# Patient Record
Sex: MALE | Race: WHITE | NOT HISPANIC OR LATINO | Employment: UNEMPLOYED | URBAN - METROPOLITAN AREA
[De-identification: names, ages, dates, MRNs, and addresses within clinical notes are randomized per-mention and may not be internally consistent; named-entity substitution may affect disease eponyms.]

---

## 2017-01-17 ENCOUNTER — TELEPHONE (OUTPATIENT)
Dept: FAMILY MEDICINE | Facility: CLINIC | Age: 47
End: 2017-01-17

## 2017-01-17 DIAGNOSIS — E13.9 DIABETES MELLITUS TYPE 1.5, MANAGED AS TYPE 1 (H): Primary | ICD-10-CM

## 2017-01-17 NOTE — TELEPHONE ENCOUNTER
Reason for Call:  Other prescription    Detailed comments: pharmacy calling on the lantus. The patient has always been on the solostar. This rx if for the vial. Which is correct.     Phone Number Patient can be reached at: Other phone number:  Number above.     Best Time: any    Can we leave a detailed message on this number? YES    Call taken on 1/17/2017 at 4:08 PM by Bhavana Ang

## 2017-01-18 NOTE — TELEPHONE ENCOUNTER
Spoke with pharmacy, patient has prescription for vials but an order for pen needles.  What does patient want?    Voice mail left for patient to call RN hotline at [phone].    Is patient using vials for insulin or pens?  Does patient need syringes?    Maya Ponce RN

## 2017-01-18 NOTE — TELEPHONE ENCOUNTER
Pt returned call. Uses the Lantus solostar pen.  Pt does not need pen needles at this time.  Rx sent.  Yvette Colindres RN

## 2017-03-08 ENCOUNTER — MYC MEDICAL ADVICE (OUTPATIENT)
Dept: FAMILY MEDICINE | Facility: CLINIC | Age: 47
End: 2017-03-08

## 2017-03-08 DIAGNOSIS — E13.9 DIABETES MELLITUS TYPE 1.5, MANAGED AS TYPE 1 (H): ICD-10-CM

## 2017-03-08 NOTE — TELEPHONE ENCOUNTER
Routing refill request to provider for review/approval because:  Labs not current:  Microalbumin  Please also see [employer] message below. Please advise.    Mae Edwards RN - ELIJAH Landa         Last Written Prescription Date: 1/18/17  Last Fill Quantity: 15 mL, # refills: 0  Last Office Visit with [facility] or  Health prescribing provider:  9/12/16   Next 5 appointments (look out 90 days)     Mar 24, 2017 12:45 PM CDT   Office Visit with Padmini Arthur MD   [facility] ([facility]    [address] [place] [id number]   [phone]                   BP Readings from Last 3 Encounters:   09/12/16 110/78   07/06/16 108/88   06/09/16 118/84     No results found for: MICROL  No results found for: MICROALBUMIN  Creatinine   Date Value Ref Range Status   12/09/2015 0.72 0.66 - 1.25 mg/dL Final   ]  GFR Estimate   Date Value Ref Range Status   12/09/2015 >90  Non  GFR Calc   >60 mL/min/1.7m2 Final   11/26/2015 >60 ml/min/1.73m2 Final   10/08/2014 >90  Non  GFR Calc   >60 mL/min/1.7m2 Final     GFR Estimate If Black   Date Value Ref Range Status   12/09/2015 >90   GFR Calc   >60 mL/min/1.7m2 Final   11/26/2015 >60 ml/min/1.73m2 Final   10/08/2014 >90   GFR Calc   >60 mL/min/1.7m2 Final     Lab Results   Component Value Date    CHOL 196 09/12/2016     Lab Results   Component Value Date    HDL 92 09/12/2016     Lab Results   Component Value Date    LDL 92 09/12/2016     12/09/2015     05/09/2008     Lab Results   Component Value Date    TRIG 60 09/12/2016     Lab Results   Component Value Date    CHOLHDLRATIO 2.7 10/08/2014     Lab Results   Component Value Date     09/12/2016     Lab Results   Component Value Date     09/12/2016     Lab Results   Component Value Date    A1C 7.5 09/12/2016    A1C 7.9 06/09/2016    A1C 8.4 12/09/2015    A1C 7.2 08/10/2015    A1C 7.3 05/11/2015     Potassium    Date Value Ref Range Status   12/09/2015 4.4 3.4 - 5.3 mmol/L Final
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
## 2017-03-24 ENCOUNTER — OFFICE VISIT (OUTPATIENT)
Dept: FAMILY MEDICINE | Facility: CLINIC | Age: 47
End: 2017-03-24
Payer: COMMERCIAL

## 2017-03-24 VITALS
OXYGEN SATURATION: 98 % | BODY MASS INDEX: 25.11 KG/M2 | HEART RATE: 80 BPM | SYSTOLIC BLOOD PRESSURE: 140 MMHG | TEMPERATURE: 97.2 F | WEIGHT: 175 LBS | DIASTOLIC BLOOD PRESSURE: 80 MMHG

## 2017-03-24 DIAGNOSIS — G25.81 RESTLESS LEG SYNDROME: ICD-10-CM

## 2017-03-24 DIAGNOSIS — F41.1 GENERALIZED ANXIETY DISORDER: ICD-10-CM

## 2017-03-24 DIAGNOSIS — R07.89 ATYPICAL CHEST PAIN: Primary | ICD-10-CM

## 2017-03-24 DIAGNOSIS — E13.9 DIABETES MELLITUS TYPE 1.5, MANAGED AS TYPE 1 (H): ICD-10-CM

## 2017-03-24 DIAGNOSIS — Z63.32 FAMILY DISRUPTION DUE TO CHILD IN WELFARE CUSTODY: ICD-10-CM

## 2017-03-24 DIAGNOSIS — I10 HYPERTENSION GOAL BP (BLOOD PRESSURE) < 140/90: ICD-10-CM

## 2017-03-24 DIAGNOSIS — E78.5 HYPERLIPIDEMIA LDL GOAL <100: ICD-10-CM

## 2017-03-24 DIAGNOSIS — Z79.4 ENCOUNTER FOR LONG-TERM (CURRENT) USE OF INSULIN (H): ICD-10-CM

## 2017-03-24 DIAGNOSIS — Z62.21 FAMILY DISRUPTION DUE TO CHILD IN WELFARE CUSTODY: ICD-10-CM

## 2017-03-24 LAB
ANION GAP SERPL CALCULATED.3IONS-SCNC: 15 MMOL/L (ref 3–14)
BUN SERPL-MCNC: 10 MG/DL (ref 7–30)
CALCIUM SERPL-MCNC: 9.5 MG/DL (ref 8.5–10.1)
CHLORIDE SERPL-SCNC: 100 MMOL/L (ref 94–109)
CO2 SERPL-SCNC: 24 MMOL/L (ref 20–32)
CREAT SERPL-MCNC: 0.71 MG/DL (ref 0.66–1.25)
GFR SERPL CREATININE-BSD FRML MDRD: ABNORMAL ML/MIN/1.7M2
GLUCOSE SERPL-MCNC: 313 MG/DL (ref 70–99)
HBA1C MFR BLD: 8.1 % (ref 4.3–6)
LDLC SERPL DIRECT ASSAY-MCNC: 109 MG/DL
POTASSIUM SERPL-SCNC: 4.8 MMOL/L (ref 3.4–5.3)
SODIUM SERPL-SCNC: 139 MMOL/L (ref 133–144)
TROPONIN I SERPL-MCNC: NORMAL UG/L (ref 0–0.04)

## 2017-03-24 PROCEDURE — 83721 ASSAY OF BLOOD LIPOPROTEIN: CPT | Performed by: FAMILY MEDICINE

## 2017-03-24 PROCEDURE — 83036 HEMOGLOBIN GLYCOSYLATED A1C: CPT | Performed by: FAMILY MEDICINE

## 2017-03-24 PROCEDURE — 84484 ASSAY OF TROPONIN QUANT: CPT | Performed by: FAMILY MEDICINE

## 2017-03-24 PROCEDURE — 80048 BASIC METABOLIC PNL TOTAL CA: CPT | Performed by: FAMILY MEDICINE

## 2017-03-24 PROCEDURE — 99214 OFFICE O/P EST MOD 30 MIN: CPT | Performed by: FAMILY MEDICINE

## 2017-03-24 PROCEDURE — 93000 ELECTROCARDIOGRAM COMPLETE: CPT | Performed by: FAMILY MEDICINE

## 2017-03-24 PROCEDURE — 36415 COLL VENOUS BLD VENIPUNCTURE: CPT | Performed by: FAMILY MEDICINE

## 2017-03-24 RX ORDER — ROPINIROLE 0.25 MG/1
0.25-0.5 TABLET, FILM COATED ORAL AT BEDTIME
Qty: 180 TABLET | Refills: 1 | Status: SHIPPED | OUTPATIENT
Start: 2017-03-24 | End: 2017-09-25

## 2017-03-24 RX ORDER — ESCITALOPRAM OXALATE 10 MG/1
10 TABLET ORAL DAILY
Qty: 30 TABLET | Refills: 1 | Status: SHIPPED | OUTPATIENT
Start: 2017-03-24 | End: 2017-05-26

## 2017-03-24 RX ORDER — BUPROPION HYDROCHLORIDE 300 MG/1
300 TABLET ORAL EVERY MORNING
Qty: 90 TABLET | Refills: 1 | Status: SHIPPED | OUTPATIENT
Start: 2017-03-24 | End: 2017-09-11

## 2017-03-24 RX ORDER — ASPIRIN 325 MG
325 TABLET ORAL DAILY
Qty: 1 TABLET | Refills: 0
Start: 2017-03-24 | End: 2017-09-11

## 2017-03-24 RX ORDER — ALPRAZOLAM 0.5 MG
0.5 TABLET ORAL 3 TIMES DAILY PRN
Qty: 45 TABLET | Refills: 1 | Status: SHIPPED | OUTPATIENT
Start: 2017-03-24 | End: 2017-08-21

## 2017-03-24 ASSESSMENT — ANXIETY QUESTIONNAIRES
5. BEING SO RESTLESS THAT IT IS HARD TO SIT STILL: NOT AT ALL
6. BECOMING EASILY ANNOYED OR IRRITABLE: SEVERAL DAYS
2. NOT BEING ABLE TO STOP OR CONTROL WORRYING: NOT AT ALL
3. WORRYING TOO MUCH ABOUT DIFFERENT THINGS: SEVERAL DAYS
1. FEELING NERVOUS, ANXIOUS, OR ON EDGE: SEVERAL DAYS
7. FEELING AFRAID AS IF SOMETHING AWFUL MIGHT HAPPEN: SEVERAL DAYS
IF YOU CHECKED OFF ANY PROBLEMS ON THIS QUESTIONNAIRE, HOW DIFFICULT HAVE THESE PROBLEMS MADE IT FOR YOU TO DO YOUR WORK, TAKE CARE OF THINGS AT HOME, OR GET ALONG WITH OTHER PEOPLE: NOT DIFFICULT AT ALL
GAD7 TOTAL SCORE: 4

## 2017-03-24 ASSESSMENT — PATIENT HEALTH QUESTIONNAIRE - PHQ9: 5. POOR APPETITE OR OVEREATING: NOT AT ALL

## 2017-03-24 ASSESSMENT — PAIN SCALES - GENERAL: PAINLEVEL: NO PAIN (0)

## 2017-03-24 NOTE — NURSING NOTE
"Chief Complaint   Patient presents with     Recheck Medication     Diabetes     Hypertension     Lipids     Health Maintenance     PHQ-9, GAIL, Mircroalbumin, A1C, Lipid       Initial /78  Pulse 91  Temp 97.2  F (36.2  C) (Oral)  Wt 175 lb (79.4 kg)  SpO2 99%  BMI 25.11 kg/m2 Estimated body mass index is 25.11 kg/(m^2) as calculated from the following:    Height as of 7/6/16: 5' 10\" (1.778 m).    Weight as of this encounter: 175 lb (79.4 kg).  Medication Reconciliation: complete      "

## 2017-03-24 NOTE — PATIENT INSTRUCTIONS
Your ekg has changed slightly  I am concerned, because you continue to have pain, that you may be having angina (heart pain).  I do recommend going to the ER now because of your symptoms-- it's the only way that we can be sure that there is no significant blockage in the heart.     We will connect after the ER (Monday) to talk about the next steps for your health.
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
## 2017-03-25 ASSESSMENT — PATIENT HEALTH QUESTIONNAIRE - PHQ9: SUM OF ALL RESPONSES TO PHQ QUESTIONS 1-9: 1

## 2017-03-25 ASSESSMENT — ANXIETY QUESTIONNAIRES: GAD7 TOTAL SCORE: 4

## 2017-04-03 DIAGNOSIS — I10 HYPERTENSION GOAL BP (BLOOD PRESSURE) < 140/90: Primary | ICD-10-CM

## 2017-04-03 NOTE — TELEPHONE ENCOUNTER
lisinopril (PRINIVIL,ZESTRIL) 40 MG tablet      Last Written Prescription Date: 6/9/16  Last Fill Quantity: 90, # refills: 1  Last Office Visit with G, P or [facility] prescribing provider: 3/24/17       Potassium   Date Value Ref Range Status   03/24/2017 4.8 3.4 - 5.3 mmol/L Final     Creatinine   Date Value Ref Range Status   03/24/2017 0.71 0.66 - 1.25 mg/dL Final     BP Readings from Last 3 Encounters:   03/24/17 140/80   09/12/16 110/78   07/06/16 108/88

## 2017-04-04 RX ORDER — LISINOPRIL 40 MG/1
40 TABLET ORAL DAILY
Qty: 90 TABLET | Refills: 3 | Status: SHIPPED | OUTPATIENT
Start: 2017-04-04 | End: 2018-08-21

## 2017-04-04 NOTE — TELEPHONE ENCOUNTER
Prescription approved per [facility] Refill Protocol.  Mae Edwards, RN - BC

## 2017-04-17 ENCOUNTER — MYC MEDICAL ADVICE (OUTPATIENT)
Dept: FAMILY MEDICINE | Facility: CLINIC | Age: 47
End: 2017-04-17

## 2017-04-17 DIAGNOSIS — E10.9 TYPE 1 DIABETES MELLITUS WITHOUT COMPLICATION (H): Primary | ICD-10-CM

## 2017-05-26 DIAGNOSIS — E13.9 DIABETES MELLITUS TYPE 1.5, MANAGED AS TYPE 1 (H): ICD-10-CM

## 2017-05-26 DIAGNOSIS — F41.1 GENERALIZED ANXIETY DISORDER: ICD-10-CM

## 2017-05-26 RX ORDER — ESCITALOPRAM OXALATE 10 MG/1
10 TABLET ORAL DAILY
Qty: 30 TABLET | Refills: 1 | Status: SHIPPED | OUTPATIENT
Start: 2017-05-26 | End: 2017-08-20

## 2017-05-26 NOTE — TELEPHONE ENCOUNTER
blood glucose monitoring Freestyle lite test strip      Last Written Prescription Date: 01/17/17  Last Fill Quantity: 300,  # refills: 1   Last Office Visit with [facility] or  Health prescribing provider: 03/24/17    escitalopram (LEXAPRO) 10 MG tablet       Last Written Prescription Date: 03/24/17  Last Fill Quantity: 30; # refills: 1  Last Office Visit with [facility], [facility] or  Health prescribing provider:  03/24/17        Last PHQ-9 score on record=   PHQ-9 SCORE 3/24/2017   Total Score -   Total Score 1       Lab Results   Component Value Date     09/12/2016     Lab Results   Component Value Date     09/12/2016       Tiara Frost [facility]

## 2017-05-26 NOTE — TELEPHONE ENCOUNTER
Prescription approved per [facility] Refill Protocol.  Mae Edwards, RN - BC

## 2017-07-11 ENCOUNTER — TELEPHONE (OUTPATIENT)
Dept: FAMILY MEDICINE | Facility: CLINIC | Age: 47
End: 2017-07-11

## 2017-07-11 NOTE — TELEPHONE ENCOUNTER
Panel Management Review      Patient has the following on his problem list:     Diabetes    ASA: Passed    Last A1C  Lab Results   Component Value Date    A1C 8.1 03/24/2017    A1C 7.5 09/12/2016    A1C 7.9 06/09/2016    A1C 8.4 12/09/2015    A1C 7.2 08/10/2015     A1C tested: FAILED    Last LDL:    Lab Results   Component Value Date    CHOL 196 09/12/2016     Lab Results   Component Value Date    HDL 92 09/12/2016     Lab Results   Component Value Date     03/24/2017    LDL 92 09/12/2016     Lab Results   Component Value Date    TRIG 60 09/12/2016     Lab Results   Component Value Date    CHOLHDLRATIO 2.7 10/08/2014     Lab Results   Component Value Date    NHDL 104 09/12/2016       Is the patient on a Statin? YES             Is the patient on Aspirin? YES    Medications     HMG CoA Reductase Inhibitors    pravastatin (PRAVACHOL) 40 MG tablet    Salicylates    aspirin 325 MG tablet    aspirin 81 MG EC tablet          Last three blood pressure readings:  BP Readings from Last 3 Encounters:   03/24/17 140/80   09/12/16 110/78   07/06/16 108/88       Date of last diabetes office visit: 03/24/17     Tobacco History:     History   Smoking Status     Former Smoker     Packs/day: 0.40     Types: Cigarettes   Smokeless Tobacco     Never Used     Comment: 2-4  cigarettes daily           Composite cancer screening  Chart review shows that this patient is due/due soon for the following None  Summary:    Patient is due/failing the following:   A1C    Action needed:   Patient needs office visit for Diabetes and anxiety recheck.    Type of outreach:    Phone, left message for patient to call back.     Questions for provider review:    None                                                                                                                                    Leana Martinez MA       Chart routed to Care Team .

## 2017-07-11 NOTE — TELEPHONE ENCOUNTER
Needs to be seen asap for dizziness and neuro symptoms. Can do labs for diabetes at that time.   Will need to see another provider this week for this. Not appropriate for evisit or phone visit.   Book for diabetes/anxiety for 30 min in August-- Ok to use 2 Same day spots  Padmini Arthur MD

## 2017-07-11 NOTE — TELEPHONE ENCOUNTER
Patient returned call transferred to RN due to symptoms of lightheadness/Dizziness. Leana Martinez MA

## 2017-07-11 NOTE — TELEPHONE ENCOUNTER
See triage note below.  Patient needs visit for Diabetic check and anxiety and patient now has dizziness.   Please advise- next 30 min visit is Patricia Ponce RN

## 2017-07-11 NOTE — TELEPHONE ENCOUNTER
Voice mail left for patient to call RN hotline at [phone].  Needs visit this week with any provider for dizziness and for diabetic labs.  Patient also needs Diabetes/anxiety f/u in august with Dr. Jerson murray for 2 same day spots.   Maya Ponce RN

## 2017-07-11 NOTE — TELEPHONE ENCOUNTER
"Patient was called to set up appointment for diabetic and anxiety recheck.  Patient then reported dizziness.   Rudolph Tao is a 47 year old male who calls with dizziness.  Patient is dizzy when lifting things at work like heavy objects and also when turning head around in car to look behind.  This does not happen every time.  Patient denies chest pain, denies SOB, and denies vision changes.  Patient does have ringing in ears for years, this is more noticeable when in a quiet room.   Patient wondering if this is from his lisinopril?  Patient then reported that he has also had numbness in finger tips on left hand.  Denies discoloration and denies changes in skin temperature.  Patient reports this happened to his father and he needed a nerve repair.  Patient wondering if this could be related to the disks in his neck?     NURSING ASSESSMENT:  Description:  dizziness  Onset/duration:  Patient not sure \"a while\"  Precip. factors:  Patient taking lisinopril   Associated symptoms:  Numbness in left finger tips  Improves/worsens symptoms:  nothing  Last exam/Treatment:  3/24/17  Allergies:   Allergies   Allergen Reactions     Avapro [Irbesartan]      Profuse sweating     Crestor [Rosuvastatin Calcium]      cough     Simvastatin      Elevated BS readings     Sulfa Drugs Swelling       MEDICATIONS:   Taking medication(s) as prescribed? Yes  Taking over the counter medication(s?) No  Any medication side effects? No significant side effects    Any barriers to taking medication(s) as prescribed?  No  Medication(s) improving/managing symptoms?  No  Medication reconciliation completed: Yes      NURSING PLAN: Routed to provider Yes    RECOMMENDED DISPOSITION: routing to provider to advise   Will comply with recommendation:  If further questions/concerns or if symptoms do not improve, worsen or new symptoms develop, call your PCP or [facility] as soon as possible.      Guideline used:  Telephone Triage Protocols for " Nurses, Fourth Edition, Lakisha Ponce RN

## 2017-07-13 NOTE — TELEPHONE ENCOUNTER
Patient called the RN hotline and spoke with writer.  RN notified patient of the provider's message as it's written below.  Patient agrees to be seen next Thursday or Friday as he can't come today or tomorrow.  RN helped patient scheduling with Dr. Weathers on 7/20/17 due to patient can't come before 4:30 PM and late provider that is available on Thursday is Dr. Weathers.  Patient states he has to check his August schedule to schedule with Dr. Arthur and declined to schedule now.  Patient states he will call and schedule for the August appointment with Dr. Arthur.       Bola RODRIGUEZ RN, BSN

## 2017-08-18 ENCOUNTER — TELEPHONE (OUTPATIENT)
Dept: FAMILY MEDICINE | Facility: CLINIC | Age: 47
End: 2017-08-18

## 2017-08-18 ENCOUNTER — NURSE TRIAGE (OUTPATIENT)
Dept: NURSING | Facility: CLINIC | Age: 47
End: 2017-08-18

## 2017-08-18 NOTE — TELEPHONE ENCOUNTER
"  Reason for Disposition    [1] MODERATE dizziness (e.g., interferes with normal activities) AND [2] has NOT been evaluated by physician for this  (Exception: dizziness caused by heat exposure, sudden standing, or poor fluid intake)     \"I have noticed recently that I've been getting dizzy and light headed. I had a stress test and a work up that was normal. I also had a recent appt. With my doctor that I needed to cancel. (see epic). I am not having chest pain or palpitations this time. It's just really light headed and usually upon exertion. I have not checked my blood pressure or blood sugar yet today. I also have noticed my left fingers (only) feel numb some times and some \"mild\" nausea.\" denies other sx. Triaged, gave home care advice or when to seek ER. Also advised appt with PCP. Transferred to scheduling for appt.    Additional Information    Negative: Severe difficulty breathing (e.g., struggling for each breath, speaks in single words)    Negative: [1] Difficulty breathing or swallowing AND [2] started suddenly after medicine, an allergic food or bee sting    Negative: Shock suspected (e.g., cold/pale/clammy skin, too weak to stand, low BP, rapid pulse)    Negative: Difficult to awaken or acting confused  (e.g., disoriented, slurred speech)    Negative: [1] Weakness (i.e., paralysis, loss of muscle strength) of the face, arm or leg on one side of the body AND [2] sudden onset AND [3] present now    Negative: [1] Numbness (i.e., loss of sensation) of the face, arm or leg on one side of the body AND [2] sudden onset AND [3] present now    Negative: [1] Loss of speech or garbled speech AND [2] sudden onset AND [3] present now    Negative: Overdose (accidental or intentional) of medications    Negative: [1] Fainted > 15 minutes ago AND [2] still feels too weak or dizzy to stand    Negative: Heart beating < 50 beats per minute OR > 140 beats per minute    Negative: Sounds like a life-threatening emergency to the " "triager    Negative: Chest pain    Negative: Rectal bleeding, bloody stool, or tarry-black stool    Negative: [1] Vomiting AND [2] contains red blood or black (\"coffee ground\") material    Negative: Vomiting is main symptom    Negative: Diarrhea is main symptom    Negative: Headache is main symptom    Negative: Patient states that he/she is having an anxiety/panic attack    Negative: Dizziness from low blood sugar (i.e., < 60 mg/dl or 3.5 mmol/l)    Negative: Dizziness is described as a spinning sensation (i.e., vertigo)    Negative: Heat exhaustion suspected    Negative: Difficulty breathing    Negative: SEVERE dizziness (e.g., unable to stand, requires support to walk, feels like passing out now)    Negative: Extra heart beats OR irregular heart beating  (i.e., \"palpitations\")    Negative: [1] Drinking very little AND [2] dehydration suspected (e.g., no urine > 12 hours, very dry mouth, very lightheaded)    Negative: Patient sounds very sick or weak to the triager    Negative: [1] Dizziness caused by heat exposure, sudden standing, or poor fluid intake AND [2] no improvement after 2 hours of rest and fluids    Negative: [1] Fever > 103 F (39.4 C) AND [2] not able to get the fever down using Fever Care Advice    Negative: [1] Fever > 101 F (38.3 C) AND [2] age > 60    Negative: [1] Fever > 101 F (38.3 C) AND [2] bedridden (e.g., nursing home patient, CVA, chronic illness, recovering from surgery)    Negative: [1] Fever > 100.5 F (38.1 C) AND [2] diabetes mellitus or weak immune system (e.g., HIV positive, cancer chemo, splenectomy, organ transplant, chronic steroids)    Protocols used: DIZZINESS - LIGHTHEADEDNESS-ADULT-AH    "

## 2017-08-18 NOTE — TELEPHONE ENCOUNTER
Reason for call:  Patient reporting a symptom    Symptom or request: Dizziness, numbness in fingers .    Duration (how long have symptoms been present): few days.    Have you been treated for this before? No    Additional comments: Patient would like to schedule appointment . States he is not feeling well , abdominal pain, dizzy and numbness in his fingers.      Phone Number patient can be reached at:  Home number on file [phone] (home)    Best Time:  any    Can we leave a detailed message on this number:  YES    Call taken on 8/18/2017 at 12:14 PM by Anjali Riggs

## 2017-08-20 ENCOUNTER — TELEPHONE (OUTPATIENT)
Dept: FAMILY MEDICINE | Facility: CLINIC | Age: 47
End: 2017-08-20

## 2017-08-20 DIAGNOSIS — F41.1 GENERALIZED ANXIETY DISORDER: ICD-10-CM

## 2017-08-21 ENCOUNTER — OFFICE VISIT (OUTPATIENT)
Dept: FAMILY MEDICINE | Facility: CLINIC | Age: 47
End: 2017-08-21
Payer: COMMERCIAL

## 2017-08-21 VITALS
BODY MASS INDEX: 26.97 KG/M2 | OXYGEN SATURATION: 98 % | HEIGHT: 67 IN | HEART RATE: 90 BPM | SYSTOLIC BLOOD PRESSURE: 130 MMHG | TEMPERATURE: 98.4 F | DIASTOLIC BLOOD PRESSURE: 84 MMHG | WEIGHT: 171.8 LBS

## 2017-08-21 DIAGNOSIS — F41.1 GENERALIZED ANXIETY DISORDER: ICD-10-CM

## 2017-08-21 DIAGNOSIS — H93.13 TINNITUS, BILATERAL: ICD-10-CM

## 2017-08-21 DIAGNOSIS — F17.200 TOBACCO USE DISORDER: ICD-10-CM

## 2017-08-21 DIAGNOSIS — R20.2 PARESTHESIAS: ICD-10-CM

## 2017-08-21 DIAGNOSIS — G56.03 BILATERAL CARPAL TUNNEL SYNDROME: ICD-10-CM

## 2017-08-21 DIAGNOSIS — R42 DIZZINESS: Primary | ICD-10-CM

## 2017-08-21 PROCEDURE — 99214 OFFICE O/P EST MOD 30 MIN: CPT | Performed by: FAMILY MEDICINE

## 2017-08-21 RX ORDER — ALPRAZOLAM 0.5 MG
0.5 TABLET ORAL 3 TIMES DAILY PRN
Qty: 45 TABLET | Refills: 0 | Status: SHIPPED | OUTPATIENT
Start: 2017-08-21 | End: 2021-03-30

## 2017-08-21 NOTE — PATIENT INSTRUCTIONS
Plan:   1. Hold lexapro in meantime, continue Lisinopril and if gets dizzy will consider cutting of Lisinopril.dose  2. Splint for CTS    [facility]    If you have any questions regarding to your visit please contact your care team:       Team Purple:   Clinic Hours Telephone Number   Dr. Margie Arthur     7am-7pm  Monday - Thursday   7am-5pm  Fridays  [phone]  (Appointment scheduling available 24/7)    Questions about your Visit?   Team Line:  [phone]   Urgent Care - [place] - 11am-9pm Monday-Friday Saturday-Sunday- 9am-5pm   [place] - 5pm-9pm Monday-Friday Saturday-Sunday- 9am-5pm  [phone] - [place]   [phone] - [place]       What options do I have for visits at the clinic other than the traditional office visit?  To expand how we care for you, many of our providers are utilizing electronic visits (e-visits) and telephone visits, when medically appropriate, for interactions with their patients rather than a visit in the clinic.   We also offer nurse visits for many medical concerns. Just like any other service, we will bill your insurance company for this type of visit based on time spent on the phone with your provider. Not all insurance companies cover these visits. Please check with your medical insurance if this type of visit is covered. You will be responsible for any charges that are not paid by your insurance.      E-visits via [email or web address]:  generally incur a $35.00 fee.  Telephone visits:  Time spent on the phone: *charged based on time that is spent on the phone in increments of 10 minutes. Estimated cost:   5-10 mins $30.00   11-20 mins. $59.00   21-30 mins. $85.00     Use [email or web address] (secure email communication and access to your chart) to send your primary care provider a message or make an appointment. Ask someone on your Team how to sign up for [email or web address].  For a Price Quote for your services, please  call our Consumer Price Line at [phone].  As always, Thank you for trusting us with your health care needs!    Discharged by Padmini FIELDS CMA ([facility])

## 2017-08-21 NOTE — NURSING NOTE
"Chief Complaint   Patient presents with     Dizziness     light headness  and dizzness     Ear Problem     ringing in ear      Numbness     having numbness in left hand fingertips     Spasms     in bilateral hand but mostly in right hand      Medication Reconciliation     stopped taking Lisinopril x 2-3 days       Initial /80  Pulse 90  Temp 98.4  F (36.9  C) (Oral)  Ht 5' 7.32\" (1.71 m)  Wt 171 lb 12.8 oz (77.9 kg)  SpO2 98%  BMI 26.65 kg/m2 Estimated body mass index is 26.65 kg/(m^2) as calculated from the following:    Height as of this encounter: 5' 7.32\" (1.71 m).    Weight as of this encounter: 171 lb 12.8 oz (77.9 kg).  Medication Reconciliation: complete     An ALEJANDRO Schilling    "

## 2017-08-21 NOTE — PROGRESS NOTES
SUBJECTIVE:   Rudolph Tao is a 47 year old male who presents to clinic today for the following health issues:    Patient presents with:  Dizziness: light headness  and dizzness  Ear Problem: ringing in ear   Numbness: having numbness in left hand fingertips  Spasms: in bilateral hand but mostly in right hand   Medication Reconciliation: stopped taking Lisinopril x 2-3 days    Dizziness x a while  Gets light headed when exerting.  Has not taken his medication for about 3 days and has not felt dizzy.  Also reports that this started when he started taking Lexapro 3 months ago; added to Wellbutrin for his anxiety arising from a protracted divorce [place].  His BP has also been elevated but low while taking Lisinopril.     Tinnitus:  Has ringing in the ears. Exaggerated when quiet.    Numbness tingling/CTS:  Finger tips with numbness and involuntary muscle spasms for fingers for a while especially middle fingers; for a couple of months worse in the morning. He says his neck is a mess has had MRI just some DDD.  Works in [employer] in sales  Has CTS from previous studies in the past.    Hypertension Follow-up  BP still at goal  BP Readings from Last 6 Encounters:   08/21/17 130/84   03/24/17 140/80   09/12/16 110/78   07/06/16 108/88   06/09/16 118/84   12/31/15 (!) 148/93     GAIL:   Needs refill for Xanax   Helps him when nearly goes into panic attack.   Hx of complicated protracted divorce proceedings.      Outpatient blood pressures are not being checked.    Low Salt Diet: not monitoring salt    Problem list and histories reviewed & adjusted, as indicated.  Additional history: as documented    Patient Active Problem List   Diagnosis     GENERALIZED ANXIETY     Hyperlipidemia LDL goal <100     Encounter for long-term (current) use of insulin (H)     Chronic low back pain     Libido, decreased     Restless legs     Tobacco abuse     Cervicalgia     Paresthesias     Persistent disorder of initiating or maintaining  "sleep     Spasms of the hands or feet     Hypertension goal BP (blood pressure) < 140/90     Right-sided low back pain without sciatica     Type 1 diabetes mellitus without complication (H)     Diabetes mellitus type 1.5, managed as type 1 (H)     Past Surgical History:   Procedure Laterality Date     OSTEOTOMY FOOT  1/8/2013    Procedure: OSTEOTOMY FOOT;  Left Foot Distal First Metarsal Osteotomy, Second Toe Hammer Toe Correction, Second Metatarsal Weil Osteotomy;  Surgeon: Robert Augustine DPM;  Location: [facility]     SURGICAL HISTORY OF -   1992    [facility]       Social History   Substance Use Topics     Smoking status: Former Smoker     Packs/day: 0.40     Types: Cigarettes     Smokeless tobacco: Never Used      Comment: 2-4  cigarettes daily     Alcohol use Yes      Comment: 2 drinks daily     Family History   Problem Relation Age of Onset     Unknown/Adopted Mother      Unknown/Adopted Father      Unknown/Adopted Maternal Grandmother      Unknown/Adopted Maternal Grandfather      Unknown/Adopted Paternal Grandmother      Unknown/Adopted Paternal Grandfather          Reviewed and updated as needed this visit by clinical staffTobacco  Allergies  Meds  Med Hx  Surg Hx  Fam Hx  Soc Hx        ROS:  Constitutional, HEENT, cardiovascular, pulmonary, gi and gu systems are negative, except as otherwise noted.      OBJECTIVE:   /80  Pulse 90  Temp 98.4  F (36.9  C) (Oral)  Ht 5' 7.32\" (1.71 m)  Wt 171 lb 12.8 oz (77.9 kg)  SpO2 98%  BMI 26.65 kg/m2  Body mass index is 26.65 kg/(m^2).  GENERAL: healthy, alert and no distress  HANDS: No muscle wasting.  ENT: Ear canals and TMs clear.  NECK: no adenopathy and thyroid normal to palpation  RESP: lungs clear to auscultation - no rales, rhonchi or wheezes  CV: regular rate and rhythm, no murmur, click or rub, no peripheral edema and peripheral pulses strong  ABDOMEN: soft, nontender,  no masses and bowel sounds normal  MS: no gross musculoskeletal defects noted, " no edema    Diagnostic Test Results:  none     ASSESSMENT/PLAN:   (R42) Dizziness  (primary encounter diagnosis)  Comment: Likely hypotension or medication related. He has not been on BP medication and dizziness is better but also reports that started while taking lexapro and stopped both medication. When taking BP medication BP was low. Discussed restarting Lexapro and see if dizziness occurs, if does not then will cut down dose of BP medication.  Plan: Recheck in 1 week: BP and dizziness status.    (G56.03) Bilateral carpal tunnel syndrome  Comment: Has history of CTS  Plan: Try wrist brace and recheck in 1 week    (R20.2) Paresthesias  Comment: Related to CTS  Plan: Wrist Brace    (H93.13) Tinnitus, bilateral  Comment: On going, possibly meniere's disease  Plan: Consider ENT evaluation    (F41.1) GENERALIZED ANXIETY  Comment: refill xanax  Plan: ALPRAZolam (XANAX) 0.5 MG tablet    Follow up in 1 week or sooner with concerns    Jacky Maradiaga MD  [facility]

## 2017-08-21 NOTE — MR AVS SNAPSHOT
After Visit Summary   8/21/2017    Rudolph Tao    MRN: [id number]           Patient Information     Date Of Birth          1970        Visit Information        Provider Department      8/21/2017 4:40 PM Jacky Maradiaga MD [facility]        Today's Diagnoses     Dizziness    -  1    Bilateral carpal tunnel syndrome        Tobacco use disorder        Paresthesias        GENERALIZED ANXIETY          Care Instructions    Plan:   1. Hold lexapro in meantime, continue Lisinopril and if gets dizzy will consider cutting of Lisinopril.dose  2. Splint for CTS    [facility]    If you have any questions regarding to your visit please contact your care team:       Team Purple:   Clinic Hours Telephone Number   Dr. Margie Arthur     7am-7pm  Monday - Thursday   7am-5pm  Fridays  [phone]  (Appointment scheduling available 24/7)    Questions about your Visit?   Team Line:  [phone]   Urgent Care - [place] and [place] - 11am-9pm Monday-Friday Saturday-Sunday- 9am-5pm   [place] - 5pm-9pm Monday-Friday Saturday-Sunday- 9am-5pm  [phone] - [place]   [phone] - [place]       What options do I have for visits at the clinic other than the traditional office visit?  To expand how we care for you, many of our providers are utilizing electronic visits (e-visits) and telephone visits, when medically appropriate, for interactions with their patients rather than a visit in the clinic.   We also offer nurse visits for many medical concerns. Just like any other service, we will bill your insurance company for this type of visit based on time spent on the phone with your provider. Not all insurance companies cover these visits. Please check with your medical insurance if this type of visit is covered. You will be responsible for any charges that are not paid by your insurance.      E-visits via [employer]:   generally incur a $35.00 fee.  Telephone visits:  Time spent on the phone: *charged based on time that is spent on the phone in increments of 10 minutes. Estimated cost:   5-10 mins $30.00   11-20 mins. $59.00   21-30 mins. $85.00     Use Qubulushart (secure email communication and access to your chart) to send your primary care provider a message or make an appointment. Ask someone on your Team how to sign up for [email or web address].  For a Price Quote for your services, please call our [employer] Price Line at [phone].  As always, Thank you for trusting us with your health care needs!    Discharged by Padmini FIELDS CMA ([facility])            Follow-ups after your visit        Follow-up notes from your care team     Return in about 1 week (around 8/28/2017).      Who to contact     If you have questions or need follow up information about today's clinic visit or your schedule please contact [facility] directly at [phone].  Normal or non-critical lab and imaging results will be communicated to you by MyChart, letter or phone within 4 business days after the clinic has received the results. If you do not hear from us within 7 days, please contact the clinic through Qubulushart or phone. If you have a critical or abnormal lab result, we will notify you by phone as soon as possible.  Submit refill requests through [employer] or call your pharmacy and they will forward the refill request to us. Please allow 3 business days for your refill to be completed.          Additional Information About Your Visit        Qubulushart Information     [email or web address] gives you secure access to your electronic health record. If you see a primary care provider, you can also send messages to your care team and make appointments. If you have questions, please call your primary care clinic.  If you do not have a primary care provider, please call [phone] and they will assist you.        Care EveryWhere ID     This is your Care EveryWhere ID. This could  "be used by other organizations to access your [place] medical records  [id number]        Your Vitals Were     Pulse Temperature Height Pulse Oximetry BMI (Body Mass Index)       90 98.4  F (36.9  C) (Oral) 5' 7.32\" (1.71 m) 98% 26.65 kg/m2        Blood Pressure from Last 3 Encounters:   08/21/17 140/80   03/24/17 140/80   09/12/16 110/78    Weight from Last 3 Encounters:   08/21/17 171 lb 12.8 oz (77.9 kg)   03/24/17 175 lb (79.4 kg)   09/12/16 170 lb (77.1 kg)              Today, you had the following     No orders found for display         Where to get your medicines      Some of these will need a paper prescription and others can be bought over the counter.  Ask your nurse if you have questions.     Bring a paper prescription for each of these medications     ALPRAZolam 0.5 MG tablet          Primary Care Provider Office Phone # Fax #    Padmini Arthur [phone]       [facility] [address]        Equal Access to Services     [facility] AH: Hadii meme ku hadasho Sostephanie, waaxda luqadaha, qaybta kaalmada adeobie, grisel george . So [facility] [phone].    ATENCIÓN: Si habla español, tiene a may disposición servicios gratuitos de asistencia lingüística. LlAC[facility] [phone].    We comply with applicable federal civil rights laws and [place] laws. We do not discriminate on the basis of race, color, national origin, age, disability sex, sexual orientation or gender identity.            Thank you!     Thank you for choosing [facility]  for your care. Our goal is always to provide you with excellent care. Hearing back from our patients is one way we can continue to improve our services. Please take a few minutes to complete the written survey that you may receive in the mail after your visit with us. Thank you!             Your Updated Medication List - Protect others around you: Learn how to safely use, store and throw away your " medicines at www.disposemymeds.org.          This list is accurate as of: 8/21/17  5:23 PM.  Always use your most recent med list.                   Brand Name Dispense Instructions for use Diagnosis    ALPRAZolam 0.5 MG tablet    XANAX    45 tablet    Take 1 tablet (0.5 mg) by mouth 3 times daily as needed for anxiety 1 month supply    Generalized anxiety disorder       * aspirin 81 MG EC tablet     90 tablet    Take 1 tablet (81 mg) by mouth daily        * aspirin 325 MG tablet     1 tablet    Take 1 tablet (325 mg) by mouth daily    Atypical chest pain       blood glucose monitoring lancets     3 Box    Use as directed to test blood sugar 4-6 times daily, may test up to 10 times daily if needed. Needs Visit For Further Refills    Type I (juvenile type) diabetes mellitus without mention of complication, not stated as uncontrolled       blood glucose monitoring test strip    no brand specified    300 each    Test as directedtest 4-6 times daily, up to 10 times daily    Diabetes mellitus type 1.5, managed as type 1 (H)       buPROPion 300 MG 24 hr tablet    WELLBUTRIN XL    90 tablet    Take 1 tablet (300 mg) by mouth every morning    Generalized anxiety disorder       cetirizine 10 MG tablet    zyrTEC     Take 10 mg by mouth daily        cyclobenzaprine 5 MG tablet    FLEXERIL    90 tablet    TAKE 1 TABLET (5 MG) BY MOUTH NIGHTLY AS NEEDED FOR MUSCLE SPASMS    Chronic low back pain       escitalopram 10 MG tablet    LEXAPRO    30 tablet    Take 1 tablet (10 mg) by mouth daily    Generalized anxiety disorder       fluticasone 50 MCG/ACT spray    FLONASE    16 g    Spray 1-2 sprays into both nostrils daily    Hoarseness       GLUCAGON EMERGENCY KIT 1 MG Kit     1    use as directed    Type I (juvenile type) diabetes mellitus without mention of complication, not stated as uncontrolled       GLUCOSE CONTROL Liqd     one bottle    as directed    Type I (juvenile type) diabetes mellitus without mention of complication,  not stated as uncontrolled       insulin aspart 100 UNITS/ML injection    NovoLOG    3 Month    Use with sliding scale, approximately 2-5 units daily    Diabetes mellitus type 1.5, managed as type 1 (H)       insulin glargine 100 UNIT/ML injection    LANTUS SOLOSTAR    15 mL    Inject 32 Units Subcutaneous daily    Diabetes mellitus type 1.5, managed as type 1 (H)       insulin pen needle 31G X 5 MM    B-D U/F    300 each    by Device route 6 times daily    Type 1 diabetes mellitus without complication (H)       lisinopril 40 MG tablet    PRINIVIL/ZESTRIL    90 tablet    Take 1 tablet (40 mg) by mouth daily No refills. Needs visit    Hypertension goal BP (blood pressure) < 140/90       metoclopramide 10 MG tablet    REGLAN    30 tablet    Take 1 tablet (10 mg) by mouth 4 times daily as needed    Non-intractable vomiting with nausea, vomiting of unspecified type       metroNIDAZOLE 0.75 % cream    METROCREAM    45 g    Apply topically 2 times daily    Folliculitis       naproxen 500 MG tablet    NAPROSYN    30 tablet    Take 1 tablet (500 mg) by mouth 2 times daily as needed for moderate pain    Bilateral low back pain without sciatica       ondansetron 4 MG ODT tab    ZOFRAN ODT    30 tablet    Take 1-2 tablets (4-8 mg) by mouth every 8 hours as needed for nausea    Non-intractable vomiting with nausea, vomiting of unspecified type       order for DME     1 each    Glucometer, brand as covered by insurance.    Diabetes, type 1.5, uncontrolled, managed as type 1 (H), Encounter for long-term (current) use of insulin (H)       * order for DME     1 each    Glucometer, brand as covered by insurance.    Diabetes, type 1.5, uncontrolled, managed as type 1 (H)       * order for DME     400 each    Lancets.  Four times daily and prn.    Diabetes, type 1.5, uncontrolled, managed as type 1 (H)       * order for DME     400 each    Test strips for pt's glucometer, brand as covered by insurance. Test four times daily and prn.     Diabetes, type 1.5, uncontrolled, managed as type 1 (H)       * order for DME     400 each    Test strips for pt's glucometer, brand as covered by insurance. Test 6 times daily and prn.    Diabetes, type 1.5, uncontrolled, managed as type 1 (H)       * order for DME     400 each    Lancets.  6 times daily and prn    Diabetes, type 1.5, uncontrolled, managed as type 1 (H)       * order for DME     1 each    Glucometer, brand as covered by insurance.    Diabetes, type 1.5, uncontrolled, managed as type 1 (H)       pravastatin 40 MG tablet    PRAVACHOL    90 tablet    TAKE 1 TABLET (40 MG) BY MOUTH DAILY    Hyperlipidemia LDL goal <100       rOPINIRole 0.25 MG tablet    REQUIP    180 tablet    Take 1-2 tablets (0.25-0.5 mg) by mouth At Bedtime    Restless leg syndrome       tadalafil 10 MG tablet    CIALIS    12 tablet    Take 0.5-1 tablets (5-10 mg) by mouth daily as needed for erectile dysfunction Never use with nitroglycerin, terazosin or doxazosin.    ED (erectile dysfunction)       triamcinolone 0.1 % ointment    KENALOG    45 g    Apply sparingly to affected area three times daily sparingly    Rash       * Notice:  This list has 8 medication(s) that are the same as other medications prescribed for you. Read the directions carefully, and ask your doctor or other care provider to review them with you.

## 2017-08-22 NOTE — TELEPHONE ENCOUNTER
escitalopram (LEXAPRO) 10 MG tablet     Last Written Prescription Date: 5/26/17  Last Fill Quantity: 30, # refills: 1  Last Office Visit with G primary care provider:  8/21/17       Last PHQ-9 score on record=   PHQ-9 SCORE 3/24/2017   Total Score -   Total Score 1

## 2017-08-25 RX ORDER — ESCITALOPRAM OXALATE 10 MG/1
TABLET ORAL
Qty: 30 TABLET | Refills: 3 | Status: SHIPPED | OUTPATIENT
Start: 2017-08-25 | End: 2017-09-21

## 2017-09-01 DIAGNOSIS — E13.9 DIABETES MELLITUS TYPE 1.5, MANAGED AS TYPE 1 (H): ICD-10-CM

## 2017-09-05 ENCOUNTER — NURSE TRIAGE (OUTPATIENT)
Dept: NURSING | Facility: CLINIC | Age: 47
End: 2017-09-05

## 2017-09-05 RX ORDER — INSULIN GLARGINE 100 [IU]/ML
INJECTION, SOLUTION SUBCUTANEOUS
Qty: 4 ML | Refills: 1 | Status: SHIPPED | OUTPATIENT
Start: 2017-09-05 | End: 2018-03-16

## 2017-09-05 NOTE — TELEPHONE ENCOUNTER
Routing refill request to provider for review/approval because:  Labs not current:  Microalbumin    Mabel Juares RN

## 2017-09-05 NOTE — TELEPHONE ENCOUNTER
insulin glargine (LANTUS SOLOSTAR) 100 UNIT/ML injection      Last Written Prescription Date: 03/24/2017  Last Fill Quantity: 15mL, # refills: 0  Last Office Visit with G, P or [facility] prescribing provider:  08/21/2017        BP Readings from Last 3 Encounters:   08/21/17 130/84   03/24/17 140/80   09/12/16 110/78     No results found for: MICROL  No results found for: UMALCR  Creatinine   Date Value Ref Range Status   03/24/2017 0.71 0.66 - 1.25 mg/dL Final   ]  GFR Estimate   Date Value Ref Range Status   03/24/2017 >90  Non  GFR Calc   >60 mL/min/1.7m2 Final   12/09/2015 >90  Non  GFR Calc   >60 mL/min/1.7m2 Final   11/26/2015 >60 ml/min/1.73m2 Final     GFR Estimate If Black   Date Value Ref Range Status   03/24/2017 >90   GFR Calc   >60 mL/min/1.7m2 Final   12/09/2015 >90   GFR Calc   >60 mL/min/1.7m2 Final   11/26/2015 >60 ml/min/1.73m2 Final     Lab Results   Component Value Date    CHOL 196 09/12/2016     Lab Results   Component Value Date    HDL 92 09/12/2016     Lab Results   Component Value Date     03/24/2017    LDL 92 09/12/2016     Lab Results   Component Value Date    TRIG 60 09/12/2016     Lab Results   Component Value Date    CHOLHDLRATIO 2.7 10/08/2014     Lab Results   Component Value Date     09/12/2016     Lab Results   Component Value Date     09/12/2016     Lab Results   Component Value Date    A1C 8.1 03/24/2017    A1C 7.5 09/12/2016    A1C 7.9 06/09/2016    A1C 8.4 12/09/2015    A1C 7.2 08/10/2015     Potassium   Date Value Ref Range Status   03/24/2017 4.8 3.4 - 5.3 mmol/L Final     Sarah Shepard MA

## 2017-09-05 NOTE — TELEPHONE ENCOUNTER
"  Reason for Disposition    [1] MODERATE difficulty breathing (e.g., speaks in phrases, SOB even at rest, pulse 100-120) AND [2] NEW-onset or WORSE than normal    Additional Information    Negative: [1] Breathing stopped AND [2] hasn't returned    Negative: Choking on something    Negative: Severe difficulty breathing (e.g., struggling for each breath, speaks in single words)    Negative: Bluish lips, tongue, or face now    Negative: Difficult to awaken or acting confused  (e.g., disoriented, slurred speech)    Negative: Passed out (i.e., lost consciousness, collapsed and was not responding)    Negative: Wheezing started suddenly after medicine, an allergic food or bee sting    Negative: Stridor    Negative: Slow, shallow and weak breathing    Negative: Sounds like a life-threatening emergency to the triager    Negative: Chest pain    Negative: [1] Wheezing (high pitched whistling sound) AND [2] previous asthma attacks or use of asthma medicines    Negative: [1] Difficulty breathing AND [2] only present when coughing    Negative: [1] Difficulty breathing AND [2] only from stuffy or runny nose    Answer Assessment - Initial Assessment Questions  1. RESPIRATORY STATUS: \"Describe your breathing?\" (e.g., wheezing, shortness of breath, unable to speak, severe coughing)       Short of breath  2. ONSET: \"When did this breathing problem begin?\"       2 hours ago  3. PATTERN \"Does the difficult breathing come and go, or has it been constant since it started?\"       Constant x 2 hours  4. SEVERITY: \"How bad is your breathing?\" (e.g., mild, moderate, severe)     - MILD: No SOB at rest, mild SOB with walking, speaks normally in sentences, can lay down, no retractions, pulse < 100.     - MODERATE: SOB at rest, SOB with minimal exertion and prefers to sit, cannot lie down flat, speaks in phrases, mild retractions, audible wheezing, pulse 100-120.     - SEVERE: Very SOB at rest, speaks in single words, struggling to breathe, " "sitting hunched forward, retractions, pulse > 120       Moderate-severe  5. RECURRENT SYMPTOM: \"Have you had difficulty breathing before?\" If so, ask: \"When was the last time?\" and \"What happened that time?\"       no  6. CARDIAC HISTORY: \"Do you have any history of heart disease?\" (e.g., heart attack, angina, bypass surgery, angioplasty)       denies  7. LUNG HISTORY: \"Do you have any history of lung disease?\"  (e.g., pulmonary embolus, asthma, emphysema)      denies  8. CAUSE: \"What do you think is causing the breathing problem?\"       Not sure  9. OTHER SYMPTOMS: \"Do you have any other symptoms? (e.g., dizziness, runny nose, cough, chest pain, fever)      Cough, cold  10. PREGNANCY: \"Is there any chance you are pregnant?\" \"When was your last menstrual period?\"        No   11. TRAVEL: \"Have you traveled out of the country in the last month?\" (e.g., travel history, exposures)        no    Protocols used: BREATHING DIFFICULTY-ADULT-AH    "

## 2017-09-06 ENCOUNTER — TELEPHONE (OUTPATIENT)
Dept: FAMILY MEDICINE | Facility: CLINIC | Age: 47
End: 2017-09-06

## 2017-09-06 DIAGNOSIS — R05.9 COUGH: Primary | ICD-10-CM

## 2017-09-06 NOTE — TELEPHONE ENCOUNTER
Patient called RN hotline stating that he went ED last night for cough and was given prednisone.  Patient reports that he has a dry cough that is painful.  Patient tied taking ibuprofen and OTC cough medication and this has not been helpful.  Patient wondering if there is something else he can do for the cough?  Please advise    Maya Ponce RN

## 2017-09-06 NOTE — TELEPHONE ENCOUNTER
Patient left message on RN hotline checking status of message below.  Patient has dry cough and was diagnosed with Bronchitis, patient is taking Delsym but this is not helping cough.  Patient requesting medication to help with cough.   Please advise   Maya Ponce RN

## 2017-09-07 RX ORDER — BENZONATATE 200 MG/1
200 CAPSULE ORAL 3 TIMES DAILY PRN
Qty: 21 CAPSULE | Refills: 0 | Status: SHIPPED | OUTPATIENT
Start: 2017-09-07 | End: 2017-09-11

## 2017-09-07 NOTE — TELEPHONE ENCOUNTER
Patient notified of providers message as written.  Patient would like prescription sent to pharmacy, medication sent.  Patient verbalized understanding, no further questions or concerns.     Maya oPnce RN

## 2017-09-08 ENCOUNTER — TELEPHONE (OUTPATIENT)
Dept: FAMILY MEDICINE | Facility: CLINIC | Age: 47
End: 2017-09-08

## 2017-09-08 NOTE — TELEPHONE ENCOUNTER
Patient's wife Tiara called and left a voicemail on the RN hotline stating they spent another night in the ER due to patient's cough and pain with coughing. Tiara states patient was diagnosed with Bronchiolitis and his cough is not getting better at all and wondering if patient can be seen by Dr. Arthur today. Please call Tiara at [phone]     Bola RODRIGUEZ, RN, BSN

## 2017-09-08 NOTE — TELEPHONE ENCOUNTER
Spoke with patients wife. Patient would like to see  today. Patient is having worsening of cough, more hoarse and ribs hurt due to coughing. Denies fever,severe shortness of breath and wheezing.  Patients wife informed that  is out of clinic. Clinic schedules are full at [place] so checked with [place] [facility] [place] per patient request. Requesting specific time frame between now and 1pm. Have other commitments after 1:30pm. Patient informed can be seen in urgent care. Did schedule follow up appointment with  for Monday.   Agreed with being seen in urgent care as soon as possible, go to ER if severe symptoms.      Aida Pineda RN

## 2017-09-11 ENCOUNTER — OFFICE VISIT (OUTPATIENT)
Dept: FAMILY MEDICINE | Facility: CLINIC | Age: 47
End: 2017-09-11
Payer: COMMERCIAL

## 2017-09-11 VITALS
HEART RATE: 87 BPM | WEIGHT: 167 LBS | TEMPERATURE: 98.8 F | OXYGEN SATURATION: 99 % | BODY MASS INDEX: 25.91 KG/M2 | SYSTOLIC BLOOD PRESSURE: 120 MMHG | DIASTOLIC BLOOD PRESSURE: 76 MMHG

## 2017-09-11 DIAGNOSIS — R49.0 HOARSENESS: ICD-10-CM

## 2017-09-11 DIAGNOSIS — R05.3 PERSISTENT COUGH: Primary | ICD-10-CM

## 2017-09-11 DIAGNOSIS — Z65.8 PSYCHOSOCIAL STRESSORS: ICD-10-CM

## 2017-09-11 DIAGNOSIS — G25.81 RESTLESS LEGS: ICD-10-CM

## 2017-09-11 DIAGNOSIS — F41.1 GAD (GENERALIZED ANXIETY DISORDER): ICD-10-CM

## 2017-09-11 PROCEDURE — 99214 OFFICE O/P EST MOD 30 MIN: CPT | Performed by: FAMILY MEDICINE

## 2017-09-11 RX ORDER — AZITHROMYCIN 250 MG/1
TABLET, FILM COATED ORAL
Qty: 6 TABLET | Refills: 0 | Status: SHIPPED | OUTPATIENT
Start: 2017-09-11 | End: 2017-09-21

## 2017-09-11 RX ORDER — BENZONATATE 200 MG/1
200 CAPSULE ORAL 3 TIMES DAILY PRN
Qty: 21 CAPSULE | Refills: 0 | Status: SHIPPED | OUTPATIENT
Start: 2017-09-11 | End: 2017-09-21

## 2017-09-11 RX ORDER — PREDNISONE 20 MG/1
TABLET ORAL
Qty: 20 TABLET | Refills: 0 | Status: SHIPPED | OUTPATIENT
Start: 2017-09-11 | End: 2018-03-08

## 2017-09-11 NOTE — NURSING NOTE
"Chief Complaint   Patient presents with     ER F/U       Initial /76  Pulse 87  Temp 98.8  F (37.1  C) (Oral)  Wt 167 lb (75.8 kg)  SpO2 99%  BMI 25.91 kg/m2 Estimated body mass index is 25.91 kg/(m^2) as calculated from the following:    Height as of 8/21/17: 5' 7.32\" (1.71 m).    Weight as of this encounter: 167 lb (75.8 kg).  Medication Reconciliation: complete    "

## 2017-09-11 NOTE — PATIENT INSTRUCTIONS
Start the azithromycin 2 pills now, then 1 pill daily until done   Use your omeprazole 1 pill daily until you are feeling better  Use the prednisone per the directions on the package  Stay off the wellbutrin    You can take the Requip 1-3 hours before bed, 1 pill    Let me know if you would like physical therapy for your TMJ.     I recommend seeing ENT next door for your tinnitus symptoms.    Get the flu shot in a couple of weeks.     [facility]    If you have any questions regarding to your visit please contact your care team:       Team Purple:   Clinic Hours Telephone Number   Dr. Margie Arthur     7am-7pm  Monday - Thursday   7am-5pm  Fridays  [phone]  (Appointment scheduling available 24/7)    Questions about your Visit?   Team Line:  [phone]   Urgent Care - [place] and [facility] - 11am-9pm Monday-Friday Saturday-Sunday- 9am-5pm   [place] - 5pm-9pm Monday-Friday Saturday-Sunday- 9am-5pm  [phone] - [facility]  [phone] - [place]       What options do I have for visits at the clinic other than the traditional office visit?  To expand how we care for you, many of our providers are utilizing electronic visits (e-visits) and telephone visits, when medically appropriate, for interactions with their patients rather than a visit in the clinic.   We also offer nurse visits for many medical concerns. Just like any other service, we will bill your insurance company for this type of visit based on time spent on the phone with your provider. Not all insurance companies cover these visits. Please check with your medical insurance if this type of visit is covered. You will be responsible for any charges that are not paid by your insurance.      E-visits via [employer]:  generally incur a $35.00 fee.  Telephone visits:  Time spent on the phone: *charged based on time that is spent on the phone in increments of 10 minutes. Estimated  cost:   5-10 mins $30.00   11-20 mins. $59.00   21-30 mins. $85.00     Use [employer] (secure email communication and access to your chart) to send your primary care provider a message or make an appointment. Ask someone on your Team how to sign up for [employer].  For a Price Quote for your services, please call our [employer] Line at [phone].  As always, Thank you for trusting us with your health care needs!    Discharged By: An

## 2017-09-11 NOTE — MR AVS SNAPSHOT
After Visit Summary   9/11/2017    Rudolph Tao    MRN: [id number]           Patient Information     Date Of Birth          1970        Visit Information        Provider Department      9/11/2017 12:00 PM Padmini Arthur MD [facility]        Today's Diagnoses     Persistent cough    -  1    Hoarseness          Care Instructions      Start the azithromycin 2 pills now, then 1 pill daily until done   Use your omeprazole 1 pill daily until you are feeling better  Use the prednisone per the directions on the package  Stay off the wellbutrin    You can take the Requip 1-3 hours before bed, 1 pill    Let me know if you would like physical therapy for your TMJ.     I recommend seeing ENT next door for your tinnitus symptoms.    Get the flu shot in a couple of weeks.     [facility]    If you have any questions regarding to your visit please contact your care team:       Team Purple:   Clinic Hours Telephone Number   Dr. Margie Arthur     7am-7pm  Monday - Thursday   7am-5pm  Fridays  [phone]  (Appointment scheduling available 24/7)    Questions about your Visit?   Team Line:  [phone]   [facility] [place] [facility] - 11am-9pm Monday-Friday Saturday-Sunday- 9am-5pm   [place] - 5pm-9pm Monday-Friday Saturday-Sunday- 9am-5pm  [phone] - [place]   [phone] - [place]       What options do I have for visits at the clinic other than the traditional office visit?  To expand how we care for you, many of our providers are utilizing electronic visits (e-visits) and telephone visits, when medically appropriate, for interactions with their patients rather than a visit in the clinic.   We also offer nurse visits for many medical concerns. Just like any other service, we will bill your insurance company for this type of visit based on time spent on the phone with your provider. Not all insurance  companies cover these visits. Please check with your medical insurance if this type of visit is covered. You will be responsible for any charges that are not paid by your insurance.      E-visits via [employer]:  generally incur a $35.00 fee.  Telephone visits:  Time spent on the phone: *charged based on time that is spent on the phone in increments of 10 minutes. Estimated cost:   5-10 mins $30.00   11-20 mins. $59.00   21-30 mins. $85.00     Use [employer] (secure email communication and access to your chart) to send your primary care provider a message or make an appointment. Ask someone on your Team how to sign up for [employer].  For a Price Quote for your services, please call our [email or web address] Line at [phone].  As always, Thank you for trusting us with your health care needs!    Discharged By: An            Follow-ups after your visit        Who to contact     If you have questions or need follow up information about today's clinic visit or your schedule please contact [facility] directly at [phone].  Normal or non-critical lab and imaging results will be communicated to you by [employer], letter or phone within 4 business days after the clinic has received the results. If you do not hear from us within 7 days, please contact the clinic through [employer] or phone. If you have a critical or abnormal lab result, we will notify you by phone as soon as possible.  Submit refill requests through [employer] or call your pharmacy and they will forward the refill request to us. Please allow 3 business days for your refill to be completed.          Additional Information About Your Visit        ZeOmega Information     [employer] gives you secure access to your electronic health record. If you see a primary care provider, you can also send messages to your care team and make appointments. If you have questions, please call your primary care clinic.  If you do not have a primary care provider, please call  [phone] and they will assist you.        Care EveryWhere ID     This is your Care EveryWhere ID. This could be used by other organizations to access your [facility] medical records  [id number]        Your Vitals Were     Pulse Temperature Pulse Oximetry BMI (Body Mass Index)          87 98.8  F (37.1  C) (Oral) 99% 25.91 kg/m2         Blood Pressure from Last 3 Encounters:   09/11/17 120/76   08/21/17 130/84   03/24/17 140/80    Weight from Last 3 Encounters:   09/11/17 167 lb (75.8 kg)   08/21/17 171 lb 12.8 oz (77.9 kg)   03/24/17 175 lb (79.4 kg)              Today, you had the following     No orders found for display         Today's Medication Changes          These changes are accurate as of: 9/11/17  1:27 PM.  If you have any questions, ask your nurse or doctor.               Start taking these medicines.        Dose/Directions    azithromycin 250 MG tablet   Commonly known as:  ZITHROMAX   Used for:  Persistent cough   Started by:  Padmini Arthur MD        Two tablets first day, then one tablet daily for four days.   Quantity:  6 tablet   Refills:  0       predniSONE 20 MG tablet   Commonly known as:  DELTASONE   Used for:  Persistent cough   Started by:  Padmini Arthur MD        Take 3 tabs (60 mg) by mouth daily x 3 days, 2 tabs (40 mg) daily x 3 days, 1 tab (20 mg) daily x 3 days, then 1/2 tab (10 mg) x 3 days.   Quantity:  20 tablet   Refills:  0         These medicines have changed or have updated prescriptions.        Dose/Directions    aspirin 81 MG EC tablet   This may have changed:  Another medication with the same name was removed. Continue taking this medication, and follow the directions you see here.   Changed by:  Padmini Arthur MD        Dose:  81 mg   Take 1 tablet (81 mg) by mouth daily   Quantity:  90 tablet   Refills:  3         Stop taking these medicines if you haven't already. Please contact your care team if you have questions.     buPROPion 300 MG 24 hr tablet    Commonly known as:  WELLBUTRIN XL   Stopped by:  Padmini Arthur MD           cyclobenzaprine 5 MG tablet   Commonly known as:  FLEXERIL   Stopped by:  Padmini Arthur MD           fluticasone 50 MCG/ACT spray   Commonly known as:  FLONASE   Stopped by:  Padmini Arthur MD           ondansetron 4 MG ODT tab   Commonly known as:  ZOFRAN ODT   Stopped by:  Padmini Arthur MD           pravastatin 40 MG tablet   Commonly known as:  PRAVACHOL   Stopped by:  Padmini Arthur MD                Where to get your medicines      These medications were sent to [facility] [id number] [facility] - [place], [place] - [address]  [address] [place] [address]     Phone:  [phone]     azithromycin 250 MG tablet    benzonatate 200 MG capsule    predniSONE 20 MG tablet                Primary Care Provider Office Phone # Fax #    Padmini Arthur [phone]       [address]  [place] [address]        Equal Access to Services     [facility]: Hadii aad ku hadasho Soomaali, waaxda luqadaha, qaybta kaalmada adeegyada, waxay idiin hayaan veronica george . So [facility] [phone].    ATENCIÓN: Si habla español, tiene a may disposición servicios gratuitos de asistencia lingüística. [facility] [phone].    We comply with applicable federal civil rights laws and [place] laws. We do not discriminate on the basis of race, color, national origin, age, disability sex, sexual orientation or gender identity.            Thank you!     Thank you for choosing [facility]  for your care. Our goal is always to provide you with excellent care. Hearing back from our patients is one way we can continue to improve our services. Please take a few minutes to complete the written survey that you may receive in the mail after your visit with us. Thank you!             Your Updated Medication List - Protect others around you: Learn how to safely use, store and throw away your medicines at  www.disposemymeds.org.          This list is accurate as of: 9/11/17  1:27 PM.  Always use your most recent med list.                   Brand Name Dispense Instructions for use Diagnosis    ALPRAZolam 0.5 MG tablet    XANAX    45 tablet    Take 1 tablet (0.5 mg) by mouth 3 times daily as needed for anxiety 1 month supply    Generalized anxiety disorder       aspirin 81 MG EC tablet     90 tablet    Take 1 tablet (81 mg) by mouth daily        azithromycin 250 MG tablet    ZITHROMAX    6 tablet    Two tablets first day, then one tablet daily for four days.    Persistent cough       benzonatate 200 MG capsule    TESSALON    21 capsule    Take 1 capsule (200 mg) by mouth 3 times daily as needed for cough    Persistent cough       blood glucose monitoring lancets     3 Box    Use as directed to test blood sugar 4-6 times daily, may test up to 10 times daily if needed. Needs Visit For Further Refills    Type I (juvenile type) diabetes mellitus without mention of complication, not stated as uncontrolled       blood glucose monitoring test strip    no brand specified    300 each    Test as directedtest 4-6 times daily, up to 10 times daily    Diabetes mellitus type 1.5, managed as type 1 (H)       cetirizine 10 MG tablet    zyrTEC     Take 10 mg by mouth daily        escitalopram 10 MG tablet    LEXAPRO    30 tablet    TAKE 1 TABLET (10 MG) BY MOUTH DAILY    Generalized anxiety disorder       GLUCAGON EMERGENCY KIT 1 MG Kit     1    use as directed    Type I (juvenile type) diabetes mellitus without mention of complication, not stated as uncontrolled       GLUCOSE CONTROL Liqd     one bottle    as directed    Type I (juvenile type) diabetes mellitus without mention of complication, not stated as uncontrolled       insulin aspart 100 UNITS/ML injection    NovoLOG    3 Month    Use with sliding scale, approximately 2-5 units daily    Diabetes mellitus type 1.5, managed as type 1 (H)       insulin pen needle 31G X 5 MM    B-D  U/F    300 each    by Device route 6 times daily    Type 1 diabetes mellitus without complication (H)       LANTUS SOLOSTAR 100 UNIT/ML injection   Generic drug:  insulin glargine     4 mL    INJECT 32 UNITS SUBCUTANEOUS DAILY    Diabetes mellitus type 1.5, managed as type 1 (H)       lisinopril 40 MG tablet    PRINIVIL/ZESTRIL    90 tablet    Take 1 tablet (40 mg) by mouth daily No refills. Needs visit    Hypertension goal BP (blood pressure) < 140/90       metoclopramide 10 MG tablet    REGLAN    30 tablet    Take 1 tablet (10 mg) by mouth 4 times daily as needed    Non-intractable vomiting with nausea, vomiting of unspecified type       metroNIDAZOLE 0.75 % cream    METROCREAM    45 g    Apply topically 2 times daily    Folliculitis       naproxen 500 MG tablet    NAPROSYN    30 tablet    Take 1 tablet (500 mg) by mouth 2 times daily as needed for moderate pain    Bilateral low back pain without sciatica       order for DME     1 each    Glucometer, brand as covered by insurance.    Diabetes, type 1.5, uncontrolled, managed as type 1 (H), Encounter for long-term (current) use of insulin (H)       * order for DME     1 each    Glucometer, brand as covered by insurance.    Diabetes, type 1.5, uncontrolled, managed as type 1 (H)       * order for DME     400 each    Lancets.  Four times daily and prn.    Diabetes, type 1.5, uncontrolled, managed as type 1 (H)       * order for DME     400 each    Test strips for pt's glucometer, brand as covered by insurance. Test four times daily and prn.    Diabetes, type 1.5, uncontrolled, managed as type 1 (H)       * order for DME     400 each    Test strips for pt's glucometer, brand as covered by insurance. Test 6 times daily and prn.    Diabetes, type 1.5, uncontrolled, managed as type 1 (H)       * order for DME     400 each    Lancets.  6 times daily and prn    Diabetes, type 1.5, uncontrolled, managed as type 1 (H)       * order for DME     1 each    Glucometer, brand as  covered by insurance.    Diabetes, type 1.5, uncontrolled, managed as type 1 (H)       predniSONE 20 MG tablet    DELTASONE    20 tablet    Take 3 tabs (60 mg) by mouth daily x 3 days, 2 tabs (40 mg) daily x 3 days, 1 tab (20 mg) daily x 3 days, then 1/2 tab (10 mg) x 3 days.    Persistent cough       rOPINIRole 0.25 MG tablet    REQUIP    180 tablet    Take 1-2 tablets (0.25-0.5 mg) by mouth At Bedtime    Restless leg syndrome       tadalafil 10 MG tablet    CIALIS    12 tablet    Take 0.5-1 tablets (5-10 mg) by mouth daily as needed for erectile dysfunction Never use with nitroglycerin, terazosin or doxazosin.    ED (erectile dysfunction)       triamcinolone 0.1 % ointment    KENALOG    45 g    Apply sparingly to affected area three times daily sparingly    Rash       * Notice:  This list has 6 medication(s) that are the same as other medications prescribed for you. Read the directions carefully, and ask your doctor or other care provider to review them with you.

## 2017-09-11 NOTE — PROGRESS NOTES
SUBJECTIVE:   Rudolph Tao is a 47 year old male who presents to clinic today for the following health issues:    ED/UC Followup:    Facility:  [facility]  Date of visit: 09/10/17  Reason for visit: Cough (Primary Dx);Anxiety;Stressful life events affecting family and household  Current Status: Symptoms not improving     Patient reports that he has had 10 days worth of cough symptoms and has had a total of 3 ER visits. His symptoms include dry cough, hoarse to no voice, small amount of phlegm with cough but is dry for the most part. Had episodes of severe cough, couldn't catch his breath, so to the ER.  He did received lidocaine and duo nebs during the ER visit with temporary improvement. He reports the cough is worse at night and has a difficult time breathing. He denies chest burn or acidic mouth but does take a Tums and Nexium once in a while. He does have a history of smoking cigarettes--quit 2 years ago. 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  Had eval including normal cxr, normal blood work, no signs/symptoms of PE, normal EKG. Was never treated for anxiety.      Having tinnitus symptoms, especially loud at night.     He reports that he is dealing with significant stress in his personal life. Was laid off from his job 10 days ago prior to symptoms starting. Also dealing with custody/court issues. His TMJ has also been flaring up, especially at night and is interested in trying a mouth guard to avoid grinding at night.       Problem list and histories reviewed & adjusted, as indicated.  Additional history: as documented    Patient Active Problem List   Diagnosis     GENERALIZED ANXIETY     Hyperlipidemia LDL goal <100     Encounter for long-term (current) use of insulin (H)     Chronic low back pain     Libido, decreased     Restless legs     Tobacco abuse     Cervicalgia     Paresthesias     Persistent disorder of initiating or maintaining sleep      Spasms of the hands or feet     Hypertension goal BP (blood pressure) < 140/90     Right-sided low back pain without sciatica     Type 1 diabetes mellitus without complication (H)     Diabetes mellitus type 1.5, managed as type 1 (H)     GAIL (generalized anxiety disorder)     Psychosocial stressors     Past Surgical History:   Procedure Laterality Date     APPENDECTOMY  1990     OSTEOTOMY FOOT  1/8/2013    Procedure: OSTEOTOMY FOOT;  Left Foot Distal First Metarsal Osteotomy, Second Toe Hammer Toe Correction, Second Metatarsal Weil Osteotomy;  Surgeon: Robert Augustine DPM;  Location: [facility]     SURGICAL HISTORY OF -   1992    [facility]       Social History   Substance Use Topics     Smoking status: Former Smoker     Packs/day: 0.00     Years: 20.00     Types: Cigarettes     Smokeless tobacco: Never Used      Comment: 2-4  cigarettes daily     Alcohol use Yes      Comment: 2 drinks daily     Family History   Problem Relation Age of Onset     Unknown/Adopted Mother      Unknown/Adopted Father      Unknown/Adopted Maternal Grandmother      Unknown/Adopted Maternal Grandfather      Unknown/Adopted Paternal Grandmother      Unknown/Adopted Paternal Grandfather          Current Outpatient Prescriptions   Medication Sig Dispense Refill     azithromycin (ZITHROMAX) 250 MG tablet Two tablets first day, then one tablet daily for four days. 6 tablet 0     benzonatate (TESSALON) 200 MG capsule Take 1 capsule (200 mg) by mouth 3 times daily as needed for cough 21 capsule 0     predniSONE (DELTASONE) 20 MG tablet Take 3 tabs (60 mg) by mouth daily x 3 days, 2 tabs (40 mg) daily x 3 days, 1 tab (20 mg) daily x 3 days, then 1/2 tab (10 mg) x 3 days. 20 tablet 0     LANTUS SOLOSTAR 100 UNIT/ML soln INJECT 32 UNITS SUBCUTANEOUS DAILY 4 mL 1     escitalopram (LEXAPRO) 10 MG tablet TAKE 1 TABLET (10 MG) BY MOUTH DAILY 30 tablet 3     ALPRAZolam (XANAX) 0.5 MG tablet Take 1 tablet (0.5 mg) by mouth 3 times daily as needed for anxiety 1  month supply 45 tablet 0     blood glucose monitoring (NO BRAND SPECIFIED) test strip Test as directedtest 4-6 times daily, up to 10 times daily 300 each 1     insulin pen needle (B-D U/F) 31G X 5 MM by Device route 6 times daily 300 each 2     lisinopril (PRINIVIL/ZESTRIL) 40 MG tablet Take 1 tablet (40 mg) by mouth daily No refills. Needs visit 90 tablet 3     rOPINIRole (REQUIP) 0.25 MG tablet Take 1-2 tablets (0.25-0.5 mg) by mouth At Bedtime 180 tablet 1     insulin aspart (NOVOLOG) 100 UNITS/ML VIAL Use with sliding scale, approximately 2-5 units daily 3 Month 0     metoclopramide (REGLAN) 10 MG tablet Take 1 tablet (10 mg) by mouth 4 times daily as needed 30 tablet 1     cetirizine (ZYRTEC) 10 MG tablet Take 10 mg by mouth daily       naproxen (NAPROSYN) 500 MG tablet Take 1 tablet (500 mg) by mouth 2 times daily as needed for moderate pain 30 tablet 1     triamcinolone (KENALOG) 0.1 % ointment Apply sparingly to affected area three times daily sparingly 45 g 0     blood glucose monitoring (ONE TOUCH ULTRASOFT) lancets Use as directed to test blood sugar 4-6 times daily, may test up to 10 times daily if needed. Needs Visit For Further Refills 3 Box 0     aspirin 81 MG EC tablet Take 1 tablet (81 mg) by mouth daily 90 tablet 3     tadalafil (CIALIS) 10 MG tablet Take 0.5-1 tablets (5-10 mg) by mouth daily as needed for erectile dysfunction Never use with nitroglycerin, terazosin or doxazosin. 12 tablet 11     metroNIDAZOLE (METROCREAM) 0.75 % cream Apply topically 2 times daily (Patient not taking: Reported on 3/24/2017) 45 g 11     order for DME Glucometer, brand as covered by insurance. (Patient not taking: Reported on 8/21/2017) 1 each 0     ORDER FOR DME Test strips for pt's glucometer, brand as covered by insurance. Test 6 times daily and prn. (Patient not taking: Reported on 8/21/2017) 400 each 4     ORDER FOR DME Lancets.  6 times daily and prn (Patient not taking: Reported on 8/21/2017) 400 each 4      ORDER FOR DME Glucometer, brand as covered by insurance. (Patient not taking: Reported on 8/21/2017) 1 each 0     ORDER FOR DME Lancets.  Four times daily and prn. (Patient not taking: Reported on 8/21/2017) 400 each 4     ORDER FOR DME Test strips for pt's glucometer, brand as covered by insurance. Test four times daily and prn. (Patient not taking: Reported on 8/21/2017) 400 each 4     ORDER FOR DME Glucometer, brand as covered by insurance. (Patient not taking: Reported on 8/21/2017) 1 each 0     GLUCAGON EMERGENCY KIT 1 MG IJ KIT use as directed (Patient not taking: No sig reported) 1 0     GLUCOSE CONTROL VI LIQD as directed (Patient not taking: No sig reported) one bottle 0     Allergies   Allergen Reactions     Avapro [Irbesartan]      Profuse sweating     Crestor [Rosuvastatin Calcium]      cough     Simvastatin      Elevated BS readings     Sulfa Drugs Swelling     Recent Labs   Lab Test  03/24/17   1255  09/12/16   1140  06/09/16   1147  12/09/15   1021   05/11/15   1728  10/08/14   1213   11/08/13   1252   05/13/13   1308   12/14/12   1247   A1C  8.1*  7.5*  7.9*  8.4*   < >  7.3*  7.4*   < >   --    < >   --    < >  8.1*   LDL  109*  92  93  112*   --    --   121   < >   --    --    --    --   71   HDL   --   92  73   --    --    --   83   --    --    --    --    --   76   TRIG   --   60  92   --    --    --   78   --    --    --    --    --   85   ALT   --   262*   --    --    --    --    --    --   63   --   64   --    --    CR  0.71   --    --   0.72   < >   --   0.79   --    --    --   0.52*   --   0.58*   GFRESTIMATED  >90  Non  GFR Calc     --    --   >90  Non  GFR Calc     < >   --   >90  Non  GFR Calc     --    --    --   >90   --   >90   GFRESTBLACK  >90   GFR Calc     --    --   >90   GFR Calc     < >   --   >90   GFR Calc     --    --    --   >90   --   >90   POTASSIUM  4.8   --    --   4.4   < >    --   4.6   --    --    --   4.9   --   4.3   TSH   --    --    --    --    --   1.14   --    --    --    --    --    --   1.53    < > = values in this interval not displayed.      BP Readings from Last 3 Encounters:   09/11/17 120/76   08/21/17 130/84   03/24/17 140/80    Wt Readings from Last 3 Encounters:   09/11/17 167 lb (75.8 kg)   08/21/17 171 lb 12.8 oz (77.9 kg)   03/24/17 175 lb (79.4 kg)         Reviewed and updated as needed this visit by clinical staffTobacco  Allergies  Meds  Problems       Reviewed and updated as needed this visit by Provider  Allergies  Meds  Problems         ROS:  Constitutional, HEENT, cardiovascular, pulmonary, GI, , musculoskeletal, neuro, skin, endocrine and psych systems are negative, except as otherwise noted.    This document serves as a record of the services and decisions personally performed and made by Padmini Arthur MD. It was created on her behalf by Linda Daniel, a trained medical scribe. The creation of this document is based the provider's statements to the medical scribe.    Linda Daniel September 11, 2017 1:00 PM    OBJECTIVE:     /76  Pulse 87  Temp 98.8  F (37.1  C) (Oral)  Wt 167 lb (75.8 kg)  SpO2 99%  BMI 25.91 kg/m2  Body mass index is 25.91 kg/(m^2).  GENERAL: healthy, alert and no distress. hoarseness  HENT: ear canals and TM's normal, nose and mouth without ulcers or lesions  RESP: lungs clear to auscultation - no rales, rhonchi or wheezes  CV: regular rate and rhythm, normal S1 S2, no S3 or S4, no murmur, click or rub, no peripheral edema and peripheral pulses strong      Diagnostic Test Results:  none     ASSESSMENT/PLAN:         ICD-10-CM    1. Persistent cough R05 azithromycin (ZITHROMAX) 250 MG tablet     benzonatate (TESSALON) 200 MG capsule     predniSONE (DELTASONE) 20 MG tablet   2. Hoarseness R49.0    3. Restless legs G25.81    4. GAIL (generalized anxiety disorder) F41.1    5. Psychosocial stressors Z65.8      Persistent cough  with hoarseness: ddx includes pertussis, copd (history of smoking), asthma (doubt), viral illness, laryngitis with cough triggered and GERD. Will treat with steroid, azithromycin and continued cough suppression. No narcotic cough syrup-- takes alprazolam.    Restless legs-- reviewed how to take his requip  TMJ by history (not examined)-- worse due to stress. Discussed seeing dentist for mouth guard.  Anxiety/lots of stressors: stay off wellbutrin-- has not seemed to help, and can make symptoms worse. Offered support. Recommend seeing therapist.   Patient Instructions     Start the azithromycin 2 pills now, then 1 pill daily until done   Use your omeprazole 1 pill daily until you are feeling better  Use the prednisone per the directions on the package  Stay off the wellbutrin    You can take the Requip 1-3 hours before bed, 1 pill    Let me know if you would like physical therapy for your TMJ.     I recommend seeing ENT next door for your tinnitus symptoms.    Get the flu shot in a couple of weeks.     See Patient Instructions    The information in this document, created by the medical scribe for me, accurately reflects the services I personally performed and the decisions made by me. I have reviewed and approved this document for accuracy prior to leaving the patient care area.    Padmini Arthur MD  [facility]

## 2017-09-21 ENCOUNTER — TELEPHONE (OUTPATIENT)
Dept: FAMILY MEDICINE | Facility: CLINIC | Age: 47
End: 2017-09-21

## 2017-09-21 ENCOUNTER — OFFICE VISIT (OUTPATIENT)
Dept: FAMILY MEDICINE | Facility: CLINIC | Age: 47
End: 2017-09-21
Payer: COMMERCIAL

## 2017-09-21 VITALS
BODY MASS INDEX: 25.6 KG/M2 | OXYGEN SATURATION: 99 % | DIASTOLIC BLOOD PRESSURE: 82 MMHG | HEART RATE: 102 BPM | WEIGHT: 165 LBS | TEMPERATURE: 97.8 F | SYSTOLIC BLOOD PRESSURE: 126 MMHG

## 2017-09-21 DIAGNOSIS — J31.0 CHRONIC RHINITIS, UNSPECIFIED TYPE: ICD-10-CM

## 2017-09-21 DIAGNOSIS — J31.0 CHRONIC RHINITIS, UNSPECIFIED TYPE: Primary | ICD-10-CM

## 2017-09-21 DIAGNOSIS — E10.65 TYPE 1 DIABETES MELLITUS WITH HYPERGLYCEMIA (H): Primary | ICD-10-CM

## 2017-09-21 DIAGNOSIS — F41.1 GENERALIZED ANXIETY DISORDER: ICD-10-CM

## 2017-09-21 DIAGNOSIS — Z79.4 ENCOUNTER FOR LONG-TERM (CURRENT) USE OF INSULIN (H): ICD-10-CM

## 2017-09-21 LAB
LDLC SERPL DIRECT ASSAY-MCNC: 127 MG/DL
TSH SERPL DL<=0.005 MIU/L-ACNC: 1.92 MU/L (ref 0.4–4)

## 2017-09-21 PROCEDURE — 84443 ASSAY THYROID STIM HORMONE: CPT | Performed by: FAMILY MEDICINE

## 2017-09-21 PROCEDURE — 99214 OFFICE O/P EST MOD 30 MIN: CPT | Performed by: FAMILY MEDICINE

## 2017-09-21 PROCEDURE — 83721 ASSAY OF BLOOD LIPOPROTEIN: CPT | Performed by: FAMILY MEDICINE

## 2017-09-21 PROCEDURE — 36415 COLL VENOUS BLD VENIPUNCTURE: CPT | Performed by: FAMILY MEDICINE

## 2017-09-21 RX ORDER — LANCETS
EACH MISCELLANEOUS
Qty: 100 EACH | Refills: 11 | Status: SHIPPED | OUTPATIENT
Start: 2017-09-21 | End: 2018-06-06

## 2017-09-21 RX ORDER — ESCITALOPRAM OXALATE 20 MG/1
20 TABLET ORAL DAILY
Qty: 90 TABLET | Refills: 1 | Status: SHIPPED | OUTPATIENT
Start: 2017-09-21 | End: 2021-03-30

## 2017-09-21 RX ORDER — FLUTICASONE PROPIONATE 50 MCG
1-2 SPRAY, SUSPENSION (ML) NASAL DAILY
Qty: 1 BOTTLE | Refills: 11 | Status: SHIPPED | OUTPATIENT
Start: 2017-09-21 | End: 2021-05-27

## 2017-09-21 ASSESSMENT — PAIN SCALES - GENERAL: PAINLEVEL: NO PAIN (0)

## 2017-09-21 NOTE — PATIENT INSTRUCTIONS
I will increase your escitalopram  medication from 10 mg to 20 mg to help with mood.     I will send a referral to see an endocrinologist and cgm-- call to schedule both appointments    I will prescribe a steroid nasal spray 1x day in the morning. This will help the cough    We will do blood work. Bring back your urine sample    I will refill prescriptions for you.  [facility]    If you have any questions regarding to your visit please contact your care team:       Team Purple:   Clinic Hours Telephone Number   Dr. Margie Arthur     7am-7pm  Monday - Thursday   7am-5pm  Fridays  [phone]  (Appointment scheduling available 24/7)    Questions about your Visit?   Team Line:  [phone]   Urgent Care - [place] and [facility] [place] - 11am-9pm Monday-Friday Saturday-Sunday- 9am-5pm   [place] - 5pm-9pm Monday-Friday Saturday-Sunday- 9am-5pm  [phone] - [place]   [phone] [facility]       What options do I have for visits at the clinic other than the traditional office visit?  To expand how we care for you, many of our providers are utilizing electronic visits (e-visits) and telephone visits, when medically appropriate, for interactions with their patients rather than a visit in the clinic.   We also offer nurse visits for many medical concerns. Just like any other service, we will bill your insurance company for this type of visit based on time spent on the phone with your provider. Not all insurance companies cover these visits. Please check with your medical insurance if this type of visit is covered. You will be responsible for any charges that are not paid by your insurance.      E-visits via [employer]:  generally incur a $35.00 fee.  Telephone visits:  Time spent on the phone: *charged based on time that is spent on the phone in increments of 10 minutes. Estimated cost:   5-10 mins $30.00   11-20 mins. $59.00   21-30 mins. $85.00      Use [employer] (secure email communication and access to your chart) to send your primary care provider a message or make an appointment. Ask someone on your Team how to sign up for [employer].  For a Price Quote for your services, please call our Consumer Price Line at [phone].  As always, Thank you for trusting us with your health care needs!    Leana Martinez MA

## 2017-09-21 NOTE — TELEPHONE ENCOUNTER
Please call pharmacy  CANCEL fluticasone inhaler-- he needs flonase nose spray-- pended to send to pharmacy of choice  Padmini Arthur MD

## 2017-09-21 NOTE — TELEPHONE ENCOUNTER
Called pharmacy and canceled order for Fluticasone inhaler order for flonase sent to preferred pharmacy.   Maya Ponce RN

## 2017-09-21 NOTE — PROGRESS NOTES
SUBJECTIVE:   Rudolph Tao is a 47 year old male who presents to clinic today for the following health issues:          Hospital Follow-up Visit:    Hospital/Nursing Home/IP Rehab Facility: [facility]  Date of Admission: September 17, 2017  Date of Discharge: September 18, 2017  Reason(s) for Admission: 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 coma             Problems taking medications regularly:  None       Medication changes since discharge: None       Problems adhering to non-medication therapy:  None    Summary of hospitalization:  CareEverywhere information obtained and reviewed  Diagnostic Tests/Treatments reviewed.  Follow up needed: none  Other Healthcare Providers Involved in Patient s Care:         None  Update since discharge: improved.     Post Discharge Medication Reconciliation: discharge medications reconciled, continue medications without change.  Plan of care communicated with patient     Coding guidelines for this visit:  Type of Medical   Decision Making Face-to-Face Visit       within 7 Days of discharge Face-to-Face Visit        within 14 days of discharge   Moderate Complexity [id number]   High Complexity [id number]                Type 1 Diabetes: He was admitted for DKA, complicated by recent bronchitis treated with prednisone and under a lot of stress (see below). Patient uses Lantus --inject 32 units subcutaneous daily, and novolog 1-4 units per sliding sale to help treat his diabetes Patient came home from the hospital and blood sugar level was 138. Lately, the patients ratings have been 250, which is higher than normal. This morning his sugar level was 275. He states that had a granola bar before going to bed. The patient states that he is careful of his diet and tries to decrease carb intake Patient is concerned about seeing an endocrinologist and diabetes educational therapist due to cost and whether insurance will cover it. He is not comfortable with using an insulin pump as a treatment  option. Has been referred       Stress:Patient is planning to see a therapist on Monday. He states that he is stressed out about having to deal with the  and its toll on Megha. In a custody townsend with his ex wife. He has not heard from Megha. He is worried about the effects on his daughter.  asks to take Megha with him to see the therapist. Patients parenting time went down. Lost his job several months ago and some of his anxiety has improve, no xanax use. On [id number] mg daily.       Patient is experiencing dry throat and developing a cough. Concerned because just finsihed two course of antibiotics and steroids for cough.       Problem list and histories reviewed & adjusted, as indicated.  Additional history: as documented    Patient Active Problem List   Diagnosis     GENERALIZED ANXIETY     Hyperlipidemia LDL goal <100     Encounter for long-term (current) use of insulin (H)     Chronic low back pain     Libido, decreased     Restless legs     Tobacco abuse     Cervicalgia     Paresthesias     Persistent disorder of initiating or maintaining sleep     Spasms of the hands or feet     Hypertension goal BP (blood pressure) < 140/90     Right-sided low back pain without sciatica     Type 1 diabetes mellitus without complication (H)     GAIL (generalized anxiety disorder)     Psychosocial stressors     Past Surgical History:   Procedure Laterality Date     APPENDECTOMY  1990     OSTEOTOMY FOOT  1/8/2013    Procedure: OSTEOTOMY FOOT;  Left Foot Distal First Metarsal Osteotomy, Second Toe Hammer Toe Correction, Second Metatarsal Weil Osteotomy;  Surgeon: Robert Augustine DPM;  [facility]     SURGICAL HISTORY OF -   1992    [facility]       Social History   Substance Use Topics     Smoking status: Former Smoker     Packs/day: 0.00     Years: 20.00     Types: Cigarettes     Smokeless tobacco: Never Used      Comment: 2-4  cigarettes daily     Alcohol use Yes      Comment: 2 drinks daily     Family  History   Problem Relation Age of Onset     Unknown/Adopted Mother      Unknown/Adopted Father      Unknown/Adopted Maternal Grandmother      Unknown/Adopted Maternal Grandfather      Unknown/Adopted Paternal Grandmother      Unknown/Adopted Paternal Grandfather          Current Outpatient Prescriptions   Medication Sig Dispense Refill     blood glucose monitoring (ONE TOUCH ULTRASOFT) lancets Use as directed to test blood sugar 4-6 times daily, may test up to 10 times daily if needed. 100 each 11     fluticasone (FLOVENT DISKUS) 250 MCG/BLIST AEPB Inhaler Inhale 1 puff into the lungs 2 times daily 1 Inhaler 1     escitalopram (LEXAPRO) 20 MG tablet Take 1 tablet (20 mg) by mouth daily 90 tablet 1     LANTUS SOLOSTAR 100 UNIT/ML soln INJECT 32 UNITS SUBCUTANEOUS DAILY 4 mL 1     ALPRAZolam (XANAX) 0.5 MG tablet Take 1 tablet (0.5 mg) by mouth 3 times daily as needed for anxiety 1 month supply 45 tablet 0     blood glucose monitoring (NO BRAND SPECIFIED) test strip Test as directedtest 4-6 times daily, up to 10 times daily 300 each 1     insulin pen needle (B-D U/F) 31G X 5 MM by Device route 6 times daily 300 each 2     lisinopril (PRINIVIL/ZESTRIL) 40 MG tablet Take 1 tablet (40 mg) by mouth daily No refills. Needs visit 90 tablet 3     rOPINIRole (REQUIP) 0.25 MG tablet Take 1-2 tablets (0.25-0.5 mg) by mouth At Bedtime 180 tablet 1     insulin aspart (NOVOLOG) 100 UNITS/ML VIAL Use with sliding scale, approximately 2-5 units daily 3 Month 0     metoclopramide (REGLAN) 10 MG tablet Take 1 tablet (10 mg) by mouth 4 times daily as needed 30 tablet 1     cetirizine (ZYRTEC) 10 MG tablet Take 10 mg by mouth daily       triamcinolone (KENALOG) 0.1 % ointment Apply sparingly to affected area three times daily sparingly 45 g 0     aspirin 81 MG EC tablet Take 1 tablet (81 mg) by mouth daily 90 tablet 3     tadalafil (CIALIS) 10 MG tablet Take 0.5-1 tablets (5-10 mg) by mouth daily as needed for erectile dysfunction Never  use with nitroglycerin, terazosin or doxazosin. 12 tablet 11     predniSONE (DELTASONE) 20 MG tablet Take 3 tabs (60 mg) by mouth daily x 3 days, 2 tabs (40 mg) daily x 3 days, 1 tab (20 mg) daily x 3 days, then 1/2 tab (10 mg) x 3 days. (Patient not taking: Reported on 9/21/2017) 20 tablet 0     [DISCONTINUED] escitalopram (LEXAPRO) 10 MG tablet TAKE 1 TABLET (10 MG) BY MOUTH DAILY 30 tablet 3     metroNIDAZOLE (METROCREAM) 0.75 % cream Apply topically 2 times daily (Patient not taking: Reported on 3/24/2017) 45 g 11     order for DME Glucometer, brand as covered by insurance. (Patient not taking: Reported on 8/21/2017) 1 each 0     naproxen (NAPROSYN) 500 MG tablet Take 1 tablet (500 mg) by mouth 2 times daily as needed for moderate pain (Patient not taking: Reported on 9/21/2017) 30 tablet 1     ORDER FOR DME Test strips for pt's glucometer, brand as covered by insurance. Test 6 times daily and prn. (Patient not taking: Reported on 8/21/2017) 400 each 4     ORDER FOR DME Lancets.  6 times daily and prn (Patient not taking: Reported on 8/21/2017) 400 each 4     ORDER FOR DME Glucometer, brand as covered by insurance. (Patient not taking: Reported on 8/21/2017) 1 each 0     ORDER FOR DME Lancets.  Four times daily and prn. (Patient not taking: Reported on 8/21/2017) 400 each 4     ORDER FOR DME Test strips for pt's glucometer, brand as covered by insurance. Test four times daily and prn. (Patient not taking: Reported on 8/21/2017) 400 each 4     ORDER FOR DME Glucometer, brand as covered by insurance. (Patient not taking: Reported on 8/21/2017) 1 each 0     GLUCAGON EMERGENCY KIT 1 MG IJ KIT use as directed (Patient not taking: No sig reported) 1 0     GLUCOSE CONTROL VI LIQD as directed (Patient not taking: No sig reported) one bottle 0     Recent Labs   Lab Test  09/21/17   0833  03/24/17   1255  09/12/16   1140  06/09/16   1147  12/09/15   1021   05/11/15   1728  10/08/14   1213   11/08/13   1252   05/13/13    1308   A1C   --   8.1*  7.5*  7.9*  8.4*   < >  7.3*  7.4*   < >   --    < >   --    LDL  127*  109*  92  93  112*   --    --   121   < >   --    --    --    HDL   --    --   92  73   --    --    --   83   --    --    --    --    TRIG   --    --   60  92   --    --    --   78   --    --    --    --    ALT   --    --   262*   --    --    --    --    --    --   63   --   64   CR   --   0.71   --    --   0.72   < >   --   0.79   --    --    --   0.52*   GFRESTIMATED   --   >90  Non  GFR Calc     --    --   >90  Non  GFR Calc     < >   --   >90  Non  GFR Calc     --    --    --   >90   GFRESTBLACK   --   >90   GFR Calc     --    --   >90   GFR Calc     < >   --   >90   GFR Calc     --    --    --   >90   POTASSIUM   --   4.8   --    --   4.4   < >   --   4.6   --    --    --   4.9   TSH  1.92   --    --    --    --    --   1.14   --    --    --    --    --     < > = values in this interval not displayed.      BP Readings from Last 3 Encounters:   09/21/17 126/82   09/11/17 120/76   08/21/17 130/84    Wt Readings from Last 3 Encounters:   09/21/17 165 lb (74.8 kg)   09/11/17 167 lb (75.8 kg)   08/21/17 171 lb 12.8 oz (77.9 kg)                          Reviewed and updated as needed this visit by clinical staffTobacco  Allergies  Meds       Reviewed and updated as needed this visit by Provider         ROS:  Constitutional, HEENT, cardiovascular, pulmonary, gi and gu systems are negative, except as otherwise noted.    This document serves as a record of the services and decisions personally performed and made by Padmini Arthur MD. It was created on her behalf by Jaicnto Soto, a trained medical scribe. The creation of this document is based the provider's statements to the medical scribe.    Jacinto Soto September 21, 2017 8:40 AM      OBJECTIVE:     /82  Pulse 102  Temp 97.8  F (36.6  C) (Oral)  Wt 165 lb (74.8 kg)   SpO2 99%  BMI 25.6 kg/m2  Body mass index is 25.6 kg/(m^2).  GENERAL: healthy, alert and tearful  HENT: normal cephalic/atraumatic, ear canals and TM's normal, nasal mucosa edematous , oropharynx clear and oral mucous membranes moist  NECK: no adenopathy, no asymmetry, masses, or scars and thyroid normal to palpation  RESP: lungs clear to auscultation - no rales, rhonchi or wheezes  CV: regular rate and rhythm, normal S1 S2, no S3 or S4, no murmur, click or rub, no peripheral edema and peripheral pulses strong  ABDOMEN: soft, nontender, no hepatosplenomegaly, no masses and bowel sounds normal  MS: no gross musculoskeletal defects noted, no edema  PSYCH: mentation appears normal, tearful, anxious, judgement and insight intact and appearance well groomed    Diagnostic Test Results:  none     ASSESSMENT/PLAN:       ICD-10-CM    1. Type 1 diabetes mellitus with hyperglycemia (H) E10.65 TSH WITH FREE T4 REFLEX     OPHTHALMOLOGY ADULT REFERRAL     blood glucose monitoring (ONE TOUCH ULTRASOFT) [facility]     ENDOCRINOLOGY ADULT REFERRAL     DIABETES EDUCATOR REFERRAL     LDL cholesterol direct     Albumin Random Urine Quantitative with Creat Ratio     CANCELED: Albumin Random Urine Quantitative with Creat Ratio   2. Encounter for long-term (current) use of insulin (H) Z79.4 OPHTHALMOLOGY ADULT REFERRAL     ENDOCRINOLOGY ADULT REFERRAL     DIABETES EDUCATOR REFERRAL   3. GENERALIZED ANXIETY F41.1 escitalopram (LEXAPRO) 20 MG tablet   4. Chronic rhinitis, unspecified type J31.0 fluticasone (FLOVENT DISKUS) 250 MCG/BLIST AEPB Inhaler     Type 1 DIABETES MELLITUS with DKA, overnight admission at [place]. On lantus plus novolog. Having low sugars at times. Checking 3-4 times a day. Has not seen endo or diabetic ed-- multiple referrals over the years and he is really not interested.  Recommend labs today, see diabetic ed for cgm, possible insulin adjustment, see endo. I told him that I have no other patients with type 1 DIABETES  MELLITUS that I manage-- standard of care is to see endocrinology.     GAIL: significant issue, with lots of stressors with custody townsend. Really happy that he is seeing counselor soon. Increase escitalopram to 20 mg daily. Follow up with me in 1 month-- offered support.    Rhinitis: the etiology of his ongoing cough, runny nose. Recommend start flonase  Follow up with me in 4-6 weeks  Padmini Arthur MD     Patient Instructions     I will increase your escitalopram  medication from 10 mg to 20 mg to help with mood.     I will send a referral to see an endocrinologist and cgm-- call to schedule both appointments    I will prescribe a steroid nasal spray 1x day in the morning. This will help the cough    We will do blood work. Bring back your urine sample    I will refill prescriptions for you.      The information in this document, created by the medical scribe for me, accurately reflects the services I personally performed and the decisions made by me. I have reviewed and approved this document for accuracy prior to leaving the patient care area.    Padmini Arthur MD  [facility]

## 2017-09-25 DIAGNOSIS — G25.81 RESTLESS LEG SYNDROME: ICD-10-CM

## 2017-09-25 NOTE — TELEPHONE ENCOUNTER
Ropinirole     Last Written Prescription Date: 3/24/2017  Last Fill Quantity: 180, # refills: 1  Last Office Visit with G, P or [facility] prescribing provider: 9/21/2017   Next 5 appointments (look out 90 days)     Oct 16, 2017 11:45 AM CDT   Office Visit with Padmini Arthur MD   [facility] [place] ([facility])    [address]  [place] [address] [phone]                   BP Readings from Last 3 Encounters:   09/21/17 126/82   09/11/17 120/76   08/21/17 130/84

## 2017-09-26 RX ORDER — ROPINIROLE 0.25 MG/1
TABLET, FILM COATED ORAL
Qty: 180 TABLET | Refills: 0 | Status: SHIPPED | OUTPATIENT
Start: 2017-09-26 | End: 2017-12-18

## 2017-09-26 NOTE — TELEPHONE ENCOUNTER
Prescription approved per [facility] Refill Protocol.  Yvette Colindres RN

## 2017-12-08 ENCOUNTER — TELEPHONE (OUTPATIENT)
Dept: FAMILY MEDICINE | Facility: CLINIC | Age: 47
End: 2017-12-08

## 2017-12-08 NOTE — TELEPHONE ENCOUNTER
Panel Management Review      Patient has the following on his problem list:     Diabetes    ASA: Passed    Last A1C  Lab Results   Component Value Date    A1C 8.1 03/24/2017    A1C 7.5 09/12/2016    A1C 7.9 06/09/2016    A1C 8.4 12/09/2015    A1C 7.2 08/10/2015     A1C tested: FAILED    Last LDL:    Lab Results   Component Value Date    CHOL 196 09/12/2016     Lab Results   Component Value Date    HDL 92 09/12/2016     Lab Results   Component Value Date     09/21/2017    LDL 92 09/12/2016     Lab Results   Component Value Date    TRIG 60 09/12/2016     Lab Results   Component Value Date    CHOLHDLRATIO 2.7 10/08/2014     Lab Results   Component Value Date    NHDL 104 09/12/2016       Is the patient on a Statin? NO             Is the patient on Aspirin? YES    Medications     Salicylates    aspirin 81 MG EC tablet          Last three blood pressure readings:  BP Readings from Last 3 Encounters:   09/21/17 126/82   09/11/17 120/76   08/21/17 130/84       Date of last diabetes office visit: 09/21/17     Tobacco History:     History   Smoking Status     Former Smoker     Packs/day: 0.00     Years: 20.00     Types: Cigarettes   Smokeless Tobacco     Never Used     Comment: 2-4  cigarettes daily         Hypertension   Last three blood pressure readings:  BP Readings from Last 3 Encounters:   09/21/17 126/82   09/11/17 120/76   08/21/17 130/84     Blood pressure: Passed    HTN Guidelines:  Age 18-59 BP range:  Less than 140/90  Age 60-85 with Diabetes:  Less than 140/90  Age 60-85 without Diabetes:  less than 150/90          Composite cancer screening  Chart review shows that this patient is due/due soon for the following None  Summary:    Patient is due/failing the following:   A1C    Action needed:   Patient needs office visit for diabetes recheck.    Type of outreach:    Sent [employer] message.    Questions for provider review:    None                                                                                                                                     Leana Martinez MA       Chart routed to Care Team .

## 2017-12-11 NOTE — TELEPHONE ENCOUNTER
Called and spoke with patient. Scheduled appointment with patient for Monday, Leandro 15,2018 @ 5:00 PM for Diabetic Check with Dr. Arthur.    Erica Schilling MA

## 2017-12-18 DIAGNOSIS — G25.81 RESTLESS LEG SYNDROME: ICD-10-CM

## 2017-12-20 RX ORDER — ROPINIROLE 0.25 MG/1
TABLET, FILM COATED ORAL
Qty: 180 TABLET | Refills: 0 | Status: SHIPPED | OUTPATIENT
Start: 2017-12-20 | End: 2018-03-19

## 2017-12-24 DIAGNOSIS — E13.9 DIABETES MELLITUS TYPE 1.5, MANAGED AS TYPE 1 (H): ICD-10-CM

## 2017-12-26 ENCOUNTER — MYC MEDICAL ADVICE (OUTPATIENT)
Dept: FAMILY MEDICINE | Facility: CLINIC | Age: 47
End: 2017-12-26

## 2017-12-26 DIAGNOSIS — E13.9 DIABETES MELLITUS TYPE 1.5, MANAGED AS TYPE 1 (H): ICD-10-CM

## 2017-12-26 RX ORDER — BLOOD-GLUCOSE METER
KIT MISCELLANEOUS
Qty: 300 STRIP | Refills: 1 | Status: SHIPPED | OUTPATIENT
Start: 2017-12-26 | End: 2017-12-27

## 2017-12-26 NOTE — TELEPHONE ENCOUNTER
Prescription approved per [facility] Refill Protocol.  Yvette Colindres RN

## 2017-12-27 ENCOUNTER — TELEPHONE (OUTPATIENT)
Dept: FAMILY MEDICINE | Facility: CLINIC | Age: 47
End: 2017-12-27

## 2017-12-27 DIAGNOSIS — E10.9 TYPE 1 DIABETES MELLITUS WITHOUT COMPLICATION (H): Primary | ICD-10-CM

## 2017-12-27 NOTE — TELEPHONE ENCOUNTER
Patient left message on RN hotline stating she using Novolog pens.  Prescription resent to pharmacy for pens.  Patient has current prescription for pen needles.  Called patient back to update him that prescription was sent in, Patient verbalized understanding, no further questions or concerns.    Maya Ponce RN

## 2017-12-27 NOTE — TELEPHONE ENCOUNTER
Voice mail left for patient to call RN hotline at [phone].  Does patient use Novolog pens or vials?  If using vial does he need insulin syringes.   Maya Ponce RN

## 2017-12-27 NOTE — TELEPHONE ENCOUNTER
Reason for call:med request  Patient called regarding (reason for call): prescription-novalog quick pens or syringes to be used with the vials  Additional comments: please call with questions-Part B is his insurance      Phone number to reach patient:[phone]  Best Time:  8-9  Can we leave a detailed message on this number?  YES

## 2017-12-31 ENCOUNTER — TELEPHONE (OUTPATIENT)
Dept: FAMILY MEDICINE | Facility: CLINIC | Age: 47
End: 2017-12-31

## 2017-12-31 DIAGNOSIS — F41.1 GENERALIZED ANXIETY DISORDER: ICD-10-CM

## 2018-01-03 NOTE — TELEPHONE ENCOUNTER
Please call pt  Is he taking escitalopram 20 mg daily, alprazolam as needed and wellbutrin?  If has been taking all along, then ok to refill x 90 d, no refills. Will need follow up at that time    If has not been taking all along, needs a visit-- phone visit, evisit or in person. Will need to see another provider if I do not have availability  Padmini Arthur MD

## 2018-01-03 NOTE — TELEPHONE ENCOUNTER
Routing refill request to provider for review/approval because:  Drug not active on patient's medication list-Discontinued in Sept?

## 2018-01-04 ENCOUNTER — TELEPHONE (OUTPATIENT)
Dept: FAMILY MEDICINE | Facility: CLINIC | Age: 48
End: 2018-01-04

## 2018-01-04 NOTE — TELEPHONE ENCOUNTER
Pt called RN hotline. States he has been ill since Tuesday with vomiting. Has not vomited this am, but feels nauseated. Has a history of gastoparesis. Has chest pain but thinks it is related to acid reflux. Is not able to keep down food or fluids. Mouth is not dry, so doesn't feel completely dehydrated. Overnight was urinating every 2 hours. BG this am was 242. He tested his ketones in his urine and they were high at . Advised pt to go to ER. Pt agrees with plan.    Padmini Hutchison RN  [facility]

## 2018-01-09 RX ORDER — BUPROPION HYDROCHLORIDE 300 MG/1
TABLET ORAL
Qty: 90 TABLET | Refills: 0 | Status: SHIPPED | OUTPATIENT
Start: 2018-01-09 | End: 2018-08-21

## 2018-01-09 NOTE — TELEPHONE ENCOUNTER
Called and spoke to patient.  Patient has been taking the escitalopram daily and alprazolam as needed and Wellbutrin.  Refilled for 90 days. Follow up scheduled next week.  Marleny Sands RN

## 2018-03-08 ENCOUNTER — OFFICE VISIT (OUTPATIENT)
Dept: FAMILY MEDICINE | Facility: CLINIC | Age: 48
End: 2018-03-08
Payer: MEDICAID

## 2018-03-08 VITALS
SYSTOLIC BLOOD PRESSURE: 135 MMHG | OXYGEN SATURATION: 96 % | HEART RATE: 115 BPM | DIASTOLIC BLOOD PRESSURE: 81 MMHG | WEIGHT: 176 LBS | TEMPERATURE: 96 F | BODY MASS INDEX: 27.3 KG/M2

## 2018-03-08 DIAGNOSIS — G89.29 CHRONIC LOW BACK PAIN WITHOUT SCIATICA, UNSPECIFIED BACK PAIN LATERALITY: ICD-10-CM

## 2018-03-08 DIAGNOSIS — M51.369 DDD (DEGENERATIVE DISC DISEASE), LUMBAR: ICD-10-CM

## 2018-03-08 DIAGNOSIS — M54.50 CHRONIC LOW BACK PAIN WITHOUT SCIATICA, UNSPECIFIED BACK PAIN LATERALITY: ICD-10-CM

## 2018-03-08 DIAGNOSIS — S33.5XXA LUMBAR SPRAIN, INITIAL ENCOUNTER: Primary | ICD-10-CM

## 2018-03-08 PROCEDURE — 99213 OFFICE O/P EST LOW 20 MIN: CPT | Performed by: PHYSICIAN ASSISTANT

## 2018-03-08 RX ORDER — CYCLOBENZAPRINE HCL 10 MG
5-10 TABLET ORAL 3 TIMES DAILY PRN
Qty: 30 TABLET | Refills: 1 | Status: CANCELLED | OUTPATIENT
Start: 2018-03-08

## 2018-03-08 RX ORDER — DICLOFENAC SODIUM 75 MG/1
75 TABLET, DELAYED RELEASE ORAL 2 TIMES DAILY
Qty: 14 TABLET | Refills: 0 | Status: SHIPPED | OUTPATIENT
Start: 2018-03-08 | End: 2022-09-06

## 2018-03-08 RX ORDER — METHOCARBAMOL 500 MG/1
500-1000 TABLET, FILM COATED ORAL 3 TIMES DAILY PRN
Qty: 60 TABLET | Refills: 3 | Status: SHIPPED | OUTPATIENT
Start: 2018-03-08 | End: 2018-08-21

## 2018-03-08 NOTE — PROGRESS NOTES
SUBJECTIVE:   Rudolph Tao is a 48 year old male who presents to clinic today for the following health issues:      Back Pain       Duration: x4days        Specific cause: unknown was snow plowing     Description:   Location of pain: low back right  Character of pain: sharp  Pain radiation:none  New numbness or weakness in legs, not attributed to pain:  no     Intensity: Currently 8/10 with movement     History:   Pain interferes with job: no  History of back problems: recurrent self limited episodes of low back pain in the past and over 20 yrs  Any previous MRI or X-rays: Yes--at [place].  Date 5 years ago  Sees a specialist for back pain:  No  Therapies tried without relief: Physical Therapy    Alleviating factors:   Improved by: acetaminophen (Tylenol) and cold      Precipitating factors:  Worsened by: Bending and Walking    Functional and Psychosocial Screen ([place] STarT Back):      Not performed today          Accompanying Signs & Symptoms:  Risk of Fracture:  None  Risk of Cauda Equina:  None  Risk of Infection:  None  Risk of Cancer:  None  Risk of Ankylosing Spondylitis:  Onset at age <35, male, AND morning back stiffness. no           Problem list and histories reviewed & adjusted, as indicated.  Additional history: as documented    Patient Active Problem List   Diagnosis     GENERALIZED ANXIETY     Hyperlipidemia LDL goal <100     Encounter for long-term (current) use of insulin (H)     Chronic low back pain     Libido, decreased     Restless legs     Tobacco abuse     Cervicalgia     Paresthesias     Persistent disorder of initiating or maintaining sleep     Spasms of the hands or feet     Hypertension goal BP (blood pressure) < 140/90     Right-sided low back pain without sciatica     Type 1 diabetes mellitus without complication (H)     GAIL (generalized anxiety disorder)     Psychosocial stressors     DDD (degenerative disc disease), lumbar     Past Surgical History:   Procedure Laterality Date      APPENDECTOMY  1990     OSTEOTOMY FOOT  1/8/2013    Procedure: OSTEOTOMY FOOT;  Left Foot Distal First Metarsal Osteotomy, Second Toe Hammer Toe Correction, Second Metatarsal Weil Osteotomy;  Surgeon: Robert Augustine DPM;  [facility]     SURGICAL HISTORY OF -   1992    [facility]       Social History   Substance Use Topics     Smoking status: Former Smoker     Packs/day: 0.00     Years: 20.00     Types: Cigarettes     Smokeless tobacco: Never Used      Comment: 2-4  cigarettes daily     Alcohol use Yes      Comment: 2 drinks daily     Family History   Problem Relation Age of Onset     Unknown/Adopted Mother      Unknown/Adopted Father      Unknown/Adopted Maternal Grandmother      Unknown/Adopted Maternal Grandfather      Unknown/Adopted Paternal Grandmother      Unknown/Adopted Paternal Grandfather            Reviewed and updated as needed this visit by clinical staff       Reviewed and updated as needed this visit by Provider         ROS:  Constitutional, neuro, musculoskeletal systems are negative, except as otherwise noted.    OBJECTIVE:                                                    /81  Pulse 115  Temp 96  F (35.6  C) (Tympanic)  Wt 176 lb (79.8 kg)  SpO2 96%  BMI 27.3 kg/m2  Body mass index is 27.3 kg/(m^2).  GENERAL APPEARANCE: healthy, alert and no distress  MS: extremities normal- no gross deformities noted  ORTHO: Lumber/Thoracic Spine Exam: Tender:  right para lumbar muscles, right SI joint  Range of Motion:  Limited due to pain  Strength:  gastrocsoleus   5/5, hamstrings  5/5, quadriceps  5/5, tibialis anterior  5/5    NEURO: Normal strength and tone  PSYCH: mentation appears normal and affect normal/bright       ASSESSMENT:                                                      1. Lumbar sprain, initial encounter    2. DDD (degenerative disc disease), lumbar    3. Chronic low back pain without sciatica, unspecified back pain laterality         PLAN:                                                     Robaxin PRN and diclofenac x1 week. Continue warm and cold compresses. Supportive therapy also discussed. Follow up if symptoms fail to improve or worsen.      The patient was in agreement with the plan today and had no questions or concerns prior to leaving the clinic.     Akilah Hansen PA-C  [facility]

## 2018-03-08 NOTE — MR AVS SNAPSHOT
After Visit Summary   3/8/2018    Rudolph Tao    MRN: [id number]           Patient Information     Date Of Birth          1970        Visit Information        Provider Department      3/8/2018 11:40 AM Akilah Hansen PA-C [facility]        Today's Diagnoses     Lumbar sprain, initial encounter    -  1    DDD (degenerative disc disease), lumbar        Chronic low back pain without sciatica, unspecified back pain laterality           Follow-ups after your visit        Who to contact     Normal or non-critical lab and imaging results will be communicated to you by nGamehart, letter or phone within 4 business days after the clinic has received the results. If you do not hear from us within 7 days, please contact the clinic through nGamehart or phone. If you have a critical or abnormal lab result, we will notify you by phone as soon as possible.  Submit refill requests through Cuponomia or call your pharmacy and they will forward the refill request to us. Please allow 3 business days for your refill to be completed.          If you need to speak with a  for additional information , please call: [phone]             Additional Information About Your Visit        MyChart Information     Cuponomia gives you secure access to your electronic health record. If you see a primary care provider, you can also send messages to your care team and make appointments. If you have questions, please call your primary care clinic.  If you do not have a primary care provider, please call [phone] and they will assist you.        Care EveryWhere ID     This is your Care EveryWhere ID. This could be used by other organizations to access your [place] medical records  [id number]        Your Vitals Were     Pulse Temperature Pulse Oximetry BMI (Body Mass Index)          115 96  F (35.6  C) (Tympanic) 96% 27.3 kg/m2         Blood Pressure from Last 3 Encounters:   03/08/18 135/81    09/21/17 126/82   09/11/17 120/76    Weight from Last 3 Encounters:   03/08/18 176 lb (79.8 kg)   09/21/17 165 lb (74.8 kg)   09/11/17 167 lb (75.8 kg)              Today, you had the following     No orders found for display         Today's Medication Changes          These changes are accurate as of 3/8/18 12:18 PM.  If you have any questions, ask your nurse or doctor.               Start taking these medicines.        Dose/Directions    diclofenac 75 MG EC tablet   Commonly known as:  VOLTAREN   Used for:  Lumbar sprain, initial encounter   Started by:  Akilah Hansen PA-C        Dose:  75 mg   Take 1 tablet (75 mg) by mouth 2 times daily for 7 days   Quantity:  14 tablet   Refills:  0       methocarbamol 500 MG tablet   Commonly known as:  ROBAXIN   Used for:  Lumbar sprain, initial encounter   Started by:  Akilah Hansen PA-C        Dose:  500-1000 mg   Take 1-2 tablets (500-1,000 mg) by mouth 3 times daily as needed for muscle spasms   Quantity:  60 tablet   Refills:  3         Stop taking these medicines if you haven't already. Please contact your care team if you have questions.     metroNIDAZOLE 0.75 % cream   Commonly known as:  METROCREAM   Stopped by:  Akilah Hansen PA-C           naproxen 500 MG tablet   Commonly known as:  NAPROSYN   Stopped by:  Akilah Hansen PA-C           order for DME   Stopped by:  Akilah Hansen PA-C           order for DME   Stopped by:  Akilah Hansen PA-C           predniSONE 20 MG tablet   Commonly known as:  DELTASONE   Stopped by:  Akilah Hansen PA-C                Where to get your medicines      These medications were sent to [id number] [place] - [address]  [address] [place] [address]     Phone:  [phone]     diclofenac 75 MG EC tablet    methocarbamol 500 MG tablet                Primary Care Provider Office Phone # Fax #    Ariela Migue [facility] [phone] [address] [facility] [address]        Equal Access to Services     VASYL KUHN : Hadii meme ku hadcarinnataliya Sostephanie, waaxda luqadaha, qaybta kaalmada veronicataniyadeborah, grisel pearceethanotis peñaloza. So [facility] [phone].    ATENCIÓN: Si habla español, tiene a may disposición servicios gratuitos de asistencia lingüística. Ll[facility] [phone].    We comply with applicable federal civil rights laws and [place] laws. We do not discriminate on the basis of race, color, national origin, age, disability, sex, sexual orientation, or gender identity.            Thank you!     Thank you for choosing [facility]  for your care. Our goal is always to provide you with excellent care. Hearing back from our patients is one way we can continue to improve our services. Please take a few minutes to complete the written survey that you may receive in the mail after your visit with us. Thank you!             Your Updated Medication List - Protect others around you: Learn how to safely use, store and throw away your medicines at www.disposemymeds.org.          This list is accurate as of 3/8/18 12:18 PM.  Always use your most recent med list.                   Brand Name Dispense Instructions for use Diagnosis    ALPRAZolam 0.5 MG tablet    XANAX    45 tablet    Take 1 tablet (0.5 mg) by mouth 3 times daily as needed for anxiety 1 month supply    Generalized anxiety disorder       aspirin 81 MG EC tablet     90 tablet    Take 1 tablet (81 mg) by mouth daily        blood glucose monitoring lancets     100 each    Use as directed to test blood sugar 4-6 times daily, may test up to 10 times daily if needed.    Type 1 diabetes mellitus with hyperglycemia (H)       blood glucose monitoring test strip    FREESTYLE LITE    300 strip    TEST AS DIRECTED TEST 4-6 TIMES DAILY, UP TO 10 TIMES DAILY    Diabetes mellitus type 1.5, managed as type 1 (H)       buPROPion 300 MG 24 hr tablet    WELLBUTRIN XL    90 tablet    TAKE 1  TABLET (300 MG) BY MOUTH EVERY MORNING    Generalized anxiety disorder       cetirizine 10 MG tablet    zyrTEC     Take 10 mg by mouth daily        diclofenac 75 MG EC tablet    VOLTAREN    14 tablet    Take 1 tablet (75 mg) by mouth 2 times daily for 7 days    Lumbar sprain, initial encounter       escitalopram 20 MG tablet    LEXAPRO    90 tablet    Take 1 tablet (20 mg) by mouth daily    Generalized anxiety disorder       fluticasone 50 MCG/ACT spray    FLONASE    1 Bottle    Spray 1-2 sprays into both nostrils daily    Chronic rhinitis, unspecified type       GLUCAGON EMERGENCY KIT 1 MG Kit     1    use as directed    Type I (juvenile type) diabetes mellitus without mention of complication, not stated as uncontrolled       GLUCOSE CONTROL Liqd     one bottle    as directed    Type I (juvenile type) diabetes mellitus without mention of complication, not stated as uncontrolled       insulin aspart 100 UNIT/ML injection    NovoLOG PEN    6 mL    Use with sliding scale, approximately 2-5 units daily    Type 1 diabetes mellitus without complication (H)       insulin pen needle 31G X 5 MM    B-D U/F    300 each    by Device route 6 times daily    Type 1 diabetes mellitus without complication (H)       LANTUS SOLOSTAR 100 UNIT/ML injection   Generic drug:  insulin glargine     4 mL    INJECT 32 UNITS SUBCUTANEOUS DAILY    Diabetes mellitus type 1.5, managed as type 1 (H)       lisinopril 40 MG tablet    PRINIVIL/ZESTRIL    90 tablet    Take 1 tablet (40 mg) by mouth daily No refills. Needs visit    Hypertension goal BP (blood pressure) < 140/90       methocarbamol 500 MG tablet    ROBAXIN    60 tablet    Take 1-2 tablets (500-1,000 mg) by mouth 3 times daily as needed for muscle spasms    Lumbar sprain, initial encounter       metoclopramide 10 MG tablet    REGLAN    30 tablet    Take 1 tablet (10 mg) by mouth 4 times daily as needed    Non-intractable vomiting with nausea, vomiting of unspecified type       rOPINIRole  0.25 MG tablet    REQUIP    180 tablet    TAKE 1-2 TABLETS (0.25-0.5 MG) BY MOUTH AT BEDTIME    Restless leg syndrome       tadalafil 10 MG tablet    CIALIS    12 tablet    Take 0.5-1 tablets (5-10 mg) by mouth daily as needed for erectile dysfunction Never use with nitroglycerin, terazosin or doxazosin.    ED (erectile dysfunction)       triamcinolone 0.1 % ointment    KENALOG    45 g    Apply sparingly to affected area three times daily sparingly    Rash

## 2018-03-09 ENCOUNTER — TELEPHONE (OUTPATIENT)
Dept: FAMILY MEDICINE | Facility: CLINIC | Age: 48
End: 2018-03-09

## 2018-03-09 NOTE — TELEPHONE ENCOUNTER
Panel Management Review      Patient has the following on his problem list:     Diabetes    ASA: Passed    Last A1C  Lab Results   Component Value Date    A1C 8.1 03/24/2017    A1C 7.5 09/12/2016    A1C 7.9 06/09/2016    A1C 8.4 12/09/2015    A1C 7.2 08/10/2015     A1C tested: FAILED    Last LDL:    Lab Results   Component Value Date    CHOL 196 09/12/2016     Lab Results   Component Value Date    HDL 92 09/12/2016     Lab Results   Component Value Date     09/21/2017    LDL 92 09/12/2016     Lab Results   Component Value Date    TRIG 60 09/12/2016     Lab Results   Component Value Date    CHOLHDLRATIO 2.7 10/08/2014     Lab Results   Component Value Date    NHDL 104 09/12/2016       Is the patient on a Statin? NO             Is the patient on Aspirin? YES    Medications     Salicylates    aspirin 81 MG EC tablet          Last three blood pressure readings:  BP Readings from Last 3 Encounters:   03/08/18 135/81   09/21/17 126/82   09/11/17 120/76       Date of last diabetes office visit: 09/21/17     Tobacco History:     History   Smoking Status     Former Smoker     Packs/day: 0.00     Years: 20.00     Types: Cigarettes   Smokeless Tobacco     Never Used     Comment: 2-4  cigarettes daily         Hypertension   Last three blood pressure readings:  BP Readings from Last 3 Encounters:   03/08/18 135/81   09/21/17 126/82   09/11/17 120/76     Blood pressure: Passed    HTN Guidelines:  Age 18-59 BP range:  Less than 140/90  Age 60-85 with Diabetes:  Less than 140/90  Age 60-85 without Diabetes:  less than 150/90      Composite cancer screening  Chart review shows that this patient is due/due soon for the following Colonoscopy  Summary:    Patient is due/failing the following:   A1C and PHYSICAL    Action needed:   Patient needs office visit for Physical A1C.    Type of outreach:    Sent [employer] message.    Questions for provider review:    None                                                                                                                                     Leana Martinez MA       Chart routed to Care Team .

## 2018-03-13 NOTE — TELEPHONE ENCOUNTER
Patient returned call, Patient will be out of insulin in 9 days has no follow up appointment. Message sent for possible work in for Endo. Leana Martinez MA

## 2018-03-13 NOTE — TELEPHONE ENCOUNTER
Patient called MA line and writer scheduled work in appointment with Endo tomorrow 03/14/2018 at 1:00. No further questions at this time. Sarah Shepard MA

## 2018-03-13 NOTE — TELEPHONE ENCOUNTER
Alicia can do work in tomorrow at 1pm. Called patient and declines appointment. Phone number from referral for [facility] [place] [facility] given patient will call to scheduled. Leana Martinez MA

## 2018-03-14 ENCOUNTER — OFFICE VISIT (OUTPATIENT)
Dept: ENDOCRINOLOGY | Facility: CLINIC | Age: 48
End: 2018-03-14
Payer: MEDICAID

## 2018-03-14 VITALS
HEIGHT: 68 IN | SYSTOLIC BLOOD PRESSURE: 149 MMHG | DIASTOLIC BLOOD PRESSURE: 100 MMHG | BODY MASS INDEX: 26.52 KG/M2 | WEIGHT: 175 LBS | HEART RATE: 98 BPM

## 2018-03-14 DIAGNOSIS — E10.9 TYPE 1 DIABETES MELLITUS WITHOUT COMPLICATION (H): Primary | ICD-10-CM

## 2018-03-14 PROBLEM — E11.10: Status: ACTIVE | Noted: 2017-09-18

## 2018-03-14 PROBLEM — F41.9 ANXIETY: Status: ACTIVE | Noted: 2017-09-18

## 2018-03-14 PROCEDURE — 99214 OFFICE O/P EST MOD 30 MIN: CPT | Performed by: INTERNAL MEDICINE

## 2018-03-14 NOTE — NURSING NOTE
"Chief Complaint   Patient presents with     Diabetes       Initial BP (!) 149/100 (BP Location: Right arm, Cuff Size: Adult Regular)  Pulse 98  Ht 5' 8\" (1.727 m)  Wt 175 lb (79.4 kg)  BMI 26.61 kg/m2 Estimated body mass index is 26.61 kg/(m^2) as calculated from the following:    Height as of this encounter: 5' 8\" (1.727 m).    Weight as of this encounter: 175 lb (79.4 kg).  Medication Reconciliation: complete         Nicky Yeager      "

## 2018-03-14 NOTE — PATIENT INSTRUCTIONS
Health history suggests Type 1 diabetes mellitus  Will update Lantus Solostar Rx today  Plan f/u evaluation with Dr. PANG on a Wednesday at  [place] in next 3 weeks  Will plan to do lab testing, additional exam, discuss management issues  Arrange dilated eye exam soon... optomitrist or ophthalmologist  Contact Dr PANG if questions

## 2018-03-14 NOTE — MR AVS SNAPSHOT
After Visit Summary   3/14/2018    Rudolph Tao    MRN: [id number]           Patient Information     Date Of Birth          1970        Visit Information        Provider Department      3/14/2018 1:00 PM Jalil Tipton MD [facility]        Today's Diagnoses     Type 1 diabetes mellitus without complication (H)    -  1      Care Instructions    Health history suggests Type 1 diabetes mellitus  Will update Lantus Solostar Rx today  Plan f/u evaluation with Dr. PANG on a Wednesday at [facility] in next 3 weeks  Will plan to do lab testing, additional exam, discuss management issues  Arrange dilated eye exam soon... optomitrist or ophthalmologist  Contact Dr PANG if questions          Follow-ups after your visit        Who to contact     If you have questions or need follow up information about today's clinic visit or your schedule please contact [facility] directly at [phone].  Normal or non-critical lab and imaging results will be communicated to you by MyChart, letter or phone within 4 business days after the clinic has received the results. If you do not hear from us within 7 days, please contact the clinic through Youtopiahart or phone. If you have a critical or abnormal lab result, we will notify you by phone as soon as possible.  Submit refill requests through Evince or call your pharmacy and they will forward the refill request to us. Please allow 3 business days for your refill to be completed.          Additional Information About Your Visit        MyChart Information     Evince gives you secure access to your electronic health record. If you see a primary care provider, you can also send messages to your care team and make appointments. If you have questions, please call your primary care clinic.  If you do not have a primary care provider, please call [phone] and they will assist you.        Care EveryWhere ID     This is your Care EveryWhere ID. This  "could be used by other organizations to access your [place] medical records  [id number]        Your Vitals Were     Pulse Height BMI (Body Mass Index)             98 5' 8\" (1.727 m) 26.61 kg/m2          Blood Pressure from Last 3 Encounters:   03/14/18 (!) 149/100   03/08/18 135/81   09/21/17 126/82    Weight from Last 3 Encounters:   03/14/18 175 lb (79.4 kg)   03/08/18 176 lb (79.8 kg)   09/21/17 165 lb (74.8 kg)              Today, you had the following     No orders found for display       Primary Care Provider Office Phone # Fax #    [facility] [phone]       [address] [facility] [address]        Equal Access to Services     [facility] : Hadii meme hall hadasho Soomaali, waaxda luqadaha, qaybta kaalmada adeegyada, grisel george . So [facility] [phone].    ATENCIÓN: Si habla español, tiene a may disposición servicios gratuitos de asistencia lingüística. Javed al [phone].    We comply with applicable federal civil rights laws and [place] laws. We do not discriminate on the basis of race, color, national origin, age, disability, sex, sexual orientation, or gender identity.            Thank you!     Thank you for choosing [facility]  for your care. Our goal is always to provide you with excellent care. Hearing back from our patients is one way we can continue to improve our services. Please take a few minutes to complete the written survey that you may receive in the mail after your visit with us. Thank you!             Your Updated Medication List - Protect others around you: Learn how to safely use, store and throw away your medicines at www.disposemymeds.org.          This list is accurate as of 3/14/18  1:33 PM.  Always use your most recent med list.                   Brand Name Dispense Instructions for use Diagnosis    ALPRAZolam 0.5 MG tablet    XANAX    45 tablet    Take 1 tablet (0.5 mg) by mouth 3 times daily as needed " for anxiety 1 month supply    Generalized anxiety disorder       aspirin 81 MG EC tablet     90 tablet    Take 1 tablet (81 mg) by mouth daily        blood glucose monitoring lancets     100 each    Use as directed to test blood sugar 4-6 times daily, may test up to 10 times daily if needed.    Type 1 diabetes mellitus with hyperglycemia (H)       blood glucose monitoring test strip    FREESTYLE LITE    300 strip    TEST AS DIRECTED TEST 4-6 TIMES DAILY, UP TO 10 TIMES DAILY    Diabetes mellitus type 1.5, managed as type 1 (H)       buPROPion 300 MG 24 hr tablet    WELLBUTRIN XL    90 tablet    TAKE 1 TABLET (300 MG) BY MOUTH EVERY MORNING    Generalized anxiety disorder       cetirizine 10 MG tablet    zyrTEC     Take 10 mg by mouth daily        diclofenac 75 MG EC tablet    VOLTAREN    14 tablet    Take 1 tablet (75 mg) by mouth 2 times daily for 7 days    Lumbar sprain, initial encounter       escitalopram 20 MG tablet    LEXAPRO    90 tablet    Take 1 tablet (20 mg) by mouth daily    Generalized anxiety disorder       fluticasone 50 MCG/ACT spray    FLONASE    1 Bottle    Spray 1-2 sprays into both nostrils daily    Chronic rhinitis, unspecified type       GLUCAGON EMERGENCY KIT 1 MG Kit     1    use as directed    Type I (juvenile type) diabetes mellitus without mention of complication, not stated as uncontrolled       GLUCOSE CONTROL Liqd     one bottle    as directed    Type I (juvenile type) diabetes mellitus without mention of complication, not stated as uncontrolled       insulin aspart 100 UNIT/ML injection    NovoLOG PEN    6 mL    Use with sliding scale, approximately 2-5 units daily    Type 1 diabetes mellitus without complication (H)       insulin pen needle 31G X 5 MM    B-D U/F    300 each    by Device route 6 times daily    Type 1 diabetes mellitus without complication (H)       LANTUS SOLOSTAR 100 UNIT/ML injection   Generic drug:  insulin glargine     4 mL    INJECT 32 UNITS SUBCUTANEOUS DAILY     Diabetes mellitus type 1.5, managed as type 1 (H)       lisinopril 40 MG tablet    PRINIVIL/ZESTRIL    90 tablet    Take 1 tablet (40 mg) by mouth daily No refills. Needs visit    Hypertension goal BP (blood pressure) < 140/90       methocarbamol 500 MG tablet    ROBAXIN    60 tablet    Take 1-2 tablets (500-1,000 mg) by mouth 3 times daily as needed for muscle spasms    Lumbar sprain, initial encounter       metoclopramide 10 MG tablet    REGLAN    30 tablet    Take 1 tablet (10 mg) by mouth 4 times daily as needed    Non-intractable vomiting with nausea, vomiting of unspecified type       rOPINIRole 0.25 MG tablet    REQUIP    180 tablet    TAKE 1-2 TABLETS (0.25-0.5 MG) BY MOUTH AT BEDTIME    Restless leg syndrome       tadalafil 10 MG tablet    CIALIS    12 tablet    Take 0.5-1 tablets (5-10 mg) by mouth daily as needed for erectile dysfunction Never use with nitroglycerin, terazosin or doxazosin.    ED (erectile dysfunction)       triamcinolone 0.1 % ointment    KENALOG    45 g    Apply sparingly to affected area three times daily sparingly    Rash

## 2018-03-14 NOTE — PROGRESS NOTES
Name: Rudolph Tao is a 48 year old man, self-referred for evaluation of diabetes mellitus.    HPI:  Recent issues:  Here for work-in diabetes evaluation  Running out of his Lantus solostar pen medication        Diagnosis of diabetes mellitus age 34, living in [facility]  Had seen Dr. RAJ Foster for evaluation  Initial treatment treatment with oral meds.  Hospitalized with high BG levels, then med plan switched to insulin medications  Recalls taking Humalog and Lantus  insulin  2017. Had DKA illness, hospitalized at [facility]  Current DM meds:   Lantus 32U sq QAM   Novolog sscale 1U per 50>150    total daily dose usually 3-4U  Using [employer] BG meter, tests 3x/day   No meter data available today, recalls FBG 53 this morning  Has not worn CGMS in the past  Previous [facility] labs include:  Lab Results   Component Value Date    A1C 8.1 (H) 03/24/2017     03/24/2017    POTASSIUM 4.8 03/24/2017    CHLORIDE 100 03/24/2017    CO2 24 03/24/2017    ANIONGAP 15 (H) 03/24/2017     (H) 03/24/2017    BUN 10 03/24/2017    CR 0.71 03/24/2017    GFRESTIMATED >90  Non  GFR Calc   03/24/2017    GFRESTBLACK >90   GFR Calc   03/24/2017    DANA 9.5 03/24/2017    CPEPT <0.1 (L) 12/09/2015    CHOL 196 09/12/2016    TRIG 60 09/12/2016    HDL 92 09/12/2016     (H) 09/21/2017    NHDL 104 09/12/2016     DM Complications:   Retinopathy    Last eye exam approx 2 yrs ago, recalls some eye changes (retinopathy)    Wears contact lenses   Neuropathy?    Reports h/o gastroparesis    Has seen Dr. Padmini Arthur general medicine evaluations.    PMH/PSH:  Past Medical History:   Diagnosis Date     Adopted      Anxiety      Anxiety      Chronic low back pain      DDD (degenerative disc disease), lumbar 3/8/2018     Diabetes mellitus (H)     TYPE 1     Hallux abducto valgus      Hyperlipaemia      Hyperlipidemia      Hypertension      Lip lesion 8/2/2010     Pain in toe of left foot     2  ND TOE     RLS (restless legs syndrome)      Snoring     MODERATE SEVERITY     Swelling of foot joint 7/13/2012     Past Surgical History:   Procedure Laterality Date     APPENDECTOMY  1990     OSTEOTOMY FOOT  1/8/2013    Procedure: OSTEOTOMY FOOT;  Left Foot Distal First Metarsal Osteotomy, Second Toe Hammer Toe Correction, Second Metatarsal Weil Osteotomy;  Surgeon: Robert Augustine DPM;  [facility]     SURGICAL HISTORY OF -   1992    Appy       Family Hx:  Family History   Problem Relation Age of Onset     Unknown/Adopted Mother      Unknown/Adopted Father      Unknown/Adopted Maternal Grandmother      Unknown/Adopted Maternal Grandfather      Unknown/Adopted Paternal Grandmother      Unknown/Adopted Paternal Grandfather          Social Hx:  Social History     Social History     Marital status:      Spouse name: N/A     Number of children: N/A     Years of education: N/A     Occupational History     Not on file.     Social History Main Topics     Smoking status: Former Smoker     Packs/day: 0.00     Years: 20.00     Types: Cigarettes     Smokeless tobacco: Never Used      Comment: 2-4  cigarettes daily     Alcohol use Yes      Comment: 2 drinks daily     Drug use: No     Sexual activity: Yes     Partners: Female     Birth control/ protection: None     Other Topics Concern     Parent/Sibling W/ Cabg, Mi Or Angioplasty Before 65f 55m? No     Social History Narrative    Works [employer]    Erratic eating pattern          MEDICATIONS:  has a current medication list which includes the following prescription(s): diclofenac, methocarbamol, bupropion, blood glucose monitoring, insulin aspart, ropinirole, blood glucose monitoring, escitalopram, fluticasone, lantus solostar, alprazolam, insulin pen needle, lisinopril, metoclopramide, cetirizine, triamcinolone, aspirin, tadalafil, glucagon emergency kit, and glucose control.    ROS:     ROS: 10 point ROS neg other than the symptoms noted above in the  "HPI.    GENERAL: no weight changes, fatigue, fevers, chills, night sweats.   HEENT: no dysphagia, odonophagia, diplopia, neck pain  THYROID:  no apparent hyper or hypothyroid symptoms  CV: no chest pain, pressure, palpitations  LUNGS: no SOB, RIVERA, cough, wheezing   ABDOMEN: no diarrhea, constipation, abdominal pain  EXTREMITIES: no rashes, ulcers, edema  NEUROLOGY: no headaches, denies changes in vision, tingling, extremitiy numbness   MSK: left ankle stiffness; no muscle aches, weakness  SKIN: no rashes or lesions  : some decreased erection function  PSYCH:  stable mood, no significant anxiety or depression  ENDOCRINE: no heat or cold intolerance    Physical Exam   VS: BP (!) 149/100 (BP Location: Right arm, Cuff Size: Adult Regular)  Pulse 98  Ht 5' 8\" (1.727 m)  Wt 175 lb (79.4 kg)  BMI 26.61 kg/m2  GENERAL: AXOX3, NAD, well dressed, answering questions appropriately, appears stated age.  THYROID:  normal gland, no apparent nodules or goiter  HEENT: neck non-tender, no exopthalmous, no proptosis, EOMI  CV: RRR, no rubs, gallops, no murmurs  LUNGS: CTAB, no wheezes, rales, or ronchi  ABDOMEN: soft, nontender, nondistended, no organomegaly  EXTREMITIES: no edema, +pedal pulses, no lesions  NEUROLOGY: CN grossly intact, + DTR lower extremity, no tremors  MSK: grossly intact  SKIN: no rashes, no lesions    LABS:    All pertinent notes, labs, and images personally reviewed by me.     A/P:  Encounter Diagnosis   Name Primary?     Type 1 diabetes mellitus without complication (H) Yes     Comments:  Reviewed overall diabetes history and treatment.      Plan:  Discussed general issues with the diabetes diagnosis and management  We discussed the hgbA1c test which reflects previous overall glucose levels or control  Discussed the importance of blood glucose (BG) testing to assess glucose trends  Provided general overview of the multiple daily injection (MDI) plan using rapid acting mealtime and longacting insulin " medications    Recommend:  Continue current low dose Nv and daily Lantus insulin plan at this time, though needs further evaluation/discussion of treatment options  Lower the Lantus as 30U QD, consider future shift to bedtime or change to longer-acting basal insulin  Consider using a 1/2-unit pen such as Novolog (Echo pen) or Humalog (Luxura pen)  Test BG levels QID, goal target  mg/dl  Would benefit from trial vs regular use of CGMS device    Patient Instructions   Health history suggests Type 1 diabetes mellitus  Will update Lantus Solostar Rx today  Plan f/u evaluation with Dr. PANG on a Wednesday at  [place] in next 3 weeks  Will plan to do lab testing, additional exam, discuss management issues  Arrange dilated eye exam soon... optomitrist or ophthalmologist  Contact Dr PANG if questions    See back in 1-2 weeks for f/u evaluation  Will need lab testing assessment  We will also review his symptoms and suspicion for gastroparesis  Monitor BP levels, goal SBP/DBP <130/<80  Advise having fasting lipid panel testing and dilated eye examination, at least annually  Discussed importance of having a primary care practitioner (family practice or internal medicine) for general medicine appointments and also acute care visits.    Addressed patient questions today    Labs ordered today: No orders of the defined types were placed in this encounter.    Radiology/Consults ordered today: None    More than 50% of the time spent with Mr. Tao on counseling / coordinating his care.  Total appointment time was 25 minutes.    Follow-up:  1 week    Jalil Tipton MD  Endocrinology  [facility]

## 2018-03-14 NOTE — LETTER
3/14/2018         RE: Rudolph Tao  [id number] [address] [id number]        Dear Colleague,    Thank you for referring your patient, Rudolph Tao, to the [facility]. Please see a copy of my visit note below.    Name: Rudolph Tao is a 48 year old man, self-referred for evaluation of diabetes mellitus.    HPI:  Recent issues:  Here for work-in diabetes evaluation  Running out of his Lantus solostar pen medication        Diagnosis of diabetes mellitus age 34, living in [place]  Had seen Dr. RAJ Foster for evaluation  Initial treatment treatment with oral meds.  Hospitalized with high BG levels, then med plan switched to insulin medications  Recalls taking Humalog and Lantus  insulin  2017. Had DKA illness, hospitalized at [facility]  Current DM meds:   Lantus 32U sq QAM   Novolog sscale 1U per 50>150    total daily dose usually 3-4U  Using [employer] BG meter, tests 3x/day   No meter data available today, recalls FBG 53 this morning  Has not worn CGMS in the past  Previous [facility] labs include:  Lab Results   Component Value Date    A1C 8.1 (H) 03/24/2017     03/24/2017    POTASSIUM 4.8 03/24/2017    CHLORIDE 100 03/24/2017    CO2 24 03/24/2017    ANIONGAP 15 (H) 03/24/2017     (H) 03/24/2017    BUN 10 03/24/2017    CR 0.71 03/24/2017    GFRESTIMATED >90  Non  GFR Calc   03/24/2017    GFRESTBLACK >90   GFR Calc   03/24/2017    DANA 9.5 03/24/2017    CPEPT <0.1 (L) 12/09/2015    CHOL 196 09/12/2016    TRIG 60 09/12/2016    HDL 92 09/12/2016     (H) 09/21/2017    NHDL 104 09/12/2016     DM Complications:   Retinopathy    Last eye exam approx 2 yrs ago, recalls some eye changes (retinopathy)    Wears contact lenses   Neuropathy?    Reports h/o gastroparesis    Has seen Dr. Padmini Arthur general medicine evaluations.    PMH/PSH:  Past Medical History:   Diagnosis Date     Adopted      Anxiety      Anxiety      Chronic  low back pain      DDD (degenerative disc disease), lumbar 3/8/2018     Diabetes mellitus (H)     TYPE 1     Hallux abducto valgus      Hyperlipaemia      Hyperlipidemia      Hypertension      Lip lesion 8/2/2010     Pain in toe of left foot     2 ND TOE     RLS (restless legs syndrome)      Snoring     MODERATE SEVERITY     Swelling of foot joint 7/13/2012     Past Surgical History:   Procedure Laterality Date     APPENDECTOMY  1990     OSTEOTOMY FOOT  1/8/2013    Procedure: OSTEOTOMY FOOT;  Left Foot Distal First Metarsal Osteotomy, Second Toe Hammer Toe Correction, Second Metatarsal Weil Osteotomy;  Surgeon: Robert Augustine DPM;  [facility]     SURGICAL HISTORY OF -   1992    Appy       Family Hx:  Family History   Problem Relation Age of Onset     Unknown/Adopted Mother      Unknown/Adopted Father      Unknown/Adopted Maternal Grandmother      Unknown/Adopted Maternal Grandfather      Unknown/Adopted Paternal Grandmother      Unknown/Adopted Paternal Grandfather          Social Hx:  Social History     Social History     Marital status:      Spouse name: N/A     Number of children: N/A     Years of education: N/A     Occupational History     Not on file.     Social History Main Topics     Smoking status: Former Smoker     Packs/day: 0.00     Years: 20.00     Types: Cigarettes     Smokeless tobacco: Never Used      Comment: 2-4  cigarettes daily     Alcohol use Yes      Comment: 2 drinks daily     Drug use: No     Sexual activity: Yes     Partners: Female     Birth control/ protection: None     Other Topics Concern     Parent/Sibling W/ Cabg, Mi Or Angioplasty Before 65f 55m? No     Social History Narrative    Works [employer]    Erratic eating pattern          MEDICATIONS:  has a current medication list which includes the following prescription(s): diclofenac, methocarbamol, bupropion, blood glucose monitoring, insulin aspart, ropinirole, blood glucose monitoring, escitalopram, fluticasone, lantus  "solostar, alprazolam, insulin pen needle, lisinopril, metoclopramide, cetirizine, triamcinolone, aspirin, tadalafil, glucagon emergency kit, and glucose control.    ROS:     ROS: 10 point ROS neg other than the symptoms noted above in the HPI.    GENERAL: no weight changes, fatigue, fevers, chills, night sweats.   HEENT: no dysphagia, odonophagia, diplopia, neck pain  THYROID:  no apparent hyper or hypothyroid symptoms  CV: no chest pain, pressure, palpitations  LUNGS: no SOB, RIVERA, cough, wheezing   ABDOMEN: no diarrhea, constipation, abdominal pain  EXTREMITIES: no rashes, ulcers, edema  NEUROLOGY: no headaches, denies changes in vision, tingling, extremitiy numbness   MSK: left ankle stiffness; no muscle aches, weakness  SKIN: no rashes or lesions  : some decreased erection function  PSYCH:  stable mood, no significant anxiety or depression  ENDOCRINE: no heat or cold intolerance    Physical Exam   VS: BP (!) 149/100 (BP Location: Right arm, Cuff Size: Adult Regular)  Pulse 98  Ht 5' 8\" (1.727 m)  Wt 175 lb (79.4 kg)  BMI 26.61 kg/m2  GENERAL: AXOX3, NAD, well dressed, answering questions appropriately, appears stated age.  THYROID:  normal gland, no apparent nodules or goiter  HEENT: neck non-tender, no exopthalmous, no proptosis, EOMI  CV: RRR, no rubs, gallops, no murmurs  LUNGS: CTAB, no wheezes, rales, or ronchi  ABDOMEN: soft, nontender, nondistended, no organomegaly  EXTREMITIES: no edema, +pedal pulses, no lesions  NEUROLOGY: CN grossly intact, + DTR lower extremity, no tremors  MSK: grossly intact  SKIN: no rashes, no lesions    LABS:    All pertinent notes, labs, and images personally reviewed by me.     A/P:  Encounter Diagnosis   Name Primary?     Type 1 diabetes mellitus without complication (H) Yes     Comments:  Reviewed overall diabetes history and treatment.      Plan:  Discussed general issues with the diabetes diagnosis and management  We discussed the hgbA1c test which reflects previous " overall glucose levels or control  Discussed the importance of blood glucose (BG) testing to assess glucose trends  Provided general overview of the multiple daily injection (MDI) plan using rapid acting mealtime and longacting insulin medications    Recommend:  Continue current low dose Nv and daily Lantus insulin plan at this time, though needs further evaluation/discussion of treatment options  Lower the Lantus as 30U QD, consider future shift to bedtime or change to longer-acting basal insulin  Consider using a 1/2-unit pen such as Novolog (Echo pen) or Humalog ([employer]ura pen)  Test BG levels QID, goal target  mg/dl  Would benefit from trial vs regular use of CGMS device    Patient Instructions   Health history suggests Type 1 diabetes mellitus  Will update Lantus Solostar Rx today  Plan f/u evaluation with Dr. PANG on a Wednesday at  [place] in next 3 weeks  Will plan to do lab testing, additional exam, discuss management issues  Arrange dilated eye exam soon... optomitrist or ophthalmologist  Contact Dr PANG if questions    See back in 1-2 weeks for f/u evaluation  Will need lab testing assessment  We will also review his symptoms and suspicion for gastroparesis  Monitor BP levels, goal SBP/DBP <130/<80  Advise having fasting lipid panel testing and dilated eye examination, at least annually  Discussed importance of having a primary care practitioner (family practice or internal medicine) for general medicine appointments and also acute care visits.    Addressed patient questions today    Labs ordered today: No orders of the defined types were placed in this encounter.    Radiology/Consults ordered today: None    More than 50% of the time spent with Mr. Tao on counseling / coordinating his care.  Total appointment time was 25 minutes.    Follow-up:  1 week    Jalil Tipton MD  Endocrinology  [facility]        Again, thank you for allowing me to participate in the care of your patient.         Sincerely,        Jalil Tipton MD

## 2018-03-16 ENCOUNTER — TELEPHONE (OUTPATIENT)
Dept: ENDOCRINOLOGY | Facility: CLINIC | Age: 48
End: 2018-03-16

## 2018-03-16 DIAGNOSIS — E13.9 DIABETES MELLITUS TYPE 1.5, MANAGED AS TYPE 1 (H): Primary | ICD-10-CM

## 2018-03-16 NOTE — TELEPHONE ENCOUNTER
"Routing to Dr Tipton for review and completion.    Last RX in epic system: 9-5-17.  4ml x 1 refill    OV notes from 3-14-18 relating to RX Lantus:   \"Lower the Lantus as 30U QD, consider future shift to bedtime or change to longer-acting basal insulin\"  \"Will update Lantus Solostar Rx today\"    I have pended RX Lantus with new dose.   Please review and complete.     Thank you,  Nikki PHILIPPE RN,BSN    "

## 2018-03-19 DIAGNOSIS — G25.81 RESTLESS LEG SYNDROME: ICD-10-CM

## 2018-03-20 RX ORDER — ROPINIROLE 0.25 MG/1
TABLET, FILM COATED ORAL
Qty: 60 TABLET | Refills: 0 | Status: SHIPPED | OUTPATIENT
Start: 2018-03-20 | End: 2018-04-16

## 2018-03-20 NOTE — TELEPHONE ENCOUNTER
Signed Prescriptions:                        Disp   Refills    rOPINIRole (REQUIP) 0.25 MG tablet         60 tab*0        Sig: TAKE 1-2 TABLETS BY MOUTH AT BEDTIME  Authorizing Provider: ROBERTO BURKS    No further refills until follow-up appointment

## 2018-03-20 NOTE — TELEPHONE ENCOUNTER
Pending Prescriptions:                       Disp   Refills    rOPINIRole (REQUIP) 0.25 MG tablet [Pharm*180 ta*0            Sig: TAKE 1-2 TABLETS BY MOUTH AT BEDTIME    Routing refill request to provider for review/approval because:  Recent elevated BP  Max refills per protocol 6 mths since LOV, which was 9/21/2017    Radha Patel RN on 3/20/2018 at 10:37 AM

## 2018-03-21 ENCOUNTER — OFFICE VISIT (OUTPATIENT)
Dept: ENDOCRINOLOGY | Facility: CLINIC | Age: 48
End: 2018-03-21
Payer: MEDICAID

## 2018-03-21 VITALS
HEIGHT: 68 IN | DIASTOLIC BLOOD PRESSURE: 88 MMHG | SYSTOLIC BLOOD PRESSURE: 139 MMHG | HEART RATE: 118 BPM | BODY MASS INDEX: 26.22 KG/M2 | WEIGHT: 173 LBS

## 2018-03-21 DIAGNOSIS — E10.9 TYPE 1 DIABETES MELLITUS WITHOUT COMPLICATION (H): Primary | ICD-10-CM

## 2018-03-21 LAB
ANION GAP SERPL CALCULATED.3IONS-SCNC: 15 MMOL/L (ref 3–14)
BUN SERPL-MCNC: 12 MG/DL (ref 7–30)
CALCIUM SERPL-MCNC: 10.4 MG/DL (ref 8.5–10.1)
CHLORIDE SERPL-SCNC: 97 MMOL/L (ref 94–109)
CO2 SERPL-SCNC: 23 MMOL/L (ref 20–32)
CREAT SERPL-MCNC: 0.72 MG/DL (ref 0.66–1.25)
GFR SERPL CREATININE-BSD FRML MDRD: >90 ML/MIN/1.7M2
GLUCOSE SERPL-MCNC: 160 MG/DL (ref 70–99)
HBA1C MFR BLD: 7.6 % (ref 4.3–6)
POTASSIUM SERPL-SCNC: 4.9 MMOL/L (ref 3.4–5.3)
SODIUM SERPL-SCNC: 135 MMOL/L (ref 133–144)
TSH SERPL DL<=0.005 MIU/L-ACNC: 2.73 MU/L (ref 0.4–4)

## 2018-03-21 PROCEDURE — 99215 OFFICE O/P EST HI 40 MIN: CPT | Performed by: INTERNAL MEDICINE

## 2018-03-21 PROCEDURE — 83516 IMMUNOASSAY NONANTIBODY: CPT | Mod: 90 | Performed by: INTERNAL MEDICINE

## 2018-03-21 PROCEDURE — 80048 BASIC METABOLIC PNL TOTAL CA: CPT | Performed by: INTERNAL MEDICINE

## 2018-03-21 PROCEDURE — 99000 SPECIMEN HANDLING OFFICE-LAB: CPT | Performed by: INTERNAL MEDICINE

## 2018-03-21 PROCEDURE — 83036 HEMOGLOBIN GLYCOSYLATED A1C: CPT | Performed by: INTERNAL MEDICINE

## 2018-03-21 PROCEDURE — 84443 ASSAY THYROID STIM HORMONE: CPT | Performed by: INTERNAL MEDICINE

## 2018-03-21 PROCEDURE — 36415 COLL VENOUS BLD VENIPUNCTURE: CPT | Performed by: INTERNAL MEDICINE

## 2018-03-21 PROCEDURE — 84681 ASSAY OF C-PEPTIDE: CPT | Performed by: INTERNAL MEDICINE

## 2018-03-21 NOTE — PROGRESS NOTES
Name: Rudolph Tao    HPI:  Recent issues:  Here for f/u diabetes evaluation, with his wife Tiara        Diagnosis of diabetes age 34, living in [place]  Had seen Dr. RAJ Foster for evaluation  Initial treatment treatment with oral meds.  Hospitalized with high BG levels, then med plan switched to insulin medications  Recalls taking Humalog and Lantus  insulin  1/2018. Had DKA illness, hospitalized at [facility]  Current DM meds:   Lantus 30U sq QAM (approx 10am)   Novolog sscale 1U per 50>150     total daily dose usually 3-4U  Using [employer] BG meter, tests 3x/day   No meter data available today  Has not worn CGMS in the past  Previous [facility] labs include:  Lab Results   Component Value Date    A1C 7.6 (H) 03/21/2018     03/24/2017    POTASSIUM 4.8 03/24/2017    CHLORIDE 100 03/24/2017    CO2 24 03/24/2017    ANIONGAP 15 (H) 03/24/2017     (H) 03/24/2017    BUN 10 03/24/2017    CR 0.71 03/24/2017    GFRESTIMATED >90  Non  GFR Calc   03/24/2017    GFRESTBLACK >90   GFR Calc   03/24/2017    DANA 9.5 03/24/2017    CPEPT <0.1 (L) 12/09/2015    CHOL 196 09/12/2016    TRIG 60 09/12/2016    HDL 92 09/12/2016     (H) 09/21/2017    NHDL 104 09/12/2016     DM Complications:   Retinopathy    Last eye exam approx 2 yrs ago, recalls some eye changes (retinopathy)    Wears contact lenses   Neuropathy?    Reports h/o gastroparesis    Has seen Dr. Padmini Arthur general medicine evaluations.    PMH/PSH:  Past Medical History:   Diagnosis Date     Adopted      Anxiety      Anxiety      Chronic low back pain      DDD (degenerative disc disease), lumbar 3/8/2018     Diabetes mellitus (H)     TYPE 1     Hallux abducto valgus      Hyperlipaemia      Hyperlipidemia      Hypertension      Lip lesion 8/2/2010     Pain in toe of left foot     2 ND TOE     RLS (restless legs syndrome)      Snoring     MODERATE SEVERITY     Swelling of foot joint 7/13/2012     Past Surgical  History:   Procedure Laterality Date     APPENDECTOMY  1990     OSTEOTOMY FOOT  1/8/2013    Procedure: OSTEOTOMY FOOT;  Left Foot Distal First Metarsal Osteotomy, Second Toe Hammer Toe Correction, Second Metatarsal Weil Osteotomy;  Surgeon: Robert Augustine DPM;  [facility]     SURGICAL HISTORY OF -   1992    Appy       Family Hx:  Family History   Problem Relation Age of Onset     Unknown/Adopted Mother      Unknown/Adopted Father      Unknown/Adopted Maternal Grandmother      Unknown/Adopted Maternal Grandfather      Unknown/Adopted Paternal Grandmother      Unknown/Adopted Paternal Grandfather          Social Hx:  Social History     Social History     Marital status:      Spouse name: N/A     Number of children: N/A     Years of education: N/A     Occupational History     Not on file.     Social History Main Topics     Smoking status: Former Smoker     Packs/day: 0.00     Years: 20.00     Types: Cigarettes     Smokeless tobacco: Never Used      Comment: 2-4  cigarettes daily     Alcohol use Yes      Comment: 2 drinks daily     Drug use: No     Sexual activity: Yes     Partners: Female     Birth control/ protection: None     Other Topics Concern     Parent/Sibling W/ Cabg, Mi Or Angioplasty Before 65f 55m? No     Social History Narrative    Works [employer]    Erratic eating pattern          MEDICATIONS:  has a current medication list which includes the following prescription(s): ropinirole, insulin glargine, insulin aspart, methocarbamol, bupropion, blood glucose monitoring, blood glucose monitoring, escitalopram, fluticasone, alprazolam, insulin pen needle, lisinopril, metoclopramide, cetirizine, triamcinolone, aspirin, tadalafil, glucagon emergency kit, glucose control, and diclofenac.    ROS:     ROS: 10 point ROS neg other than the symptoms noted above in the HPI.    GENERAL: nightsweats; no weight changes, fatigue, fevers, chills  HEENT: no dysphagia, odonophagia, diplopia, neck pain  THYROID:  no  "apparent hyper or hypothyroid symptoms  CV: no chest pain, pressure, palpitations  LUNGS: no SOB, RIVERA, cough, wheezing   ABDOMEN: no diarrhea, constipation, abdominal pain  EXTREMITIES: no rashes, ulcers, edema  NEUROLOGY: no headaches, denies changes in vision, tingling, extremitiy numbness   MSK: left ankle stiffness; no muscle aches, weakness  SKIN: no rashes or lesions  : some decreased erection function  PSYCH:  Stressors with his mother's health, ex-wife and child custody  ENDOCRINE: no heat or cold intolerance    Physical Exam   VS: /88 (BP Location: Right arm, Cuff Size: Adult Regular)  Pulse 118  Ht 5' 8\" (1.727 m)  Wt 173 lb (78.5 kg)  BMI 26.3 kg/m2  GENERAL: AXOX3, NAD, well dressed, answering questions appropriately, appears stated age.  SKIN: scalp hair thinning; no rashes, no lesions    LABS:    All pertinent notes, labs, and images personally reviewed by me.     A/P:  Encounter Diagnosis   Name Primary?     Type 1 diabetes mellitus without complication (H) Yes     Comments:  Reviewed overall diabetes history and management.  I suspect he has T1DM.     Plan:  Discussed general issues with the diabetes diagnosis and management  We discussed the hgbA1c test which reflects previous overall glucose levels or control  Discussed the importance of blood glucose (BG) testing to assess glucose trends  Provided general overview of the multiple daily injection (MDI) plan using rapid acting mealtime and longacting insulin medications    Recommend:  Continue current Basaglar 30U QD, but shift from morning to bedtime  Consider using a 1/2-unit pen such as Novolog (Echo pen) or Humalog ([employer] pen)   I showed him the demo [employer] pen today   Will check on formulary coverage for a 1/2U pen  Will check on [facility] formulary coverage for the basal and rapid acting insulins  Needs f/u Diabetes Education Evaluation   Review insulin dose adjustment   Sick day management including urine ketone " testing  Encouraged use of a CGMS device, provided general overview of CGMS options  He agreed to try a sensor.   I placed a [employer] Pro CGMS sensor today (lot [id number], SN [id number]), placed to left anterolat abdomen without difficulty   Chart the time/date of meals and any Nv insulin doses  Test BG levels QID, goal target  mg/dl  Monitor BP levels, goal SBP/DBP <130/<80  Will need recheck of fasting lipid panel  Advise having fasting lipid panel testing and dilated eye examination, at least annually    Discussed importance of having a primary care practitioner (family practice or internal medicine) for general medicine appointments and also acute care visits.  Would benefit from GI consultation to review digestive symptoms.  Doubt gastroparesis.  Addressed patient questions today    Labs ordered today:   Orders Placed This Encounter   Procedures     Basic metabolic panel     C-peptide     Glutamic acid decarboxylase antibody     TSH     Hemoglobin A1c     Radiology/Consults ordered today: None    More than 50% of the time spent with Mr. Tao on counseling / coordinating his care.  Total appointment time was 40 minutes.    Follow-up:  1 week    Jalil Tipton MD  Endocrinology  [facility]

## 2018-03-21 NOTE — NURSING NOTE
"Chief Complaint   Patient presents with     Diabetes       Initial /88 (BP Location: Right arm, Cuff Size: Adult Regular)  Pulse 118  Ht 5' 8\" (1.727 m)  Wt 173 lb (78.5 kg)  BMI 26.3 kg/m2 Estimated body mass index is 26.3 kg/(m^2) as calculated from the following:    Height as of this encounter: 5' 8\" (1.727 m).    Weight as of this encounter: 173 lb (78.5 kg).  Medication Reconciliation: complete       Nicky Yeager      "

## 2018-03-22 LAB — C PEPTIDE SERPL-MCNC: <0.1 NG/ML (ref 0.9–6.9)

## 2018-03-24 LAB — GAD65 AB SER IA-ACNC: >250 IU/ML (ref 0–5)

## 2018-03-27 ENCOUNTER — TELEPHONE (OUTPATIENT)
Dept: ENDOCRINOLOGY | Facility: CLINIC | Age: 48
End: 2018-03-27

## 2018-03-27 DIAGNOSIS — E13.9 DIABETES MELLITUS TYPE 1.5, MANAGED AS TYPE 1 (H): ICD-10-CM

## 2018-03-27 NOTE — TELEPHONE ENCOUNTER
Pt is calling back regarding his rx. Pt is stressing that he is out of rx and wants to know when can get it ? Please advise

## 2018-03-27 NOTE — TELEPHONE ENCOUNTER
Messages noted.  I had signed the Lantus pen Rx earlier this afternoon... To his local pharmacy.  I also called his cell phone number but no answer... Left message that the Lantus Rx sent, sorry for the delay.    JAN Tipton MD  [facility]/[place]

## 2018-03-27 NOTE — TELEPHONE ENCOUNTER
Patient calling. He is out of lantus today. At last visit, it was going to be changed. Please call and advise patient what is ordered.

## 2018-03-28 ENCOUNTER — MEDICAL CORRESPONDENCE (OUTPATIENT)
Dept: HEALTH INFORMATION MANAGEMENT | Facility: CLINIC | Age: 48
End: 2018-03-28

## 2018-03-28 ENCOUNTER — OFFICE VISIT (OUTPATIENT)
Dept: ENDOCRINOLOGY | Facility: CLINIC | Age: 48
End: 2018-03-28
Payer: MEDICAID

## 2018-03-28 VITALS
BODY MASS INDEX: 26.22 KG/M2 | SYSTOLIC BLOOD PRESSURE: 132 MMHG | HEIGHT: 68 IN | DIASTOLIC BLOOD PRESSURE: 86 MMHG | WEIGHT: 173 LBS | HEART RATE: 84 BPM

## 2018-03-28 DIAGNOSIS — E10.9 TYPE 1 DIABETES MELLITUS WITHOUT COMPLICATION (H): Primary | ICD-10-CM

## 2018-03-28 PROCEDURE — 95251 CONT GLUC MNTR ANALYSIS I&R: CPT | Performed by: INTERNAL MEDICINE

## 2018-03-28 PROCEDURE — 99214 OFFICE O/P EST MOD 30 MIN: CPT | Performed by: INTERNAL MEDICINE

## 2018-03-28 NOTE — PROGRESS NOTES
Name: Rudolph Tao    HPI:  Recent issues:  Here for f/u diabetes evaluation, with his wife Tiara  Wore Freestyle Jenni sensor past week        Diagnosis of diabetes age 34, living in [facility]  Had seen Dr. RAJ Foster for evaluation  Initial treatment treatment with oral meds.  Hospitalized with high BG levels, then med plan switched to insulin medications  Recalls taking Humalog and Lantus  insulin  1/2018. Had DKA illness, hospitalized at [facility]  Current DM meds:   Lantus 30U sq QPM (approx 9am)   Novolog sscale 1U per 50>150     total daily dose usually 3-4U  Using Ostrovok BG meter, tests 3x/day   No meter data available today  Has not worn CGMS in the past  3/21/18 labs included:  hgbA1c 7.6%, glu 160, C-P <0.1, ADM25Nt >250, TSH 2.73, Cr 0.72, Ca 10.4  DM Complications:   Retinopathy    Last eye exam approx 2 yrs ago, recalls some eye changes (retinopathy)    Wears contact lenses   Neuropathy?    Reports h/o gastroparesis?    , employed with [employer]  Has seen Dr. Padmini [facility] general medicine evaluations.    PMH/PSH:  Past Medical History:   Diagnosis Date     Adopted      Anxiety      Anxiety      Chronic low back pain      DDD (degenerative disc disease), lumbar 3/8/2018     Diabetes mellitus (H)     TYPE 1     Hallux abducto valgus      Hyperlipaemia      Hyperlipidemia      Hypertension      Lip lesion 8/2/2010     Pain in toe of left foot     2 ND TOE     RLS (restless legs syndrome)      Snoring     MODERATE SEVERITY     Swelling of foot joint 7/13/2012     Past Surgical History:   Procedure Laterality Date     APPENDECTOMY  1990     OSTEOTOMY FOOT  1/8/2013    Procedure: OSTEOTOMY FOOT;  Left Foot Distal First Metarsal Osteotomy, Second Toe Hammer Toe Correction, Second Metatarsal Weil Osteotomy;  Surgeon: Robert Augustine DPM;  [facility]     SURGICAL HISTORY OF -   1992    Appy       Family Hx:  Family History   Problem Relation Age of Onset      Unknown/Adopted Mother      Unknown/Adopted Father      Unknown/Adopted Maternal Grandmother      Unknown/Adopted Maternal Grandfather      Unknown/Adopted Paternal Grandmother      Unknown/Adopted Paternal Grandfather          Social Hx:  Social History     Social History     Marital status:      Spouse name: N/A     Number of children: N/A     Years of education: N/A     Occupational History     Not on file.     Social History Main Topics     Smoking status: Former Smoker     Packs/day: 0.00     Years: 20.00     Types: Cigarettes     Smokeless tobacco: Never Used      Comment: 2-4  cigarettes daily     Alcohol use Yes      Comment: 2 drinks daily     Drug use: No     Sexual activity: Yes     Partners: Female     Birth control/ protection: None     Other Topics Concern     Parent/Sibling W/ Cabg, Mi Or Angioplasty Before 65f 55m? No     Social History Narrative    Works [employer]    Erratic eating pattern          MEDICATIONS:  has a current medication list which includes the following prescription(s): insulin glargine, ropinirole, insulin aspart, methocarbamol, bupropion, blood glucose monitoring, blood glucose monitoring, escitalopram, fluticasone, alprazolam, insulin pen needle, lisinopril, metoclopramide, cetirizine, triamcinolone, aspirin, tadalafil, glucagon emergency kit, glucose control, and diclofenac.    ROS:     ROS: 10 point ROS neg other than the symptoms noted above in the HPI.    GENERAL: nightsweats; no weight changes, fatigue, fevers, chills  HEENT: no dysphagia, odonophagia, diplopia, neck pain  THYROID:  no apparent hyper or hypothyroid symptoms  CV: no chest pain, pressure, palpitations  LUNGS: no SOB, RIVERA, cough, wheezing   ABDOMEN: no diarrhea, constipation, abdominal pain  EXTREMITIES: no rashes, ulcers, edema  NEUROLOGY: no headaches, denies changes in vision, tingling, extremitiy numbness   MSK: left ankle stiffness; no muscle aches, weakness  SKIN: no rashes or lesions  : some  "decreased erection function  PSYCH:  Stressors with his mother's health, ex-wife and child custody  ENDOCRINE: no heat or cold intolerance    Physical Exam   VS: /86 (BP Location: Right arm, Cuff Size: Adult Regular)  Pulse 84  Ht 5' 8\" (1.727 m)  Wt 173 lb (78.5 kg)  BMI 26.3 kg/m2  GENERAL: AXOX3, NAD, well dressed, answering questions appropriately, appears stated age.  SKIN: scalp hair thinning; no rashes, no lesions    LABS:    All pertinent notes, labs, and images personally reviewed by me.     A/P:  Encounter Diagnosis   Name Primary?     Type 1 diabetes mellitus without complication (H) Yes     Comments:  Reviewed overall diabetes history and management.   CGMS report indicates postprandial hyperglycemia and nocturnal glucose drop patterns.     Plan:  Discussed general issues with the T1DM diagnosis and management  We discussed the hgbA1c test which reflects previous overall glucose levels or control  Discussed the importance of blood glucose (BG) testing to assess glucose trends  Provided general overview of the multiple daily injection (MDI) plan using rapid acting mealtime and longacting insulin medications  Reviewed recent [employer] Personal CGMS glucose trend reports with patient, in detail.  Report sent for scanning.    Recommend:  Continue MDI plan with Novolog and Lantus insulin  Needs more prandial Nv insulin, less basal Lantus insulin, see dosing strategy listed below  Will arrange Diabetes Education Evaluation   Begin instruction in \"carb counting\" method, establish I:C ratio and begin using it   Review insulin dose adjustment   Sick day management including urine ketone testing    Test BG levels QID, goal target  mg/dl  Monitor BP levels, goal SBP/DBP <130/<80  Advise having fasting lipid panel testing and dilated eye examination, at least annually    Discussed importance of having a primary care practitioner (family practice or internal medicine) for general medicine appointments and " also acute care visits.  Would benefit from GI consultation to review digestive symptoms.  Doubt gastroparesis.  Addressed patient questions today    Patient Instructions   Use the Novolog (Nv) Flex pen or the new Novolog Echo (red) pen.    Start base-dose Novolog premeal as    Nv2U regular meal and Nv4U larger meal   Nv 0.5U per 50 mg/dl over 150   Lantus 26U QPM  Goal target BG  mg/dl  Will update the Glucagon, Novolog cartridge Rx's  Needs f/u Diabetes Education Evaluation   Review insulin dose adjustment   Sick day management including urine ketone testing    Consider getting the Abbott Freestyle Jenni Personal CGMS kit, asked him to contact me if interested in Jenni Personal Rx    Labs ordered today:   Orders Placed This Encounter   Procedures     DIABETES EDUCATOR REFERRAL     Radiology/Consults ordered today: DIABETES EDUCATOR REFERRAL    More than 50% of the time spent with Mr. Tao on counseling / coordinating his care.  Total appointment time was 30 minutes.    Follow-up:  6 weeks    Jalil Tipton MD  Endocrinology  [facility]/[place]

## 2018-03-28 NOTE — MR AVS SNAPSHOT
After Visit Summary   3/28/2018    Rudolph Tao    MRN: [id number]           Patient Information     Date Of Birth          1970        Visit Information        Provider Department      3/28/2018 1:00 PM Jalil Tipton MD [facility] [place]        Today's Diagnoses     Type 1 diabetes mellitus without complication (H)    -  1      Care Instructions    Use the Novolog (Nv) Flex pen or the new Novolog Echo (red) pen.    Start base-dose Novolog premeal as    Nv2U regular meal and Nv4U larger meal   Nv 0.5U per 50 mg/dl over 150   Lantus 26U QPM  Goal target BG  mg/dl  Will update the Glucagon, Novolog cartridge Rx's  Needs f/u Diabetes Education Evaluation   Review insulin dose adjustment   Sick day management including urine ketone testing    Consider getting the Abbott Freestyle Jenni Personal CGMS kit          Follow-ups after your visit        Additional Services     DIABETES EDUCATOR REFERRAL       DIABETES SELF MANAGEMENT TRAINING (DSMT)      Your provider has referred you to Diabetes Education: FMG: Diabetes Education - All [facility] [phone]   [email or web address]     If an urgent visit is needed or A1C is above 12, Care Team to call the Diabetes  Education Team at [phone] or send an In Basket message to the Diabetes Education Pool (P DIAB ED-PATIENT CARE).    A  will call you to make your appointment. If it has been more than 3 business days since your referral was placed, please call the above phone number to schedule.    Type of training and number of hours: Previous Diagnosis: Follow-up DSMT - 2 hours.    Medicare covers: 10 hours of initial DSMT in 12 month period from the time of first visit, plus 2 hours of follow-up DSMT annually, and additional hours as requested for insulin training.    Diabetes Type: Type 1             Diabetes Co-Morbidities: none               A1C Goal:  <7.0       A1C is:  Lab Results       Component                Value               Date                       A1C                      7.6                 03/21/2018              Diabetes Education Topics: Other: insulin to carb ratio with carb counting instruction, also idea of Jenni Personal CGMS use    Special Educational Needs Requiring Individual DSMT: None       MEDICAL NUTRITION THERAPY (MNT) for Diabetes    Medical Nutrition Therapy with a Registered Dietitian can be provided in coordination with Diabetes Self-Management Training to assist in achieving optimal diabetes management.    MNT Type and Hours: Previous diagnosis: Annual follow-up MNT - 2 hours                       Medicare will cover: 3 hours initial MNT in 12 month period after first visit, plus 2 hours of follow-up MNT annually    Please be aware that coverage of these services is subject to the terms and limitations of your health insurance plan.  Call member services at your health plan to determine Diabetes Self-Management Training (Codes  &amp; ) and Medical Nutrition Therapy (Codes 65649 & 13446) benefits and ask which blood glucose monitor brands are covered by your plan.  Please bring the following with you to your appointment:    (1)  List of current medications   (2)  List of Blood Glucose Monitor brands that are covered by your insurance plan  (3)  Blood Glucose Monitor and log book  (4)   Food records for the 3 days prior to your visit    The Certified Diabetes Educator may make diabetes medication adjustments per the CDE Protocol and Collaborative Practice Agreement.                  Who to contact     If you have questions or need follow up information about today's clinic visit or your schedule please contact [facility] directly at [phone].  Normal or non-critical lab and imaging results will be communicated to you by MyChart, letter or phone within 4 business days after the clinic has received the results. If you do not  "hear from us within 7 days, please contact the clinic through [employer] or phone. If you have a critical or abnormal lab result, we will notify you by phone as soon as possible.  Submit refill requests through [employer] or call your pharmacy and they will forward the refill request to us. Please allow 3 business days for your refill to be completed.          Additional Information About Your Visit        [employer] Information     [employer] gives you secure access to your electronic health record. If you see a primary care provider, you can also send messages to your care team and make appointments. If you have questions, please call your primary care clinic.  If you do not have a primary care provider, please call [phone] and they will assist you.        Care EveryWhere ID     This is your Care EveryWhere ID. This could be used by other organizations to access your [place] medical records  [id number]        Your Vitals Were     Pulse Height BMI (Body Mass Index)             84 5' 8\" (1.727 m) 26.3 kg/m2          Blood Pressure from Last 3 Encounters:   03/28/18 132/86   03/21/18 139/88   03/14/18 (!) 149/100    Weight from Last 3 Encounters:   03/28/18 173 lb (78.5 kg)   03/21/18 173 lb (78.5 kg)   03/14/18 175 lb (79.4 kg)              We Performed the Following     DIABETES EDUCATOR REFERRAL        Primary Care Provider Office Phone # Fax #    Jacky Maradiaga [phone] [facility] [address]        Equal Access to Services     [facility] AH: Hadii aad ku hadasho Soomaali, waaxda luqadaha, qaybta kaalmada adeegyada, waxay steven george . So [facility] [phone].    ATENCIÓN: Si habla español, tiene a may disposición servicios gratuitos de asistencia lingüística. Llame al [phone].    We comply with applicable federal civil rights laws and [place] laws. We do not discriminate on the basis of race, color, national origin, age, disability, sex, " sexual orientation, or gender identity.            Thank you!     Thank you for choosing [facility]  for your care. Our goal is always to provide you with excellent care. Hearing back from our patients is one way we can continue to improve our services. Please take a few minutes to complete the written survey that you may receive in the mail after your visit with us. Thank you!             Your Updated Medication List - Protect others around you: Learn how to safely use, store and throw away your medicines at www.disposemymeds.org.          This list is accurate as of 3/28/18  1:35 PM.  Always use your most recent med list.                   Brand Name Dispense Instructions for use Diagnosis    ALPRAZolam 0.5 MG tablet    XANAX    45 tablet    Take 1 tablet (0.5 mg) by mouth 3 times daily as needed for anxiety 1 month supply    Generalized anxiety disorder       aspirin 81 MG EC tablet     90 tablet    Take 1 tablet (81 mg) by mouth daily        blood glucose monitoring lancets     100 each    Use as directed to test blood sugar 4-6 times daily, may test up to 10 times daily if needed.    Type 1 diabetes mellitus with hyperglycemia (H)       blood glucose monitoring test strip    FREESTYLE LITE    300 strip    TEST AS DIRECTED TEST 4-6 TIMES DAILY, UP TO 10 TIMES DAILY    Diabetes mellitus type 1.5, managed as type 1 (H)       buPROPion 300 MG 24 hr tablet    WELLBUTRIN XL    90 tablet    TAKE 1 TABLET (300 MG) BY MOUTH EVERY MORNING    Generalized anxiety disorder       cetirizine 10 MG tablet    zyrTEC     Take 10 mg by mouth daily        diclofenac 75 MG EC tablet    VOLTAREN    14 tablet    Take 1 tablet (75 mg) by mouth 2 times daily for 7 days    Lumbar sprain, initial encounter       escitalopram 20 MG tablet    LEXAPRO    90 tablet    Take 1 tablet (20 mg) by mouth daily    Generalized anxiety disorder       fluticasone 50 MCG/ACT spray    FLONASE    1 Bottle    Spray 1-2 sprays into both  nostrils daily    Chronic rhinitis, unspecified type       GLUCAGON EMERGENCY KIT 1 MG Kit     1    use as directed    Type I (juvenile type) diabetes mellitus without mention of complication, not stated as uncontrolled       GLUCOSE CONTROL Liqd     one bottle    as directed    Type I (juvenile type) diabetes mellitus without mention of complication, not stated as uncontrolled       insulin aspart 100 UNIT/ML injection    NovoLOG PEN    15 mL    Inject 30 Units Subcutaneous daily Use with sliding scale, approximately 2-5 units daily    Type 1 diabetes mellitus without complication (H)       insulin glargine 100 UNIT/ML injection    LANTUS SOLOSTAR    15 mL    Inject 30 Units Subcutaneous daily    Diabetes mellitus type 1.5, managed as type 1 (H)       insulin pen needle 31G X 5 MM    B-D U/F    300 each    by Device route 6 times daily    Type 1 diabetes mellitus without complication (H)       lisinopril 40 MG tablet    PRINIVIL/ZESTRIL    90 tablet    Take 1 tablet (40 mg) by mouth daily No refills. Needs visit    Hypertension goal BP (blood pressure) < 140/90       methocarbamol 500 MG tablet    ROBAXIN    60 tablet    Take 1-2 tablets (500-1,000 mg) by mouth 3 times daily as needed for muscle spasms    Lumbar sprain, initial encounter       metoclopramide 10 MG tablet    REGLAN    30 tablet    Take 1 tablet (10 mg) by mouth 4 times daily as needed    Non-intractable vomiting with nausea, vomiting of unspecified type       rOPINIRole 0.25 MG tablet    REQUIP    60 tablet    TAKE 1-2 TABLETS BY MOUTH AT BEDTIME    Restless leg syndrome       tadalafil 10 MG tablet    CIALIS    12 tablet    Take 0.5-1 tablets (5-10 mg) by mouth daily as needed for erectile dysfunction Never use with nitroglycerin, terazosin or doxazosin.    ED (erectile dysfunction)       triamcinolone 0.1 % ointment    KENALOG    45 g    Apply sparingly to affected area three times daily sparingly    Rash

## 2018-03-28 NOTE — NURSING NOTE
"Chief Complaint   Patient presents with     Diabetes       Initial /86 (BP Location: Right arm, Cuff Size: Adult Regular)  Pulse 84  Ht 5' 8\" (1.727 m)  Wt 173 lb (78.5 kg)  BMI 26.3 kg/m2 Estimated body mass index is 26.3 kg/(m^2) as calculated from the following:    Height as of this encounter: 5' 8\" (1.727 m).    Weight as of this encounter: 173 lb (78.5 kg).  Medication Reconciliation: complete         Nicky Yeager      "

## 2018-03-28 NOTE — PATIENT INSTRUCTIONS
Use the Novolog (Nv) Flex pen or the new Novolog Echo (red) pen.    Start base-dose Novolog premeal as    Nv2U regular meal and Nv4U larger meal   Nv 0.5U per 50 mg/dl over 150   Lantus 26U QPM  Goal target BG  mg/dl  Will update the Glucagon, Novolog cartridge Rx's  Needs f/u Diabetes Education Evaluation   Review insulin dose adjustment   Sick day management including urine ketone testing    Consider getting the Abbott Freestyle Jenni Personal CGMS kit, asked him to contact me if interested in Jenni Personal Rx

## 2018-04-09 ENCOUNTER — OFFICE VISIT (OUTPATIENT)
Dept: FAMILY MEDICINE | Facility: CLINIC | Age: 48
End: 2018-04-09
Payer: MEDICAID

## 2018-04-09 VITALS
BODY MASS INDEX: 26 KG/M2 | HEART RATE: 83 BPM | WEIGHT: 171 LBS | TEMPERATURE: 97.8 F | SYSTOLIC BLOOD PRESSURE: 162 MMHG | OXYGEN SATURATION: 99 % | DIASTOLIC BLOOD PRESSURE: 88 MMHG | RESPIRATION RATE: 16 BRPM

## 2018-04-09 DIAGNOSIS — L21.9 SEBORRHEIC DERMATITIS: ICD-10-CM

## 2018-04-09 DIAGNOSIS — L73.9 FOLLICULITIS: Primary | ICD-10-CM

## 2018-04-09 PROCEDURE — 99213 OFFICE O/P EST LOW 20 MIN: CPT | Performed by: INTERNAL MEDICINE

## 2018-04-09 RX ORDER — TRIAMCINOLONE ACETONIDE 1 MG/ML
LOTION TOPICAL
Qty: 60 ML | Refills: 0 | Status: SHIPPED | OUTPATIENT
Start: 2018-04-09 | End: 2018-08-21

## 2018-04-09 RX ORDER — CEPHALEXIN 500 MG/1
500 CAPSULE ORAL 3 TIMES DAILY
Qty: 30 CAPSULE | Refills: 0 | Status: SHIPPED | OUTPATIENT
Start: 2018-04-09 | End: 2018-08-21

## 2018-04-09 NOTE — PROGRESS NOTES
SUBJECTIVE:   Rudolph Tao is a 48 year old male who presents to clinic today for the following health issues:      Cold sores - Patient complains of having 5 cold sores for the past 4 days. He has never had them before but his wife has. He thinks it may be from using an unclean razor from a week ago. His cold sores looked like white blisters that were sore, pus-filled, and bleeding. There was a hair in one of the cold sores on his lower lip that came out. Notes he had a non-resistant staph infection in his ear, nose, lip, and chin about 6 years ago that took two courses of antibiotics to rid. Denies sore throat. Also has sores on his scalp that come and go. They can get sore, pus-filled, and itchy. Patient uses bar soap for his head. This has been going on for 8 months off and on. He has used triamcinolone for winter itch before. Denies any sores in axilla, pubic area, or abdomen.       Problem list and histories reviewed & adjusted, as indicated.  Additional history: as documented    Patient Active Problem List   Diagnosis     GENERALIZED ANXIETY     Hyperlipidemia LDL goal <100     Encounter for long-term (current) use of insulin (H)     Chronic low back pain     Libido, decreased     Restless legs     Tobacco abuse     Cervicalgia     Paresthesias     Persistent disorder of initiating or maintaining sleep     Spasms of the hands or feet     Hypertension goal BP (blood pressure) < 140/90     Right-sided low back pain without sciatica     Type 1 diabetes mellitus without complication (H)     GAIL (generalized anxiety disorder)     Psychosocial stressors     DDD (degenerative disc disease), lumbar     Anxiety     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)     Hypoglycemia     Syncopal episodes     Past Surgical History:   Procedure Laterality Date     APPENDECTOMY  1990     OSTEOTOMY FOOT  1/8/2013    Procedure: OSTEOTOMY FOOT;  Left Foot Distal First Metarsal Osteotomy, Second Toe Hammer Toe Correction, Second  Metatarsal Weil Osteotomy;  Surgeon: Robert Augustine DPM;  Location: [facility]     SURGICAL HISTORY OF -   1992    [facility]       Social History   Substance Use Topics     Smoking status: Former Smoker     Packs/day: 0.00     Years: 20.00     Types: Cigarettes     Smokeless tobacco: Never Used      Comment: 2-4  cigarettes daily     Alcohol use Yes      Comment: 2 drinks daily     Family History   Problem Relation Age of Onset     Unknown/Adopted Mother      Unknown/Adopted Father      Unknown/Adopted Maternal Grandmother      Unknown/Adopted Maternal Grandfather      Unknown/Adopted Paternal Grandmother      Unknown/Adopted Paternal Grandfather          Current Outpatient Prescriptions   Medication Sig Dispense Refill     cephALEXin (KEFLEX) 500 MG capsule Take 1 capsule (500 mg) by mouth 3 times daily 30 capsule 0     triamcinolone (KENALOG) 0.1 % lotion Apply sparingly to affected area three times daily as needed. 60 mL 0     acetone, Urine, test STRP Use for urine testing if severe hyperglycemia or vomiting, as directed 25 each 3     glucagon (GLUCAGON EMERGENCY) 1 MG kit Use SQ for treatment of severe hypoglycemia reaction, as directed 1 mg 1     insulin glargine (LANTUS SOLOSTAR) 100 UNIT/ML injection Inject 30 Units Subcutaneous daily 15 mL 11     rOPINIRole (REQUIP) 0.25 MG tablet TAKE 1-2 TABLETS BY MOUTH AT BEDTIME 60 tablet 0     insulin aspart (NOVOLOG PEN) 100 UNIT/ML injection Inject 30 Units Subcutaneous daily Use with sliding scale, approximately 2-5 units daily 15 mL 3     methocarbamol (ROBAXIN) 500 MG tablet Take 1-2 tablets (500-1,000 mg) by mouth 3 times daily as needed for muscle spasms 60 tablet 3     buPROPion (WELLBUTRIN XL) 300 MG 24 hr tablet TAKE 1 TABLET (300 MG) BY MOUTH EVERY MORNING 90 tablet 0     blood glucose monitoring (FREESTYLE LITE) test strip TEST AS DIRECTED TEST 4-6 TIMES DAILY, UP TO 10 TIMES DAILY 300 strip 1     blood glucose monitoring (ONE TOUCH ULTRASOFT) lancets Use as  directed to test blood sugar 4-6 times daily, may test up to 10 times daily if needed. 100 each 11     escitalopram (LEXAPRO) 20 MG tablet Take 1 tablet (20 mg) by mouth daily 90 tablet 1     fluticasone (FLONASE) 50 MCG/ACT spray Spray 1-2 sprays into both nostrils daily 1 Bottle 11     ALPRAZolam (XANAX) 0.5 MG tablet Take 1 tablet (0.5 mg) by mouth 3 times daily as needed for anxiety 1 month supply 45 tablet 0     insulin pen needle (B-D U/F) 31G X 5 MM by Device route 6 times daily 300 each 2     lisinopril (PRINIVIL/ZESTRIL) 40 MG tablet Take 1 tablet (40 mg) by mouth daily No refills. Needs visit 90 tablet 3     metoclopramide (REGLAN) 10 MG tablet Take 1 tablet (10 mg) by mouth 4 times daily as needed 30 tablet 1     cetirizine (ZYRTEC) 10 MG tablet Take 10 mg by mouth daily       triamcinolone (KENALOG) 0.1 % ointment Apply sparingly to affected area three times daily sparingly 45 g 0     aspirin 81 MG EC tablet Take 1 tablet (81 mg) by mouth daily 90 tablet 3     tadalafil (CIALIS) 10 MG tablet Take 0.5-1 tablets (5-10 mg) by mouth daily as needed for erectile dysfunction Never use with nitroglycerin, terazosin or doxazosin. 12 tablet 11     GLUCAGON EMERGENCY KIT 1 MG IJ KIT use as directed 1 0     GLUCOSE CONTROL VI LIQD as directed one bottle 0     diclofenac (VOLTAREN) 75 MG EC tablet Take 1 tablet (75 mg) by mouth 2 times daily for 7 days 14 tablet 0     Labs reviewed in EPIC    Reviewed and updated as needed this visit by clinical staff  Tobacco  Allergies  Meds  Med Hx  Surg Hx  Fam Hx  Soc Hx      Reviewed and updated as needed this visit by Provider         ROS:  Constitutional, HEENT, cardiovascular, pulmonary, GI, , musculoskeletal, neuro, skin, endocrine and psych systems are negative, except as otherwise noted.    This document serves as a record of the services and decisions personally performed and made by Haim Bustos MD. It was created on his/her behalf by Jalil Hutchison, trained  medical scribe. The creation of this document is based the provider's statements to the medical scribes.    Scribchar Jimenes CHARTorie Hutchison 2:21 PM, April 9, 2018    OBJECTIVE:     /88  Pulse 83  Temp 97.8  F (36.6  C) (Oral)  Resp 16  Wt 171 lb (77.6 kg)  SpO2 99%  BMI 26 kg/m2  Body mass index is 26 kg/(m^2).  GENERAL: healthy, alert and no distress  SKIN: Mainly dry lips bilaterally, residual folliculitis with one that is yellow crusted on the upper lip left side  NEURO: Normal strength and tone, mentation intact and speech normal  PSYCH: mentation appears normal, affect normal/bright    Diagnostic Test Results:  No orders of the defined types were placed in this encounter.        ASSESSMENT/PLAN:   1. Folliculitis  His diagnosis is not cold sores of mouth and lips, but is instead pseudofolliculitis barbae versus actual folliculitis . Treatment with cephALEXin (KEFLEX) and Lever 2000 antibacterial soap   - cephALEXin (KEFLEX) 500 MG capsule; Take 1 capsule (500 mg) by mouth 3 times daily  Dispense: 30 capsule; Refill: 0    2. Seborrheic dermatitis  An unrelated matter   - triamcinolone (KENALOG) 0.1 % lotion; Apply sparingly to affected area three times daily as needed.  Dispense: 60 mL; Refill: 0    Patient Instructions     Lever 2000 antibacterial soap try using at the very least 2-4 times days per week         [facility]    If you have any questions regarding to your visit please contact your care team:     Team Pink:   Clinic Hours Telephone Number   Internal Medicine:  Dr. Annika Bustos, NP       7am-7pm  Monday - Thursday   7am-5pm  Fridays  [phone]  (Appointment scheduling available 24/7)    Questions about your visit?  Team Line  [phone]   Urgent Care - [place] - 11am-9pm Monday-Friday Saturday-Sunday- 9am-5pm   [place] - 5pm-9pm Monday-Friday Saturday-Sunday- 9am-5pm  [phone] Jama Villagomez    [phone] - [place]       What options do I have for visits at the clinic other than the traditional office visit?  To expand how we care for you, many of our providers are utilizing electronic visits (e-visits) and telephone visits, when medically appropriate, for interactions with their patients rather than a visit in the clinic.   We also offer nurse visits for many medical concerns. Just like any other service, we will bill your insurance company for this type of visit based on time spent on the phone with your provider. Not all insurance companies cover these visits. Please check with your medical insurance if this type of visit is covered. You will be responsible for any charges that are not paid by your insurance.      E-visits via Cav[employer] microcapshart:  generally incur a $35.00 fee.  Telephone visits:  Time spent on the phone: *charged based on time that is spent on the phone in increments of 10 minutes. Estimated cost:   5-10 mins $30.00   11-20 mins. $59.00   21-30 mins. $85.00   Use Personera (secure email communication and access to your chart) to send your primary care provider a message or make an appointment. Ask someone on your Team how to sign up for Personera.    For a Price Quote for your services, please call our Consumer Price Line at [phone].    As always, Thank you for trusting us with your health care needs!    Ana JOHNSON CMA ([facility])      The information in this document, created by the medical scribe, Jalil Hutchison, for me, accurately reflects the services I personally performed and the decisions made by me. I have reviewed and approved this document for accuracy prior to leaving the patient care area.    Haim Bustos MD  [facility]

## 2018-04-09 NOTE — PATIENT INSTRUCTIONS
Meaghan 2000 antibacterial soap try using at the very least 2-4 times days per week         [facility]    If you have any questions regarding to your visit please contact your care team:     Team Pink:   Clinic Hours Telephone Number   Internal Medicine:  Dr. Annika Bustos NP       7am-7pm  Monday - Thursday   7am-5pm  Fridays  [phone]  (Appointment scheduling available 24/7)    Questions about your visit?  Team Line  [phone]   Urgent Care - [place] and [place] - 11am-9pm Monday-Friday Saturday-Sunday- 9am-5pm   [place] - 5pm-9pm Monday-Friday Saturday-Sunday- 9am-5pm  [phone] - [place]   [phone] - [place]       What options do I have for visits at the clinic other than the traditional office visit?  To expand how we care for you, many of our providers are utilizing electronic visits (e-visits) and telephone visits, when medically appropriate, for interactions with their patients rather than a visit in the clinic.   We also offer nurse visits for many medical concerns. Just like any other service, we will bill your insurance company for this type of visit based on time spent on the phone with your provider. Not all insurance companies cover these visits. Please check with your medical insurance if this type of visit is covered. You will be responsible for any charges that are not paid by your insurance.      E-visits via [employer]:  generally incur a $35.00 fee.  Telephone visits:  Time spent on the phone: *charged based on time that is spent on the phone in increments of 10 minutes. Estimated cost:   5-10 mins $30.00   11-20 mins. $59.00   21-30 mins. $85.00   Use Naonextt (secure email communication and access to your chart) to send your primary care provider a message or make an appointment. Ask someone on your Team how to sign up for Brand[employer]ters.    For a Price Quote for your services, please call our Consumer Price Line at  [phone].    As always, Thank you for trusting us with your health care needs!    Ana JOHNSON CMA ([facility])

## 2018-04-09 NOTE — MR AVS SNAPSHOT
After Visit Summary   4/9/2018    Rudolph Tao    MRN: [id number]           Patient Information     Date Of Birth          1970        Visit Information        Provider Department      4/9/2018 1:50 PM Haim Bustos MD [facility]        Today's Diagnoses     Folliculitis    -  1    Seborrheic dermatitis          Care Instructions    Lever 2000 antibacterial soap try using at the very least 2-4 times days per week         [facility]    If you have any questions regarding to your visit please contact your care team:     Team Pink:   Clinic Hours Telephone Number   Internal Medicine:  Dr. Annika Bustos, NP       7am-7pm  Monday - Thursday   7am-5pm  Fridays  [phone]  (Appointment scheduling available 24/7)    Questions about your visit?  Team Line  [phone]   Urgent Care - [place] [facility] - 11am-9pm Monday-Friday Saturday-Sunday- 9am-5pm   [place] - 5pm-9pm Monday-Friday Saturday-Sunday- 9am-5pm  [phone] - [place]   [phone] - [place]       What options do I have for visits at the clinic other than the traditional office visit?  To expand how we care for you, many of our providers are utilizing electronic visits (e-visits) and telephone visits, when medically appropriate, for interactions with their patients rather than a visit in the clinic.   We also offer nurse visits for many medical concerns. Just like any other service, we will bill your insurance company for this type of visit based on time spent on the phone with your provider. Not all insurance companies cover these visits. Please check with your medical insurance if this type of visit is covered. You will be responsible for any charges that are not paid by your insurance.      E-visits via AmberPoint:  generally incur a $35.00 fee.  Telephone visits:  Time spent on the phone: *charged based on time that is spent on the phone in  increments of 10 minutes. Estimated cost:   5-10 mins $30.00   11-20 mins. $59.00   21-30 mins. $85.00   Use Nanda Technologieshart (secure email communication and access to your chart) to send your primary care provider a message or make an appointment. Ask someone on your Team how to sign up for NetProspex.    For a Price Quote for your services, please call our Consumer Price Line at [phone].    As always, Thank you for trusting us with your health care needs!    Ana JOHNSON CMA ([facility])            Follow-ups after your visit        Your next 10 appointments already scheduled     Apr 12, 2018 10:00 AM CDT   Diabetic Education with BE DIABETIC ED RESOURCE   [facility] [place] ([facility])    [address]  [place] [address]   [phone]            Apr 19, 2018  3:00 PM CDT   Diabetic Education with BE DIABETIC ED RESOURCE   [facility] [place] ([facility])    [address]  [place] [address]   [phone]            May 16, 2018  2:30 PM CDT   Return Visit with Jalil Tipton MD   [facility] [place] ([facility])    [address]  [place] [address] [phone]              Who to contact     If you have questions or need follow up information about today's clinic visit or your schedule please contact [facility] directly at [phone].  Normal or non-critical lab and imaging results will be communicated to you by Nanda Technologieshart, letter or phone within 4 business days after the clinic has received the results. If you do not hear from us within 7 days, please contact the clinic through Nanda [employer]hart or phone. If you have a critical or abnormal lab result, we will notify you by phone as soon as possible.  Submit refill requests through NetProspex or call your pharmacy and they will forward the refill request to us. Please allow 3 business days for your refill to be completed.          Additional Information  About Your Visit        [id number] Information     Inveni gives you secure access to your electronic health record. If you see a primary care provider, you can also send messages to your care team and make appointments. If you have questions, please call your primary care clinic.  If you do not have a primary care provider, please call [phone] and they will assist you.        Care EveryWhere ID     This is your Care EveryWhere ID. This could be used by other organizations to access your [facility] medical records  [id number]        Your Vitals Were     Pulse Temperature Respirations Pulse Oximetry BMI (Body Mass Index)       83 97.8  F (36.6  C) (Oral) 16 99% 26 kg/m2        Blood Pressure from Last 3 Encounters:   04/09/18 162/88   03/28/18 132/86   03/21/18 139/88    Weight from Last 3 Encounters:   04/09/18 171 lb (77.6 kg)   03/28/18 173 lb (78.5 kg)   03/21/18 173 lb (78.5 kg)              Today, you had the following     No orders found for display         Today's Medication Changes          These changes are accurate as of 4/9/18  2:28 PM.  If you have any questions, ask your nurse or doctor.               Start taking these medicines.        Dose/Directions    cephALEXin 500 MG capsule   Commonly known as:  KEFLEX   Used for:  Folliculitis   Started by:  Haim Bustos MD        Dose:  500 mg   Take 1 capsule (500 mg) by mouth 3 times daily   Quantity:  30 capsule   Refills:  0         These medicines have changed or have updated prescriptions.        Dose/Directions    * triamcinolone 0.1 % ointment   Commonly known as:  KENALOG   This may have changed:  Another medication with the same name was added. Make sure you understand how and when to take each.   Used for:  Rash   Changed by:  Haim Bustos MD        Apply sparingly to affected area three times daily sparingly   Quantity:  45 g   Refills:  0       * triamcinolone 0.1 % lotion   Commonly known as:  KENALOG   This may have changed:  You were  already taking a medication with the same name, and this prescription was added. Make sure you understand how and when to take each.   Used for:  Seborrheic dermatitis   Changed by:  Haim Bustos MD        Apply sparingly to affected area three times daily as needed.   Quantity:  60 mL   Refills:  0       * Notice:  This list has 2 medication(s) that are the same as other medications prescribed for you. Read the directions carefully, and ask your doctor or other care provider to review them with you.         Where to get your medicines      These medications were sent to [place] [address] [place] [facility] [place] - [address]  [address] [place] [address]     Phone:  [phone]     cephALEXin 500 MG capsule    triamcinolone 0.1 % lotion                Primary Care Provider Office Phone # Fax #    Jacky Maradiaga [phone] [address]  [facility] [address]        Equal Access to Services     [facility] AH: Hadii aad ku hadasho Soomaali, waaxda luqadaha, qaybta kaalmada adeegyada, waxay idiin haydarlenen veronica george . So [facility] [phone].    ATENCIÓN: Si ade irizarry, tiene a may disposición servicios gratuitos de asistencia lingüística. Llame al [phone].    We comply with applicable federal civil rights laws and [place] laws. We do not discriminate on the basis of race, color, national origin, age, disability, sex, sexual orientation, or gender identity.            Thank you!     Thank you for choosing [facility]  for your care. Our goal is always to provide you with excellent care. Hearing back from our patients is one way we can continue to improve our services. Please take a few minutes to complete the written survey that you may receive in the mail after your visit with us. Thank you!             Your Updated Medication List - Protect others around you: Learn how to safely use, store and throw away your medicines at www.disposemymeds.org.           This list is accurate as of 4/9/18  2:28 PM.  Always use your most recent med list.                   Brand Name Dispense Instructions for use Diagnosis    acetone (Urine) test Strp     25 each    Use for urine testing if severe hyperglycemia or vomiting, as directed    Type 1 diabetes mellitus without complication (H)       ALPRAZolam 0.5 MG tablet    XANAX    45 tablet    Take 1 tablet (0.5 mg) by mouth 3 times daily as needed for anxiety 1 month supply    Generalized anxiety disorder       aspirin 81 MG EC tablet     90 tablet    Take 1 tablet (81 mg) by mouth daily        blood glucose monitoring lancets     100 each    Use as directed to test blood sugar 4-6 times daily, may test up to 10 times daily if needed.    Type 1 diabetes mellitus with hyperglycemia (H)       blood glucose monitoring test strip    FREESTYLE LITE    300 strip    TEST AS DIRECTED TEST 4-6 TIMES DAILY, UP TO 10 TIMES DAILY    Diabetes mellitus type 1.5, managed as type 1 (H)       buPROPion 300 MG 24 hr tablet    WELLBUTRIN XL    90 tablet    TAKE 1 TABLET (300 MG) BY MOUTH EVERY MORNING    Generalized anxiety disorder       cephALEXin 500 MG capsule    KEFLEX    30 capsule    Take 1 capsule (500 mg) by mouth 3 times daily    Folliculitis       cetirizine 10 MG tablet    zyrTEC     Take 10 mg by mouth daily        diclofenac 75 MG EC tablet    VOLTAREN    14 tablet    Take 1 tablet (75 mg) by mouth 2 times daily for 7 days    Lumbar sprain, initial encounter       escitalopram 20 MG tablet    LEXAPRO    90 tablet    Take 1 tablet (20 mg) by mouth daily    Generalized anxiety disorder       fluticasone 50 MCG/ACT spray    FLONASE    1 Bottle    Spray 1-2 sprays into both nostrils daily    Chronic rhinitis, unspecified type       * GLUCAGON EMERGENCY KIT 1 MG Kit     1    use as directed    Type I (juvenile type) diabetes mellitus without mention of complication, not stated as uncontrolled       * glucagon 1 MG kit    GLUCAGON  EMERGENCY    1 mg    Use SQ for treatment of severe hypoglycemia reaction, as directed    Type 1 diabetes mellitus without complication (H)       GLUCOSE CONTROL Liqd     one bottle    as directed    Type I (juvenile type) diabetes mellitus without mention of complication, not stated as uncontrolled       insulin aspart 100 UNIT/ML injection    NovoLOG PEN    15 mL    Inject 30 Units Subcutaneous daily Use with sliding scale, approximately 2-5 units daily    Type 1 diabetes mellitus without complication (H)       insulin glargine 100 UNIT/ML injection    LANTUS SOLOSTAR    15 mL    Inject 30 Units Subcutaneous daily    Diabetes mellitus type 1.5, managed as type 1 (H)       insulin pen needle 31G X 5 MM    B-D U/F    300 each    by Device route 6 times daily    Type 1 diabetes mellitus without complication (H)       lisinopril 40 MG tablet    PRINIVIL/ZESTRIL    90 tablet    Take 1 tablet (40 mg) by mouth daily No refills. Needs visit    Hypertension goal BP (blood pressure) < 140/90       methocarbamol 500 MG tablet    ROBAXIN    60 tablet    Take 1-2 tablets (500-1,000 mg) by mouth 3 times daily as needed for muscle spasms    Lumbar sprain, initial encounter       metoclopramide 10 MG tablet    REGLAN    30 tablet    Take 1 tablet (10 mg) by mouth 4 times daily as needed    Non-intractable vomiting with nausea, vomiting of unspecified type       rOPINIRole 0.25 MG tablet    REQUIP    60 tablet    TAKE 1-2 TABLETS BY MOUTH AT BEDTIME    Restless leg syndrome       tadalafil 10 MG tablet    CIALIS    12 tablet    Take 0.5-1 tablets (5-10 mg) by mouth daily as needed for erectile dysfunction Never use with nitroglycerin, terazosin or doxazosin.    ED (erectile dysfunction)       * triamcinolone 0.1 % ointment    KENALOG    45 g    Apply sparingly to affected area three times daily sparingly    Rash       * triamcinolone 0.1 % lotion    KENALOG    60 mL    Apply sparingly to affected area three times daily as needed.     Seborrheic dermatitis       * Notice:  This list has 4 medication(s) that are the same as other medications prescribed for you. Read the directions carefully, and ask your doctor or other care provider to review them with you.

## 2018-04-11 ENCOUNTER — TELEPHONE (OUTPATIENT)
Dept: ENDOCRINOLOGY | Facility: CLINIC | Age: 48
End: 2018-04-11

## 2018-04-11 NOTE — TELEPHONE ENCOUNTER
Prior Authorization Retail Medication Request    Medication/Dose: Basaglar   ICD code (if different than what is on RX):  ***  Previously Tried and Failed:  ***  Rationale:  ***    Insurance Name:  ***  Insurance ID:  ***      Pharmacy Information (if different than what is on RX)  Name:  ***  Phone:  ***

## 2018-04-16 DIAGNOSIS — G25.81 RESTLESS LEG SYNDROME: ICD-10-CM

## 2018-04-16 NOTE — TELEPHONE ENCOUNTER
"Routing refill request to provider for review/approval because:  Elevated BP      Requested Prescriptions   Pending Prescriptions Disp Refills     rOPINIRole (REQUIP) 0.25 MG tablet [Pharmacy Med Name: ROPINIROLE HCL 0.25 MG TABLET]  Last Written Prescription Date:  3/20/18  Last Fill Quantity: 30,  # refills: 0   Last office visit: 4/9/2018 with prescribing provider:     Future Office Visit:   Next 5 appointments (look out 90 days)     May 16, 2018  2:30 PM CDT   Return Visit with Jalil Tipton MD   [facility] ([place] [id number] [facility] [address]   [phone]                  60 tablet 0     Sig: TAKE 1-2 TABLETS BY MOUTH AT BEDTIME    Antiparkinson's Agents Protocol Failed    4/16/2018  8:47 AM       Failed - Blood pressure under 140/90 in past 12 months    BP Readings from Last 3 Encounters:   04/09/18 162/88   03/28/18 132/86   03/21/18 139/88                Passed - Recent (12 mo) or future (30 days) visit within the authorizing provider's specialty    Patient had office visit in the last 12 months or has a visit in the next 30 days with authorizing provider or within the authorizing provider's specialty.  See \"Patient Info\" tab in inbasket, or \"Choose Columns\" in Meds & Orders section of the refill encounter.           Passed - Patient is age 18 or older        Mae Edwards RN - BC      "

## 2018-04-17 RX ORDER — ROPINIROLE 0.25 MG/1
TABLET, FILM COATED ORAL
Qty: 60 TABLET | Refills: 3 | Status: SHIPPED | OUTPATIENT
Start: 2018-04-17 | End: 2018-10-22

## 2018-04-19 ENCOUNTER — ALLIED HEALTH/NURSE VISIT (OUTPATIENT)
Dept: EDUCATION SERVICES | Facility: CLINIC | Age: 48
End: 2018-04-19
Payer: MEDICAID

## 2018-04-19 DIAGNOSIS — E10.9 TYPE 1 DIABETES MELLITUS WITHOUT COMPLICATION (H): ICD-10-CM

## 2018-04-19 DIAGNOSIS — E10.9 TYPE 1 DIABETES MELLITUS WITHOUT COMPLICATION (H): Primary | ICD-10-CM

## 2018-04-19 PROCEDURE — G0108 DIAB MANAGE TRN  PER INDIV: HCPCS

## 2018-04-19 RX ORDER — FLASH GLUCOSE SENSOR
1 KIT MISCELLANEOUS
Qty: 1 DEVICE | Refills: 0 | Status: SHIPPED | OUTPATIENT
Start: 2018-04-19 | End: 2021-02-17

## 2018-04-19 RX ORDER — BLOOD-GLUCOSE METER
EACH MISCELLANEOUS
Qty: 1 KIT | Refills: 1 | Status: SHIPPED | OUTPATIENT
Start: 2018-04-19 | End: 2021-02-17

## 2018-04-19 RX ORDER — FLASH GLUCOSE SENSOR
KIT MISCELLANEOUS
Qty: 3 EACH | Refills: 11 | Status: SHIPPED | OUTPATIENT
Start: 2018-04-19 | End: 2021-02-17

## 2018-04-19 RX ORDER — LANCETS
1 EACH MISCELLANEOUS
Qty: 250 EACH | Refills: 11 | Status: SHIPPED | OUTPATIENT
Start: 2018-04-19 | End: 2021-02-17

## 2018-04-19 NOTE — PATIENT INSTRUCTIONS
Glucocard products are $12 per bottle of 50 at any [place] pharmacy    Take novolog before meals following Dr. Tipton's recommending correction pre meal plus 2-4 units    Take 28 units per day (Lantus 14-0-0-14)

## 2018-04-19 NOTE — PROGRESS NOTES
Diabetes Self Management Training: Individual Review Visit    Rudolph Tao presents today for education and evaluation of glucose control related to Type 1 diabetes.    He is accompanied by self    Patient's diabetes management related comments/concerns: I'm recovering from DKA ...I was in on [facility] 4/15 admission and [facility] got out Tuesday.  I think I was ignoring it for a while and then my wife finally made me go in.    Patient's emotional response to diabetes: expresses readiness to learn and denial    Patient would like this visit to be focused around the following diabetes-related behaviors and goals: Taking Medication, Problem Solving and Reducing Risks    ASSESSMENT:  Patient Problem List and Family Medical History reviewed for relevant medical history, current medical status, and diabetes risk factors.    Patient was recently hospitalized for DKA due to elevated blood sugars, limited oral intake, and stress.  Patient's blood sugars since being hospitalized remain elevated and patient has impending stress with a custody court date for his daughter.  Has custody hearing next Friday, knows he will have a new  which he feels is a good thing.  Patient has been taking novolog after meals consistently which is leading to highs, and then possibly lows as BG peaks and insulin are miss matched.  Patient feels he is confident he can prevent DKA in the next 2 weeks.  Per further discussion of carbohydrates patient is not able to estimate carbohydrate grams for common foods. Patient reports having many books at home that he would need to reference.  Patient would benefit from an insulin to carbohydrate ratio, but would initially benefit from taking insulin consistently successfully before meals then work on ratio of mealtime to background insulin.      Current Diabetes Management per Patient:  Taking diabetes medications?   yes:     Diabetes Medication(s)     Insulin Sig    insulin aspart  (NOVOLOG PEN) 100 UNIT/ML injection Inject 30 Units Subcutaneous daily Use with sliding scale, approximately 2-5 units daily    insulin glargine (LANTUS SOLOSTAR) 100 UNIT/ML injection Inject 30 Units Subcutaneous daily        Lantus 16-0-0-16, and monitor what I need for Novolog  I would correct after my meals- doesn't take novolog with meals due to fears and previous experience with not eating as many carbohydrates as planned then getting low blood sugar.      1 per 50 over  (if at 228 and take 2 units then I'll crash)     *Abbreviated insulin dose documentation key: Insulin Trade Name (kxxytrkum-xgqxq-hpkiad-bedtime) - i.e. Humalog 5-5-5-0 (Humalog 5 units at breakfast, 5 units at lunch, and 5 units at dinner).    Past Diabetes Education: Yes    Patient glucose self monitoring as follows: four times daily.   BG meter: Reli-On meter      Yesterday 4/18:  Lantus 16-0-0-16  Took 3 units Novlog before bed    Today:  16 units Lantus  6:02 am- 4 units novolog to correct    After last BG 1 unit novolog today    BG values are: Not in goal  Patient's most recent   Lab Results   Component Value Date    A1C 7.6 03/21/2018    is not meeting goal of <7.0 (day to day BG values significantly different than A1c which suggests frequent lows)    Nutrition:  Patient reports eating carbohydrates daily, though does not know how many carbohydrates are in common portion sizes of foods.    today  Lunch - 1 cup corned beef hash with potatoes, 2 scrambled eggs, diet soda  Dinner - plans to eat chicken stirfry, with 1 cup cooked rice     Biggest Challenge to Healthy Eating: none    Physical Activity:    Not discussed    Diabetes Complications:  Acute Complications: At risk for hypoglycemia? yes  Symptoms of low blood sugar? Feels it in his body  Chronic Complications: gasteroparesis  Chronic Complication Prevention: Discussed DKA prevention given the pending stress due to court, patient believes he can manage the stress despite elevated  "readings today.    Vitals:  There were no vitals taken for this visit.  Estimated body mass index is 26 kg/(m^2) as calculated from the following:    Height as of 3/28/18: 1.727 m (5' 8\").    Weight as of 4/9/18: 77.6 kg (171 lb).   Last 3 BP:   BP Readings from Last 3 Encounters:   04/09/18 162/88   03/28/18 132/86   03/21/18 139/88       History   Smoking Status     Former Smoker     Packs/day: 0.00     Years: 20.00     Types: Cigarettes   Smokeless Tobacco     Never Used     Comment: 2-4  cigarettes daily       Labs:  Lab Results   Component Value Date    A1C 7.6 03/21/2018     Lab Results   Component Value Date     03/21/2018     Lab Results   Component Value Date     09/21/2017    LDL 92 09/12/2016     HDL Cholesterol   Date Value Ref Range Status   09/12/2016 92 >39 mg/dL Final   ]  GFR Estimate   Date Value Ref Range Status   03/21/2018 >90 >60 mL/min/1.7m2 Final     Comment:     Non  GFR Calc     GFR Estimate If Black   Date Value Ref Range Status   03/21/2018 >90 >60 mL/min/1.7m2 Final     Comment:      GFR Calc     Lab Results   Component Value Date    CR 0.72 03/21/2018     No results found for: MICROALBUMIN    Socio/Economic Considerations:    Support system: family and spouse/significant other    Health Beliefs and Attitudes:   Patient Activation Measure Survey Score:  LAUREL Score (Last Two) 9/19/2011   LAUREL Raw Score 51   Activation Score 91.6   LAUREL Level 4       Stage of Change: PREPARATION (Decided to change - considering how)      Diabetes knowledge and skills assessment:     Patient is knowledgeable in diabetes management concepts related to: Monitoring    Patient needs further education on the following diabetes management concepts: Taking Medication, Problem Solving and Reducing Risks    Barriers to Learning Assessment: No Barriers identified    Based on learning assessment above, most appropriate setting for further diabetes education would be: " Individual setting.    INTERVENTION:   Discussed sending updated testing supplies to a pharmacy to have insurance cover the cost vs. Out of pocket purchaces.  Educated patient about Glucocard products at [place] pharmacies.      Discussed finding a new PCP with patient.    Education provided today on:  AADE Self-Care Behaviors:  Monitoring: What happens to BG between checks.  Discussed CGM options with patient, pros, cons, and how he would use the information day to day to make insulin dosing decisions.  Per patients research on support group sites and with his family he feels the Jenni Flash would be the best device for him.  Will route preference to Endocrinologist.    Taking Medication: action of prescribed medication, drawing up, administering and storing injectable diabetes medications and when to take medications.  Used pictures to illustrate how background and mealtime insulin work, and why Dr. Tipton recommended decreasing Lantus and increasing novolog.      Problem Solving: high blood glucose - causes, signs/symptoms, treatment and prevention, low blood glucose - causes, signs/symptoms, treatment and prevention and DKA prevention.    Opportunities for ongoing education and support in diabetes-self management were discussed.    Pt verbalized understanding of concepts discussed and recommendations provided today.       Education Materials Provided:  No new materials provided today    PLAN:  Take novolog before meals following Dr. Tipton's recommending correction pre meal plus 2-4 units    Take 28 units per day (Lantus 14-0-0-14)    AVS printed and provided to patient today.    FOLLOW-UP:  Follow-up appointment scheduled on 3/3/18.    Chart routed to referring provider.    Ongoing plan for education and support: Online diabetes websites/forums and Written resources (magazines, books, etc.)    Radha Armendariz MS, RD, LD, CDE    Time Spent: 65 minutes  Encounter Type: Individual    Any diabetes medication dose  changes were made via the CDE Protocol and Collaborative Practice Agreement with the patient's endocrinology provider. A copy of this encounter was shared with the provider.

## 2018-04-19 NOTE — TELEPHONE ENCOUNTER
Patient would like to proceeded with ordering the Jenni Flash CGM. Please see diabetes education visit 04/19/18 for complete assessment.    Radha Armendariz MS, RD, LD, CDE

## 2018-04-19 NOTE — MR AVS SNAPSHOT
After Visit Summary   4/19/2018    Rudolph Tao    MRN: [id number]           Patient Information     Date Of Birth          1970        Visit Information        Provider Department      4/19/2018 3:00 PM [facility]        Today's Diagnoses     Type 1 diabetes mellitus without complication (H)          Care Instructions    Glucocard products are $12 per bottle of 50 at any [facility] pharmacy    Take novolog before meals following Dr. Tipton's recommending correction pre meal plus 2-4 units    Take 28 units per day (Lantus 14-0-0-14)              Follow-ups after your visit        Your next 10 appointments already scheduled     May 03, 2018  3:00 PM CDT   Diabetic Education with [facility]   [facility] [place] ([facility] [place])    [address]  [place] [address] [phone]            May 16, 2018  2:30 PM CDT   Return Visit with Jalil Tipton MD   [facility]    [address]  [place] [address] [phone]              Who to contact     If you have questions or need follow up information about today's clinic visit or your schedule please contact [facility] directly at [phone].  Normal or non-critical lab and imaging results will be communicated to you by [employer], letter or phone within 4 business days after the clinic has received the results. If you do not hear from us within 7 days, please contact the clinic through MyChart or phone. If you have a critical or abnormal lab result, we will notify you by phone as soon as possible.  Submit refill requests through [employer] or call your pharmacy and they will forward the refill request to us. Please allow 3 business days for your refill to be completed.          Additional Information About Your Visit        [employer] Information     [employer] gives you secure  access to your electronic health record. If you see a primary care provider, you can also send messages to your care team and make appointments. If you have questions, please call your primary care clinic.  If you do not have a primary care provider, please call [phone] and they will assist you.        Care EveryWhere ID     This is your Care EveryWhere ID. This could be used by other organizations to access your [facility] medical records  [id number]         Blood Pressure from Last 3 Encounters:   04/09/18 162/88   03/28/18 132/86   03/21/18 139/88    Weight from Last 3 Encounters:   04/09/18 77.6 kg (171 lb)   03/28/18 78.5 kg (173 lb)   03/21/18 78.5 kg (173 lb)              Today, you had the following     No orders found for display         Today's Medication Changes          These changes are accurate as of 4/19/18  4:18 PM.  If you have any questions, ask your nurse or doctor.               Start taking these medicines.        Dose/Directions    blood glucose monitoring meter device kit   Used for:  Type 1 diabetes mellitus without complication (H)        Use to test blood sugar 8 times daily.  Ok to substitute alternative if insurance prefers.   Quantity:  1 kit   Refills:  1         These medicines have changed or have updated prescriptions.        Dose/Directions    * blood glucose monitoring lancets   This may have changed:  Another medication with the same name was added. Make sure you understand how and when to take each.   Used for:  Type 1 diabetes mellitus with hyperglycemia (H)        Use as directed to test blood sugar 4-6 times daily, may test up to 10 times daily if needed.   Quantity:  100 each   Refills:  11       * blood glucose monitoring lancets   This may have changed:  You were already taking a medication with the same name, and this prescription was added. Make sure you understand how and when to take each.   Used for:  Type 1 diabetes mellitus without complication (H)        Dose:   1 each   1 each 8 times daily Use to test blood sugar 8 times daily.  Ok to substitute alternative if insurance prefers.   Quantity:  250 each   Refills:  11       * blood glucose monitoring test strip   Commonly known as:  FREESTYLE LITE   This may have changed:  Another medication with the same name was added. Make sure you understand how and when to take each.   Used for:  Diabetes mellitus type 1.5, managed as type 1 (H)        TEST AS DIRECTED TEST 4-6 TIMES DAILY, UP TO 10 TIMES DAILY   Quantity:  300 strip   Refills:  1       * blood glucose monitoring test strip   Commonly known as:  ONETOUCH VERIO IQ   This may have changed:  You were already taking a medication with the same name, and this prescription was added. Make sure you understand how and when to take each.   Used for:  Type 1 diabetes mellitus without complication (H)        Use to test blood sugar 8 times daily.  Ok to substitute alternative if insurance prefers.   Quantity:  250 strip   Refills:  11       * Notice:  This list has 4 medication(s) that are the same as other medications prescribed for you. Read the directions carefully, and ask your doctor or other care provider to review them with you.         Where to get your medicines      [id number] [place] [facility] [address]  [address] [place] [address]     Phone:  [phone]     blood glucose monitoring lancets    blood glucose monitoring meter device kit    blood glucose monitoring test strip    insulin pen needle 31G X 5 MM                Primary Care Provider Office Phone # Fax #    Haim Bustos [phone]       [address]  [facility] [address]        Equal Access to Services     [facility] AH: Hadii aad ku hadasho Soomaali, waaxda luqadaha, qaybta kaalmada adeegyada, grisel peñaloza. So [facility] [phone].    ATENCIÓN: Si habla español, tiene a may disposición servicios gratuitos de  asistencia lingüística. Javed al [phone].    We comply with applicable federal civil rights laws and [place] laws. We do not discriminate on the basis of race, color, national origin, age, disability, sex, sexual orientation, or gender identity.            Thank you!     Thank you for choosing [facility]  for your care. Our goal is always to provide you with excellent care. Hearing back from our patients is one way we can continue to improve our services. Please take a few minutes to complete the written survey that you may receive in the mail after your visit with us. Thank you!             Your Updated Medication List - Protect others around you: Learn how to safely use, store and throw away your medicines at www.disposemymeds.org.          This list is accurate as of 4/19/18  4:18 PM.  Always use your most recent med list.                   Brand Name Dispense Instructions for use Diagnosis    ALPRAZolam 0.5 MG tablet    XANAX    45 tablet    Take 1 tablet (0.5 mg) by mouth 3 times daily as needed for anxiety 1 month supply    Generalized anxiety disorder       aspirin 81 MG EC tablet     90 tablet    Take 1 tablet (81 mg) by mouth daily        * blood glucose monitoring lancets     100 each    Use as directed to test blood sugar 4-6 times daily, may test up to 10 times daily if needed.    Type 1 diabetes mellitus with hyperglycemia (H)       * blood glucose monitoring lancets     250 each    1 each 8 times daily Use to test blood sugar 8 times daily.  Ok to substitute alternative if insurance prefers.    Type 1 diabetes mellitus without complication (H)       blood glucose monitoring meter device kit     1 kit    Use to test blood sugar 8 times daily.  Ok to substitute alternative if insurance prefers.    Type 1 diabetes mellitus without complication (H)       * blood glucose monitoring test strip    FREESTYLE LITE    300 strip    TEST AS DIRECTED TEST 4-6 TIMES DAILY, UP TO 10  TIMES DAILY    Diabetes mellitus type 1.5, managed as type 1 (H)       * blood glucose monitoring test strip    ONETOUCH VERIO IQ    250 strip    Use to test blood sugar 8 times daily.  Ok to substitute alternative if insurance prefers.    Type 1 diabetes mellitus without complication (H)       buPROPion 300 MG 24 hr tablet    WELLBUTRIN XL    90 tablet    TAKE 1 TABLET (300 MG) BY MOUTH EVERY MORNING    Generalized anxiety disorder       cephALEXin 500 MG capsule    KEFLEX    30 capsule    Take 1 capsule (500 mg) by mouth 3 times daily    Folliculitis       cetirizine 10 MG tablet    zyrTEC     Take 10 mg by mouth daily        diclofenac 75 MG EC tablet    VOLTAREN    14 tablet    Take 1 tablet (75 mg) by mouth 2 times daily for 7 days    Lumbar sprain, initial encounter       escitalopram 20 MG tablet    LEXAPRO    90 tablet    Take 1 tablet (20 mg) by mouth daily    Generalized anxiety disorder       fluticasone 50 MCG/ACT spray    FLONASE    1 Bottle    Spray 1-2 sprays into both nostrils daily    Chronic rhinitis, unspecified type       insulin aspart 100 UNIT/ML injection    NovoLOG PEN    15 mL    Inject 30 Units Subcutaneous daily Use with sliding scale, approximately 2-5 units daily    Type 1 diabetes mellitus without complication (H)       insulin glargine 100 UNIT/ML injection    LANTUS SOLOSTAR    15 mL    Inject 30 Units Subcutaneous daily    Diabetes mellitus type 1.5, managed as type 1 (H)       insulin pen needle 31G X 5 MM    B-D U/F    200 each    by Device route 6 times daily    Type 1 diabetes mellitus without complication (H)       lisinopril 40 MG tablet    PRINIVIL/ZESTRIL    90 tablet    Take 1 tablet (40 mg) by mouth daily No refills. Needs visit    Hypertension goal BP (blood pressure) < 140/90       methocarbamol 500 MG tablet    ROBAXIN    60 tablet    Take 1-2 tablets (500-1,000 mg) by mouth 3 times daily as needed for muscle spasms    Lumbar sprain, initial encounter        metoclopramide 10 MG tablet    REGLAN    30 tablet    Take 1 tablet (10 mg) by mouth 4 times daily as needed    Non-intractable vomiting with nausea, vomiting of unspecified type       rOPINIRole 0.25 MG tablet    REQUIP    60 tablet    TAKE 1-2 TABLETS BY MOUTH AT BEDTIME    Restless leg syndrome       tadalafil 10 MG tablet    CIALIS    12 tablet    Take 0.5-1 tablets (5-10 mg) by mouth daily as needed for erectile dysfunction Never use with nitroglycerin, terazosin or doxazosin.    ED (erectile dysfunction)       * triamcinolone 0.1 % ointment    KENALOG    45 g    Apply sparingly to affected area three times daily sparingly    Rash       * triamcinolone 0.1 % lotion    KENALOG    60 mL    Apply sparingly to affected area three times daily as needed.    Seborrheic dermatitis       * Notice:  This list has 6 medication(s) that are the same as other medications prescribed for you. Read the directions carefully, and ask your doctor or other care provider to review them with you.

## 2018-04-19 NOTE — Clinical Note
STEVEN he came in today, but was recently DC'd for DKA at [facility].  He believes it was due to decreased Lantus but I explained how he then needs to increase his novolog.  We are working on having him dose before  The meals and will cover more carbohydrate counting next time.  He would like the Jenni Flash cgm and I'll send you the orders.  Radha Armendraiz MS, RD, LD, CDE

## 2018-04-24 ENCOUNTER — TELEPHONE (OUTPATIENT)
Dept: INTERNAL MEDICINE | Facility: CLINIC | Age: 48
End: 2018-04-24

## 2018-04-24 NOTE — TELEPHONE ENCOUNTER
Gloria states that because patient tests 8 times a day, his insurance needs a prior auth on acetone, Urine, test STRP.    Thank you.

## 2018-04-25 NOTE — TELEPHONE ENCOUNTER
Spoke to Gloria at pharmacy. Requested she send us the PA. Gave her direct fax number.    Rocio Garcia, CMA

## 2018-04-26 ENCOUNTER — TELEPHONE (OUTPATIENT)
Dept: INTERNAL MEDICINE | Facility: CLINIC | Age: 48
End: 2018-04-26

## 2018-04-26 NOTE — TELEPHONE ENCOUNTER
PA Initiation    Medication: Accu-check jayjay plus test strips  Insurance Company: [place] Medicaid ([facility]) - Phone [phone] Fax [phone]  Ph[facility]illing [id number] [place] [facility] [address]  Filling Pharmacy Phone: [phone]  Filling Pharmacy Fax:    Start Date: 4/26/2018      THIS HAS BEEN SUBMITTED BY THE PRIOR-AUTHORIZATION TEAM. ANY QUESTIONS PLEASE CALL [phone]. THANK YOU

## 2018-04-26 NOTE — TELEPHONE ENCOUNTER
Prior Authorization Retail Medication Request    Medication/Dose: Accu-chek avia plus test strips  ICD code (if different than what is on RX):  **  Previously Tried and Failed:  **  Rationale:  **    Insurance Name:  Medicaid  Insurance ID:  [id number]       Pharmacy Information (if different than what is on RX)  [id number] [place] [facility] [address]  [address]  [place] [address]  Phone: [phone] Fax: [phone]

## 2018-04-26 NOTE — TELEPHONE ENCOUNTER
Diabetes Follow-up    Subjective/Objective:    Rudolph Tao sent in an email with question about diabetes supplies including insulin and BG strips.    Taking diabetes medications?   yes:     Diabetes Medication(s)     Insulin Sig    insulin aspart (NOVOLOG PEN) 100 UNIT/ML injection Inject 30 Units Subcutaneous daily Use with sliding scale, approximately 2-5 units daily    insulin glargine (LANTUS SOLOSTAR) 100 UNIT/ML injection Inject 30 Units Subcutaneous daily          Kris's email:  This message is for Radha Armendariz.  Radha, my pharmacy notified me that the insurance company would not cover the amount of test strip requested.  If the amount was 4-6 times a day they would probably accept it.  I have been splitting my Lantus taking 16 in the morning and 16 in the evening, which seems to be working well.  However, I believe Dr. Jose filed my prescription having me taking only 26 units a day.  I have enough to last me 18 more days at 32 units a day. Would it be possible for you to make these changes?  If not I can contact Dr. Jose's office.  Thanks! Kris    Assessment:    Kris's chart shows that a request for prior authorization for AccuCheck test strips was entered on 4/24.  Patient is checking blood 8x/day, more frequently than permitted by insurance.     He also indicates taking 32 units of Lantus/day, however med list shows only 30 units/day.     Will refer patient to endocrine office to address insulin question, will alert him to prior auth in progress and to check with Dr. Bustos's office.     Plan/Response:    No changes in the patient's current treatment plan.  Replied to patient:  Damian Ponce,   Thanks for the update! I am helping Radha with patients today since she has a full patient schedule.     It looks like a prior authorization requesting  the extra strips was sent to Dr. Bustos s office on the 24th.  It would be best to get in touch with Dr. Bustos s office to find out the status of that request and  assure it is moving forward.   If they seem reluctant, you could call the pharmacy and ask them to send the prior auth to Dr. Baptiste s office- sometimes it s easier to  just have one  go to  for all your diabetes cares.     Your chart lists a prescription for 30 units of Lantus/day. You could check in with Dr. Baptiste s office OR, if you have enough insulin for the next week, Radha can order the right amount of insulin when you see her on May 3rd at 3:00. That might actually be a smart idea just in case she makes any changes when you two are together.     Thank you!    Janna Espinosa RD, LD, CDE      Any diabetes medication dose changes were made via the CDE Protocol and Collaborative Practice Agreement with the patient's referring provider. A copy of this encounter was shared with the provider.

## 2018-04-27 NOTE — TELEPHONE ENCOUNTER
Prior Authorization Approval    Authorization Effective Date: 4/19/2018  Authorization Expiration Date: 4/30/2018  Medication: Accu-check jayjay plus test strips APPROVED   Approved Dose/Quantity:   Reference #:     Insurance Company: [place] Medicaid ([facility]) - Phone [phone] Fax [phone]  Expected CoPay:       CoPay Card Available:      Foundation Assistance Needed:    Which Pharmacy is filling the prescription (Not needed for infusion/clinic administered[id number] [place] [address]  Pharmacy Notified: Yes  Patient Notified: Yes    PATIENT IS SWITCHING TO A PREPAID HEALTH PLAN 5/1/2018 THIS IS A ONE TIME FILL APPROVAL FOR PATIENT PHARMACY IS AWARE

## 2018-05-03 ENCOUNTER — ALLIED HEALTH/NURSE VISIT (OUTPATIENT)
Dept: EDUCATION SERVICES | Facility: CLINIC | Age: 48
End: 2018-05-03
Payer: COMMERCIAL

## 2018-05-03 DIAGNOSIS — E10.9 TYPE 1 DIABETES MELLITUS WITHOUT COMPLICATION (H): Primary | ICD-10-CM

## 2018-05-03 PROCEDURE — G0108 DIAB MANAGE TRN  PER INDIV: HCPCS

## 2018-05-03 NOTE — MR AVS SNAPSHOT
After Visit Summary   5/3/2018    Rudolph Tao    MRN: [id number]           Patient Information     Date Of Birth          1970        Visit Information        Provider Department      5/3/2018 3:00 PM [facility] [place]        Care Instructions    Lantus- Decrease to 15 units twice daily    Aim for 1 unit Novolog per 10g carbohydrates plus correction    Continue correction of 1 unit per 50 over 150    Banana=30g= 3 units  1 cup pasta/rice=45g= 4 units (technically 3.5 units)  1 cup potatoes/corn/peas= 30g= 3 units  1/2 cup potatoes= 1 unit (technically 1.5 units)  Bread= 10-15g per slice= 1 unit per 1 slice and 2-3 units 2 slices    Chili with beans + corn bread (20-30g)= 2-3 units    Grapes= 1g per grape              Follow-ups after your visit        Your next 10 appointments already scheduled     May 16, 2018  2:30 PM CDT   Return Visit with Jalil Tipton MD   [facility] [place] ([facility] [place]    [address]  [place] [address] [phone]            Jun 07, 2018  2:00 PM CDT   Diabetic Education with [facility]   [facility] [place] [facility]    [address]  [place] [id number] [phone]              Who to contact     If you have questions or need follow up information about today's clinic visit or your schedule please contact [facility] directly at [phone].  Normal or non-critical lab and imaging results will be communicated to you by MyChart, letter or phone within 4 business days after the clinic has received the results. If you do not hear from us within 7 days, please contact the clinic through [facility] or phone. If you have a critical or abnormal lab result, we will notify you by phone as soon as possible.  Submit refill requests through [employer] or call your pharmacy and they will forward the refill request to us. Please allow  3 business days for your refill to be completed.          Additional Information About Your Visit        MyChart Information     [facility] gives you secure access to your electronic health record. If you see a primary care provider, you can also send messages to your care team and make appointments. If you have questions, please call your primary care clinic.  If you do not have a primary care provider, please call [phone] and they will assist you.        Care EveryWhere ID     This is your Care EveryWhere ID. This could be used by other organizations to access your [facility] medical records  [id number]         Blood Pressure from Last 3 Encounters:   04/09/18 162/88   03/28/18 132/86   03/21/18 139/88    Weight from Last 3 Encounters:   04/09/18 77.6 kg (171 lb)   03/28/18 78.5 kg (173 lb)   03/21/18 78.5 kg (173 lb)              Today, you had the following     No orders found for display       Primary Care Provider Office Phone # Fax #    Haim Bustos [phone]       [address] [address]        Equal Access to Services     [facility]: Hadii meme ku hadasho Soomaali, waaxda luqadaha, qaybta kaalmada ademichelleyadeborah, grisel george . So [facility] [phone].    ATENCIÓN: Si habla español, tiene a may disposición servicios gratuitos de asistencia lingüística. [facility] [phone].    We comply with applicable federal civil rights laws and [place] laws. We do not discriminate on the basis of race, color, national origin, age, disability, sex, sexual orientation, or gender identity.            Thank you!     Thank you for choosing [facility]  for your care. Our goal is always to provide you with excellent care. Hearing back from our patients is one way we can continue to improve our services. Please take a few minutes to complete the written survey that you may receive in the mail after your visit with us. Thank you!              Your Updated Medication List - Protect others around you: Learn how to safely use, store and throw away your medicines at www.disposemymeds.org.          This list is accurate as of 5/3/18  4:17 PM.  Always use your most recent med list.                   Brand Name Dispense Instructions for use Diagnosis    acetone (Urine) test Strp     50 each    1 strip as needed (with unexplained blood glucose over 300 mg/dL) Use one strip, as needed, with unexplained blood glucose over 300 mg/dL.    Type 1 diabetes mellitus without complication (H)       ALPRAZolam 0.5 MG tablet    XANAX    45 tablet    Take 1 tablet (0.5 mg) by mouth 3 times daily as needed for anxiety 1 month supply    Generalized anxiety disorder       aspirin 81 MG EC tablet     90 tablet    Take 1 tablet (81 mg) by mouth daily        * blood glucose monitoring lancets     100 each    Use as directed to test blood sugar 4-6 times daily, may test up to 10 times daily if needed.    Type 1 diabetes mellitus with hyperglycemia (H)       * blood glucose monitoring lancets     250 each    1 each 8 times daily Use to test blood sugar 8 times daily.  Ok to substitute alternative if insurance prefers.    Type 1 diabetes mellitus without complication (H)       blood glucose monitoring meter device kit     1 kit    Use to test blood sugar 8 times daily.  Ok to substitute alternative if insurance prefers.    Type 1 diabetes mellitus without complication (H)       * blood glucose monitoring test strip    FREESTYLE LITE    300 strip    TEST AS DIRECTED TEST 4-6 TIMES DAILY, UP TO 10 TIMES DAILY    Diabetes mellitus type 1.5, managed as type 1 (H)       * blood glucose monitoring test strip    ONETOUCH VERIO IQ    250 strip    Use to test blood sugar 8 times daily.  Ok to substitute alternative if insurance prefers.    Type 1 diabetes mellitus without complication (H)       buPROPion 300 MG 24 hr tablet    WELLBUTRIN XL    90 tablet    TAKE 1 TABLET (300 MG) BY MOUTH  EVERY MORNING    Generalized anxiety disorder       cephALEXin 500 MG capsule    KEFLEX    30 capsule    Take 1 capsule (500 mg) by mouth 3 times daily    Folliculitis       cetirizine 10 MG tablet    zyrTEC     Take 10 mg by mouth daily        continuous blood glucose monitoring sensor     3 each    For use with Freestyle Jenni Flash  for continuous monitioring of blood glucose levels. Replace sensor every 10 days.    Type 1 diabetes mellitus without complication (H)       diclofenac 75 MG EC tablet    VOLTAREN    14 tablet    Take 1 tablet (75 mg) by mouth 2 times daily for 7 days    Lumbar sprain, initial encounter       escitalopram 20 MG tablet    LEXAPRO    90 tablet    Take 1 tablet (20 mg) by mouth daily    Generalized anxiety disorder       fluticasone 50 MCG/ACT spray    FLONASE    1 Bottle    Spray 1-2 sprays into both nostrils daily    Chronic rhinitis, unspecified type       FREESTYLE JENNI READER Goldie     1 Device    1 Device 8 times daily    Type 1 diabetes mellitus without complication (H)       insulin aspart 100 UNIT/ML injection    NovoLOG PEN    15 mL    Inject 30 Units Subcutaneous daily Use with sliding scale, approximately 2-5 units daily    Type 1 diabetes mellitus without complication (H)       insulin glargine 100 UNIT/ML injection    LANTUS SOLOSTAR    15 mL    Inject 30 Units Subcutaneous daily    Diabetes mellitus type 1.5, managed as type 1 (H)       insulin pen needle 31G X 5 MM    B-D U/F    200 each    by Device route 6 times daily    Type 1 diabetes mellitus without complication (H)       lisinopril 40 MG tablet    PRINIVIL/ZESTRIL    90 tablet    Take 1 tablet (40 mg) by mouth daily No refills. Needs visit    Hypertension goal BP (blood pressure) < 140/90       methocarbamol 500 MG tablet    ROBAXIN    60 tablet    Take 1-2 tablets (500-1,000 mg) by mouth 3 times daily as needed for muscle spasms    Lumbar sprain, initial encounter       metoclopramide 10 MG tablet     REGLAN    30 tablet    Take 1 tablet (10 mg) by mouth 4 times daily as needed    Non-intractable vomiting with nausea, vomiting of unspecified type       rOPINIRole 0.25 MG tablet    REQUIP    60 tablet    TAKE 1-2 TABLETS BY MOUTH AT BEDTIME    Restless leg syndrome       tadalafil 10 MG tablet    CIALIS    12 tablet    Take 0.5-1 tablets (5-10 mg) by mouth daily as needed for erectile dysfunction Never use with nitroglycerin, terazosin or doxazosin.    ED (erectile dysfunction)       * triamcinolone 0.1 % ointment    KENALOG    45 g    Apply sparingly to affected area three times daily sparingly    Rash       * triamcinolone 0.1 % lotion    KENALOG    60 mL    Apply sparingly to affected area three times daily as needed.    Seborrheic dermatitis       * Notice:  This list has 6 medication(s) that are the same as other medications prescribed for you. Read the directions carefully, and ask your doctor or other care provider to review them with you.

## 2018-05-03 NOTE — PATIENT INSTRUCTIONS
Lantus- Decrease to 15 units twice daily    Aim for 1 unit Novolog per 10g carbohydrates plus correction    Continue correction of 1 unit per 50 over 150    Banana=30g= 3 units  1 cup pasta/rice=45g= 4 units (technically 3.5 units)  1 cup potatoes/corn/peas= 30g= 3 units  1/2 cup potatoes= 1 unit (technically 1.5 units)  Bread= 10-15g per slice= 1 unit per 1 slice and 2-3 units 2 slices    Chili with beans + corn bread (20-30g)= 2-3 units    Grapes= 1g per grape

## 2018-05-03 NOTE — Clinical Note
Hi Dr. Tipton,  I saw Kris last week.  He is slowly making improvements, but is still very skeptical about the decreased Lantus and increased Novolog doses.  He has progressed to taking Novolog pre-meals and now we're working on more accurate carbohydrate counting.  He would like to try the Echo pen but would need an Rx for the cartridges.  He has lots of novolog at home, so stated he was ok waiting to get the rx at his visit with you.  Hopefully if one of us sees him every 2 weeks we can encourage the positive progress.  Radha Armendariz MS, RD, LD, CDE

## 2018-05-03 NOTE — PROGRESS NOTES
Diabetes Self Management Training: Follow-up Visit    Rudolph Tao presents today for education and evaluation of glucose control related to Type 2 diabetes.    He is accompanied by self    Patient's diabetes management related comments/concerns: I got out of wack, trace ketones and too many beers.  I'm not seeing swings.  This past 2 weeks is the hardest I've ever worked on my diabetes and I finally feel like things are getting better.  I also need the pen cartridges and not the pens for the novolog    Patient's emotional response to diabetes: expresses readiness to learn, fear, anxiety, concern for health and well-being, uncertain and confidence diabetes can be controlled    Patient would like this visit to be focused around the following diabetes-related behaviors and goals: Assistance with making lifestyle changes, Healthy Eating, Taking Medication and Problem Solving    ASSESSMENT:  Patient Problem List and Family Medical History reviewed for relevant medical history, current medical status, and diabetes risk factors.      Patient reports in the past 2 weeks this is the hardest he has worked on his diabetes for a long time.  He has started to take novolog before meals and is pleased with the improvement in his numbers so far.      Patient's total daily Novolog use has increased as recommended, but he is having some episodes of low blood sugar when he skips meals due to a high amount of basal insulin.  Patient would benefit to continue to reduce his Lantus dose to the recommended 28 units by Dr. Tipton, and simultaneously increase the amount of Novolog to improve his insulin ratios closer to 50/50.  Patient is concerned about decreased basal insulin doses causing high blood sugars, but is willing to make small adjustments with both Lantus and Novolog to improve his blood sugars.    Rule 500: 500/ 45 TDD= 1 unit will cover 11g carbohydrate    Current Diabetes Management per Patient:  Taking diabetes  "medications?   yes:     Diabetes Medication(s)     Insulin Sig    insulin aspart (NOVOLOG PEN) 100 UNIT/ML injection Inject 30 Units Subcutaneous daily Use with sliding scale, approximately 2-5 units daily    insulin glargine (LANTUS SOLOSTAR) 100 UNIT/ML injection Inject 30 Units Subcutaneous daily        Lantus takes 16-0-0-16 - will be out soon    Novolog 13 TDD on average- takes 2-6 units per meal- and is now taking before meals vs. After     Lunch- varies  Dinner- 6 units novlog usually      *Abbreviated insulin dose documentation key: Insulin Trade Name (jggbxihbq-jjcrd-czcvxb-bedtime) - i.e. Humalog 5-5-5-0 (Humalog 5 units at breakfast, 5 units at lunch, and 5 units at dinner).    Past Diabetes Education: Yes    Patient glucose self monitoring as follows: 4-6 times daily.         BG values are: Not in goal- but improved since last visit.  Patient's most recent   Lab Results   Component Value Date    A1C 7.6 03/21/2018    is not meeting goal of <7.0    Nutrition:  Patient has been using a diary to record food and insulin doses to help with starting carbohydrate counting.       Tuesday- didn't eat anything all day ( ate Beef barley soup at 10pm)- 2 low blood sugars  During the day      Wednesday:  Dinner- randy- took 6 units  8pm- beer  11pm- hypo     Breakfast - Skips  Lunch - 2pm- 1.5 cups chicken salad with noodles, took 3 units novolog  3:28-     Biggest Challenge to Healthy Eating: Knowing how many carbohydrates are in portion sizes of foods.    Diabetes Complications:  Acute Complications: At risk for hypoglycemia? yes  Symptoms of low blood sugar? sweating, shaking, dizziness and doesn't feel well  Frequency of hypoglycemia: a few times per week      Vitals:  There were no vitals taken for this visit.  Estimated body mass index is 26 kg/(m^2) as calculated from the following:    Height as of 3/28/18: 1.727 m (5' 8\").    Weight as of 4/9/18: 77.6 kg (171 lb).   Last 3 BP:   BP Readings from Last " 3 Encounters:   04/09/18 162/88   03/28/18 132/86   03/21/18 139/88       History   Smoking Status     Former Smoker     Packs/day: 0.00     Years: 20.00     Types: Cigarettes   Smokeless Tobacco     Never Used     Comment: 2-4  cigarettes daily       Labs:  Lab Results   Component Value Date    A1C 7.6 03/21/2018     Lab Results   Component Value Date     03/21/2018     Lab Results   Component Value Date     09/21/2017    LDL 92 09/12/2016     HDL Cholesterol   Date Value Ref Range Status   09/12/2016 92 >39 mg/dL Final   ]  GFR Estimate   Date Value Ref Range Status   03/21/2018 >90 >60 mL/min/1.7m2 Final     Comment:     Non  GFR Calc     GFR Estimate If Black   Date Value Ref Range Status   03/21/2018 >90 >60 mL/min/1.7m2 Final     Comment:      GFR Calc     Lab Results   Component Value Date    CR 0.72 03/21/2018     No results found for: MICROALBUMIN    Socio/Economic Considerations:    Support system: spouse/significant other, child (current custody situation)    Health Beliefs and Attitudes:   Patient Activation Measure Survey Score:  LAUREL Score (Last Two) 9/19/2011   LAUREL Raw Score 51   Activation Score 91.6   LAUREL Level 4       Stage of Change: ACTION (Actively working towards change)      Diabetes knowledge and skills assessment:     Patient is knowledgeable in diabetes management concepts related to: Healthy Eating, Monitoring and Taking Medication    Patient needs further education on the following diabetes management concepts: Healthy Eating and Taking Medication    Barriers to Learning Assessment: No Barriers identified    Based on learning assessment above, most appropriate setting for further diabetes education would be: Individual setting.    INTERVENTION:   Praised patient for progress with taking novolog before meals.  Used food models and nutrition facts labels to help patient identify total carbohydrate in foods he is eating to calculate the amount of  insulin he would need to take.  Since patient does not have 1/2 unit Novolog cartridges used I:CR of 1 unit per 10g carbohydrate as a starting point for patient to adjust insulin.  Encouraged patient to continue to track foods and adjust mealtime insulin using the I:CR.      Education provided today on:  AADE Self-Care Behaviors:  Healthy Eating: carbohydrate counting, consistency in amount, composition, and timing of food intake, portion control, label reading and how to better estimate total carbohydrate in portion sizes of foods.    Taking Medication: Discussed action profiles of Lantus vs. Novolog insulins and how decreasing Lantus while increasing Novolog will help improve blood sugars.    Problem Solving: high blood glucose - causes, signs/symptoms, treatment and prevention and low blood glucose - causes, signs/symptoms, treatment and prevention    Opportunities for ongoing education and support in diabetes-self management were discussed.    Pt verbalized understanding of concepts discussed and recommendations provided today.       Education Materials Provided:  Carbohydrate Counting and Label reading    PLAN:  Lantus- Decrease to 15 units twice daily    Aim for 1 unit Novolog per 10g carbohydrates plus correction    Continue correction of 1 unit per 50 over 150    AVS printed and provided to patient today.    FOLLOW-UP:  Follow-up appointment scheduled on 6/7/18.  Follow-up with endocrinology 5/16/18- Will need Novolog cartridge prescription.   Chart routed to Endocrinology provider.    Ongoing plan for education and support: Online diabetes websites/forums, Written resources (magazines, books, etc.) and MyChart encouraged for quick carbohydrate counting and I:CR support between visits.    Radha Armendariz MS, RD, LD, CDE    Time Spent: 70 minutes  Encounter Type: Individual    Any diabetes medication dose changes were made via the CDE Protocol and Collaborative Practice Agreement with the patient's endocrinology  provider. A copy of this encounter was shared with the provider.

## 2018-05-16 ENCOUNTER — TELEPHONE (OUTPATIENT)
Dept: ENDOCRINOLOGY | Facility: CLINIC | Age: 48
End: 2018-05-16

## 2018-05-16 ENCOUNTER — OFFICE VISIT (OUTPATIENT)
Dept: ENDOCRINOLOGY | Facility: CLINIC | Age: 48
End: 2018-05-16
Payer: COMMERCIAL

## 2018-05-16 VITALS
DIASTOLIC BLOOD PRESSURE: 87 MMHG | HEIGHT: 68 IN | HEART RATE: 106 BPM | WEIGHT: 170 LBS | BODY MASS INDEX: 25.76 KG/M2 | SYSTOLIC BLOOD PRESSURE: 142 MMHG

## 2018-05-16 DIAGNOSIS — E10.9 TYPE 1 DIABETES MELLITUS WITHOUT COMPLICATION (H): Primary | ICD-10-CM

## 2018-05-16 PROCEDURE — 99214 OFFICE O/P EST MOD 30 MIN: CPT | Performed by: INTERNAL MEDICINE

## 2018-05-16 NOTE — TELEPHONE ENCOUNTER
Reason for Call:  Other call back    Detailed comments: Patient missed appt today due to being late. Patient ended up in [facility] on 4/16 after switching the way he administered the insulin. He would like to talk to/meet with ASAP. Made appt 6/27.     Phone Number Patient can be reached at: Cell number on file:    Telephone Information:   Mobile [phone]       Best Time: any     Can we leave a detailed message on this number? YES    Call taken on 5/16/2018 at 2:55 PM by Juan C Cooney

## 2018-05-16 NOTE — PROGRESS NOTES
Name: Rudolph Tao    HPI:  Recent issues:  Here for f/u diabetes evaluation, recent events:  1) Had DKA illness 4/2018 with nausea and vomiting, urine ketones... then hospitalization at [facility] x 2 days  2) Episode of left flank pain 1 week ago, but no apparent fall injury  Went to an ER, recalls CT scan turned out well  Has visible bruise left side of abdomen        Initial diagnosis of diabetes age 34, living in [place]  Had seen Dr. RAJ Foster for evaluation  Began treatment treatment with oral meds.  Hospitalized with high BG levels, then med plan switched to insulin medications  Recalls taking Humalog and Lantus  insulin  1/2018. Had DKA illness, hospitalized at [facility]  Previously on Lantus 30U sq QPM  4/19/18 and 5/3/18. [facility] evaluations   Reviewed MDI plan, I:C method, basal insulin dosing  Current DM meds:   Lantus 15U sq BID   Novolog 1U per 10 gm    Novolog sscale 1U per 50>150   Using [employer] BG meter, tests 3x/day   No meter data available today  Has not worn CGMS in the past  3/21/18 labs included:  hgbA1c 7.6%, glu 160, C-P <0.1, HVX41Ab >250, TSH 2.73, Cr 0.72, Ca 10.4  Lab Results   Component Value Date    A1C 7.6 (H) 03/21/2018     03/21/2018    POTASSIUM 4.9 03/21/2018    CHLORIDE 97 03/21/2018    CO2 23 03/21/2018    ANIONGAP 15 (H) 03/21/2018     (H) 03/21/2018    BUN 12 03/21/2018    CR 0.72 03/21/2018    GFRESTIMATED >90 03/21/2018    GFRESTBLACK >90 03/21/2018    DANA 10.4 (H) 03/21/2018    CPEPT <0.1 (L) 03/21/2018    CHOL 196 09/12/2016    TRIG 60 09/12/2016    HDL 92 09/12/2016     (H) 09/21/2017    NHDL 104 09/12/2016     DM Complications:   Retinopathy    Last eye exam approx 2 yrs ago, recalls some eye changes (retinopathy)    Wears contact lenses   Neuropathy?    Reports h/o gastroparesis?    , employed with [employer]  Has seen Dr. Washington Norphlet general medicine evaluations.    PMH/PSH:  Past Medical History:    Diagnosis Date     Adopted      Anxiety      Anxiety      Chronic low back pain      DDD (degenerative disc disease), lumbar 3/8/2018     Diabetes mellitus (H)     TYPE 1     Hallux abducto valgus      Hyperlipaemia      Hyperlipidemia      Hypertension      Lip lesion 8/2/2010     Pain in toe of left foot     2 ND TOE     RLS (restless legs syndrome)      Snoring     MODERATE SEVERITY     Swelling of foot joint 7/13/2012     Past Surgical History:   Procedure Laterality Date     APPENDECTOMY  1990     OSTEOTOMY FOOT  1/8/2013    Procedure: OSTEOTOMY FOOT;  Left Foot Distal First Metarsal Osteotomy, Second Toe Hammer Toe Correction, Second Metatarsal Weil Osteotomy;  Surgeon: Robert Augustine DPM;  Location: [facility]     SURGICAL HISTORY OF -   1992    Appy       Family Hx:  Family History   Problem Relation Age of Onset     Unknown/Adopted Mother      Unknown/Adopted Father      Unknown/Adopted Maternal Grandmother      Unknown/Adopted Maternal Grandfather      Unknown/Adopted Paternal Grandmother      Unknown/Adopted Paternal Grandfather          Social Hx:  Social History     Social History     Marital status:      Spouse name: N/A     Number of children: N/A     Years of education: N/A     Occupational History     Not on file.     Social History Main Topics     Smoking status: Former Smoker     Packs/day: 0.00     Years: 20.00     Types: Cigarettes     Smokeless tobacco: Never Used      Comment: 2-4  cigarettes daily     Alcohol use Yes      Comment: 2 drinks daily     Drug use: No     Sexual activity: Yes     Partners: Female     Birth control/ protection: None     Other Topics Concern     Parent/Sibling W/ Cabg, Mi Or Angioplasty Before 65f 55m? No     Social History Narrative    Works [employer]    Erratic eating pattern          MEDICATIONS:  has a current medication list which includes the following prescription(s): acetone (urine) test, alprazolam, aspirin, blood glucose monitoring, blood glucose  "monitoring, blood glucose monitoring, blood glucose monitoring, blood glucose monitoring, bupropion, cephalexin, cetirizine, freestyle loida reader, continuous blood glucose monitoring, escitalopram, fluticasone, insulin aspart, insulin glargine, insulin pen needle, lisinopril, methocarbamol, metoclopramide, ropinirole, tadalafil, triamcinolone, triamcinolone, and diclofenac.    ROS:     ROS: 10 point ROS neg other than the symptoms noted above in the HPI.    GENERAL: nightsweats; no weight changes, fatigue, fevers, chills  HEENT: no dysphagia, odonophagia, diplopia, neck pain  THYROID:  no apparent hyper or hypothyroid symptoms  CV: no chest pain, pressure, palpitations  LUNGS: no SOB, RIVERA, cough, wheezing   ABDOMEN: no diarrhea, constipation, abdominal pain  EXTREMITIES: no rashes, ulcers, edema  NEUROLOGY: no headaches, denies changes in vision, tingling, extremitiy numbness   MSK: left ankle stiffness; no muscle aches, weakness  SKIN: abdomen bruise, no other rashes or skin lesions   : some decreased erection function  PSYCH:  Stressors with his mother's health, ex-wife and child custody  ENDOCRINE: no heat or cold intolerance    Physical Exam   VS: /87 (BP Location: Right arm, Cuff Size: Adult Regular)  Pulse 106  Ht 5' 8\" (1.727 m)  Wt 170 lb (77.1 kg)  BMI 25.85 kg/m2  GENERAL: AXOX3, NAD, well dressed, answering questions appropriately, appears stated age  THYROID:  Normal gland size, no nodules  ABD:  Normal size abdomen, nontender.  SKIN: horizontal bruise left lower/lateral abdomen approx 5x2 inches, nontender    LABS:    All pertinent notes, labs, and images personally reviewed by me.     A/P:  Encounter Diagnosis   Name Primary?     Type 1 diabetes mellitus without complication (H) Yes     Comments:  Reviewed overall diabetes history and management.   Recent ER/hospital evaluations worrisome for high and low BG issues     Plan:  Discussed general issues with the T1DM diagnosis and " management  We discussed the hgbA1c test which reflects previous overall glucose levels or control  Discussed the importance of blood glucose (BG) testing to assess glucose trends  Provided general overview of the multiple daily injection (MDI) plan using rapid acting mealtime and longacting insulin medications  Reviewed CGMS device options    Recommend:  Continue MDI plan with Novolog and Lantus insulin  Suspect he needs more prandial Nv insulin, less basal Lantus insulin   Recommend lowering Lantus doses by 10% amount  Keep f/u Diabetes Education evaluation appointments  Bring BG meter to appointments  Will plan to change Novolog Rx to the pen-fill cartridge Rx, to enable use of the Galilea Echopen   We will investigate insurance coverage ([employer]) for Dexcom vs Jenni   Test BG levels QID, goal target  mg/dl  Monitor BP levels, goal SBP/DBP <130/<80  Advise having fasting lipid panel testing and dilated eye examination, at least annually    Discussed importance of having a primary care practitioner (family practice or internal medicine) for general medicine appointments and also acute care visits.  Would benefit from GI consultation to review digestive symptoms.  Doubt gastroparesis.  Addressed patient questions today    Labs ordered today:   No orders of the defined types were placed in this encounter.    Radiology/Consults ordered today: None    More than 50% of the time spent with Mr. Tao on counseling / coordinating his care.  Total appointment time was 30 minutes.    Follow-up:  6 weeks    Jalil Tipton MD  Endocrinology  [facility]

## 2018-05-17 NOTE — TELEPHONE ENCOUNTER
Message noted.  I DID see patient for his (arrived late) diabetes evaluation appt today, and addressed his questions.    JAN Tipton MD  [facility]

## 2018-05-18 ENCOUNTER — MYC MEDICAL ADVICE (OUTPATIENT)
Dept: ENDOCRINOLOGY | Facility: CLINIC | Age: 48
End: 2018-05-18

## 2018-06-06 DIAGNOSIS — E10.65 TYPE 1 DIABETES MELLITUS WITH HYPERGLYCEMIA (H): ICD-10-CM

## 2018-06-06 RX ORDER — LANCETS
EACH MISCELLANEOUS
Qty: 500 EACH | Refills: 3 | Status: SHIPPED | OUTPATIENT
Start: 2018-06-06 | End: 2021-02-17

## 2018-06-06 NOTE — TELEPHONE ENCOUNTER
Received fax from [facility] pharmacy stating insurance does not cover AccuChek Julita. Please prescribe One Touch Meter, test strips and lancets.    Rocio Garcia, CMA

## 2018-06-06 NOTE — TELEPHONE ENCOUNTER
ONE TOUCH supplies were sent to [facility] pharmacy on 4/19/18 with 11 refills on test strips and lancets and 1 refill on meter.     Please call pharmacy and verify? Do they need a different brand or new script?     Meliza Majano, RN, BSN, PHN        Disp Refills Start End         blood glucose monitoring (ONE TOUCH ULTRASOFT) lancets 250 each 11 4/19/2018       Sig - Route: 1 each 8 times daily Use to test blood sugar 8 times daily.  Ok to substitute alternative if insurance prefers. - Does not apply      Class: E-Prescribe      Order: [id number]      E-Prescribing Status: Receipt confirmed by pharmacy (4/19/2018  4:15 PM CDT)        Printout Tracking      External Result Report       Medication Administration Instructions      Use to test blood sugar 8 times daily.  Ok to substitute alternative if insurance prefers.       Pharmacy      [place] [id number] [place] [address]      blood glucose monitoring (ONETOUCH VERIO IQ) test strip 250 strip 11 4/19/2018       Sig: Use to test blood sugar 8 times daily.  Ok to substitute alternative if insurance prefers.      Class: E-Prescribe      Order: [id number]      E-Prescribing Status: Receipt confirmed by pharmacy (4/19/2018  4:15 PM CDT)        Printout Tracking      External Result Report       Medication Administration Instructions      Use to test blood sugar 8 times daily.  Ok to substitute alternative if insurance prefers.       Pharmacy      [place] [address] [place] [address]     blood glucose monitoring (ONETOUCH VERIO) meter device kit 1 kit 1 4/19/2018        Sig: Use to test blood sugar 8 times daily.  Ok to substitute alternative if insurance prefers.      Class: E-Prescribe      Order: [id number]      E-Prescribing Status: Receipt confirmed by pharmacy (4/19/2018  4:15 PM CDT)        Printout Tracking      External Result Report       Medication Administration Instructions      Use to test blood sugar 8 times daily.  Ok to substitute  alternative if insurance prefers.       Pharmacy      [id number] [place], [place] - [address]

## 2018-06-06 NOTE — TELEPHONE ENCOUNTER
Spoke to pharmacy. They have rx's for meter and test strips, but still need one for the lancets. Please send.  Padmini FIELDS CMA ([facility])

## 2018-06-07 ENCOUNTER — ALLIED HEALTH/NURSE VISIT (OUTPATIENT)
Dept: NUTRITION | Facility: CLINIC | Age: 48
End: 2018-06-07
Payer: COMMERCIAL

## 2018-06-07 VITALS — BODY MASS INDEX: 25.39 KG/M2 | WEIGHT: 167 LBS

## 2018-06-07 DIAGNOSIS — E10.9 TYPE 1 DIABETES MELLITUS WITHOUT COMPLICATION (H): Primary | ICD-10-CM

## 2018-06-07 PROCEDURE — 99207 ZZC DROP WITH A PROCEDURE: CPT

## 2018-06-07 PROCEDURE — G0108 DIAB MANAGE TRN  PER INDIV: HCPCS

## 2018-06-07 NOTE — PATIENT INSTRUCTIONS
Take Lantus daily 14 units AM and PM     Take premeal correction before meal or food, then after eating take insulin for carbohydrates eaten.    Novolog every 3 hours as a max amount of time between injections.

## 2018-06-07 NOTE — MR AVS SNAPSHOT
After Visit Summary   6/7/2018    Rudolph Tao    MRN: [id number]           Patient Information     Date Of Birth          1970        Visit Information        Provider Department      6/7/2018 2:00 PM [facility] [place]        Care Instructions    Take Lantus daily 14 units AM and PM     Take premeal correction before meal or food, then after eating take insulin for carbohydrates eaten.    Novolog every 3 hours as a max amount of time between injections.           Follow-ups after your visit        Your next 10 appointments already scheduled     Jul 12, 2018 10:00 AM CDT   Diabetic Education with [facility]   [facility] [place] ([facility] [place])    [address]  [place] [address] [phone]              Who to contact     If you have questions or need follow up information about today's clinic visit or your schedule please contact [facility] at [phone].  Normal or non-critical lab and imaging results will be communicated to you by [employer], letter or phone within 4 business days after the clinic has received the results. If you do not hear from us within 7 days, please contact the clinic through [employer] or phone. If you have a critical or abnormal lab result, we will notify you by phone as soon as possible.  Submit refill requests through [employer] or call your pharmacy and they will forward the refill request to us. Please allow 3 business days for your refill to be completed.          Additional Information About Your Visit        [employer] Information     [employer] gives you secure access to your electronic health record. If you see a primary care provider, you can also send messages to your care team and make appointments. If you have questions, please call your primary care clinic.  If you do not have a primary care provider, please call [phone] and they will assist you.         Care EveryWhere ID     This is your Care EveryWhere ID. This could be used by other organizations to access your [place] medical records  [id number]        Your Vitals Were     BMI (Body Mass Index)                   25.39 kg/m2            Blood Pressure from Last 3 Encounters:   05/16/18 142/87   04/09/18 162/88   03/28/18 132/86    Weight from Last 3 Encounters:   06/07/18 75.8 kg (167 lb)   05/16/18 77.1 kg (170 lb)   04/09/18 77.6 kg (171 lb)              Today, you had the following     No orders found for display       Primary Care Provider Office Phone # Fax #    Haim Bustos [phone]       [address]  [place] [address]        Equal Access to Services     VASYL KUHN : Hadii aad ku hadasho Sostephanie, waaxda luqadaha, qaybta kaalmada adeegyada, grisel george . So [facility] [phone].    ATENCIÓN: Si habla español, tiene a may disposición servicios gratuitos de asistencia lingüística. Llame al [phone].    We comply with applicable federal civil rights laws and [place] laws. We do not discriminate on the basis of race, color, national origin, age, disability, sex, sexual orientation, or gender identity.            Thank you!     Thank you for choosing [facility]  for your care. Our goal is always to provide you with excellent care. Hearing back from our patients is one way we can continue to improve our services. Please take a few minutes to complete the written survey that you may receive in the mail after your visit with us. Thank you!             Your Updated Medication List - Protect others around you: Learn how to safely use, store and throw away your medicines at www.disposemymeds.org.          This list is accurate as of 6/7/18  3:03 PM.  Always use your most recent med list.                   Brand Name Dispense Instructions for use Diagnosis    acetone (Urine) test Strp     50 each    1 strip as needed (with unexplained  blood glucose over 300 mg/dL) Use one strip, as needed, with unexplained blood glucose over 300 mg/dL.    Type 1 diabetes mellitus without complication (H)       ALPRAZolam 0.5 MG tablet    XANAX    45 tablet    Take 1 tablet (0.5 mg) by mouth 3 times daily as needed for anxiety 1 month supply    Generalized anxiety disorder       aspirin 81 MG EC tablet     90 tablet    Take 1 tablet (81 mg) by mouth daily        * blood glucose monitoring lancets     250 each    1 each 8 times daily Use to test blood sugar 8 times daily.  Ok to substitute alternative if insurance prefers.    Type 1 diabetes mellitus without complication (H)       * blood glucose monitoring lancets     500 each    Use as directed to test blood sugar 4-6 times daily, may test up to 10 times daily if needed.    Type 1 diabetes mellitus with hyperglycemia (H)       blood glucose monitoring meter device kit     1 kit    Use to test blood sugar 8 times daily.  Ok to substitute alternative if insurance prefers.    Type 1 diabetes mellitus without complication (H)       * blood glucose monitoring test strip    FREESTYLE LITE    300 strip    TEST AS DIRECTED TEST 4-6 TIMES DAILY, UP TO 10 TIMES DAILY    Diabetes mellitus type 1.5, managed as type 1 (H)       * blood glucose monitoring test strip    ONETOUCH VERIO IQ    250 strip    Use to test blood sugar 8 times daily.  Ok to substitute alternative if insurance prefers.    Type 1 diabetes mellitus without complication (H)       buPROPion 300 MG 24 hr tablet    WELLBUTRIN XL    90 tablet    TAKE 1 TABLET (300 MG) BY MOUTH EVERY MORNING    Generalized anxiety disorder       cephALEXin 500 MG capsule    KEFLEX    30 capsule    Take 1 capsule (500 mg) by mouth 3 times daily    Folliculitis       cetirizine 10 MG tablet    zyrTEC     Take 10 mg by mouth daily        continuous blood glucose monitoring sensor     3 each    For use with Freestyle Jenni Flash  for continuous monitioring of blood glucose  levels. Replace sensor every 10 days.    Type 1 diabetes mellitus without complication (H)       diclofenac 75 MG EC tablet    VOLTAREN    14 tablet    Take 1 tablet (75 mg) by mouth 2 times daily for 7 days    Lumbar sprain, initial encounter       escitalopram 20 MG tablet    LEXAPRO    90 tablet    Take 1 tablet (20 mg) by mouth daily    Generalized anxiety disorder       fluticasone 50 MCG/ACT spray    FLONASE    1 Bottle    Spray 1-2 sprays into both nostrils daily    Chronic rhinitis, unspecified type       FREESTYLE JUSTIN READER Goldie     1 Device    1 Device 8 times daily    Type 1 diabetes mellitus without complication (H)       insulin aspart 100 UNIT/ML injection    NovoLOG PEN    15 mL    Inject 30 Units Subcutaneous daily Use with sliding scale, approximately 2-5 units daily    Type 1 diabetes mellitus without complication (H)       insulin glargine 100 UNIT/ML injection    LANTUS SOLOSTAR    15 mL    Inject 30 Units Subcutaneous daily    Diabetes mellitus type 1.5, managed as type 1 (H)       insulin pen needle 31G X 5 MM    B-D U/F    200 each    by Device route 6 times daily    Type 1 diabetes mellitus without complication (H)       lisinopril 40 MG tablet    PRINIVIL/ZESTRIL    90 tablet    Take 1 tablet (40 mg) by mouth daily No refills. Needs visit    Hypertension goal BP (blood pressure) < 140/90       methocarbamol 500 MG tablet    ROBAXIN    60 tablet    Take 1-2 tablets (500-1,000 mg) by mouth 3 times daily as needed for muscle spasms    Lumbar sprain, initial encounter       metoclopramide 10 MG tablet    REGLAN    30 tablet    Take 1 tablet (10 mg) by mouth 4 times daily as needed    Non-intractable vomiting with nausea, vomiting of unspecified type       rOPINIRole 0.25 MG tablet    REQUIP    60 tablet    TAKE 1-2 TABLETS BY MOUTH AT BEDTIME    Restless leg syndrome       tadalafil 10 MG tablet    CIALIS    12 tablet    Take 0.5-1 tablets (5-10 mg) by mouth daily as needed for erectile  dysfunction Never use with nitroglycerin, terazosin or doxazosin.    ED (erectile dysfunction)       * triamcinolone 0.1 % ointment    KENALOG    45 g    Apply sparingly to affected area three times daily sparingly    Rash       * triamcinolone 0.1 % lotion    KENALOG    60 mL    Apply sparingly to affected area three times daily as needed.    Seborrheic dermatitis       * Notice:  This list has 6 medication(s) that are the same as other medications prescribed for you. Read the directions carefully, and ask your doctor or other care provider to review them with you.

## 2018-06-07 NOTE — PROGRESS NOTES
Diabetes Self Management Training: Follow-up Visit    Rudolph Tao presents today for education and evaluation of glucose control Type 1 diabetes.    He is accompanied by self    Patient's diabetes management related comments/concerns: Gastroparesis the last few weeks (not eating right, and increased stressed).    Patient would like this visit to be focused around the following diabetes-related behaviors and goals: Discussing sensor status and insulin dosing.    ASSESSMENT:  Patient Problem List reviewed for relevant medical history and current medical status.    Patients blood sugars are currently significantly elevated throughout the day.  Patient is self selecting if he takes his lantus before bed or during the day, which is resulting in elevated readings later in the day.  Symptoms of gasteroparesis have increased in the past month, and patient has been checking ketones to determine if it is related to DKA or gasteroparesis.     Current blood sugars and stress level place patient at risk for developing DKA in the near future again if insulin dosing isn't improved.      Current Diabetes Management per Patient:  Taking diabetes medications?   yes:     Diabetes Medication(s)     Insulin Sig    insulin aspart (NOVOLOG PEN) 100 UNIT/ML injection Inject 30 Units Subcutaneous daily Use with sliding scale, approximately 2-5 units daily    insulin glargine (LANTUS SOLOSTAR) 100 UNIT/ML injection Inject 30 Units Subcutaneous daily        Trace to small ketones in urine    Takes insulin after eating- to know size and portions of food   Hotdog, coleslaw, baked beans took insulin after as he ended up only eating 1/2 of the meal.    Feels difference between DKA vs Gastroparesis     Do you have any difficulty affording your medications or glucose monitoring supplies?  Yes - sensor if cost is out of pocket, all other medicines are covered by insurance      *Abbreviated insulin dose documentation key: Insulin trade name  "([id number]-bedtime) - i.e. Humalog 5-5-5-0 (Humalog 5 units at breakfast, 5 units at lunch, and 5 units at dinner).    Patient glucose self monitoring as follows: 8 times daily.   BG meter: Accu-Chek Julita meter  BG results: -         Today Lantus 14 @ 10:15am  5 Novolog 1:14pm    Monday night and Tuesday morning:  skipped Monday night lantus dose and morning dose as blood sugars were 150    BG values are: Not in goal  Patient's most recent   Lab Results   Component Value Date    A1C 7.6 03/21/2018    is not meeting goal of <7.0    Nutrition:  Patient reports he has a limited appetite, unsure why.    Cultural/Roman Catholic diet restrictions: No     Biggest Challenge to Healthy Eating: Gastroparesis    Physical Activity:    Not assessed    Diabetes Complications:  Acute Complications: At risk for hypoglycemia? yes  Symptoms of low blood sugar? sweating, shaking and confusion  Frequency of hypoglycemia: not in last month  Symptoms of hyperglycemia? None currently, will vomit violently with DKA    Recent health service and resource utilization related to diabetes (hyperglycemia, hypoglycemia, etc.):  Hospital stay - date(s): 4/15/18- DKA  Emergency Department visit - date(s): 5/7/18- non diabetes related    Vitals:  Wt 75.8 kg (167 lb)  BMI 25.39 kg/m2  Estimated body mass index is 25.85 kg/(m^2) as calculated from the following:    Height as of 5/16/18: 1.727 m (5' 8\").    Weight as of 5/16/18: 77.1 kg (170 lb).     Wt Readings from Last 10 Encounters:   05/16/18 77.1 kg (170 lb)   04/09/18 77.6 kg (171 lb)   03/28/18 78.5 kg (173 lb)   03/21/18 78.5 kg (173 lb)   03/14/18 79.4 kg (175 lb)   03/08/18 79.8 kg (176 lb)   09/21/17 74.8 kg (165 lb)   09/11/17 75.8 kg (167 lb)   08/21/17 77.9 kg (171 lb 12.8 oz)   03/24/17 79.4 kg (175 lb)     Weight loss of 3 lbs in 3 weeks- overall down 9 lbs in 3 months likely related to hyperglycemia    Last 3 BP:   BP Readings from Last 3 Encounters:   05/16/18 142/87 "   04/09/18 162/88   03/28/18 132/86       History   Smoking Status     Former Smoker     Packs/day: 0.00     Years: 20.00     Types: Cigarettes   Smokeless Tobacco     Never Used     Comment: 2-4  cigarettes daily       Labs:  Lab Results   Component Value Date    A1C 7.6 03/21/2018     Lab Results   Component Value Date     03/21/2018     Lab Results   Component Value Date     09/21/2017    LDL 92 09/12/2016     HDL Cholesterol   Date Value Ref Range Status   09/12/2016 92 >39 mg/dL Final   ]  GFR Estimate   Date Value Ref Range Status   03/21/2018 >90 >60 mL/min/1.7m2 Final     Comment:     Non  GFR Calc     GFR Estimate If Black   Date Value Ref Range Status   03/21/2018 >90 >60 mL/min/1.7m2 Final     Comment:      GFR Calc     Lab Results   Component Value Date    CR 0.72 03/21/2018     No results found for: MICROALBUMIN    Health Beliefs and Attitudes:   Patient Activation Measure Survey Score:  LAUREL Score (Last Two) 9/19/2011   LAUREL Raw Score 51   Activation Score 91.6   LAUREL Level 4       Stage of Change: ACTION (Actively working towards change)    Progress toward meeting diabetes-related behavioral goals:    GOALS % Met Goal   Healthy Eating 50   Physical Activity 50   Monitoring 100   Medication Taking 50   Problem Solving 50   Healthy Coping     Risk Reduction           Diabetes knowledge and skills assessment:     Patient is knowledgeable in diabetes management concepts related to: Healthy Eating    Patient needs further education on the following diabetes management concepts: Monitoring, Taking Medication, Problem Solving and Reducing Risks    Barriers to Learning Assessment: No Barriers identified    Based on learning assessment above, most appropriate setting for further diabetes education would be: Individual setting.    INTERVENTION:  Discussed current life stressors in relation to current blood sugar levels and risk of developing DKA again if blood sugars  don't decrease.  Discussed risks of adjusting Lantus based on blood sugar and what happens when doses are skipped.  Discussed how lantus should not drop blood sugars.    Discussed Novolog dosing strategy to adjust for gastroparesis.  Recommended taking pre-meal correction, then taking insulin for carbohydrates eaten after the meal.  Patient was accepting of this.    Recommended limiting additional Novolog corrective doses after a meal to 3 hours after the last injection to prevent insulin dose stacking.    Reviewed Jenni status, patient plans to contact [facility] on the status of the prescription if a PA was required.  Discussed likely $75 per month out of pocket cost if not covered by insurance.  Patient is waiting on updated insurance cards would like to wait to complete EOB form for dexcom.      Education provided today on:  AADE Self-Care Behaviors:  Healthy Eating: Assessing carbohydrate grams and insulin doses  Taking Medication: Difference between Lantus and Novolog.      Opportunities for ongoing education and support in diabetes-self management were discussed.    Pt verbalized understanding of concepts discussed and recommendations provided today.       Education Materials Provided:  [place] Understanding Diabetes Booklet, Safe Disposal Options for Needles & Syringes, BG Log Sheet and No new materials provided today    PLAN:  Take Lantus daily 14 units AM and PM     Take premeal correction before meal or food, then after eating take insulin for carbohydrates eaten.    Novolog every 3 hours as a max amount of time between injections.     AVS printed and provided to patient today.    FOLLOW-UP:  Follow-up appointment scheduled on 7/12/18.  Patient plans to schedule follow up Endocrinology visit.    Discussed importance of scheduling PCP visit with an MD for other issues impacting patient that can't be addressed by Endocrinology.    Chart routed to referring provider.    Ongoing plan for education and support:  Online diabetes websites/forums and Written resources (magazines, books, etc.)    Radha Armendariz MS, RD, LD, CDE    Time Spent: 60 minutes  Encounter Type: Individual    Any diabetes medication dose changes were made via the CDE Protocol and Collaborative Practice Agreement with the patient's endocrinology provider. A copy of this encounter was shared with the provider.

## 2018-06-14 ENCOUNTER — TELEPHONE (OUTPATIENT)
Dept: FAMILY MEDICINE | Facility: CLINIC | Age: 48
End: 2018-06-14

## 2018-06-14 DIAGNOSIS — E10.9 TYPE 1 DIABETES MELLITUS WITHOUT COMPLICATION (H): Primary | ICD-10-CM

## 2018-06-14 RX ORDER — BLOOD-GLUCOSE METER
EACH MISCELLANEOUS
Qty: 1 KIT | Refills: 0 | Status: SHIPPED | OUTPATIENT
Start: 2018-06-14 | End: 2021-02-17

## 2018-06-14 RX ORDER — GLUCOSAMINE HCL/CHONDROITIN SU 500-400 MG
CAPSULE ORAL
Qty: 300 EACH | Refills: 1 | Status: SHIPPED | OUTPATIENT
Start: 2018-06-14 | End: 2021-02-17

## 2018-06-14 NOTE — TELEPHONE ENCOUNTER
Please prescribe exactly what he needs. Total permission from me.    Please reroute for additional questions, otherwise afterwards , close this encounter     aHim Bustos MD

## 2018-06-14 NOTE — TELEPHONE ENCOUNTER
PT REQUESTS NEW RX: INSURANCE DOES NOT COVER ACCU-CHECK SHARRI. PLEASE PRESCRIBE ONE TOUCH METER TEST STRIPS AND LANCETS.  [facility] -[phone]

## 2018-07-10 ENCOUNTER — MYC MEDICAL ADVICE (OUTPATIENT)
Dept: ENDOCRINOLOGY | Facility: CLINIC | Age: 48
End: 2018-07-10

## 2018-07-10 DIAGNOSIS — K31.84 GASTROPARESIS: Primary | ICD-10-CM

## 2018-07-10 RX ORDER — METOCLOPRAMIDE 10 MG/1
10 TABLET ORAL 4 TIMES DAILY PRN
Qty: 30 TABLET | Refills: 1 | OUTPATIENT
Start: 2018-07-10

## 2018-07-10 NOTE — TELEPHONE ENCOUNTER
Dr. Tipton-    Please see pt's request below for Reglan in eyeOS message. Reordered in , but diagnosis association is required (no dx of gastroparesis currently on med list).     Please review and authorize if appropriate,     Thank you,   Amanda SHANNON RN

## 2018-07-11 PROBLEM — K31.84 GASTROPARESIS: Status: ACTIVE | Noted: 2018-07-11

## 2018-07-11 RX ORDER — METOCLOPRAMIDE 10 MG/1
10 TABLET ORAL 4 TIMES DAILY PRN
Qty: 30 TABLET | Refills: 1 | Status: SHIPPED | OUTPATIENT
Start: 2018-07-11 | End: 2020-10-05

## 2018-07-24 ENCOUNTER — TELEPHONE (OUTPATIENT)
Dept: FAMILY MEDICINE | Facility: CLINIC | Age: 48
End: 2018-07-24

## 2018-07-24 NOTE — TELEPHONE ENCOUNTER
Panel Management Review      Patient has the following on his problem list:     Diabetes    ASA: Passed    Last A1C  Lab Results   Component Value Date    A1C 7.6 03/21/2018    A1C 8.1 03/24/2017    A1C 7.5 09/12/2016    A1C 7.9 06/09/2016    A1C 8.4 12/09/2015     A1C tested: FAILED    Last LDL:    Lab Results   Component Value Date    CHOL 196 09/12/2016     Lab Results   Component Value Date    HDL 92 09/12/2016     Lab Results   Component Value Date     09/21/2017    LDL 92 09/12/2016     Lab Results   Component Value Date    TRIG 60 09/12/2016     Lab Results   Component Value Date    CHOLHDLRATIO 2.7 10/08/2014     Lab Results   Component Value Date    NHDL 104 09/12/2016       Is the patient on a Statin? NO             Is the patient on Aspirin? YES    Medications     Salicylates    aspirin 81 MG EC tablet          Last three blood pressure readings:  BP Readings from Last 3 Encounters:   05/16/18 142/87   04/09/18 162/88   03/28/18 132/86       Date of last diabetes office visit: 05/16/2018     Tobacco History:     History   Smoking Status     Former Smoker     Packs/day: 0.00     Years: 20.00     Types: Cigarettes   Smokeless Tobacco     Never Used     Comment: 2-4  cigarettes daily         Hypertension   Last three blood pressure readings:  BP Readings from Last 3 Encounters:   05/16/18 142/87   04/09/18 162/88   03/28/18 132/86     Blood pressure: FAILED    HTN Guidelines:  Age 18-59 BP range:  Less than 140/90  Age 60-85 with Diabetes:  Less than 140/90  Age 60-85 without Diabetes:  less than 150/90      Composite cancer screening  Chart review shows that this patient is due/due soon for the following Colonoscopy  Summary:    Patient is due/failing the following:   A1C, COLONOSCOPY, LDL and PHQ9    Action needed:   Patient needs office visit for a1c, hypertension follow up.    Type of outreach:    Sent Moz message.    Questions for provider review:    None                                                                                                                                     Ana JOHNSON CMA ([facility])       Chart routed to none   .

## 2018-08-21 ENCOUNTER — OFFICE VISIT (OUTPATIENT)
Dept: INTERNAL MEDICINE | Facility: CLINIC | Age: 48
End: 2018-08-21
Payer: COMMERCIAL

## 2018-08-21 VITALS
BODY MASS INDEX: 25.16 KG/M2 | DIASTOLIC BLOOD PRESSURE: 84 MMHG | RESPIRATION RATE: 16 BRPM | WEIGHT: 166 LBS | HEIGHT: 68 IN | TEMPERATURE: 97.3 F | SYSTOLIC BLOOD PRESSURE: 134 MMHG | HEART RATE: 82 BPM

## 2018-08-21 DIAGNOSIS — E10.8 DIABETES MELLITUS TYPE 1 WITH COMPLICATIONS (H): ICD-10-CM

## 2018-08-21 DIAGNOSIS — Z12.11 SPECIAL SCREENING FOR MALIGNANT NEOPLASMS, COLON: ICD-10-CM

## 2018-08-21 DIAGNOSIS — Z12.5 SCREENING FOR PROSTATE CANCER: ICD-10-CM

## 2018-08-21 DIAGNOSIS — Z00.00 ROUTINE GENERAL MEDICAL EXAMINATION AT A HEALTH CARE FACILITY: Primary | ICD-10-CM

## 2018-08-21 DIAGNOSIS — F41.1 GENERALIZED ANXIETY DISORDER: ICD-10-CM

## 2018-08-21 DIAGNOSIS — I10 HYPERTENSION GOAL BP (BLOOD PRESSURE) < 140/90: ICD-10-CM

## 2018-08-21 PROBLEM — F41.9 ANXIETY: Status: RESOLVED | Noted: 2017-09-18 | Resolved: 2018-08-21

## 2018-08-21 LAB
CHOLEST SERPL-MCNC: 249 MG/DL
HBA1C MFR BLD: 7.6 % (ref 0–5.6)
HDLC SERPL-MCNC: 75 MG/DL
LDLC SERPL CALC-MCNC: 152 MG/DL
NONHDLC SERPL-MCNC: 174 MG/DL
PSA SERPL-ACNC: 0.44 UG/L (ref 0–4)
TRIGL SERPL-MCNC: 112 MG/DL

## 2018-08-21 PROCEDURE — 80061 LIPID PANEL: CPT | Performed by: INTERNAL MEDICINE

## 2018-08-21 PROCEDURE — 36415 COLL VENOUS BLD VENIPUNCTURE: CPT | Performed by: INTERNAL MEDICINE

## 2018-08-21 PROCEDURE — 83036 HEMOGLOBIN GLYCOSYLATED A1C: CPT | Performed by: INTERNAL MEDICINE

## 2018-08-21 PROCEDURE — G0103 PSA SCREENING: HCPCS | Performed by: INTERNAL MEDICINE

## 2018-08-21 PROCEDURE — 99396 PREV VISIT EST AGE 40-64: CPT | Performed by: INTERNAL MEDICINE

## 2018-08-21 RX ORDER — PRAVASTATIN SODIUM 20 MG
20 TABLET ORAL DAILY
Qty: 90 TABLET | Refills: 3 | Status: SHIPPED | OUTPATIENT
Start: 2018-08-21 | End: 2020-10-05

## 2018-08-21 RX ORDER — BUPROPION HYDROCHLORIDE 300 MG/1
TABLET ORAL
Qty: 90 TABLET | Refills: 3 | Status: SHIPPED | OUTPATIENT
Start: 2018-08-21 | End: 2021-01-27

## 2018-08-21 RX ORDER — LISINOPRIL 40 MG/1
40 TABLET ORAL DAILY
Qty: 90 TABLET | Refills: 3 | Status: SHIPPED | OUTPATIENT
Start: 2018-08-21 | End: 2020-08-12

## 2018-08-21 ASSESSMENT — ANXIETY QUESTIONNAIRES

## 2018-08-21 ASSESSMENT — PATIENT HEALTH QUESTIONNAIRE - PHQ9: 5. POOR APPETITE OR OVEREATING: NOT AT ALL

## 2018-08-21 NOTE — MR AVS SNAPSHOT
After Visit Summary   8/21/2018    Rudolph Tao    MRN: [id number]           Patient Information     Date Of Birth          1970        Visit Information        Provider Department      8/21/2018 3:00 PM Selvin Aleman MD [facility]        Today's Diagnoses     Routine general medical examination at a health care facility    -  1    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)        Special screening for malignant neoplasms, colon        Hypertension goal BP (blood pressure) < 140/90        GENERALIZED ANXIETY        Screening for prostate cancer          Care Instructions      Preventive Health Recommendations  Male Ages 50 - 64    Yearly exam:             See your health care provider every year in order to  o   Review health changes.   o   Discuss preventive care.    o   Review your medicines if your doctor has prescribed any.     Have a cholesterol test every 5 years, or more frequently if you are at risk for high cholesterol/heart disease.     Have a diabetes test (fasting glucose) every three years. If you are at risk for diabetes, you should have this test more often.     Have a colonoscopy at age 50, or have a yearly FIT test (stool test). These exams will check for colon cancer.      Talk with your health care provider about whether or not a prostate cancer screening test (PSA) is right for you.    You should be tested each year for STDs (sexually transmitted diseases), if you re at risk.     Shots: Get a flu shot each year. Get a tetanus shot every 10 years.     Nutrition:    Eat at least 5 servings of fruits and vegetables daily.     Eat whole-grain bread, whole-wheat pasta and brown rice instead of white grains and rice.     Get adequate Calcium and Vitamin D.     Lifestyle    Exercise for at least 150 minutes a week (30 minutes a day, 5 days a week). This will help you control your weight and prevent disease.     Limit alcohol to one drink per day.     No smoking.      Wear sunscreen to prevent skin cancer.     See your dentist every six months for an exam and cleaning.     See your eye doctor every 1 to 2 years.            Follow-ups after your visit        Additional Services     OPHTHALMOLOGY ADULT REFERRAL       Your provider has referred you to: [place] [facility]    Please be aware that coverage of these services is subject to the terms and limitations of your health insurance plan.  Call member services at your health plan with any benefit or coverage questions.      Please bring the following with you to your appointment:    (1) Any X-Rays, CTs or MRIs which have been performed.  Contact the facility where they were done to arrange for  prior to your scheduled appointment.    (2) List of current medications  (3) This referral request   (4) Any documents/labs given to you for this referral                  Follow-up notes from your care team     Return in about 3 months (around 11/21/2018).      Who to contact     Normal or non-critical lab and imaging results will be communicated to you by Disqushart, letter or phone within 4 business days after the clinic has received the results. If you do not hear from us within 7 days, please contact the clinic through Disqushart or phone. If you have a critical or abnormal lab result, we will notify you by phone as soon as possible.  Submit refill requests through SingWho or call your pharmacy and they will forward the refill request to us. Please allow 3 business days for your refill to be completed.          If you need to speak with a  for additional information , please call: [phone]             Additional Information About Your Visit        SingWho Information     SingWho gives you secure access to your electronic health record. If you see a primary care provider, you can also send messages to your care team and make appointments. If you have questions, please call your primary care clinic.  If you do not have  "a primary care provider, please call [phone] and they will assist you.        Care EveryWhere ID     This is your Care EveryWhere ID. This could be used by other organizations to access your [facility] medical records  [id number]        Your Vitals Were     Pulse Temperature Respirations Height BMI (Body Mass Index)       96 97.3  F (36.3  C) (Tympanic) 16 5' 8\" (1.727 m) 25.24 kg/m2        Blood Pressure from Last 3 Encounters:   08/21/18 (!) 133/92   05/16/18 142/87   04/09/18 162/88    Weight from Last 3 Encounters:   08/21/18 166 lb (75.3 kg)   06/07/18 167 lb (75.8 kg)   05/16/18 170 lb (77.1 kg)              We Performed the Following     ABSTRACT COLOGUARD-NO CHARGE     Albumin Random Urine Quantitative with Creat Ratio     Hemoglobin A1c     Lipid panel reflex to direct LDL Fasting     OPHTHALMOLOGY ADULT REFERRAL     PSA, screen          Today's Medication Changes          These changes are accurate as of 8/21/18  4:01 PM.  If you have any questions, ask your nurse or doctor.               Start taking these medicines.        Dose/Directions    pravastatin 20 MG tablet   Commonly known as:  PRAVACHOL   Used for:  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)   Started by:  Selvin Aleman MD        Dose:  20 mg   Take 1 tablet (20 mg) by mouth daily   Quantity:  90 tablet   Refills:  3         These medicines have changed or have updated prescriptions.        Dose/Directions    insulin aspart 100 UNIT/ML injection   Commonly known as:  NovoLOG PEN   This may have changed:    - how much to take  - additional instructions   Used for:  Type 1 diabetes mellitus without complication (H)        Dose:  30 Units   Inject 30 Units Subcutaneous daily Use with sliding scale, approximately 2-5 units daily   Quantity:  15 mL   Refills:  3         Stop taking these medicines if you haven't already. Please contact your care team if you have questions.     methocarbamol 500 MG tablet   Commonly known as:  ROBAXIN "   Stopped by:  Selvin Aleman MD           triamcinolone 0.1 % lotion   Commonly known as:  KENALOG   Stopped by:  Selvin Aleman MD           triamcinolone 0.1 % ointment   Commonly known as:  KENALOG   Stopped by:  Selvin Aleman MD                Where to get your medicines      These medications were sent to [facility] [address] [facility] - [place], [place] - [address]  [address], [place] [address]     Phone:  [phone]     buPROPion 300 MG 24 hr tablet    lisinopril 40 MG tablet    pravastatin 20 MG tablet                Primary Care Provider Office Phone # Fax #    Haim Bustos [phone]       [address]  [place] [address]        Equal Access to Services     [facility]: Hadii aad ku hadasho Soomaali, waaxda luqadaha, qaybta kaalmada adeegyada, waxay idiin hayaan veronica khdileep george . So [facility] [phone].    ATENCIÓN: Si habla español, tiene a may disposición servicios gratuitos de asistencia lingüística. Llame al [phone].    We comply with applicable federal civil rights laws and [place] laws. We do not discriminate on the basis of race, color, national origin, age, disability, sex, sexual orientation, or gender identity.            Thank you!     Thank you for choosing [facility]  for your care. Our goal is always to provide you with excellent care. Hearing back from our patients is one way we can continue to improve our services. Please take a few minutes to complete the written survey that you may receive in the mail after your visit with us. Thank you!             Your Updated Medication List - Protect others around you: Learn how to safely use, store and throw away your medicines at www.disposemymeds.org.          This list is accurate as of 8/21/18  4:01 PM.  Always use your most recent med list.                   Brand Name Dispense Instructions for use Diagnosis    acetone (Urine) test Strp     50 each    1 strip as needed (with unexplained blood  glucose over 300 mg/dL) Use one strip, as needed, with unexplained blood glucose over 300 mg/dL.    Type 1 diabetes mellitus without complication (H)       alcohol swab prep pads     300 each    Use to swab area of injection/aldo as directed.    Type 1 diabetes mellitus without complication (H)       ALPRAZolam 0.5 MG tablet    XANAX    45 tablet    Take 1 tablet (0.5 mg) by mouth 3 times daily as needed for anxiety 1 month supply    Generalized anxiety disorder       aspirin 81 MG EC tablet     90 tablet    Take 1 tablet (81 mg) by mouth daily        blood glucose calibration solution     1 Bottle    Use to calibrate blood glucose monitor as directed.    Type 1 diabetes mellitus without complication (H)       * blood glucose monitoring lancets     250 each    1 each 8 times daily Use to test blood sugar 8 times daily.  Ok to substitute alternative if insurance prefers.    Type 1 diabetes mellitus without complication (H)       * blood glucose monitoring lancets     500 each    Use as directed to test blood sugar 4-6 times daily, may test up to 10 times daily if needed.    Type 1 diabetes mellitus with hyperglycemia (H)       * blood glucose monitoring lancets     300 each    Use to test blood sugar 4-6 times daily. May test up to 10 times per day    Type 1 diabetes mellitus without complication (H)       * blood glucose monitoring meter device kit     1 kit    Use to test blood sugar 8 times daily.  Ok to substitute alternative if insurance prefers.    Type 1 diabetes mellitus without complication (H)       * blood glucose monitoring meter device kit     1 kit    Use to test blood sugar 4-6 times daily, may test up to 10 times per day.    Type 1 diabetes mellitus without complication (H)       * blood glucose monitoring test strip    FREESTYLE LITE    300 strip    TEST AS DIRECTED TEST 4-6 TIMES DAILY, UP TO 10 TIMES DAILY    Diabetes mellitus type 1.5, managed as type 1 (H)       * blood glucose monitoring test  strip    ONETOUCH VERIO IQ    250 strip    Use to test blood sugar 8 times daily.  Ok to substitute alternative if insurance prefers.    Type 1 diabetes mellitus without complication (H)       * blood glucose monitoring test strip    ONETOUCH ULTRA    300 strip    Use to test blood sugar 4-6 times daily, may test up to 10 times per day.    Type 1 diabetes mellitus without complication (H)       buPROPion 300 MG 24 hr tablet    WELLBUTRIN XL    90 tablet    TAKE 1 TABLET (300 MG) BY MOUTH EVERY MORNING    Generalized anxiety disorder       cetirizine 10 MG tablet    zyrTEC     Take 10 mg by mouth daily        continuous blood glucose monitoring sensor     3 each    For use with Freestyle Jenni Flash  for continuous monitioring of blood glucose levels. Replace sensor every 10 days.    Type 1 diabetes mellitus without complication (H)       diclofenac 75 MG EC tablet    VOLTAREN    14 tablet    Take 1 tablet (75 mg) by mouth 2 times daily for 7 days    Lumbar sprain, initial encounter       escitalopram 20 MG tablet    LEXAPRO    90 tablet    Take 1 tablet (20 mg) by mouth daily    Generalized anxiety disorder       fluticasone 50 MCG/ACT spray    FLONASE    1 Bottle    Spray 1-2 sprays into both nostrils daily    Chronic rhinitis, unspecified type       FREESTYLE JENNI READER Goldie     1 Device    1 Device 8 times daily    Type 1 diabetes mellitus without complication (H)       insulin aspart 100 UNIT/ML injection    NovoLOG PEN    15 mL    Inject 30 Units Subcutaneous daily Use with sliding scale, approximately 2-5 units daily    Type 1 diabetes mellitus without complication (H)       insulin glargine 100 UNIT/ML injection    LANTUS SOLOSTAR    15 mL    Inject 30 Units Subcutaneous daily    Diabetes mellitus type 1.5, managed as type 1 (H)       insulin pen needle 31G X 5 MM    B-D U/F    200 each    by Device route 6 times daily    Type 1 diabetes mellitus without complication (H)       lisinopril 40 MG  tablet    PRINIVIL/ZESTRIL    90 tablet    Take 1 tablet (40 mg) by mouth daily No refills. Needs visit    Hypertension goal BP (blood pressure) < 140/90       metoclopramide 10 MG tablet    REGLAN    30 tablet    Take 1 tablet (10 mg) by mouth 4 times daily as needed    Gastroparesis       pravastatin 20 MG tablet    PRAVACHOL    90 tablet    Take 1 tablet (20 mg) by mouth daily    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)       rOPINIRole 0.25 MG tablet    REQUIP    60 tablet    TAKE 1-2 TABLETS BY MOUTH AT BEDTIME    Restless leg syndrome       tadalafil 10 MG tablet    CIALIS    12 tablet    Take 0.5-1 tablets (5-10 mg) by mouth daily as needed for erectile dysfunction Never use with nitroglycerin, terazosin or doxazosin.    ED (erectile dysfunction)       * Notice:  This list has 8 medication(s) that are the same as other medications prescribed for you. Read the directions carefully, and ask your doctor or other care provider to review them with you.

## 2018-08-21 NOTE — PROGRESS NOTES
SUBJECTIVE:   CC: Rudolph Tao is an 48 year old male who presents for preventative health visit.         Chief Complaint   Patient presents with     Physical     Diabetes     follow up     Blood Draw     fasting labs       Healthy Habits:    Do you get at least three servings of calcium containing foods daily (dairy, green leafy vegetables, etc.)? yes    Amount of exercise or daily activities, outside of work: yardwork but nothing scheduled day(s) per week    Problems taking medications regularly Yes insulin dose was changed and his blood sugars spiked.  He was put on a split dose.  He changed back to how he dosed himself in the past    Medication side effects: No    Have you had an eye exam in the past two years? yes    Do you see a dentist twice per year? no    Do you have sleep apnea, excessive snoring or daytime drowsiness?yes             Social History   Substance Use Topics     Smoking status: Former Smoker     Packs/day: 0.00     Years: 20.00     Types: Cigarettes     Smokeless tobacco: Never Used      Comment: 2-4  cigarettes daily     Alcohol use Yes      Comment: 2 drinks daily        Today's PHQ-2 Score:   PHQ-2 ( 1999 Pf[employer]) 8/21/2018 9/12/2016   Q1: Little interest or pleasure in doing things 0 0   Q2: Feeling down, depressed or hopeless 0 0   PHQ-2 Score 0 0   Q1: Little interest or pleasure in doing things - -   Q2: Feeling down, depressed or hopeless - -   PHQ-2 Score - -       Abuse: Current or Past(Physical, Sexual or Emotional)- No  Do you feel safe in your environment - Yes    Social History   Substance Use Topics     Smoking status: Former Smoker     Packs/day: 0.00     Years: 20.00     Types: Cigarettes     Smokeless tobacco: Never Used      Comment: 2-4  cigarettes daily     Alcohol use Yes      Comment: 2 drinks daily      If you drink alcohol do you typically have >3 drinks per day or >7 drinks per week? No                      Last PSA:   PSA   Date Value Ref Range Status   12/19/2011  "0.51 0 - 4 ug/L Final       Reviewed orders with patient. Reviewed health maintenance and updated orders accordingly - Yes      Reviewed and updated as needed this visit by clinical staff  Tobacco  Allergies  Meds  Med Hx  Surg Hx  Fam Hx  Soc Hx        Reviewed and updated as needed this visit by Provider          ROS:  CONSTITUTIONAL: NEGATIVE for fever, chills, change in weight  INTEGUMENTARY/SKIN: NEGATIVE for worrisome rashes, moles or lesions  EYES: NEGATIVE for vision changes or irritation  ENT: NEGATIVE for ear, mouth and throat problems  RESP: NEGATIVE for significant cough or SOB  CV: NEGATIVE for chest pain, palpitations or peripheral edema  GI: NEGATIVE for nausea, abdominal pain, heartburn, or change in bowel habits   male: negative for dysuria, hematuria, decreased urinary stream, erectile dysfunction, urethral discharge  MUSCULOSKELETAL: NEGATIVE for significant arthralgias or myalgia  NEURO: NEGATIVE for weakness, dizziness or paresthesias  PSYCHIATRIC: NEGATIVE for changes in mood or affect    OBJECTIVE:   BP (!) 133/92  Pulse 96  Temp 97.3  F (36.3  C) (Tympanic)  Resp 16  Ht 5' 8\" (1.727 m)  Wt 166 lb (75.3 kg)  BMI 25.24 kg/m2  EXAM:  GENERAL: healthy, alert and no distress  NECK: no adenopathy, no asymmetry, masses, or scars and thyroid normal to palpation  RESP: lungs clear to auscultation - no rales, rhonchi or wheezes  CV: regular rate and rhythm, normal S1 S2, no S3 or S4, no murmur, click or rub, no peripheral edema and peripheral pulses strong  ABDOMEN: soft, nontender, no hepatosplenomegaly, no masses and bowel sounds normal  MS: no gross musculoskeletal defects noted, no edema    Diagnostic Test Results:  none     ASSESSMENT/PLAN:   1. Routine general medical examination at a health care facility    2. 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 GAIL with acute stressors including custody of daughter      COUNSELING:  Reviewed preventive health counseling, as " "reflected in patient instructions       Regular exercise       Healthy diet/nutrition       Aspirin Prophylaxsis       Colon cancer screening -- COLOGUARD       Prostate cancer screening    BP Readings from Last 1 Encounters:   08/21/18 (!) 133/92     Estimated body mass index is 25.24 kg/(m^2) as calculated from the following:    Height as of this encounter: 5' 8\" (1.727 m).    Weight as of this encounter: 166 lb (75.3 kg).           reports that he has quit smoking. His smoking use included Cigarettes. He smoked 0.00 packs per day for 20.00 years. He has never used smokeless tobacco.      Counseling Resources:  ATP IV Guidelines  Pooled Cohorts Equation Calculator  FRAX Risk Assessment  ICSI Preventive Guidelines  Dietary Guidelines for Americans, 2010  USDA's MyPlate  ASA Prophylaxis  Lung CA Screening    Selvin Aleman MD  [facility]  "

## 2018-08-22 ASSESSMENT — ANXIETY QUESTIONNAIRES: GAD7 TOTAL SCORE: 0

## 2018-08-22 ASSESSMENT — PATIENT HEALTH QUESTIONNAIRE - PHQ9: SUM OF ALL RESPONSES TO PHQ QUESTIONS 1-9: 3

## 2018-09-06 ENCOUNTER — TELEPHONE (OUTPATIENT)
Dept: ENDOCRINOLOGY | Facility: CLINIC | Age: 48
End: 2018-09-06

## 2018-09-06 DIAGNOSIS — E10.9 TYPE 1 DIABETES MELLITUS WITHOUT COMPLICATION (H): Primary | ICD-10-CM

## 2018-09-06 RX ORDER — INSULIN GLARGINE 100 [IU]/ML
30 INJECTION, SOLUTION SUBCUTANEOUS DAILY
Qty: 15 ML | Refills: 11 | Status: SHIPPED | OUTPATIENT
Start: 2018-09-06 | End: 2020-12-14

## 2018-09-06 NOTE — TELEPHONE ENCOUNTER
Reason for Call:  Other call back    Detailed comments: [facility] is calling regarding a prescription, Patient insurance no longer covers Lantus insurance would prefer you order Basaglar. Please call and advise Thank you     Phone Number Patient can be reached at: Other phone number:  [phone]    Best Time: any    Can we leave a detailed message on this number? YES    Call taken on 9/6/2018 at 11:41 AM by Cristal Lange

## 2018-09-06 NOTE — TELEPHONE ENCOUNTER
Message noted.  I agree with the change from the Lantus Solostar pen to the Basaglar Kwikpen... Same dose schedule.  New Rx has now been sent to his local pharmacy.  Please inform patient, thanks.    JAN Tipton MD  Endocrinology  [facility]

## 2018-09-11 ENCOUNTER — MYC MEDICAL ADVICE (OUTPATIENT)
Dept: ENDOCRINOLOGY | Facility: CLINIC | Age: 48
End: 2018-09-11

## 2018-09-11 NOTE — TELEPHONE ENCOUNTER
Message from Rudolph Tao: Please advise, Thanks!      Hi Dr. Tipton, My wife went to  my Lantus prescription but the [facility] pharmacy said they had Basaglar instead.  I have never used this before.  So I am wondering if it much different from Lantus or if it is the same?  I really would prefer to stay with the Lantus.  I know I need to see you soon, but I just started a new job last week and am glued to work at the moment opening the new [employer] in [place].  Please let me know your thoughts on the Basaglar.  Thanks!

## 2018-10-22 DIAGNOSIS — G25.81 RESTLESS LEG SYNDROME: ICD-10-CM

## 2018-10-22 NOTE — TELEPHONE ENCOUNTER
Requested Prescriptions   Pending Prescriptions Disp Refills     rOPINIRole (REQUIP) 0.25 MG tablet 60 tablet 3    There is no refill protocol information for this order        Last Written Prescription Date:  4/17/2018  Last Fill Quantity: 60,  # refills: 3   Last office visit: 4/9/2018 with prescribing provider:  Juli  Future Office Visit:    Requesting 90 day supply

## 2018-10-23 NOTE — TELEPHONE ENCOUNTER
"Routing refill request to provider for review/approval because:  Labs not current:        Requested Prescriptions   Pending Prescriptions Disp Refills     rOPINIRole (REQUIP) 0.25 MG tablet  Last Written Prescription Date:  4/17/18  Last Fill Quantity: 60,  # refills: 3   Last office visit: 4/9/2018 with prescribing provider:     Future Office Visit:     60 tablet 3    Antiparkinson's Agents Protocol Failed    10/22/2018 10:37 AM       Failed - CBC on record in past 12 months    Recent Labs   Lab Test  09/19/16   1155   WBC  5.1   RBC  4.49   HGB  15.3   HCT  43.2   PLT  214                Failed - ALT on record in past 12 months        Recent Labs   Lab Test  09/12/16   1140   ALT  262*            Failed - Recent (6 mo) or future (30 days) visit within the authorizing provider's specialty    Patient had office visit in the last 6 months or has a visit in the next 30 days with authorizing provider or within the authorizing provider's specialty.  See \"Patient Info\" tab in inbasket, or \"Choose Columns\" in Meds & Orders section of the refill encounter.           Passed - Blood pressure under 140/90 in past 12 months    BP Readings from Last 3 Encounters:   08/21/18 134/84   05/16/18 142/87   04/09/18 162/88                Passed - Serum Creatinine on file in past 12 months    Recent Labs   Lab Test  03/21/18   1345   CR  0.72            Passed - Patient is age 18 or older        Mae Edwards RN - [facility]      "

## 2018-10-25 RX ORDER — ROPINIROLE 0.25 MG/1
TABLET, FILM COATED ORAL
Qty: 60 TABLET | Refills: 5 | Status: SHIPPED | OUTPATIENT
Start: 2018-10-25 | End: 2020-04-14

## 2018-10-25 NOTE — TELEPHONE ENCOUNTER
Spoke to patient.  Patient actually just had a physical with Dr. Aleman at [facility] on 8-1-18.  Patient is not sure if he is staying with Dr. Bustos or Dr. Aleman.  He stated that he has been extremely busy with a new job, but when things settle down he will schedule an appointment with either Dr. Aleman or Dr. Bustos because he wants to discuss his blood pressure medication.    Patient stated that he does need a refill on the rOPINIRole (REQUIP) 0.25 MG tablet.  Routing to refill pool to send in prescription. Tammy Eden,

## 2018-10-25 NOTE — TELEPHONE ENCOUNTER
As a matter of courtesy we can refill medication . But , as of this coming April it will be a full year since last office visit and patient will need appointment prior to any additional refills    Haim Bustos MD

## 2019-04-21 ENCOUNTER — TRANSFERRED RECORDS (OUTPATIENT)
Dept: HEALTH INFORMATION MANAGEMENT | Facility: CLINIC | Age: 49
End: 2019-04-21

## 2019-06-07 LAB — TSH SERPL-ACNC: 1.86 UIU/ML (ref 0.3–4.5)

## 2019-11-03 ENCOUNTER — HEALTH MAINTENANCE LETTER (OUTPATIENT)
Age: 49
End: 2019-11-03

## 2019-12-23 ENCOUNTER — OFFICE VISIT (OUTPATIENT)
Dept: URGENT CARE | Facility: URGENT CARE | Age: 49
End: 2019-12-23
Payer: COMMERCIAL

## 2019-12-23 VITALS
RESPIRATION RATE: 16 BRPM | WEIGHT: 172.2 LBS | HEART RATE: 85 BPM | BODY MASS INDEX: 26.18 KG/M2 | TEMPERATURE: 98.6 F | SYSTOLIC BLOOD PRESSURE: 149 MMHG | DIASTOLIC BLOOD PRESSURE: 86 MMHG | OXYGEN SATURATION: 98 %

## 2019-12-23 DIAGNOSIS — E10.69 TYPE 1 DIABETES MELLITUS WITH OTHER SPECIFIED COMPLICATION (H): ICD-10-CM

## 2019-12-23 DIAGNOSIS — J18.9 WALKING PNEUMONIA: Primary | ICD-10-CM

## 2019-12-23 DIAGNOSIS — K31.84 GASTROPARESIS: ICD-10-CM

## 2019-12-23 PROCEDURE — 99214 OFFICE O/P EST MOD 30 MIN: CPT | Performed by: FAMILY MEDICINE

## 2019-12-23 RX ORDER — ONDANSETRON 4 MG/1
4 TABLET, ORALLY DISINTEGRATING ORAL EVERY 8 HOURS PRN
Qty: 20 TABLET | Refills: 0 | Status: SHIPPED | OUTPATIENT
Start: 2019-12-23 | End: 2020-10-05

## 2019-12-23 RX ORDER — ALBUTEROL SULFATE 90 UG/1
1-2 AEROSOL, METERED RESPIRATORY (INHALATION) EVERY 4 HOURS PRN
Qty: 1 INHALER | Refills: 0 | Status: SHIPPED | OUTPATIENT
Start: 2019-12-23 | End: 2021-12-09

## 2019-12-23 RX ORDER — ALBUTEROL SULFATE 0.83 MG/ML
2.5 SOLUTION RESPIRATORY (INHALATION) EVERY 6 HOURS PRN
Qty: 1 BOX | Refills: 0 | Status: SHIPPED | OUTPATIENT
Start: 2019-12-23 | End: 2022-11-10

## 2019-12-23 RX ORDER — DOXYCYCLINE 100 MG/1
100 CAPSULE ORAL 2 TIMES DAILY
Qty: 28 CAPSULE | Refills: 0 | Status: SHIPPED | OUTPATIENT
Start: 2019-12-23 | End: 2020-01-06

## 2019-12-23 RX ORDER — METOCLOPRAMIDE 5 MG/1
5 TABLET ORAL 4 TIMES DAILY PRN
Qty: 30 TABLET | Refills: 0 | Status: SHIPPED | OUTPATIENT
Start: 2019-12-23 | End: 2021-02-17

## 2019-12-23 NOTE — PATIENT INSTRUCTIONS
Patient Education     Walking Pneumonia (Mycoplasma Pneumonia)  What is walking pneumonia?    Pneumonia is an infection of one or both of the lungs. It's usually caused by a virus, or bacteria. Walking pneumonia is caused by a specific bacteria called mycoplasma. Pneumonia can be very serious, especially in infants, young children, and older adults. And also in those with other long-term health problems or weak immune systems. In otherwise healthy adults, pneumonia can be mild. Mycoplasma pneumonia is a mild form and is often called walking pneumonia.  What are the symptoms of walking pneumonia?  The most common symptom is a dry, hacking cough. You may also feel tired.  Other symptoms may include:    Fever    Headaches    Chills    Sweating    Chest pain    Ear pain    Sore throat  How is walking pneumonia diagnosed?  Your healthcare provider will ask about your medical history and current symptoms. He or she will also examine you. You may also have tests including:    Imaging. A chest X-ray of your chest may be done.    Lab tests. Blood tests and sputum cultures may be done.  How is walking pneumonia treated?  Since it's caused by bacteria, antibiotics are usually prescribed. However, it may also go away without any treatment.  Your healthcare provider may also recommend medicines, either prescription or over-the-counter, and other measures to relieve symptoms such as:    Acetaminophen or ibuprofen to lower your fever and lessen headache or other pain    Cough medicine to loosen mucus or reduce coughing    Bedrest or reduced activity    Increased fluids to loosen mucus and replace lost fluids from sweating  Make sure you check with your healthcare provider or pharmacist before taking any over-the-counter medicines.  What are the complications of walking pneumonia?  Although walking pneumonia is usually mild, and it often goes away without treatment, complications can occur. They include:    Severe  pneumonia    Serious infections in other parts of the body    Anemia or a low red blood cell count    Kidney problems    Skin conditions  What can I do to prevent walking pneumonia?  To prevent others from getting walking pneumonia:    Try to stay away from other people if you are coughing a lot.    Cover your mouth when you cough. It's easy to pass along this infection to other people through coughing, and contact with surfaces that you cough near.    Wipe off surfaces, including phones, remote controls, and doorknobs with antibacterial or disinfectant products.    Tell people to wash their hands if they touch things you have coughed near.    Make sure to wash your hands often. Wash them before handling food or objects that others may touch. Use a separate towel or paper towels for drying.  Date Last Reviewed: 1/1/2017 2000-2018 [employer]. [address] [place] [id number]. All rights reserved. This information is not intended as a substitute for professional medical care. Always follow your healthcare professional's instructions.           Patient Education     Diabetic Gastroparesis  Gastroparesis, or delayed gastric emptying, is when food moves through the stomach more slowly than normal. In someone with diabetes, it s caused by damage to the vagus nerve because of chronic high blood sugar. (Other chronic diseases may also cause gastroparesis.) The vagus nerve helps control how food moves through the digestive system. When this nerve is damaged, the movement of food is slowed down or stopped.  Food that stays in the stomach for too long can cause problems. Food can ferment in the stomach, causing bacteria to grow. Undigested food can also harden into masses called bezoars. These can cause nausea and vomiting. In some cases, they may block food from passing from the stomach to the small intestine.  Gastroparesis can make it hard to manage blood sugar levels because it is hard to predict  when a meal will actually leave the stomach to be digested. It can also cause problems with vitamins and minerals being absorbed into the body as well as maintaining a healthy weight.  Symptoms of diabetic gastroparesis are:    Nausea    Vomiting    Feeling full after eating a small amount of food    Stomach pain or cramps    Heartburn    Stomach bloating    Weight loss    Loss of appetite    High or low blood sugar levels  There are several different tests and studies that can help diagnose gastroparesis.  For many people, gastroparesis is a lifelong health problem. Managing it will likely include changes in how you eat. You may be prescribed medicine to help with blood sugar levels, help your symptoms like nausea and vomiting, or act on muscles in the digestive system. Certain medicines otherwise very effective for diabetes can make gastroparesis worse and should not be taken. In severe cases, surgery to put in a feeding tube may be needed. Or a special device may be implanted to encourage the stomach muscles to contract.  Home care  These changes may help ease your symptoms:    Take prescribed medicines exactly as directed.    Eat a liquid or soft diet if advised.    Eat frequent small meals instead of less frequent large meals.    Stay away from foods that are high in fat (such as whole milk, cheese, and fried foods) or fiber (such as beans, and many fruits and vegetables). These can slow digestion.    Always control your blood sugar levels as well as possible as directed by your healthcare provider.  Follow-up care  Follow up with your healthcare provider as advised. Regular visits may be needed to manage gastroparesis.  When to seek medical advice  Call your healthcare provider right away if any of these occur:    Severe pain in your abdomen    Inability to keep down food or liquids    Weight loss    Other symptoms as directed by your healthcare provider  Date Last Reviewed: 6/1/2018 2000-2018 [employer]. [address] [place] [address], [place] [address]. All rights reserved. This information is not intended as a substitute for professional medical care. Always follow your healthcare professional's instructions.

## 2019-12-24 NOTE — PROGRESS NOTES
SUBJECTIVE:  Chief Complaint   Patient presents with     URI     been sick for 6 weeks or more, cough, sinus, tired, sore throat, headaches, weakness, and inactive, dehydrate     Rudolph Tao is a 49 year old male who presents to the clinic today with a chief complaint of cough  for 6 week(s).  Patient denies shortness of breath., central chest pain. and pleuritic chest pain  His cough is described as persistent, daytime, nightime and productive mucoid.    The patient's symptoms are moderate and not changing over the course of time.  Associated symptoms include nasal congestion and malaise. The patient's symptoms are exacerbated by no particular triggers  Patient has been using OTC cough suppressants  to improve symptoms.    He has type 1 diabetes no insulin, with gastroparesis-=  Has had some worsening in the past 2 days- usually treats with zofran and reglan-  But he is almost out, and traveling to another [place]- wants fill of Rx    Past Medical History:   Diagnosis Date     Adopted      Anxiety      Anxiety      Chronic low back pain      DDD (degenerative disc disease), lumbar 3/8/2018     Diabetes mellitus (H)     TYPE 1     Hallux abducto valgus      Hyperlipaemia      Hyperlipidemia      Hypertension      Lip lesion 8/2/2010     Pain in toe of left foot     2 ND TOE     RLS (restless legs syndrome)      Snoring     MODERATE SEVERITY     Swelling of foot joint 7/13/2012     Patient Active Problem List   Diagnosis     Hyperlipidemia LDL goal <100     Encounter for long-term (current) use of insulin (H)     Chronic low back pain     Libido, decreased     Restless legs     Tobacco abuse     Cervicalgia     Paresthesias     Persistent disorder of initiating or maintaining sleep     Spasms of the hands or feet     Hypertension goal BP (blood pressure) < 140/90     Right-sided low back pain without sciatica     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  alcohol swab prep pads, Use to swab area of injection/aldo as directed.  ALPRAZolam (XANAX) 0.5 MG tablet, Take 1 tablet (0.5 mg) by mouth 3 times daily as needed for anxiety 1 month supply  aspirin 81 MG EC tablet, Take 1 tablet (81 mg) by mouth daily  BASAGLAR 100 UNIT/ML injection, Inject 30 Units Subcutaneous daily  blood glucose calibration (ONETOUCH ULTRA CONTROL) solution, Use to calibrate blood glucose monitor as directed.  blood glucose monitoring (FREESTYLE LITE) test strip, TEST AS DIRECTED TEST 4-6 TIMES DAILY, UP TO 10 TIMES DAILY  blood glucose monitoring (ONE TOUCH DELICA) lancets, Use to test blood sugar 4-6 times daily. May test up to 10 times per day  blood glucose monitoring (ONE TOUCH ULTRA 2) meter device kit, Use to test blood sugar 4-6 times daily, may test up to 10 times per day.  blood glucose monitoring (ONE TOUCH ULTRASOFT) lancets, Use as directed to test blood sugar 4-6 times daily, may test up to 10 times daily if needed.  blood glucose monitoring (ONE TOUCH ULTRASOFT) lancets, 1 each 8 times daily Use to test blood sugar 8 times daily.  Ok to substitute alternative if insurance prefers.  blood glucose monitoring (ONETOUCH ULTRA) test strip, Use to test blood sugar 4-6 times daily, may test up to 10 times per day.  blood glucose monitoring (ONETOUCH VERIO IQ) test strip, Use to test blood sugar 8 times daily.  Ok to substitute alternative if insurance prefers.  blood glucose monitoring (ONETOUCH VERIO) meter device kit, Use to test blood sugar 8 times daily.  Ok to  substitute alternative if insurance prefers.  buPROPion (WELLBUTRIN XL) 300 MG 24 hr tablet, TAKE 1 TABLET (300 MG) BY MOUTH EVERY MORNING  cetirizine (ZYRTEC) 10 MG tablet, Take 10 mg by mouth daily  Continuous Blood Gluc  (FREESTYLE JUSTIN READER) MYA, 1 Device 8 times daily  continuous blood glucose monitoring (FREESTYLE JUSTIN) sensor, For use with Freestyle Justin Flash  for continuous monitioring of blood glucose levels. Replace sensor every 10 days.  insulin aspart (NOVOLOG PEN) 100 UNIT/ML injection, Inject 30 Units Subcutaneous daily Use with sliding scale, approximately 2-5 units daily (Patient taking differently: Inject 32 Units Subcutaneous daily Use with sliding scale, approximately 2-5 units daily)  insulin pen needle (B-D U/F) 31G X 5 MM, by Device route 6 times daily  lisinopril (PRINIVIL/ZESTRIL) 40 MG tablet, Take 1 tablet (40 mg) by mouth daily No refills. Needs visit  metoclopramide (REGLAN) 10 MG tablet, Take 1 tablet (10 mg) by mouth 4 times daily as needed  pravastatin (PRAVACHOL) 20 MG tablet, Take 1 tablet (20 mg) by mouth daily  rOPINIRole (REQUIP) 0.25 MG tablet, TAKE 1-2 TABLETS BY MOUTH AT BEDTIME  tadalafil (CIALIS) 10 MG tablet, Take 0.5-1 tablets (5-10 mg) by mouth daily as needed for erectile dysfunction Never use with nitroglycerin, terazosin or doxazosin.  diclofenac (VOLTAREN) 75 MG EC tablet, Take 1 tablet (75 mg) by mouth 2 times daily for 7 days  escitalopram (LEXAPRO) 20 MG tablet, Take 1 tablet (20 mg) by mouth daily (Patient not taking: Reported on 12/23/2019)  fluticasone (FLONASE) 50 MCG/ACT spray, Spray 1-2 sprays into both nostrils daily (Patient not taking: Reported on 8/21/2018)    No current facility-administered medications on file prior to visit.       Social History     Tobacco Use     Smoking status: Former Smoker     Packs/day: 0.00     Years: 20.00     Pack years: 0.00     Types: Cigarettes     Last attempt to quit: 10/1/2015     Years since  quittin.2     Smokeless tobacco: Never Used     Tobacco comment: 2-4  cigarettes daily   Substance Use Topics     Alcohol use: Yes     Comment: 2 drinks daily       Family History   Adopted: Yes   Problem Relation Age of Onset     Unknown/Adopted Mother      Cancer Mother      Unknown/Adopted Father      Unknown/Adopted Maternal Grandmother      Unknown/Adopted Maternal Grandfather      Unknown/Adopted Paternal Grandmother      Unknown/Adopted Paternal Grandfather          ROS  CONSTITUTIONAL:NEGATIVE for fever, chills,   INTEGUMENTARY/SKIN: NEGATIVE for worrisome rashes,   or lesions  EYES: NEGATIVE for vision changes or irritation  ENT/MOUTH: NEGATIVE for ear, mouth and throat problems    OBJECTIVE:  BP (!) 149/86 (BP Location: Left arm, Patient Position: Sitting, Cuff Size: Adult Large)   Pulse 85   Temp 98.6  F (37  C) (Oral)   Resp 16   Wt 78.1 kg (172 lb 3.2 oz)   SpO2 98%   BMI 26.18 kg/m    GENERAL APPEARANCE: alert, moderate distress and cooperative-  Occasional harsh cough  EYES: EOMI,  PERRL, conjunctiva clear  HENT: ear canals and TM's normal.  Nose and mouth without ulcers, erythema or lesions  NECK: supple, nontender, no lymphadenopathy  RESP: lungs clear to auscultation - no rales, rhonchi or wheezes  CV: regular rates and rhythm, normal S1 S2, no murmur noted  ABDOMEN:  soft, nontender, no HSM or masses and bowel sounds normal  NEURO: Normal strength and tone, sensory exam grossly normal,  normal speech and mentation  SKIN: no suspicious lesions or rashes         ASSESSMENT:    Walking pneumonia     - doxycycline hyclate (VIBRAMYCIN) 100 MG capsule; Take 1 capsule (100 mg) by mouth 2 times daily for 14 days  - albuterol (PROAIR HFA/PROVENTIL HFA/VENTOLIN HFA) 108 (90 Base) MCG/ACT inhaler; Inhale 1-2 puffs into the lungs every 4 hours as needed for shortness of breath / dyspnea or wheezing  - albuterol (PROVENTIL) (2.5 MG/3ML) 0.083% neb solution; Take 1 vial (2.5 mg) by nebulization every  6 hours as needed for shortness of breath / dyspnea or wheezing    We discussed that the patient's symptoms most closely fit the pattern of a walking pneumonia or mycoplasma pneumonitis with a prolonged, low level respiratory infection involving both lungs. Not much fevers or chills,  Thick, tenacious pulmonary secretions that are difficult to clear with coughing.      We discussed that no antibiotic kills mycoplasma, and only certain antibiotics will slow the multiplication of the organism, so more prolonged antibiotic treatment is often necessary    Use of an albuterol inhaler and OTC expectorant can be helpful to open the airways to help clear the thick secretions from the airways.     Symptomatic measures encouraged, humidified air, plenty of fluids.  Patient may consider OTC expectorant and/or cough suppressant to treat symptoms.  Return if worsening    Type 1 diabetes mellitus with other specified complication (H)  Gastroparesis     - ondansetron (ZOFRAN-ODT) 4 MG ODT tab; Take 1 tablet (4 mg) by mouth every 8 hours as needed for nausea  - metoclopramide (REGLAN) 5 MG tablet; Take 1 tablet (5 mg) by mouth 4 times daily as needed (nausea)    Refill for 1 month-  Should get future Rx from primary care

## 2020-01-28 ENCOUNTER — TRANSFERRED RECORDS (OUTPATIENT)
Dept: HEALTH INFORMATION MANAGEMENT | Facility: CLINIC | Age: 50
End: 2020-01-28

## 2020-01-30 ENCOUNTER — TRANSFERRED RECORDS (OUTPATIENT)
Dept: HEALTH INFORMATION MANAGEMENT | Facility: CLINIC | Age: 50
End: 2020-01-30

## 2020-01-30 ENCOUNTER — OFFICE VISIT (OUTPATIENT)
Dept: URGENT CARE | Facility: URGENT CARE | Age: 50
End: 2020-01-30
Payer: COMMERCIAL

## 2020-01-30 VITALS
SYSTOLIC BLOOD PRESSURE: 149 MMHG | DIASTOLIC BLOOD PRESSURE: 104 MMHG | WEIGHT: 163.8 LBS | TEMPERATURE: 98.2 F | HEART RATE: 110 BPM | OXYGEN SATURATION: 99 % | BODY MASS INDEX: 24.91 KG/M2

## 2020-01-30 DIAGNOSIS — R11.10 NON-INTRACTABLE VOMITING, PRESENCE OF NAUSEA NOT SPECIFIED, UNSPECIFIED VOMITING TYPE: ICD-10-CM

## 2020-01-30 DIAGNOSIS — K31.84 GASTROPARESIS: ICD-10-CM

## 2020-01-30 DIAGNOSIS — E10.8 DIABETES MELLITUS TYPE 1 WITH COMPLICATIONS (H): ICD-10-CM

## 2020-01-30 DIAGNOSIS — R07.89 CHEST HEAVINESS: Primary | ICD-10-CM

## 2020-01-30 DIAGNOSIS — I10 HYPERTENSION GOAL BP (BLOOD PRESSURE) < 140/90: ICD-10-CM

## 2020-01-30 LAB — HBA1C MFR BLD: 8.2 % (ref 0–5.7)

## 2020-01-30 PROCEDURE — 99215 OFFICE O/P EST HI 40 MIN: CPT | Performed by: NURSE PRACTITIONER

## 2020-01-30 PROCEDURE — 93000 ELECTROCARDIOGRAM COMPLETE: CPT | Performed by: NURSE PRACTITIONER

## 2020-01-30 RX ORDER — ONDANSETRON 4 MG/1
4 TABLET, ORALLY DISINTEGRATING ORAL ONCE
Status: COMPLETED | OUTPATIENT
Start: 2020-01-30 | End: 2020-01-30

## 2020-01-30 RX ADMIN — ONDANSETRON 4 MG: 4 TABLET, ORALLY DISINTEGRATING ORAL at 16:31

## 2020-01-30 NOTE — NURSING NOTE
Clinic Administered Medication Documentation    Oral Medication Documentation    Patient was given Ondansetron (Zofran) . Prior to medication administration, verified patients identity using patient s name and date of birth. Please see MAR and medication order for additional information.     Was entire amount of medication used? Yes     The following medication was given:     MEDICATION: Zofran  ROUTE: PO  SITE: mouth  DOSE: 4 mg  LOT #: [id number]  :  Dileep  EXPIRATION DATE:  Jul 2021  NDC#: [id number]    Cam Schilling CMA

## 2020-01-30 NOTE — PROGRESS NOTES
SUBJECTIVE:   Rudolph Tao is a 50 year old male presenting with a chief complaint of   Chief Complaint   Patient presents with     Cough     Patient complains of chest congestion    .  Patient was seen at the end of December and told he had walking pneumonia and was given Doxycycline for fourteen days. He was also seen in the ER 2 days ago    Feels weak, fatigued, chest heavy, poor appetite, nausea, dry cough. Blood sugars have been 200-250.     Onset of symptoms was 1 month(s) ago.  Course of illness is worsening.    Severity severe  Current and Associated symptoms: fever, chills, sweats, cough - non-productive and shortness of breath  Treatment measures tried include as noted above.  Predisposing factors include Smoking history, hypertension and Type 1 DM.    SKIN: Dry  EYES: no acute vision problems or changes  ENT: no ear problems, no mouth problems, no throat problems  : no frequency, no dysuria, no hematuria  MS: no claudication, no myalgias, no joint aches  ENDOCRINE: dry skin, polyuria  HEME: no easy bruising, no bleeding problems    ECG- ST-Twave changes in the anterior lateral leads    Past Medical History:   Diagnosis Date     Adopted      Anxiety      Anxiety      Chronic low back pain      DDD (degenerative disc disease), lumbar 3/8/2018     Diabetes mellitus (H)     TYPE 1     Hallux abducto valgus      Hyperlipaemia      Hyperlipidemia      Hypertension      Lip lesion 8/2/2010     Pain in toe of left foot     2 ND TOE     RLS (restless legs syndrome)      Snoring     MODERATE SEVERITY     Swelling of foot joint 7/13/2012     Current Outpatient Medications   Medication Sig Dispense Refill     acetone, Urine, test STRP 1 strip as needed (with unexplained blood glucose over 300 mg/dL) Use one strip, as needed, with unexplained blood glucose over 300 mg/dL. 50 each 11     albuterol (PROAIR HFA/PROVENTIL HFA/VENTOLIN HFA) 108 (90 Base) MCG/ACT inhaler Inhale 1-2 puffs into the lungs every 4 hours as  needed for shortness of breath / dyspnea or wheezing 1 Inhaler 0     albuterol (PROVENTIL) (2.5 MG/3ML) 0.083% neb solution Take 1 vial (2.5 mg) by nebulization every 6 hours as needed for shortness of breath / dyspnea or wheezing 1 Box 0     alcohol swab prep pads Use to swab area of injection/aldo as directed. 300 each 1     ALPRAZolam (XANAX) 0.5 MG tablet Take 1 tablet (0.5 mg) by mouth 3 times daily as needed for anxiety 1 month supply 45 tablet 0     aspirin 81 MG EC tablet Take 1 tablet (81 mg) by mouth daily 90 tablet 3     BASAGLAR 100 UNIT/ML injection Inject 30 Units Subcutaneous daily 15 mL 11     blood glucose calibration (ONETOUCH ULTRA CONTROL) solution Use to calibrate blood glucose monitor as directed. 1 Bottle 1     blood glucose monitoring (FREESTYLE LITE) test strip TEST AS DIRECTED TEST 4-6 TIMES DAILY, UP TO 10 TIMES DAILY 300 strip 1     blood glucose monitoring (ONE TOUCH DELICA) lancets Use to test blood sugar 4-6 times daily. May test up to 10 times per day 300 each 1     blood glucose monitoring (ONE TOUCH ULTRA 2) meter device kit Use to test blood sugar 4-6 times daily, may test up to 10 times per day. 1 kit 0     blood glucose monitoring (ONE TOUCH ULTRASOFT) lancets Use as directed to test blood sugar 4-6 times daily, may test up to 10 times daily if needed. 500 each 3     blood glucose monitoring (ONE TOUCH ULTRASOFT) lancets 1 each 8 times daily Use to test blood sugar 8 times daily.  Ok to substitute alternative if insurance prefers. 250 each 11     blood glucose monitoring (ONETOUCH ULTRA) test strip Use to test blood sugar 4-6 times daily, may test up to 10 times per day. 300 strip 1     blood glucose monitoring (ONETOUCH VERIO IQ) test strip Use to test blood sugar 8 times daily.  Ok to substitute alternative if insurance prefers. 250 strip 11     blood glucose monitoring (ONETOUCH VERIO) meter device kit Use to test blood sugar 8 times daily.  Ok to substitute alternative if  insurance prefers. 1 kit 1     buPROPion (WELLBUTRIN XL) 300 MG 24 hr tablet TAKE 1 TABLET (300 MG) BY MOUTH EVERY MORNING 90 tablet 3     cetirizine (ZYRTEC) 10 MG tablet Take 10 mg by mouth daily       Continuous Blood Gluc  (FREESTYLE JUSTIN READER) MYA 1 Device 8 times daily 1 Device 0     continuous blood glucose monitoring (FREESTYLE JUSTIN) sensor For use with Freestyle Justin Flash  for continuous monitioring of blood glucose levels. Replace sensor every 10 days. 3 each 11     escitalopram (LEXAPRO) 20 MG tablet Take 1 tablet (20 mg) by mouth daily 90 tablet 1     fluticasone (FLONASE) 50 MCG/ACT spray Spray 1-2 sprays into both nostrils daily 1 Bottle 11     insulin aspart (NOVOLOG PEN) 100 UNIT/ML injection Inject 30 Units Subcutaneous daily Use with sliding scale, approximately 2-5 units daily (Patient taking differently: Inject 32 Units Subcutaneous daily Use with sliding scale, approximately 2-5 units daily) 15 mL 3     insulin pen needle (B-D U/F) 31G X 5 MM by Device route 6 times daily 200 each 11     lisinopril (PRINIVIL/ZESTRIL) 40 MG tablet Take 1 tablet (40 mg) by mouth daily No refills. Needs visit 90 tablet 3     metoclopramide (REGLAN) 10 MG tablet Take 1 tablet (10 mg) by mouth 4 times daily as needed 30 tablet 1     metoclopramide (REGLAN) 5 MG tablet Take 1 tablet (5 mg) by mouth 4 times daily as needed (nausea) 30 tablet 0     ondansetron (ZOFRAN-ODT) 4 MG ODT tab Take 1 tablet (4 mg) by mouth every 8 hours as needed for nausea 20 tablet 0     pravastatin (PRAVACHOL) 20 MG tablet Take 1 tablet (20 mg) by mouth daily 90 tablet 3     rOPINIRole (REQUIP) 0.25 MG tablet TAKE 1-2 TABLETS BY MOUTH AT BEDTIME 60 tablet 5     tadalafil (CIALIS) 10 MG tablet Take 0.5-1 tablets (5-10 mg) by mouth daily as needed for erectile dysfunction Never use with nitroglycerin, terazosin or doxazosin. 12 tablet 11     diclofenac (VOLTAREN) 75 MG EC tablet Take 1 tablet (75 mg) by mouth 2 times  daily for 7 days 14 tablet 0     Social History     Tobacco Use     Smoking status: Former Smoker     Packs/day: 0.00     Years: 20.00     Pack years: 0.00     Types: Cigarettes     Last attempt to quit: 10/1/2015     Years since quittin.3     Smokeless tobacco: Never Used     Tobacco comment: 2-4  cigarettes daily   Substance Use Topics     Alcohol use: Yes     Comment: 2 drinks daily     Family History   Adopted: Yes   Problem Relation Age of Onset     Unknown/Adopted Mother      Cancer Mother      Unknown/Adopted Father      Unknown/Adopted Maternal Grandmother      Unknown/Adopted Maternal Grandfather      Unknown/Adopted Paternal Grandmother      Unknown/Adopted Paternal Grandfather          OBJECTIVE  BP (!) 149/104 (BP Location: Left arm, Patient Position: Chair, Cuff Size: Adult Regular)   Pulse 110   Temp 98.2  F (36.8  C) (Oral)   Wt 74.3 kg (163 lb 12.8 oz)   SpO2 99%   BMI 24.91 kg/m        GENERAL APPEARANCE: alert and moderate distress     EYES: PERRL, EOMI, sclera non-icteric     HENT: oral exam benign, mucus membranes intact but slightly dry without ulcers or lesions     NECK: no adenopathy or asymmetry, thyroid normal to palpation     RESP: lungs clear to auscultation - no rales, rhonchi or wheezes     CV: regular rates and rhythm, no murmurs, rubs, or gallop     ABDOMEN:  soft, nontender, no HSM or masses and bowel sounds normal     MS: extremities normal- no gross deformities noted; normal muscle tone.     SKIN: no suspicious lesions or rashes     NEURO: Normal strength and tone, mentation intact and speech normal     PSYCH: normal thought process; no significant mood disturbance    ECG- NSR with diffuse ST changes in the anterior leads      ICD-10-CM    1. Chest heaviness R07.89 EKG 12-lead complete w/read - Clinics   2. Non-intractable vomiting, presence of nausea not specified, unspecified vomiting type R11.10 ondansetron (ZOFRAN-ODT) ODT tab 4 mg   3. Diabetes mellitus type 1 with  complications (H) E10.8    4. Gastroparesis K31.84    5. Hypertension goal BP (blood pressure) < 140/90 I10      Complex patient with respiratory versus cardiac issues, nausea and diabetes. Needs thorough  Examination at ER.    Followup:  Return in about 1 day (around 1/31/2020), or go to ER now.    Patient Instructions   Go to ER for further evaluation immediately.    Called [facility] to inform of patient coming, He declined ambulance transport wife will drive.    YUE Haq, CNP  [facility] Provider

## 2020-01-31 LAB — HEP C HIM: NORMAL

## 2020-02-01 LAB
ALT SERPL-CCNC: 60 IU/L (ref 8–45)
AST SERPL-CCNC: 61 IU/L (ref 2–40)
CHOLEST SERPL-MCNC: 177 MG/DL (ref 100–199)
HDLC SERPL-MCNC: 44 MG/DL
LDLC SERPL CALC-MCNC: 98 MG/DL
NONHDLC SERPL-MCNC: 133 MG/DL
TRIGL SERPL-MCNC: 175 MG/DL

## 2020-02-10 ENCOUNTER — HEALTH MAINTENANCE LETTER (OUTPATIENT)
Age: 50
End: 2020-02-10

## 2020-03-03 ENCOUNTER — TRANSFERRED RECORDS (OUTPATIENT)
Dept: MULTI SPECIALTY CLINIC | Facility: CLINIC | Age: 50
End: 2020-03-03

## 2020-03-03 LAB
CREAT SERPL-MCNC: 0.91 MG/DL (ref 0.72–1.25)
GFR SERPL CREATININE-BSD FRML MDRD: >60 ML/MIN/1.73M2
GLUCOSE SERPL-MCNC: 198 MG/DL (ref 65–100)
POTASSIUM SERPL-SCNC: 3.8 MMOL/L (ref 3.5–5)

## 2020-03-31 DIAGNOSIS — G25.81 RESTLESS LEG SYNDROME: ICD-10-CM

## 2020-03-31 NOTE — TELEPHONE ENCOUNTER
Requested Prescriptions   Pending Prescriptions Disp Refills     rOPINIRole (REQUIP) 0.25 MG tablet 60 tablet 5     Sig: TAKE 1-2 TABLETS BY MOUTH AT BEDTIME       There is no refill protocol information for this order        Last Written Prescription Date:  10/25/18  Last Fill Quantity: 60,  # refills: 5   Last office visit: 4/9/2018 with prescribing provider:  Haim Bustos     Future Office Visit:

## 2020-04-01 RX ORDER — ROPINIROLE 0.25 MG/1
TABLET, FILM COATED ORAL
Qty: 60 TABLET | Refills: 5 | OUTPATIENT
Start: 2020-04-01

## 2020-04-01 NOTE — TELEPHONE ENCOUNTER
"Routing refill request to provider for review/approval because:  Failed protocol - see below    Requested Prescriptions   Pending Prescriptions Disp Refills     rOPINIRole (REQUIP) 0.25 MG tablet 60 tablet 5     Sig: TAKE 1-2 TABLETS BY MOUTH AT BEDTIME       Antiparkinson's Agents Protocol Failed - 3/31/2020  8:52 AM        Failed - Blood pressure under 140/90 in past 12 months     BP Readings from Last 3 Encounters:   01/30/20 (!) 149/104   12/23/19 (!) 149/86   08/21/18 134/84                 Failed - CBC on record in past 12 months     Recent Labs   Lab Test 09/19/16  1155   WBC 5.1   RBC 4.49   HGB 15.3   HCT 43.2                    Failed - ALT on record in past 12 months         Recent Labs   Lab Test 09/12/16  1140   *             Failed - Serum Creatinine on file in past 12 months     Recent Labs   Lab Test 03/21/18  1345   CR 0.72       Ok to refill medication if creatinine is low          Failed - Recent (6 mo) or future (30 days) visit within the authorizing provider's specialty     Patient had office visit in the last 6 months or has a visit in the next 30 days with authorizing provider or within the authorizing provider's specialty.  See \"Patient Info\" tab in inbasket, or \"Choose Columns\" in Meds & Orders section of the refill encounter.            Passed - Medication is active on med list        Passed - Patient is age 18 or older           Mabel Lowe RN  "

## 2020-04-11 DIAGNOSIS — G25.81 RESTLESS LEG SYNDROME: ICD-10-CM

## 2020-04-13 NOTE — TELEPHONE ENCOUNTER
"Routing refill request to provider for review/approval because:  Labs not current:  See protocol  LOV was 04/09/2018. Please advise if telephone visit appropriate for med refill or if face to face OV needed.    Requested Prescriptions   Pending Prescriptions Disp Refills     rOPINIRole (REQUIP) 0.25 MG tablet [Pharmacy Med Name: ROPINIROLE HCL 0.25 MG TABLET] 60 tablet 5     Sig: TAKE 1-2 TABLETS BY MOUTH AT BEDTIME       Antiparkinson's Agents Protocol Failed - 4/13/2020  2:04 PM        Failed - Blood pressure under 140/90 in past 12 months     BP Readings from Last 3 Encounters:   01/30/20 (!) 149/104   12/23/19 (!) 149/86   08/21/18 134/84                 Failed - CBC on record in past 12 months     Recent Labs   Lab Test 09/19/16  1155   WBC 5.1   RBC 4.49   HGB 15.3   HCT 43.2                    Failed - ALT on record in past 12 months         Recent Labs   Lab Test 09/12/16  1140   *             Failed - Serum Creatinine on file in past 12 months     Recent Labs   Lab Test 03/21/18  1345   CR 0.72       Ok to refill medication if creatinine is low          Failed - Recent (6 mo) or future (30 days) visit within the authorizing provider's specialty     Patient had office visit in the last 6 months or has a visit in the next 30 days with authorizing provider or within the authorizing provider's specialty.  See \"Patient Info\" tab in inbasket, or \"Choose Columns\" in Meds & Orders section of the refill encounter.            Passed - Medication is active on med list        Passed - Patient is age 18 or older           "

## 2020-04-14 ENCOUNTER — VIRTUAL VISIT (OUTPATIENT)
Dept: INTERNAL MEDICINE | Facility: CLINIC | Age: 50
End: 2020-04-14
Payer: COMMERCIAL

## 2020-04-14 DIAGNOSIS — G25.81 RESTLESS LEG SYNDROME: Primary | ICD-10-CM

## 2020-04-14 DIAGNOSIS — E10.8 DIABETES MELLITUS TYPE 1 WITH COMPLICATIONS (H): ICD-10-CM

## 2020-04-14 PROCEDURE — 99214 OFFICE O/P EST MOD 30 MIN: CPT | Mod: 95 | Performed by: INTERNAL MEDICINE

## 2020-04-14 RX ORDER — ROPINIROLE 0.25 MG/1
TABLET, FILM COATED ORAL
Qty: 60 TABLET | Refills: 5 | OUTPATIENT
Start: 2020-04-14

## 2020-04-14 RX ORDER — ROPINIROLE 0.25 MG/1
TABLET, FILM COATED ORAL
Qty: 60 TABLET | Refills: 5 | Status: SHIPPED | OUTPATIENT
Start: 2020-04-14 | End: 2020-06-16

## 2020-04-14 NOTE — PROGRESS NOTES
"Rudolph Tao is a 50 year old male who is being evaluated via a billable telephone visit.      The patient has been notified of following:     \"This telephone visit will be conducted via a call between you and your physician/provider. We have found that certain health care needs can be provided without the need for a physical exam.  This service lets us provide the care you need with a short phone conversation.  If a prescription is necessary we can send it directly to your pharmacy.  If lab work is needed we can place an order for that and you can then stop by our lab to have the test done at a later time.    Telephone visits are billed at different rates depending on your insurance coverage. During this emergency period, for some insurers they may be billed the same as an in-person visit.  Please reach out to your insurance provider with any questions.    If during the course of the call the physician/provider feels a telephone visit is not appropriate, you will not be charged for this service.\"    Patient has given verbal consent for Telephone visit?  Yes    How would you like to obtain your AVS? [employer]     Rudolph Tao is a 50 year old male who presents to clinic today for the following health issues:    Medication Followup of rOPINIRole (REQUIP) 0.25 MG tablet     Taking Medication as prescribed: yes    Side Effects:  None    Medication Helping Symptoms:  yes       Patient Active Problem List   Diagnosis     Hyperlipidemia LDL goal <100     Encounter for long-term (current) use of insulin (H)     Chronic low back pain     Libido, decreased     Restless legs     Tobacco abuse     Cervicalgia     Paresthesias     Persistent disorder of initiating or maintaining sleep     Spasms of the hands or feet     Hypertension goal BP (blood pressure) < 140/90     Right-sided low back pain without sciatica     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)     GAIL (generalized anxiety disorder)     " Psychosocial stressors     DDD (degenerative disc disease), lumbar     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)     Hypoglycemia     Syncopal episodes     Gastroparesis     Past Surgical History:   Procedure Laterality Date     APPENDECTOMY       OSTEOTOMY FOOT  2013    Procedure: OSTEOTOMY FOOT;  Left Foot Distal First Metarsal Osteotomy, Second Toe Hammer Toe Correction, Second Metatarsal Weil Osteotomy;  Surgeon: Robert Augustine DPM;  [facility]     SURGICAL HISTORY OF -       [facility]       Social History     Tobacco Use     Smoking status: Former Smoker     Packs/day: 0.00     Years: 20.00     Pack years: 0.00     Types: Cigarettes     Last attempt to quit: 10/1/2015     Years since quittin.5     Smokeless tobacco: Never Used     Tobacco comment: 2-4  cigarettes daily   Substance Use Topics     Alcohol use: Yes     Comment: 2 drinks daily     Family History   Adopted: Yes   Problem Relation Age of Onset     Unknown/Adopted Mother      Cancer Mother      Unknown/Adopted Father      Unknown/Adopted Maternal Grandmother      Unknown/Adopted Maternal Grandfather      Unknown/Adopted Paternal Grandmother      Unknown/Adopted Paternal Grandfather          Current Outpatient Medications   Medication Sig Dispense Refill     acetone, Urine, test STRP 1 strip as needed (with unexplained blood glucose over 300 mg/dL) Use one strip, as needed, with unexplained blood glucose over 300 mg/dL. 50 each 11     albuterol (PROAIR HFA/PROVENTIL HFA/VENTOLIN HFA) 108 (90 Base) MCG/ACT inhaler Inhale 1-2 puffs into the lungs every 4 hours as needed for shortness of breath / dyspnea or wheezing 1 Inhaler 0     albuterol (PROVENTIL) (2.5 MG/3ML) 0.083% neb solution Take 1 vial (2.5 mg) by nebulization every 6 hours as needed for shortness of breath / dyspnea or wheezing 1 Box 0     alcohol swab prep pads Use to swab area of injection/aldo as directed. 300 each 1     ALPRAZolam (XANAX) 0.5 MG tablet Take 1 tablet (0.5 mg)  by mouth 3 times daily as needed for anxiety 1 month supply 45 tablet 0     aspirin 81 MG EC tablet Take 1 tablet (81 mg) by mouth daily 90 tablet 3     BASAGLAR 100 UNIT/ML injection Inject 30 Units Subcutaneous daily 15 mL 11     blood glucose calibration (ONETOUCH ULTRA CONTROL) solution Use to calibrate blood glucose monitor as directed. 1 Bottle 1     blood glucose monitoring (FREESTYLE LITE) test strip TEST AS DIRECTED TEST 4-6 TIMES DAILY, UP TO 10 TIMES DAILY 300 strip 1     blood glucose monitoring (ONE TOUCH DELICA) lancets Use to test blood sugar 4-6 times daily. May test up to 10 times per day 300 each 1     blood glucose monitoring (ONE TOUCH ULTRA 2) meter device kit Use to test blood sugar 4-6 times daily, may test up to 10 times per day. 1 kit 0     blood glucose monitoring (ONE TOUCH ULTRASOFT) lancets Use as directed to test blood sugar 4-6 times daily, may test up to 10 times daily if needed. 500 each 3     blood glucose monitoring (ONE TOUCH ULTRASOFT) lancets 1 each 8 times daily Use to test blood sugar 8 times daily.  Ok to substitute alternative if insurance prefers. 250 each 11     blood glucose monitoring (ONETOUCH ULTRA) test strip Use to test blood sugar 4-6 times daily, may test up to 10 times per day. 300 strip 1     blood glucose monitoring (ONETOUCH VERIO IQ) test strip Use to test blood sugar 8 times daily.  Ok to substitute alternative if insurance prefers. 250 strip 11     blood glucose monitoring (ONETOUCH VERIO) meter device kit Use to test blood sugar 8 times daily.  Ok to substitute alternative if insurance prefers. 1 kit 1     buPROPion (WELLBUTRIN XL) 300 MG 24 hr tablet TAKE 1 TABLET (300 MG) BY MOUTH EVERY MORNING 90 tablet 3     cetirizine (ZYRTEC) 10 MG tablet Take 10 mg by mouth daily       Continuous Blood Gluc  (FREESTYLE JUSTIN READER) MYA 1 Device 8 times daily 1 Device 0     continuous blood glucose monitoring (FREESTYLE JUSTIN) sensor For use with "[employer]  Jenni Flash  for continuous monitioring of blood glucose levels. Replace sensor every 10 days. 3 each 11     escitalopram (LEXAPRO) 20 MG tablet Take 1 tablet (20 mg) by mouth daily 90 tablet 1     fluticasone (FLONASE) 50 MCG/ACT spray Spray 1-2 sprays into both nostrils daily 1 Bottle 11     insulin aspart (NOVOLOG PEN) 100 UNIT/ML injection Inject 30 Units Subcutaneous daily Use with sliding scale, approximately 2-5 units daily (Patient taking differently: Inject 32 Units Subcutaneous daily Use with sliding scale, approximately 2-5 units daily) 15 mL 3     insulin pen needle (B-D U/F) 31G X 5 MM by Device route 6 times daily 200 each 11     lisinopril (PRINIVIL/ZESTRIL) 40 MG tablet Take 1 tablet (40 mg) by mouth daily No refills. Needs visit 90 tablet 3     metoclopramide (REGLAN) 10 MG tablet Take 1 tablet (10 mg) by mouth 4 times daily as needed 30 tablet 1     metoclopramide (REGLAN) 5 MG tablet Take 1 tablet (5 mg) by mouth 4 times daily as needed (nausea) 30 tablet 0     ondansetron (ZOFRAN-ODT) 4 MG ODT tab Take 1 tablet (4 mg) by mouth every 8 hours as needed for nausea 20 tablet 0     pravastatin (PRAVACHOL) 20 MG tablet Take 1 tablet (20 mg) by mouth daily 90 tablet 3     rOPINIRole (REQUIP) 0.25 MG tablet TAKE 1-2 TABLETS BY MOUTH AT BEDTIME 60 tablet 5     tadalafil (CIALIS) 10 MG tablet Take 0.5-1 tablets (5-10 mg) by mouth daily as needed for erectile dysfunction Never use with nitroglycerin, terazosin or doxazosin. 12 tablet 11     diclofenac (VOLTAREN) 75 MG EC tablet Take 1 tablet (75 mg) by mouth 2 times daily for 7 days 14 tablet 0     Allergies   Allergen Reactions     Avapro [Irbesartan]      Profuse sweating     Crestor [Rosuvastatin Calcium]      cough     Sulfa Drugs Swelling and Hives     Simvastatin      Elevated BS readings     Recent Labs   Lab Test 08/21/18  1606 03/21/18  1345 09/21/17  0833 03/24/17  1255 09/12/16  1140 06/09/16  1147  11/08/13  1252  05/13/13  1308   A1C  7.6* 7.6*  --  8.1* 7.5* 7.9*   < >  --    < >  --    *  --  127* 109* 92 93   < >  --   --   --    HDL 75  --   --   --  92 73   < >  --   --   --    TRIG 112  --   --   --  60 92   < >  --   --   --    ALT  --   --   --   --  262*  --   --  63  --  64   CR  --  0.72  --  0.71  --   --    < >  --   --  0.52*   GFRESTIMATED  --  >90  --  >90  Non  GFR Calc    --   --    < >  --   --  >90   GFRESTBLACK  --  >90  --  >90  African American GFR Calc    --   --    < >  --   --  >90   POTASSIUM  --  4.9  --  4.8  --   --    < >  --   --  4.9   TSH  --  2.73 1.92  --   --   --    < >  --   --   --     < > = values in this interval not displayed.      BP Readings from Last 3 Encounters:   01/30/20 (!) 149/104   12/23/19 (!) 149/86   08/21/18 134/84    Wt Readings from Last 3 Encounters:   01/30/20 74.3 kg (163 lb 12.8 oz)   12/23/19 78.1 kg (172 lb 3.2 oz)   08/21/18 75.3 kg (166 lb)                    Reviewed and updated as needed this visit by Provider         Review of Systems   ROS COMP: Constitutional, HEENT, cardiovascular, pulmonary, gi and gu systems are negative, except as otherwise noted.       Objective   Reported vitals:  There were no vitals taken for this visit.   healthy, alert and no distress  PSYCH: Alert and oriented times 3; coherent speech, normal   rate and volume, able to articulate logical thoughts, able   to abstract reason, no tangential thoughts, no hallucinations   or delusions  His affect is normal  RESP: No cough, no audible wheezing, able to talk in full sentences  Remainder of exam unable to be completed due to telephone visits    Diagnostic Test Results:  Labs reviewed in Epic        Assessment/Plan:  1. Restless leg syndrome  I spoke with patient and we reviewed some issues . He continues to use rOPINIRole (REQUIP) for help with his restless leg syndrome symptoms .  - rOPINIRole (REQUIP) 0.25 MG tablet; TAKE 1-2 TABLETS BY MOUTH AT BEDTIME  Dispense: 60 tablet;  Refill: 5    2. 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)  This was a longterm patient with Dr. Padmini Arthur with [facility] and I did meet patient but it was only once and this was 2 years ago. Patient acknowledged he is still looking for a primary health care provider and I offered to fulfil this role although it's entirely up to him what he chooses to do. I agreed to refill medications over the next few months but he has to either see me or somebody else to establish primary care by July . He agrees. See patient after-visit summary       No follow-ups on file.      Phone call duration:  9 minutes    Telephone MD visit     Call began at 10:14 AM   Call ended at  10:23 AM    His primary health care provider was Dr. Padmini Arthur with [facility] for 10+ years and only saw me once. Patient is injections not an insulin pump .     Haim Bustos MD

## 2020-04-14 NOTE — PATIENT INSTRUCTIONS
We talked a bit today Mr. Tao about your primary care. You need a primary health care provider and I can fulfil this role although it's entirely up to you what you choose to do. I agree to refill medications over the next few months but you have to either see me or somebody else to establish primary care by July . We refilled the rOPINIRole (REQUIP) prescription .    Haim Bustos MD    [facility]

## 2020-04-20 ENCOUNTER — TELEPHONE (OUTPATIENT)
Dept: INTERNAL MEDICINE | Facility: CLINIC | Age: 50
End: 2020-04-20

## 2020-04-20 NOTE — TELEPHONE ENCOUNTER
Panel Management Review      Patient has the following on his problem list:     Diabetes    ASA: Passed    Last A1C  Lab Results   Component Value Date    A1C 7.6 08/21/2018    A1C 7.6 03/21/2018    A1C 8.1 03/24/2017    A1C 7.5 09/12/2016    A1C 7.9 06/09/2016     A1C tested: FAILED    Last LDL:    Lab Results   Component Value Date    CHOL 249 08/21/2018     Lab Results   Component Value Date    HDL 75 08/21/2018     Lab Results   Component Value Date     08/21/2018     Lab Results   Component Value Date    TRIG 112 08/21/2018     Lab Results   Component Value Date    CHOLHDLRATIO 2.7 10/08/2014     Lab Results   Component Value Date    NHDL 174 08/21/2018       Is the patient on a Statin? YES             Is the patient on Aspirin? YES    Medications     HMG CoA Reductase Inhibitors     pravastatin (PRAVACHOL) 20 MG tablet       Salicylates     aspirin 81 MG EC tablet             Last three blood pressure readings:  BP Readings from Last 3 Encounters:   01/30/20 (!) 149/104   12/23/19 (!) 149/86   08/21/18 134/84       Date of last diabetes office visit: 04/16/2020     Tobacco History:     History   Smoking Status     Former Smoker     Packs/day: 0.00     Years: 20.00     Types: Cigarettes     Quit date: 10/1/2015   Smokeless Tobacco     Never Used     Comment: 2-4  cigarettes daily           Composite cancer screening  Chart review shows that this patient is due/due soon for the following None  Summary:    Patient is due/failing the following:   Hiv screening, eye exam, microalbumin, foot exam, zoster, a1c, lipid, bmp, physical, dtap, phq2    Action needed:   Patient needs lab work done    Type of outreach:    None, routed to provider for review.    Questions for provider review:    Patient needing labs please advise.                                                                                                                                     Ana ANTHONY CMA ([facility])        Chart routed to Provider  .

## 2020-04-20 NOTE — LETTER
April 20, 2020          Rudolph Tao,  [address] [facility] [id number]        Dear Rudolph Tao      Monitoring and managing your preventative and chronic health conditions are very important to us. Our records indicate that you have not scheduled for Appointment with your provider after July 6th which was recommended by Dr. Bustos.      If you have received your health care elsewhere, please call the clinic so the information can be documented in your chart.    Please call [phone] or message us through your [employer] account to schedule an appointment or provide information for your chart.     Feel free to contact us if you have any questions or concerns!    I look forward to seeing you and working with you on your health care needs.     Sincerely,       [facility]/Ana ANTHONY CMA ([facility])

## 2020-05-05 ENCOUNTER — VIRTUAL VISIT (OUTPATIENT)
Dept: INTERNAL MEDICINE | Facility: CLINIC | Age: 50
End: 2020-05-05
Payer: COMMERCIAL

## 2020-05-05 DIAGNOSIS — M25.521 PAIN OF BOTH ELBOWS: ICD-10-CM

## 2020-05-05 DIAGNOSIS — R25.2 SPASMS OF THE HANDS OR FEET: ICD-10-CM

## 2020-05-05 DIAGNOSIS — E10.42 TYPE 1 DIABETES MELLITUS WITH DIABETIC POLYNEUROPATHY (H): Primary | ICD-10-CM

## 2020-05-05 DIAGNOSIS — M25.522 PAIN OF BOTH ELBOWS: ICD-10-CM

## 2020-05-05 DIAGNOSIS — E10.8 DIABETES MELLITUS TYPE 1 WITH COMPLICATIONS (H): ICD-10-CM

## 2020-05-05 PROCEDURE — 99214 OFFICE O/P EST MOD 30 MIN: CPT | Mod: 95 | Performed by: INTERNAL MEDICINE

## 2020-05-05 RX ORDER — FLASH GLUCOSE SENSOR
KIT MISCELLANEOUS
Qty: 1 EACH | Refills: 11 | Status: SHIPPED | OUTPATIENT
Start: 2020-05-05 | End: 2021-06-21

## 2020-05-05 RX ORDER — GABAPENTIN 300 MG/1
300 CAPSULE ORAL SEE ADMIN INSTRUCTIONS
Qty: 150 CAPSULE | Refills: 5 | Status: SHIPPED | OUTPATIENT
Start: 2020-05-05 | End: 2021-05-10

## 2020-05-05 RX ORDER — FLASH GLUCOSE SENSOR
1 KIT MISCELLANEOUS SEE ADMIN INSTRUCTIONS
Qty: 1 DEVICE | Refills: 0 | Status: SHIPPED | OUTPATIENT
Start: 2020-05-05 | End: 2022-09-06

## 2020-05-05 NOTE — PROGRESS NOTES
"Rudolph Tao is a 50 year old male who is being evaluated via a billable telephone visit.      The patient has been notified of following:     \"This telephone visit will be conducted via a call between you and your physician/provider. We have found that certain health care needs can be provided without the need for a physical exam.  This service lets us provide the care you need with a short phone conversation.  If a prescription is necessary we can send it directly to your pharmacy.  If lab work is needed we can place an order for that and you can then stop by our lab to have the test done at a later time.    Telephone visits are billed at different rates depending on your insurance coverage. During this emergency period, for some insurers they may be billed the same as an in-person visit.  Please reach out to your insurance provider with any questions.    If during the course of the call the physician/provider feels a telephone visit is not appropriate, you will not be charged for this service.\"    Patient has given verbal consent for Telephone visit?  Yes    What phone number would you like to be contacted at? [phone]    How would you like to obtain your AVS? Jeysonhart    Subjective     Rudolph Tao is a 50 year old male who presents to clinic today for the following health issues:    HPI  Chief Complaint   Patient presents with     Dizziness     Nausea     Establish Care     Elbow Pain     Recheck Medication     Continuous Glucose Monitoring         Patient Active Problem List   Diagnosis     Hyperlipidemia LDL goal <100     Encounter for long-term (current) use of insulin (H)     Chronic low back pain     Libido, decreased     Restless legs     Tobacco abuse     Cervicalgia     Paresthesias     Persistent disorder of initiating or maintaining sleep     Spasms of the hands or feet     Hypertension goal BP (blood pressure) < 140/90     Right-sided low back pain without sciatica     Diabetes mellitus type 1 " with complications (H)     GAIL (generalized anxiety disorder)     Psychosocial stressors     DDD (degenerative disc disease), lumbar     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)     Hypoglycemia     Syncopal episodes     Gastroparesis     Past Surgical History:   Procedure Laterality Date     APPENDECTOMY       OSTEOTOMY FOOT  2013    Procedure: OSTEOTOMY FOOT;  Left Foot Distal First Metarsal Osteotomy, Second Toe Hammer Toe Correction, Second Metatarsal Weil Osteotomy;  Surgeon: Robert Augustine DPM;  Location: [facility]     SURGICAL HISTORY OF -       [facility]       Social History     Tobacco Use     Smoking status: Former Smoker     Packs/day: 0.00     Years: 20.00     Pack years: 0.00     Types: Cigarettes     Last attempt to quit: 10/1/2015     Years since quittin.5     Smokeless tobacco: Never Used     Tobacco comment: 2-4  cigarettes daily   Substance Use Topics     Alcohol use: Yes     Comment: 2 drinks daily     Family History   Adopted: Yes   Problem Relation Age of Onset     Unknown/Adopted Mother      Cancer Mother      Unknown/Adopted Father      Unknown/Adopted Maternal Grandmother      Unknown/Adopted Maternal Grandfather      Unknown/Adopted Paternal Grandmother      Unknown/Adopted Paternal Grandfather          Current Outpatient Medications   Medication Sig Dispense Refill     acetone, Urine, test STRP 1 strip as needed (with unexplained blood glucose over 300 mg/dL) Use one strip, as needed, with unexplained blood glucose over 300 mg/dL. 50 each 11     albuterol (PROAIR HFA/PROVENTIL HFA/VENTOLIN HFA) 108 (90 Base) MCG/ACT inhaler Inhale 1-2 puffs into the lungs every 4 hours as needed for shortness of breath / dyspnea or wheezing 1 Inhaler 0     albuterol (PROVENTIL) (2.5 MG/3ML) 0.083% neb solution Take 1 vial (2.5 mg) by nebulization every 6 hours as needed for shortness of breath / dyspnea or wheezing 1 Box 0     alcohol swab prep pads Use to swab area of injection/aldo as directed. 300  each 1     ALPRAZolam (XANAX) 0.5 MG tablet Take 1 tablet (0.5 mg) by mouth 3 times daily as needed for anxiety 1 month supply 45 tablet 0     aspirin 81 MG EC tablet Take 1 tablet (81 mg) by mouth daily 90 tablet 3     BASAGLAR 100 UNIT/ML injection Inject 30 Units Subcutaneous daily 15 mL 11     blood glucose calibration (ONETOUCH ULTRA CONTROL) solution Use to calibrate blood glucose monitor as directed. 1 Bottle 1     blood glucose monitoring (FREESTYLE LITE) test strip TEST AS DIRECTED TEST 4-6 TIMES DAILY, UP TO 10 TIMES DAILY 300 strip 1     blood glucose monitoring (ONE TOUCH DELICA) lancets Use to test blood sugar 4-6 times daily. May test up to 10 times per day 300 each 1     blood glucose monitoring (ONE TOUCH ULTRA 2) meter device kit Use to test blood sugar 4-6 times daily, may test up to 10 times per day. 1 kit 0     blood glucose monitoring (ONE TOUCH ULTRASOFT) lancets Use as directed to test blood sugar 4-6 times daily, may test up to 10 times daily if needed. 500 each 3     blood glucose monitoring (ONE TOUCH ULTRASOFT) lancets 1 each 8 times daily Use to test blood sugar 8 times daily.  Ok to substitute alternative if insurance prefers. 250 each 11     blood glucose monitoring (ONETOUCH ULTRA) test strip Use to test blood sugar 4-6 times daily, may test up to 10 times per day. 300 strip 1     blood glucose monitoring (ONETOUCH VERIO IQ) test strip Use to test blood sugar 8 times daily.  Ok to substitute alternative if insurance prefers. 250 strip 11     blood glucose monitoring (ONETOUCH VERIO) meter device kit Use to test blood sugar 8 times daily.  Ok to substitute alternative if insurance prefers. 1 kit 1     buPROPion (WELLBUTRIN XL) 300 MG 24 hr tablet TAKE 1 TABLET (300 MG) BY MOUTH EVERY MORNING 90 tablet 3     cetirizine (ZYRTEC) 10 MG tablet Take 10 mg by mouth daily       Continuous Blood Gluc  (FREESTYLE JUSTIN READER) MYA 1 Device 8 times daily 1 Device 0     continuous blood  glucose monitoring (FREESTYLE JUSTIN) sensor For use with Freestyle Justin Flash  for continuous monitioring of blood glucose levels. Replace sensor every 10 days. 3 each 11     escitalopram (LEXAPRO) 20 MG tablet Take 1 tablet (20 mg) by mouth daily 90 tablet 1     fluticasone (FLONASE) 50 MCG/ACT spray Spray 1-2 sprays into both nostrils daily 1 Bottle 11     insulin aspart (NOVOLOG PEN) 100 UNIT/ML injection Inject 30 Units Subcutaneous daily Use with sliding scale, approximately 2-5 units daily (Patient taking differently: Inject 32 Units Subcutaneous daily Use with sliding scale, approximately 2-5 units daily) 15 mL 3     insulin pen needle (B-D U/F) 31G X 5 MM by Device route 6 times daily 200 each 11     lisinopril (PRINIVIL/ZESTRIL) 40 MG tablet Take 1 tablet (40 mg) by mouth daily No refills. Needs visit 90 tablet 3     metoclopramide (REGLAN) 10 MG tablet Take 1 tablet (10 mg) by mouth 4 times daily as needed 30 tablet 1     metoclopramide (REGLAN) 5 MG tablet Take 1 tablet (5 mg) by mouth 4 times daily as needed (nausea) 30 tablet 0     ondansetron (ZOFRAN-ODT) 4 MG ODT tab Take 1 tablet (4 mg) by mouth every 8 hours as needed for nausea 20 tablet 0     pravastatin (PRAVACHOL) 20 MG tablet Take 1 tablet (20 mg) by mouth daily 90 tablet 3     rOPINIRole (REQUIP) 0.25 MG tablet TAKE 1-2 TABLETS BY MOUTH AT BEDTIME 60 tablet 5     tadalafil (CIALIS) 10 MG tablet Take 0.5-1 tablets (5-10 mg) by mouth daily as needed for erectile dysfunction Never use with nitroglycerin, terazosin or doxazosin. 12 tablet 11     diclofenac (VOLTAREN) 75 MG EC tablet Take 1 tablet (75 mg) by mouth 2 times daily for 7 days 14 tablet 0     Allergies   Allergen Reactions     Avapro [Irbesartan]      Profuse sweating     Crestor [Rosuvastatin Calcium]      cough     Sulfa Drugs Swelling and Hives     Simvastatin      Elevated BS readings     Recent Labs   Lab Test 08/21/18  1606 03/21/18  1345 09/21/17  0833 03/24/17  1255  09/12/16  1140 06/09/16  1147  11/08/13  1252  05/13/13  1308   A1C 7.6* 7.6*  --  8.1* 7.5* 7.9*   < >  --    < >  --    *  --  127* 109* 92 93   < >  --   --   --    HDL 75  --   --   --  92 73   < >  --   --   --    TRIG 112  --   --   --  60 92   < >  --   --   --    ALT  --   --   --   --  262*  --   --  63  --  64   CR  --  0.72  --  0.71  --   --    < >  --   --  0.52*   GFRESTIMATED  --  >90  --  >90  Non  GFR Calc    --   --    < >  --   --  >90   GFRESTBLACK  --  >90  --  >90  African American GFR Calc    --   --    < >  --   --  >90   POTASSIUM  --  4.9  --  4.8  --   --    < >  --   --  4.9   TSH  --  2.73 1.92  --   --   --    < >  --   --   --     < > = values in this interval not displayed.      BP Readings from Last 3 Encounters:   01/30/20 (!) 149/104   12/23/19 (!) 149/86   08/21/18 134/84    Wt Readings from Last 3 Encounters:   01/30/20 74.3 kg (163 lb 12.8 oz)   12/23/19 78.1 kg (172 lb 3.2 oz)   08/21/18 75.3 kg (166 lb)                    Reviewed and updated as needed this visit by Provider         Review of Systems   ROS COMP: Constitutional, HEENT, cardiovascular, pulmonary, gi and gu systems are negative, except as otherwise noted.       Objective   Reported vitals:  There were no vitals taken for this visit.   healthy, alert and no distress  PSYCH: Alert and oriented times 3; coherent speech, normal   rate and volume, able to articulate logical thoughts, able   to abstract reason, no tangential thoughts, no hallucinations   or delusions  His affect is normal  RESP: No cough, no audible wheezing, able to talk in full sentences  Remainder of exam unable to be completed due to telephone visits    Diagnostic Test Results:  Labs reviewed in Epic        Assessment/Plan:  1. Type 1 diabetes mellitus with diabetic polyneuropathy (H)  I had a detailed discussion with this gentleman. There are many different issues . We agreed to send a prescription for the [place] home monitor  for blood glucose monitoring and will postpone a new referral to certified diabetes educator until we see if this approach helps him. He had the last hemoglobin a1c  [ diabetes test ] in January 2020 [ see care everywhere] . His average hemoglobin a1c  [ diabetes test ] has been 8% for 4-5 years now. He agrees with a goal of getting back down towards the 7-7.5% RANGE. He also requested we write a new prescription for his Gabapentin [Neurontin] to reflect his increased dosing secondary to recent developed of diabetes neuropathy [ last 12-18 months or so ]  - gabapentin (NEURONTIN) 300 MG capsule; Take 1 capsule (300 mg) by mouth See Admin Instructions 2 tabs am, 1 noon, 2 pm  Dispense: 150 capsule; Refill: 5  - continuous blood glucose monitoring (FREESTYLE JUSTIN) sensor; For use with Freestyle Justni Flash  for continuous monitioring of blood glucose levels. Replace sensor every 10 days.  Dispense: 1 each; Refill: 11  - Continuous Blood Gluc  (FREESTYLE JUSTIN READER) MYA; 1 Device See Admin Instructions  Dispense: 1 Device; Refill: 0    2. Pain of both elbows  As we had reviewed his history and symptoms the diagnosis became increasingly uncertain. He has a history of bilateral carpal tunnel syndrome with an needle EMG examination done purportedly in about 2015. He described this as extremely painful for him and he's none to excited about a repeat. But as we reviewed his history, without exam, the differential diagnosis came to include ulnar neuritis, carpal tunnel syndrome, cervical radiculopathy, medial epicondylitis  And osteoarthritis and although we did review potential treatment for medial epicondylitis  , I felt it to be prudent to recommend seeing orthopedic   - ORTHOPEDICS ADULT REFERRAL    3. 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)  He's emphasized the need for continuous glucose monitor . I agree, prescription sent   - continuous blood glucose monitoring (FREESTYLE JUSTIN) sensor;  For use with Freestyle Jenni Flash  for continuous monitioring of blood glucose levels. Replace sensor every 10 days.  Dispense: 1 each; Refill: 11  - Continuous Blood Gluc  (FREESTYLE JENNI READER) MYA; 1 Device See Admin Instructions  Dispense: 1 Device; Refill: 0    4. Spasms of the hands or feet  As it turns out this is not a new problem and diagnosis is not entirely clear to me. Will need further follow up and review      No follow-ups on file.    Different questions patient has    [employer] diabetes mellitus waiver form for driving. His is overdue . I agreed to complete. He mary ann send us this paper work     Patient needs help with Gabapentin [Neurontin] dosing. He used the take 300 milligrams 2 times a day . He does he feel it helps with the dose increased to 600 milligrams am, 300 noon is and 600 in evening , taken for diabetes neuropathy . He feels this higher dosing is helping his painful . Left greater then right. Maybe wants to see also a podiatry consultation.     Undiagnosed osteoarthritis in both hands. Severe involuntary hand clenching movements, associated with pain. He was diagnosed with severe carpal tunnel syndrome but he doesn't think this is the problem. Then he has noted major issues with the inside bone of his left sided elbow. He thought he bumped it but his symptoms now have been persistent past 3 months. He has also a bad shoulder and symptoms are hard to sort out, possibilities including medial epicondylitis , cervical radiculopathy, shoulder problems and ulnar neuritis . He completed an needle EMG examination that brought him to tears 5 years ago. Also has low back pain issues.    Takes his long acting insulin at around 1:30     Lab Results   Component Value Date    A1C 7.6 08/21/2018    A1C 7.6 03/21/2018    A1C 8.1 03/24/2017    A1C 7.5 09/12/2016    A1C 7.9 06/09/2016         Phone call duration:  30 minutes    12:12 PM  12:42 PM         Haim Bustos MD

## 2020-06-15 DIAGNOSIS — G25.81 RESTLESS LEG SYNDROME: ICD-10-CM

## 2020-06-16 RX ORDER — ROPINIROLE 0.25 MG/1
TABLET, FILM COATED ORAL
Qty: 180 TABLET | Refills: 0 | Status: SHIPPED | OUTPATIENT
Start: 2020-06-16 | End: 2022-07-06

## 2020-08-12 DIAGNOSIS — I10 HYPERTENSION GOAL BP (BLOOD PRESSURE) < 140/90: ICD-10-CM

## 2020-08-12 RX ORDER — LISINOPRIL 40 MG/1
40 TABLET ORAL DAILY
Qty: 30 TABLET | Refills: 0 | Status: SHIPPED | OUTPATIENT
Start: 2020-08-12 | End: 2020-10-05

## 2020-08-12 NOTE — TELEPHONE ENCOUNTER
"Routing refill request to provider for review/approval because:  Failed protocol - see below  Last prescribed by Dr. Aleman    Requested Prescriptions   Pending Prescriptions Disp Refills     lisinopril (ZESTRIL) 40 MG tablet 90 tablet 3     Sig: Take 1 tablet (40 mg) by mouth daily No refills. Needs visit       ACE Inhibitors (Including Combos) Protocol Failed - 8/12/2020 12:49 PM        Failed - Blood pressure under 140/90 in past 12 months     BP Readings from Last 3 Encounters:   01/30/20 (!) 149/104   12/23/19 (!) 149/86   08/21/18 134/84                 Failed - Normal serum creatinine on file in past 12 months     Recent Labs   Lab Test 03/21/18  1345   CR 0.72       Ok to refill medication if creatinine is low          Failed - Normal serum potassium on file in past 12 months     Recent Labs   Lab Test 03/21/18  1345   POTASSIUM 4.9             Passed - Recent (12 mo) or future (30 days) visit within the authorizing provider's specialty     Patient has had an office visit with the authorizing provider or a provider within the authorizing providers department within the previous 12 mos or has a future within next 30 days. See \"Patient Info\" tab in inbasket, or \"Choose Columns\" in Meds & Orders section of the refill encounter.              Passed - Medication is active on med list        Passed - Patient is age 18 or older           Mabel Lowe RN  "

## 2020-08-20 ENCOUNTER — TELEPHONE (OUTPATIENT)
Dept: FAMILY MEDICINE | Facility: CLINIC | Age: 50
End: 2020-08-20

## 2020-08-20 NOTE — TELEPHONE ENCOUNTER
Please abstract the following data from this visit with this patient into the appropriate field in Epic:    Tests that can be patient reported without a hard copy:      Other Tests found in the patient's chart through Chart Review/Care Everywhere:    A1c done by this group [facility] on this date: 1/30/20 and Lipid done by this group [facility] on this date: 2/1/20    Note to [employer]: If this section is blank, no results were found via Chart Review/Care Everywhere.

## 2020-09-02 ENCOUNTER — TELEPHONE (OUTPATIENT)
Dept: INTERNAL MEDICINE | Facility: CLINIC | Age: 50
End: 2020-09-02

## 2020-09-02 DIAGNOSIS — E10.9 TYPE 1 DIABETES MELLITUS WITHOUT COMPLICATION (H): ICD-10-CM

## 2020-09-02 RX ORDER — FLURBIPROFEN SODIUM 0.3 MG/ML
SOLUTION/ DROPS OPHTHALMIC
Qty: 600 EACH | Refills: 2 | Status: SHIPPED | OUTPATIENT
Start: 2020-09-02 | End: 2022-01-03

## 2020-09-17 ENCOUNTER — TRANSFERRED RECORDS (OUTPATIENT)
Dept: HEALTH INFORMATION MANAGEMENT | Facility: CLINIC | Age: 50
End: 2020-09-17

## 2020-09-30 ENCOUNTER — OFFICE VISIT (OUTPATIENT)
Dept: ORTHOPEDICS | Facility: CLINIC | Age: 50
End: 2020-09-30
Payer: COMMERCIAL

## 2020-09-30 VITALS — BODY MASS INDEX: 25.01 KG/M2 | HEIGHT: 68 IN | HEART RATE: 82 BPM | WEIGHT: 165 LBS | RESPIRATION RATE: 16 BRPM

## 2020-09-30 DIAGNOSIS — M54.12 CERVICAL RADICULOPATHY: ICD-10-CM

## 2020-09-30 DIAGNOSIS — M54.2 CERVICALGIA: Primary | ICD-10-CM

## 2020-09-30 PROCEDURE — 99204 OFFICE O/P NEW MOD 45 MIN: CPT | Performed by: PEDIATRICS

## 2020-09-30 ASSESSMENT — MIFFLIN-ST. JEOR: SCORE: 1572.94

## 2020-09-30 NOTE — PROGRESS NOTES
Sports Medicine Clinic Visit      PCP: Haim Bustos    Rudolph Tao is a 50 year old male who is seen  as a self referral presenting with left arm pain.  He was seen at an ER in [place], [place] and he was given cyclobenzaprine as well as 3 shots intra muscular.  He is not sure what all of them were, but he does know one of them was a steroid and this did cause his sugars to increase as a Type 1 diabetic.   Currently pain down his left arm.  Numbness and tingling into his hand.    He had a CT and x ray done thru the ER.     He is right hand dominant.   Has has been told he had CTS in the past with an EMG, has not had surgery.      Originally had an appointment with Dr. Clement and his team called him to cancel that appointment as he was told he does not treat spine.      Pain with raising his arm as well as turning to the left.      Injury: gradual onset   **  EMG ~5 years ago, identified some neck and upper back issues.  Dec 2013:    Interpretation:     1. Bilateral carpal tunnel syndrome, at least moderate.  2. Left ulnar neuropathy at the elbow.  3. Cannot rule out coexistent bilateral ulnar neuropathies at the wrist.  4. No electrodiagnostic evidence of right cervical radiculopathy or brachial plexopathy.  5. Electrically silent cramps. This finding can occur in certain metabolic myopathies. Clinical correlation is recommended.     EMG Physician:      Bertrand Mcneil MD      **  Initially to ED 9/17. Started with left side neck pain. Using heat, icing. Some benefit from acetaminophen, but does not last. Also using ibuprofen, naproxen.  Also has taken hydrocodone, cyclobenzaprine.    Radiates from left side of neck, to upper trap, to posterior left shoulder and upper arm, and into forearm and into fingers. Has hx of CTS, but sensation change has increased since onset of neck pain.      Mild numbness right hand as well. Baseline.          Location of Pain: left neck and upper extremity   Duration of Pain:  2+ week(s)  Rating of Pain at worst: 10/10  Rating of Pain Currently: 8/10  Symptoms are better with: Nothing  Symptoms are worse with: movement   Additional Features:   Positive: paresthesias, numbness and weakness   Negative: swelling, bruising, popping, grinding, catching, locking, instability, pain in other joints and systemic symptoms  Other evaluation and/or treatments so far consists of: CT, X ray   Prior History of related problems: HX of CTS, shoulder pain     Social History: currently not employed with Cov[employer], worked for [employer]    Review of Systems  Musculoskeletal: as above  Remainder of review of systems is negative including constitutional, CV, pulmonary, GI, Skin and Neurologic except as noted in HPI or medical history.    Past Medical History:   Diagnosis Date     Adopted      Anxiety      Anxiety      Chronic low back pain      DDD (degenerative disc disease), lumbar 3/8/2018     Diabetes mellitus (H)     TYPE 1     Hallux abducto valgus      Hyperlipaemia      Hyperlipidemia      Hypertension      Lip lesion 8/2/2010     Pain in toe of left foot     2 ND TOE     RLS (restless legs syndrome)      Snoring     MODERATE SEVERITY     Swelling of foot joint 7/13/2012     Past Surgical History:   Procedure Laterality Date     APPENDECTOMY  1990     OSTEOTOMY FOOT  1/8/2013    Procedure: OSTEOTOMY FOOT;  Left Foot Distal First Metarsal Osteotomy, Second Toe Hammer Toe Correction, Second Metatarsal Weil Osteotomy;  Surgeon: Robert Augustine DPM;  [facility]     SURGICAL HISTORY OF -   1992    Appy     Family History   Adopted: Yes   Problem Relation Age of Onset     Unknown/Adopted Mother      Cancer Mother      Unknown/Adopted Father      Unknown/Adopted Maternal Grandmother      Unknown/Adopted Maternal Grandfather      Unknown/Adopted Paternal Grandmother      Unknown/Adopted Paternal Grandfather      Social History     Socioeconomic History     Marital status:      Spouse name: Not on file  "    Number of children: Not on file     Years of education: Not on file     Highest education level: Not on file   Occupational History     Not on file   Social Needs     Financial resource strain: Not on file     Food insecurity     Worry: Not on file     Inability: Not on file     Transportation needs     Medical: Not on file     Non-medical: Not on file   Tobacco Use     Smoking status: Former Smoker     Packs/day: 0.00     Years: 20.00     Pack years: 0.00     Types: Cigarettes     Last attempt to quit: 10/1/2015     Years since quittin.0     Smokeless tobacco: Never Used     Tobacco comment: 2-4  cigarettes daily   Substance and Sexual Activity     Alcohol use: Yes     Comment: 2 drinks daily     Drug use: No     Sexual activity: Yes     Partners: Female     Birth control/protection: None   Lifestyle     Physical activity     Days per week: Not on file     Minutes per session: Not on file     Stress: Not on file   Relationships     Social connections     Talks on phone: Not on file     Gets together: Not on file     Attends Yazidi service: Not on file     Active member of club or organization: Not on file     Attends meetings of clubs or organizations: Not on file     Relationship status: Not on file     Intimate partner violence     Fear of current or ex partner: Not on file     Emotionally abused: Not on file     Physically abused: Not on file     Forced sexual activity: Not on file   Other Topics Concern     Parent/sibling w/ CABG, MI or angioplasty before 65F 55M? No   Social History Narrative    History commercial photography    Customer Service at [employer]    Ex-wife, daughter 15yo as of 2018        Objective  Pulse 82   Resp 16   Ht 1.727 m (5' 8\")   Wt 74.8 kg (165 lb)   BMI 25.09 kg/m      GENERAL APPEARANCE: healthy, alert and no distress   GAIT: NORMAL  SKIN: no suspicious lesions or rashes  NEURO: Normal tone, mentation intact and speech normal  PSYCH:  mentation appears normal and " affect normal/bright  HEENT: no scleral icterus  CV: distal perfusion intact  RESP: nonlabored breathing      Cervical Spine Exam    Range of Motion:         forward flexion mild limitation with pain        extension mod to significant limitation, with pain        lateral flexion to right no change; to left increased pain, with radiation    Inspection:   Some loss of lordosis    Tender:        paraspinal muscles       upper border of trapezius       left    Strength:       C4 (shoulder shrug)  symmetric        C5 (shoulder abduction) symmetric        C6 (elbow flexion) symmetric        C7 (elbow extension) symmetric        C8 (finger abduction, thumb flexion) asymmetric diminished left    Reflexes:        C5 (biceps) symmetric        C6 (supinator) symmetric        C7 (triceps) symmetric   intact    Special Tests:       positive (+) Spurling    Skin:       well perfused       capillary refill brisk    Lymphatics:        no edema noted in the upper extremities         Radiology  Imaging from ED visit in [place] not available at the time of visit, but following visit received fax of ED visit and imaging reports.  [facility]; [address] [place] [address]    IMPRESSION:  1.  No acute osseous abnormality within the cervical spine  2.  C3-4: Moderate spinal stenosis.  Severe right and moderate left neural foraminal stenosis  3.  C4-5: Severe right and moderate left neuroforaminal stenosis.  Mild central disc protrusion  4.  C5-6: Mild central disc protrusion  5.  C6-7: Mild central disc protrusion        Assessment:  1. Cervicalgia    2. Cervical radiculopathy        Plan:  Discussed the assessment with the patient.  Consistent with c-spine source, cervical radiculopathy, though he does have hx of peripheral issues; see EMG above.  We discussed the following: symptom treatment, activity modification/rest, imaging, rehab, injection therapy, medication and  electrodiagnostic testing. Following discussion, plan:    Discussed increasing gabapentin.  Has had imaging of the cervical spine. Await images; RICHIE signed. Reports as noted above.  Rehab: Physical Therapy: referral placed.  Discussed consideration repeat Edx testing; hold for now, start with treating as cervical source. Repeat EMG may be future consideration, pending course with PT and injection.  Discussed potential injection therapy, through pain mgmt. Referral placed. We discussed potential for increase in blood sugars with this as well, he understands. Will work on obtaining previous imaging prior to that time.  Follow up: 2-3 weeks after injection, sooner prn.  Questions answered. Discussed signs and symptoms that may indicate more serious issues; the patient was instructed to seek appropriate care if noted. Kris indicates understanding of these issues and agrees with the plan.      Drake Alberto DO, CAAJITH                Patient Instructions   We discussed increasing the gabapentin; currently 600 mg daily   Next go to 600 mg am, 300 pm for 1-3 days; then   600 mg am, 600 mg pm for 1-3 days; then   600 mg am, 300 mg afternoon, 600 mg pm    Physical therapy referral placed.    Referred for injection through pain management; will work on getting imaging prior to that time.    Recheck or contact clinic with update 2-3 weeks after injection.      This note consists of symbols derived from keyboarding, dictation and/or voice recognition software. As a result, there may be errors in the script that have gone undetected. Please consider this when interpreting information found in this chart.

## 2020-09-30 NOTE — PATIENT INSTRUCTIONS
We discussed increasing the gabapentin; currently 600 mg daily   Next go to 600 mg am, 300 pm for 1-3 days; then   600 mg am, 600 mg pm for 1-3 days; then   600 mg am, 300 mg afternoon, 600 mg pm    Physical therapy referral placed.    Referred for injection through pain management; will work on getting imaging prior to that time.    Recheck or contact clinic with update 2-3 weeks after injection.

## 2020-09-30 NOTE — LETTER
9/30/2020         RE: Rudolph Tao  [id number] [address]        Dear Colleague,    Thank you for referring your patient, Rudolph Tao, to the [facility]. Please see a copy of my visit note below.    Sports Medicine Clinic Visit      PCP: Haim Bustos    Rudolph Tao is a 50 year old male who is seen  as a self referral presenting with left arm pain.  He was seen at an ER in [place], [place] and he was given cyclobenzaprine as well as 3 shots intra muscular.  He is not sure what all of them were, but he does know one of them was a steroid and this did cause his sugars to increase as a Type 1 diabetic.   Currently pain down his left arm.  Numbness and tingling into his hand.    He had a CT and x ray done thru the ER.     He is right hand dominant.   Has has been told he had CTS in the past with an EMG, has not had surgery.      Originally had an appointment with Dr. Clement and his team called him to cancel that appointment as he was told he does not treat spine.      Pain with raising his arm as well as turning to the left.      Injury: gradual onset   **  EMG ~5 years ago, identified some neck and upper back issues.  Dec 2013:    Interpretation:     1. Bilateral carpal tunnel syndrome, at least moderate.  2. Left ulnar neuropathy at the elbow.  3. Cannot rule out coexistent bilateral ulnar neuropathies at the wrist.  4. No electrodiagnostic evidence of right cervical radiculopathy or brachial plexopathy.  5. Electrically silent cramps. This finding can occur in certain metabolic myopathies. Clinical correlation is recommended.     EMG Physician:      Bertrand Mcneil MD      **  Initially to ED 9/17. Started with left side neck pain. Using heat, icing. Some benefit from acetaminophen, but does not last. Also using ibuprofen, naproxen.  Also has taken hydrocodone, cyclobenzaprine.    Radiates from left side of neck, to upper trap, to  posterior left shoulder and upper arm, and into forearm and into fingers. Has hx of CTS, but sensation change has increased since onset of neck pain.      Mild numbness right hand as well. Baseline.          Location of Pain: left neck and upper extremity   Duration of Pain: 2+ week(s)  Rating of Pain at worst: 10/10  Rating of Pain Currently: 8/10  Symptoms are better with: Nothing  Symptoms are worse with: movement   Additional Features:   Positive: paresthesias, numbness and weakness   Negative: swelling, bruising, popping, grinding, catching, locking, instability, pain in other joints and systemic symptoms  Other evaluation and/or treatments so far consists of: CT, X ray   Prior History of related problems: HX of CTS, shoulder pain     Social History: currently not employed with [employer], worked for [employer]    Review of Systems  Musculoskeletal: as above  Remainder of review of systems is negative including constitutional, CV, pulmonary, GI, Skin and Neurologic except as noted in HPI or medical history.    Past Medical History:   Diagnosis Date     Adopted      Anxiety      Anxiety      Chronic low back pain      DDD (degenerative disc disease), lumbar 3/8/2018     Diabetes mellitus (H)     TYPE 1     Hallux abducto valgus      Hyperlipaemia      Hyperlipidemia      Hypertension      Lip lesion 8/2/2010     Pain in toe of left foot     2 ND TOE     RLS (restless legs syndrome)      Snoring     MODERATE SEVERITY     Swelling of foot joint 7/13/2012     Past Surgical History:   Procedure Laterality Date     APPENDECTOMY  1990     OSTEOTOMY FOOT  1/8/2013    Procedure: OSTEOTOMY FOOT;  Left Foot Distal First Metarsal Osteotomy, Second Toe Hammer Toe Correction, Second Metatarsal Weil Osteotomy;  Surgeon: Robert Augustine DPM;  [facility]     SURGICAL HISTORY OF -   1992    Appy     Family History   Adopted: Yes   Problem Relation Age of Onset     Unknown/Adopted Mother      Cancer Mother      Unknown/Adopted  Father      Unknown/Adopted Maternal Grandmother      Unknown/Adopted Maternal Grandfather      Unknown/Adopted Paternal Grandmother      Unknown/Adopted Paternal Grandfather      Social History     Socioeconomic History     Marital status:      Spouse name: Not on file     Number of children: Not on file     Years of education: Not on file     Highest education level: Not on file   Occupational History     Not on file   Social Needs     Financial resource strain: Not on file     Food insecurity     Worry: Not on file     Inability: Not on file     Transportation needs     Medical: Not on file     Non-medical: Not on file   Tobacco Use     Smoking status: Former Smoker     Packs/day: 0.00     Years: 20.00     Pack years: 0.00     Types: Cigarettes     Last attempt to quit: 10/1/2015     Years since quittin.0     Smokeless tobacco: Never Used     Tobacco comment: 2-4  cigarettes daily   Substance and Sexual Activity     Alcohol use: Yes     Comment: 2 drinks daily     Drug use: No     Sexual activity: Yes     Partners: Female     Birth control/protection: None   Lifestyle     Physical activity     Days per week: Not on file     Minutes per session: Not on file     Stress: Not on file   Relationships     Social connections     Talks on phone: Not on file     Gets together: Not on file     Attends Tenriism service: Not on file     Active member of club or organization: Not on file     Attends meetings of clubs or organizations: Not on file     Relationship status: Not on file     Intimate partner violence     Fear of current or ex partner: Not on file     Emotionally abused: Not on file     Physically abused: Not on file     Forced sexual activity: Not on file   Other Topics Concern     Parent/sibling w/ CABG, MI or angioplasty before 65F 55M? No   Social History Narrative    History commercial photography    Customer Service at [employer]    Ex-wife, daughter 15yo as of 2018        Objective  Pulse 82    "Resp 16   Ht 1.727 m (5' 8\")   Wt 74.8 kg (165 lb)   BMI 25.09 kg/m      GENERAL APPEARANCE: healthy, alert and no distress   GAIT: NORMAL  SKIN: no suspicious lesions or rashes  NEURO: Normal tone, mentation intact and speech normal  PSYCH:  mentation appears normal and affect normal/bright  HEENT: no scleral icterus  CV: distal perfusion intact  RESP: nonlabored breathing      Cervical Spine Exam    Range of Motion:         forward flexion mild limitation with pain        extension mod to significant limitation, with pain        lateral flexion to right no change; to left increased pain, with radiation    Inspection:   Some loss of lordosis    Tender:        paraspinal muscles       upper border of trapezius       left    Strength:       C4 (shoulder shrug)  symmetric        C5 (shoulder abduction) symmetric        C6 (elbow flexion) symmetric        C7 (elbow extension) symmetric        C8 (finger abduction, thumb flexion) asymmetric diminished left    Reflexes:        C5 (biceps) symmetric        C6 (supinator) symmetric        C7 (triceps) symmetric   intact    Special Tests:       positive (+) Spurling    Skin:       well perfused       capillary refill brisk    Lymphatics:        no edema noted in the upper extremities         Radiology  Imaging from ED visit in [place] not available at the time of visit, but following visit received fax of ED visit and imaging reports.  [facility]; [address] [place] [address]    IMPRESSION:  1.  No acute osseous abnormality within the cervical spine  2.  C3-4: Moderate spinal stenosis.  Severe right and moderate left neural foraminal stenosis  3.  C4-5: Severe right and moderate left neuroforaminal stenosis.  Mild central disc protrusion  4.  C5-6: Mild central disc protrusion  5.  C6-7: Mild central disc protrusion        Assessment:  1. Cervicalgia    2. Cervical radiculopathy        Plan:  Discussed the assessment " with the patient.  Consistent with c-spine source, cervical radiculopathy, though he does have hx of peripheral issues; see EMG above.  We discussed the following: symptom treatment, activity modification/rest, imaging, rehab, injection therapy, medication and electrodiagnostic testing. Following discussion, plan:    Discussed increasing gabapentin.  Has had imaging of the cervical spine. Await images; RICHIE signed. Reports as noted above.  Rehab: Physical Therapy: referral placed.  Discussed consideration repeat Edx testing; hold for now, start with treating as cervical source. Repeat EMG may be future consideration, pending course with PT and injection.  Discussed potential injection therapy, through pain mgmt. Referral placed. We discussed potential for increase in blood sugars with this as well, he understands. Will work on obtaining previous imaging prior to that time.  Follow up: 2-3 weeks after injection, sooner prn.  Questions answered. Discussed signs and symptoms that may indicate more serious issues; the patient was instructed to seek appropriate care if noted. Kris indicates understanding of these issues and agrees with the plan.      Drake Alberto DO, CAQ                Patient Instructions   We discussed increasing the gabapentin; currently 600 mg daily   Next go to 600 mg am, 300 pm for 1-3 days; then   600 mg am, 600 mg pm for 1-3 days; then   600 mg am, 300 mg afternoon, 600 mg pm    Physical therapy referral placed.    Referred for injection through pain management; will work on getting imaging prior to that time.    Recheck or contact clinic with update 2-3 weeks after injection.      This note consists of symbols derived from keyboarding, dictation and/or voice recognition software. As a result, there may be errors in the script that have gone undetected. Please consider this when interpreting information found in this chart.        Again, thank you for allowing me to participate in the care of  your patient.        Sincerely,        Drake Alberto, DO

## 2020-10-01 ENCOUNTER — TELEPHONE (OUTPATIENT)
Dept: PALLIATIVE MEDICINE | Facility: CLINIC | Age: 50
End: 2020-10-01

## 2020-10-01 NOTE — TELEPHONE ENCOUNTER
Pt will diana back to schedule JESSE.    Ninoska HERNANDEZ    [facility]

## 2020-10-05 ENCOUNTER — OFFICE VISIT (OUTPATIENT)
Dept: INTERNAL MEDICINE | Facility: CLINIC | Age: 50
End: 2020-10-05
Payer: COMMERCIAL

## 2020-10-05 VITALS
DIASTOLIC BLOOD PRESSURE: 82 MMHG | BODY MASS INDEX: 25.73 KG/M2 | OXYGEN SATURATION: 98 % | TEMPERATURE: 98.1 F | WEIGHT: 169.8 LBS | HEIGHT: 68 IN | HEART RATE: 108 BPM | SYSTOLIC BLOOD PRESSURE: 144 MMHG

## 2020-10-05 DIAGNOSIS — Z23 NEED FOR DIPHTHERIA-TETANUS-PERTUSSIS (TDAP) VACCINE: ICD-10-CM

## 2020-10-05 DIAGNOSIS — E10.8 DIABETES MELLITUS TYPE 1 WITH COMPLICATIONS (H): Primary | ICD-10-CM

## 2020-10-05 DIAGNOSIS — K31.84 GASTROPARESIS: ICD-10-CM

## 2020-10-05 DIAGNOSIS — Z23 NEEDS FLU SHOT: ICD-10-CM

## 2020-10-05 DIAGNOSIS — I10 HYPERTENSION GOAL BP (BLOOD PRESSURE) < 140/90: ICD-10-CM

## 2020-10-05 DIAGNOSIS — Z23 NEED FOR 23-POLYVALENT PNEUMOCOCCAL POLYSACCHARIDE VACCINE: ICD-10-CM

## 2020-10-05 DIAGNOSIS — E78.5 HYPERLIPIDEMIA LDL GOAL <100: ICD-10-CM

## 2020-10-05 DIAGNOSIS — E10.42 DIABETIC POLYNEUROPATHY ASSOCIATED WITH TYPE 1 DIABETES MELLITUS (H): ICD-10-CM

## 2020-10-05 DIAGNOSIS — Z20.822 SUSPECTED COVID-19 VIRUS INFECTION: ICD-10-CM

## 2020-10-05 DIAGNOSIS — Z23 NEED FOR PROPHYLACTIC VACCINATION AND INOCULATION AGAINST INFLUENZA: ICD-10-CM

## 2020-10-05 LAB — HBA1C MFR BLD: 7.6 % (ref 0–5.6)

## 2020-10-05 PROCEDURE — 90682 RIV4 VACC RECOMBINANT DNA IM: CPT | Performed by: INTERNAL MEDICINE

## 2020-10-05 PROCEDURE — 83036 HEMOGLOBIN GLYCOSYLATED A1C: CPT | Performed by: INTERNAL MEDICINE

## 2020-10-05 PROCEDURE — 99207 PR FOOT EXAM NO CHARGE: CPT | Performed by: INTERNAL MEDICINE

## 2020-10-05 PROCEDURE — 99214 OFFICE O/P EST MOD 30 MIN: CPT | Mod: 25 | Performed by: INTERNAL MEDICINE

## 2020-10-05 PROCEDURE — 90471 IMMUNIZATION ADMIN: CPT | Performed by: INTERNAL MEDICINE

## 2020-10-05 PROCEDURE — 86769 SARS-COV-2 COVID-19 ANTIBODY: CPT | Performed by: INTERNAL MEDICINE

## 2020-10-05 RX ORDER — LISINOPRIL 40 MG/1
40 TABLET ORAL DAILY
Qty: 90 TABLET | Refills: 1 | Status: SHIPPED | OUTPATIENT
Start: 2020-10-05 | End: 2021-06-09

## 2020-10-05 RX ORDER — METOCLOPRAMIDE 10 MG/1
10 TABLET ORAL 4 TIMES DAILY PRN
Qty: 30 TABLET | Refills: 1 | Status: SHIPPED | OUTPATIENT
Start: 2020-10-05 | End: 2021-12-09

## 2020-10-05 RX ORDER — PRAVASTATIN SODIUM 20 MG
20 TABLET ORAL DAILY
Qty: 90 TABLET | Refills: 3 | Status: SHIPPED | OUTPATIENT
Start: 2020-10-05 | End: 2021-12-20

## 2020-10-05 RX ORDER — ONDANSETRON 4 MG/1
4 TABLET, ORALLY DISINTEGRATING ORAL EVERY 8 HOURS PRN
Qty: 20 TABLET | Refills: 0 | Status: SHIPPED | OUTPATIENT
Start: 2020-10-05 | End: 2021-12-09

## 2020-10-05 ASSESSMENT — MIFFLIN-ST. JEOR: SCORE: 1594.71

## 2020-10-05 NOTE — PROGRESS NOTES
Subjective     Rudolph Tao is a 50 year old male who presents to clinic today for the following health issues:    HPI         Diabetes Follow-up    How often are you checking your blood sugar? Three times daily  Blood sugar testing frequency justification:   off work since March 2nd. Spouse also lost her job. Now on medical assistance.   What time of day are you checking your blood sugars (select all that apply)?  Before and after meals  Have you had any blood sugars above 200?  Yes   Have you had any blood sugars below 70?  Yes     What symptoms do you notice when your blood sugar is low?  Shaky and lightheaded.     What concerns do you have today about your diabetes? Low blood sugar and Other: concerned about Gastroparesis     Do you have any of these symptoms? (Select all that apply)  Numbness in feet and Burning in feet    Have you had a diabetic eye exam in the last 12 months? No    [place] and back for only about a week now. Here with spouse    Lab Results   Component Value Date    A1C 7.6 10/05/2020    A1C 8.2 01/30/2020    A1C 7.6 08/21/2018    A1C 7.6 03/21/2018    A1C 8.1 03/24/2017     Off work since March 2nd         BP Readings from Last 2 Encounters:   10/05/20 (!) 144/82   01/30/20 (!) 149/104     Hemoglobin A1C (%)   Date Value   10/05/2020 7.6 (H)   01/30/2020 8.2 (H)     LDL Cholesterol Calculated (mg/dL)   Date Value   02/01/2020 98   08/21/2018 152 (H)                 Review of Systems   Constitutional, HEENT, cardiovascular, pulmonary, gi and gu systems are negative, except as otherwise noted.      Objective    BP (!) 144/82   There is no height or weight on file to calculate BMI.  Physical Exam   GENERAL: healthy, alert and no distress  EYES: Eyes grossly normal to inspection, PERRL and conjunctivae and sclerae normal  MS: no gross musculoskeletal defects noted, no edema  NEURO: Normal strength and tone, mentation intact and speech normal  PSYCH: mentation  appears normal, affect normal/bright    Orders Placed This Encounter   Procedures     FOOT EXAM     NM Gastric Emptying     Hemoglobin A1c     COVID-19 Virus (Coronavirus) Antibody & Titer Reflex     AMBULATORY ADULT DIABETES EDUCATOR REFERRAL             Assessment & Plan     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)  Today got into a long winded appointment with a review of many different issues. Patient has had diabetes mellitus type 1 for about 16 years and today's hemoglobin a1c  [ diabetes test ] is actually not too bad. Nevertheless we could strive for better. Got into a discussion of the insulin pump and if patient is serious about wanting this he's going to need to get scheduled with certified diabetes educator. He's not yet entirely clear that he wants this. We did spend a considerable amount of time in review of basic diabetes mellitus care. We need to start seeing this patient regularly at the very least in 6 months   - Hemoglobin A1c  - AMBULATORY ADULT DIABETES EDUCATOR REFERRAL; Future  - aspirin (ASA) 81 MG EC tablet; Take 1 tablet (81 mg) by mouth daily  - pravastatin (PRAVACHOL) 20 MG tablet; Take 1 tablet (20 mg) by mouth daily  - Basic metabolic panel    Gastroparesis  This was a prior diagnostic and patient has a lot of questions about this condition and diagnosis, so much so that a - NM Gastric Emptying Study seems appropriate at this point. With further follow up depending on the test results . Meanwhile will refill his medications  - NM Gastric Emptying; Future  - metoclopramide (REGLAN) 10 MG tablet; Take 1 tablet (10 mg) by mouth 4 times daily as needed  - ondansetron (ZOFRAN-ODT) 4 MG ODT tab; Take 1 tablet (4 mg) by mouth every 8 hours as needed for nausea    Suspected COVID-19 virus infection  Patient relates a history of a illness roughly 8 months ago that he remains fully convinced it was Coronavirus (COVID-19). Reviewed the role of the antibody testing and he wishes to go  forwards with the antibody test  - COVID-19 Virus (Coronavirus) Antibody & Titer Reflex; Future  - COVID-19 Virus (Coronavirus) Antibody & Titer Reflex    Needs flu shot      Diabetic polyneuropathy associated with type 1 diabetes mellitus (H)  Within normal limits foot exam today with no ulcerations although he does have some positive findings for diabetes neuropathy   - FOOT EXAM    Hypertension goal BP (blood pressure) < 140/90  He needed a refill with his medication. We can do a blood pressure recommended with next appointment   - lisinopril (ZESTRIL) 40 MG tablet; Take 1 tablet (40 mg) by mouth daily No refills. Needs visit  - Basic metabolic panel    Need for diphtheria-tetanus-pertussis (Tdap) vaccine  Recommended, declines     Need for 23-polyvalent pneumococcal polysaccharide vaccine  Declines     Hyperlipidemia LDL goal <100  Follow up as indicated on results   - Lipid panel reflex to direct LDL Fasting       Return in about 3 months (around 1/5/2021).    Haim Bustos MD  [facility]

## 2020-10-05 NOTE — LETTER
October 9, 2020      Rudolph Tao  [address] [place] [address]        Dear ,    We are writing to inform you of your test results.    Negative Coronavirus (COVID-19) antibody test        Resulted Orders   Hemoglobin A1c   Result Value Ref Range    Hemoglobin A1C 7.6 (H) 0 - 5.6 %      Comment:      Normal <5.7% Prediabetes 5.7-6.4%  Diabetes 6.5% or higher - adopted from ADA   consensus guidelines.     COVID-19 Virus (Coronavirus) Antibody & Titer Reflex   Result Value Ref Range    COVID-19 Antibody Screen Negative       Comment:      No COVID-19 antibodies detected.  Patients within 10 days of symptom onset for   COVID-19 may not produce sufficient levels of detectable antibodies.    Immunocompromised COVID-19 patients may take longer to develop antibodies.      COVID-19 Antibody, IgG Titer Not Applicable       Comment:      Qualitative screen for total antibodies to COVID-19 (SARS-CoV-2) with   semi-quantitative measurement of IgG COVID-19 antibodies by endpoint titer.    COVID-19 antibodies may be elevated due to a past or current infection.  Negative results do not rule out COVID-19 infection.  Results from antibody   testing should not be used as the sole basis to diagnose or exclude SARS-CoV-2   infection or to inform infection status.  COVID-19 PCR test should be ordered   if current infection is suspected.  False positive results may occur in rare   cases due to cross-reacting antibodies.  This test was developed and its performance characteristics determined by the   [facility] ([facility]),   which is regulated under CLIA as qualified to perform high-complexity testing.    This test has not been reviewed by the FDA.  Testing performed by [facility], [facility], [address], [address], [place] [address]         If you have any questions or concerns, please call the  clinic at the number listed above.       Sincerely,        Haim Bustos MD/mancuso

## 2020-10-08 LAB
COVID-19 SPIKE RBD ABY TITER: NORMAL
COVID-19 SPIKE RBD ABY: NEGATIVE

## 2020-11-16 ENCOUNTER — HEALTH MAINTENANCE LETTER (OUTPATIENT)
Age: 50
End: 2020-11-16

## 2020-12-10 ENCOUNTER — TELEPHONE (OUTPATIENT)
Dept: INTERNAL MEDICINE | Facility: CLINIC | Age: 50
End: 2020-12-10

## 2020-12-10 DIAGNOSIS — E10.9 TYPE 1 DIABETES MELLITUS WITHOUT COMPLICATION (H): ICD-10-CM

## 2020-12-10 NOTE — TELEPHONE ENCOUNTER
Patients insurance does not cover lantus, please send in basaglar, this is covered under insurance please send in asap.       Ana ANTHONY CMA ([facility])

## 2020-12-14 ENCOUNTER — NURSE TRIAGE (OUTPATIENT)
Dept: NURSING | Facility: CLINIC | Age: 50
End: 2020-12-14

## 2020-12-14 RX ORDER — INSULIN GLARGINE 100 [IU]/ML
30 INJECTION, SOLUTION SUBCUTANEOUS DAILY
Qty: 15 ML | Refills: 1 | Status: SHIPPED | OUTPATIENT
Start: 2020-12-14 | End: 2021-02-17

## 2020-12-14 NOTE — TELEPHONE ENCOUNTER
Triage Call:    Patient is a type I diabetic. Has neuropathy in his feet.   Hit 2nd toe on left foot (next to big toe) on the bed frame last night.  Pt states he has hardware in the toe.  Hardware is supposed to keep it from moving/bending.  Toe is bending now. Patient does not remember where he had the plates put in the toe.    Toe is purple.  Rates pain currently: moderate.     Pt was advised of protocol recommendation/disposition of go to ED/UCC now (or office with PCP approval).     Please advise on where patient should be seen.  Should patient go to ED/UCC, office or follow up with orthopedic urgent care?      Kathy Conte RN 12/14/20 1:34 PM  [facility] Nurse Advisor    Additional Information    Negative: Major bleeding (actively dripping or spurting) that can't be stopped    Negative: Amputation of toe    Negative: Sounds like a life-threatening emergency to the triager    Negative: Looks infected (e.g., spreading redness, pus, red streak)    Negative: High pressure injection injury (e.g., from paint gun, usually work-related    Looks like a broken bone or dislocated joint (e.g., crooked or deformed)    Protocols used: TOE INJURY-A-OH

## 2020-12-14 NOTE — TELEPHONE ENCOUNTER
Called patient. Pt had his left foot operated on about 5 years ago and hardware was placed in his toe (the one next to the big toe). The doctor put in a clip to keep the toe raised. He is concerned that the toe is broken, but more concerned that the hardware may be moved. Recommended visit with PCP, looks like there is an old x-ray if that foot. Scheduled appt for tomorrow.

## 2020-12-15 ENCOUNTER — ANCILLARY PROCEDURE (OUTPATIENT)
Dept: GENERAL RADIOLOGY | Facility: CLINIC | Age: 50
End: 2020-12-15
Attending: NURSE PRACTITIONER
Payer: COMMERCIAL

## 2020-12-15 ENCOUNTER — OFFICE VISIT (OUTPATIENT)
Dept: FAMILY MEDICINE | Facility: CLINIC | Age: 50
End: 2020-12-15
Payer: COMMERCIAL

## 2020-12-15 VITALS
HEART RATE: 90 BPM | BODY MASS INDEX: 26.79 KG/M2 | OXYGEN SATURATION: 99 % | SYSTOLIC BLOOD PRESSURE: 130 MMHG | TEMPERATURE: 98.4 F | WEIGHT: 176.2 LBS | DIASTOLIC BLOOD PRESSURE: 84 MMHG

## 2020-12-15 DIAGNOSIS — S99.922A TOE INJURY, LEFT, INITIAL ENCOUNTER: Primary | ICD-10-CM

## 2020-12-15 PROCEDURE — 73660 X-RAY EXAM OF TOE(S): CPT | Mod: LT | Performed by: RADIOLOGY

## 2020-12-15 PROCEDURE — 99213 OFFICE O/P EST LOW 20 MIN: CPT | Performed by: NURSE PRACTITIONER

## 2020-12-15 NOTE — PROGRESS NOTES
Subjective     Rudolph Tao is a 50 year old male who presents to clinic today for the following health issues:    HPI         Musculoskeletal problem/pain  Onset/Duration: 2 days  Description  Location: Second toe - left  Joint Swelling: no  Redness: Purple  Pain: YES  Warmth: no  Intensity:  moderate  Progression of Symptoms:  same  Accompanying signs and symptoms:   Fevers: no  Numbness/tingling/weakness: no  History  Trauma to the area: YES- hit toe on bedpost   Recent illness:  no  Previous similar problem: YES  Previous evaluation:  no  Precipitating or alleviating factors:  Aggravating factors include: none  Therapies tried and outcome: rest/inactivity and NSAID - aleve    Review of Systems   Constitutional, HEENT, cardiovascular, pulmonary, gi and gu systems are negative, except as otherwise noted.      Objective    /84   Pulse 90   Temp 98.4  F (36.9  C) (Oral)   Wt 79.9 kg (176 lb 3.2 oz)   SpO2 99%   BMI 26.79 kg/m    Body mass index is 26.79 kg/m .  Physical Exam   GENERAL: healthy, alert and no distress  EYES: Eyes grossly normal to inspection, PERRL and conjunctivae and sclerae normal  MS: Swelling reduced ROM left toes  SKIN: no suspicious lesions or rashes and ecchymoses - left second toe  NEURO: Normal strength and tone, mentation intact and speech normal  PSYCH: mentation appears normal, affect normal/bright    Results for orders placed or performed in visit on 12/15/20 (from the past 24 hour(s))   XR Toe Left G/E 2 Views    Narrative    TOE LEFT TWO OR MORE VIEWS   12/15/2020 12:37 PM     HISTORY: Toe injury, left, initial encounter.    COMPARISON: 2/28/2013 x-ray.      Impression    IMPRESSION: Postoperative changes from second toe PIP joint fusion and  osteotomy of the distal second metatarsal as well as osteotomy and  bunion shaving of the first metatarsal. The fusion of the second toe  appears solid. No evidence of second toe fracture or other fracture.  Vascular calcifications.            Assessment & Plan     Toe injury, left, initial encounter  Lisa taped toe with coban and gauze in between toes. Counseled on self-care measures including: lisa tape and DME shoe for 1 to 6 week until symptoms improve, OTC pain medication, ice; and warning signs of when to seek urgent medical care including: worsening symtpoms.  - XR Toe Left G/E 2 Views  - Ankle/Foot Bracing Supplies Order for DME - ONLY FOR DME         Return in about 4 weeks (around 1/12/2021) for diabetes check with PCP.    Gloria Tompkins, YUE CNP  [facility]

## 2020-12-15 NOTE — PATIENT INSTRUCTIONS
Patient Education     Lower Extremity Bruise  You have a bruise (contusion on a leg, knee, ankle, foot, or toe. Symptoms include pain, swelling, and skin discoloration. No bones are broken. This injury may take from a few days to a few weeks to heal.  During that time, the bruise may change from reddish in color, to purple-blue, to green-yellow, to yellow-brown.   Home care    Unless another medicine was prescribed, you can take acetaminophen, ibuprofen, or naproxen to control pain. Talk with your healthcare provider before using these medicines if you have chronic liver or kidney disease or ever had a stomach ulcer or digestive bleeding.    Elevate the injured area to reduce pain and swelling. As much as possible, sit or lie down with the injured area raised about the level of your heart. This is especially important during the first 48 hours.    Ice the injured area to help reduce pain and swelling. Wrap an ice pack or ice cubes in a plastic bag in a thin towel. Apply to the bruised area for 20 minutes every 1 to 2 hours the first day. Continue this 3 to 4 times a day until the pain and swelling goes away.    If crutches have been advised, don't bear full weight on the injured leg until you can do so without pain. You may return to sports when you are able to put full weight and impact on the injured leg without pain.    Follow up  Follow up with your healthcare provider, or as advised. Call if you are not improving within the next 1 to 2 weeks.   When to seek medical advice   Call your healthcare provider right away if any of these occur:    Increased pain or swelling    Foot or toes become cold, blue, numb or tingly    Signs of infection: Warmth, drainage, or increased redness or pain around the injury    Inability to move the injured area, or any joints below the injured area    Frequent bruising for unknown reasons  Melissa last reviewed this educational content on 8/1/2019 2000-2020 [employer] [place]. [address] [place] [address], [place] [address]. All rights reserved. This information is not intended as a substitute for professional medical care. Always follow your healthcare professional's instructions.

## 2020-12-17 ENCOUNTER — TELEPHONE (OUTPATIENT)
Dept: INTERNAL MEDICINE | Facility: CLINIC | Age: 50
End: 2020-12-17

## 2020-12-17 DIAGNOSIS — E10.8 DIABETES MELLITUS TYPE 1 WITH COMPLICATIONS (H): Primary | ICD-10-CM

## 2020-12-17 NOTE — TELEPHONE ENCOUNTER
Received fax from pharmacy, called pharmacy and they stating that prescription was sent over on accident for paul, Please send over as Lantus in the preferred medication.       Ana ANTHONY CMA ([facility])

## 2020-12-17 NOTE — TELEPHONE ENCOUNTER
Changed medication per therapeutic substitution chart, prescription sent to pharmacy.   Maya Ponce RN

## 2021-01-13 ENCOUNTER — NURSE TRIAGE (OUTPATIENT)
Dept: INTERNAL MEDICINE | Facility: CLINIC | Age: 51
End: 2021-01-13

## 2021-01-13 NOTE — TELEPHONE ENCOUNTER
Reason for call:  Patient reporting a symptom    Symptom or request: temp is 96.8, blood sugars 168.  Starting early this morning he feels like his feet and hands are freezing, yet is sweating profusely.  Neuropathy in his feet has increased and very painful.    Patient does have appointment with Dr. Bustos tomorrow at 1:30 p.m., for a diabetic check.  Would like a call back from a nurse today to discuss further.    Duration (how long have symptoms been present): since this morning around 9 a. m.    Phone Number patient can be reached at:  Cell number on file:    Telephone Information:   Mobile [phone]     Best Time:  Anytime    Can we leave a detailed message on this number:  YES    Call taken on 1/13/2021 at 2:26 PM by Tammy Eden

## 2021-01-13 NOTE — TELEPHONE ENCOUNTER
Called patient. He states that he is calling because his blood sugar was normal, his temp was 96.8. He is having profuse sweating and his feet feel like they are freezing an he had an episode of vomiting today. He does have neuropathy and takes gabapentin for the neuropathy.   Vomiting started today. He does have a headache. Pt had 1 episode of vomiting in past 24 hours, states that he vomited a couple of times during the episode of about 15-20 min. Started this morning. No constant abdominal pain, he is having a little cramping. No diarrhea. Pt states that he can stand up, but is shaky  No blood or coffee ground looking emesis, no bile.  Pt does not have an appendix. He did have bowel movement today.  Pt sates that the main concerns is the amount of sweat. Blood glucose was 168 today. He states that his readings generally have been good, no concerns with high blood sugar readings over the past few days.   Advised to continue to monitor, call back if vomiting recurs. Advised UC/ER if symptoms worsen or he is experiencing moderate to severe vomiting that is not improving. He verbalized understanding. Pt has upcoming appt with provider tomorrow.      Additional Information    Negative: Shock suspected (e.g., cold/pale/clammy skin, too weak to stand, low BP, rapid pulse)    Negative: Difficult to awaken or acting confused (e.g., disoriented, slurred speech)    Negative: Sounds like a life-threatening emergency to the triager    Negative: Vomiting occurs only while coughing    Negative: Pregnant < 20 Weeks and nausea/vomiting began in early pregnancy (i.e., 4-8 weeks pregnant)    Negative: Chest pain    Negative: Headache is main symptom    Negative: SEVERE vomiting (e.g., 6 or more times/day)    Negative: MODERATE vomiting (e.g., 3 - 5 times/day) and age > 60    Negative: Vomiting contains bile (green color)    Negative: Vomiting red blood or black (coffee ground) material    Negative: Insulin-dependent diabetes and  glucose > 240 mg/dL (13 mmol/L)    Negative: Recent head injury (within 3 days)    Negative: Recent abdominal injury (within 7 days)    Negative: Drinking very little and has signs of dehydration (e.g., no urine > 12 hours, very dry mouth, very lightheaded)    Negative: Constant abdominal pain lasting > 2 hours    Negative: High-risk adult (e.g., brain tumor, V-P shunt, hernia)    Negative: Severe pain in one eye    Negative: Patient sounds very sick or weak to the triager    Negative: Vomiting and abdomen looks much more swollen than usual    Negative: Fever > 103 F (39.4 C)    Negative: Fever > 101 F (38.3 C) and over 60 years of age    Negative: Fever > 100.0 F (37.8 C) and has a weak immune system (e.g., HIV positive, cancer chemo, organ transplant, splenectomy, chronic steroids)    Negative: Fever > 100.0 F (37.8 C) and bedridden (e.g., nursing home patient, stroke, chronic illness, recovering from surgery)    Negative: Taking any of the following medications: digoxin (Lanoxin), lithium, theophylline, phenytoin (Dilantin)    Negative: SEVERE headache and vomiting    Vomiting    Negative: MILD to MODERATE vomiting (e.g., 1-5 times/day) and lasts > 48 hours (2 days)    Negative: Fever present > 3 days (72 hours)    Negative: Patient wants to be seen    Negative: Vomiting a prescription medication    Negative: Alcohol abuse, known or suspected    Negative: Vomiting is a chronic symptom (recurrent or ongoing AND lasting > 4 weeks)    Protocols used: VOMITING-A-OH    Kathy Ferguson RN

## 2021-01-14 ENCOUNTER — OFFICE VISIT (OUTPATIENT)
Dept: INTERNAL MEDICINE | Facility: CLINIC | Age: 51
End: 2021-01-14
Payer: COMMERCIAL

## 2021-01-14 DIAGNOSIS — N52.1 ERECTILE DYSFUNCTION DUE TO DISEASES CLASSIFIED ELSEWHERE: ICD-10-CM

## 2021-01-14 DIAGNOSIS — Z98.890 HISTORY OF BUNIONECTOMY OF RIGHT GREAT TOE: ICD-10-CM

## 2021-01-14 DIAGNOSIS — M79.671 RIGHT FOOT PAIN: ICD-10-CM

## 2021-01-14 DIAGNOSIS — K31.84 GASTROPARESIS: ICD-10-CM

## 2021-01-14 DIAGNOSIS — E10.649 HYPOGLYCEMIA DUE TO TYPE 1 DIABETES MELLITUS (H): ICD-10-CM

## 2021-01-14 DIAGNOSIS — R13.10 DYSPHAGIA, UNSPECIFIED TYPE: ICD-10-CM

## 2021-01-14 DIAGNOSIS — E10.8 DIABETES MELLITUS TYPE 1 WITH COMPLICATIONS (H): Primary | ICD-10-CM

## 2021-01-14 DIAGNOSIS — G63 POLYNEUROPATHY ASSOCIATED WITH UNDERLYING DISEASE (H): ICD-10-CM

## 2021-01-14 LAB — HBA1C MFR BLD: 6.9 % (ref 0–5.6)

## 2021-01-14 PROCEDURE — 36415 COLL VENOUS BLD VENIPUNCTURE: CPT | Performed by: INTERNAL MEDICINE

## 2021-01-14 PROCEDURE — 83036 HEMOGLOBIN GLYCOSYLATED A1C: CPT | Performed by: INTERNAL MEDICINE

## 2021-01-14 PROCEDURE — 99214 OFFICE O/P EST MOD 30 MIN: CPT | Performed by: INTERNAL MEDICINE

## 2021-01-14 RX ORDER — SILDENAFIL 100 MG/1
100 TABLET, FILM COATED ORAL DAILY PRN
Qty: 8 TABLET | Refills: 11 | Status: SHIPPED | OUTPATIENT
Start: 2021-01-14 | End: 2022-02-25

## 2021-01-14 NOTE — PATIENT INSTRUCTIONS
We talked about quite a bit of stuff today     1. See endocrinologist to better regulate your diabetes mellitus. The hypoglycemia is dangerous and we absolutely HAVE to do better    2. The - NM Gastric Emptying Study is to see oncology and for all if gastroparesis is real and if so how much worse is it ?    3. The XR Esophagram w upper GI  Is prove if there is or is not any serious issues with your esophagus    4. Lets try Viagra ( Sildenafil citrate ) for the erectile dysfunction. If this isn't helping you'll need to see a urologist to look at your options     5. I referred you to a neurologist to explore the severity of your peripheral neuropathy and more odd right foot pain symptoms following bunion surgery. It's plausible you also have complex regional pain syndrome but I need help making this diagnosis

## 2021-01-14 NOTE — Clinical Note
I spoke with this patient about seeing a neurologist during our appointment but had forgotten to actually place referral order during the appointment today. I did place it now and assuming patient is interested lets steer him out towards [facility] for the appointment

## 2021-01-14 NOTE — PROGRESS NOTES
Assessment & Plan     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)  This was a complicated appointment in which many different problems are reviewed. The underlying problem of chronic diabetes mellitus type 1 with complication of peripheral neuropathy as well as presumed gastroparesis and diabetes mediated erectile dysfunction as well as other problems are reviewed, and this patient is having absolutely unacceptably frequent hypoglycemia which is actually dangerous for this patient. In consideration of all issues I feel an endocrinologist is the absolute best way to proceed with his diabetes mellitus care.   - Hemoglobin A1c  - ENDOCRINOLOGY ADULT REFERRAL    Dysphagia, unspecified type  The history is of a fairly classic gradually progressive dysphagia and he's now to the point that we need to exclude intrinsic vs extrinsic compression of the esophagus and most likely this patient will also need eventually an esphagogastroduodenoscopy although lets see what his swallowing study shows first  - XR Esophagram w Upper GI; Future  - NM Gastric Emptying; Future    Gastroparesis  A previous diagnosis of gastroparesis has not been completely clear to me and it is time to formally make this reassessment with a - NM Gastric Emptying Study with further follow up depending on the test results   - XR Esophagram w Upper GI; Future  - NM Gastric Emptying; Future    Polyneuropathy associated with underlying disease (H)  It is my suspicion that the weird lower extremity dysesthesia symptoms he has including episodes besides more or less chronic hypoesthesia, he also has episodes of weird temperature dysregulation including times of hot feelings as well as cold feelings.  Given the fact that there is a significant atypia here, seeing a neurologist is what makes the most sense to me     Right foot pain  Along the right great toe  He's been having for months now a virtually unbearable burning / tortuous pain that is outside the  "usual spectrum of the more generalized pain associated with diabetes neuropathy, at the very least this is suggestive of a separate diagnosis. Given the fact that this is following directly on the heels of his bunion surgery I am curious if a complex regional pain syndrome could develop this way. I think he should have his case reviewed by a neurologist to fully explore his symptoms and diagnosis  .     History of bunionectomy of right great toe  As detailed above     Erectile dysfunction due to diseases classified elsewhere  He also has a diagnosis of diabetes mediated erectile dysfunction   - sildenafil (VIAGRA) 100 MG tablet; Take 1 tablet (100 mg) by mouth daily as needed (sexual functioning)    Hypoglycemia due to type 1 diabetes mellitus (H)  As detailed above   - ENDOCRINOLOGY ADULT REFERRAL    30 minutes spent on the date of the encounter doing chart review, history and exam, documentation and further activities as noted above       BMI:   Estimated body mass index is 26.79 kg/m  as calculated from the following:    Height as of 10/5/20: 1.727 m (5' 8\").    Weight as of 12/15/20: 79.9 kg (176 lb 3.2 oz).   Weight management plan: Patient referred to endocrine and/or weight management specialty      Return in about 3 months (around 4/14/2021).    Haim Bustos MD  [facility] EUSE[employer] Ponce is a 50 year old who presents to clinic today for the following health issues     History of Present Illness       Diabetes:   He presents for follow up of diabetes.  He is checking home blood glucose four or more times daily. He checks blood glucose before and after meals.  Blood glucose is sometimes over 200 and never under 70. When his blood glucose is low, the patient is asymptomatic for confusion, blurred vision, lethargy and reports not feeling dizzy, shaky, or weak.             Routine follow up care for diabetes mellitus. But things are not going well and the synopsis of blood glucose " "readings as detailed above isn't accurate. He's getting some blood glucose readings down to below 60. These are symptomatic and stressful for patient.    He's also describing some symptoms of generally weird stuff recently     Symptoms maybe over the last year     Symptoms last as a \" syndrome\" for 24-48\" spells and then improve. These are stereotypical . When the symptoms are present they tend to start first thing in the morning     If he gets up he gets to feeling nauseous .   He uses Ondansetron (ZOFRAN) with some benefit   These symptoms develop as a \" big flare-up\" they last brief and fast  Gets a headache on top of this    Essentially no eating during these symptoms he just \" can't eat\", some drinking but even that is limited   He might have retching just a couple of times during this time.  No abdominal pain   No fever   No mouth symptoms , such as drooling [ptyalism]   No diarrhea   Wt Readings from Last 5 Encounters:   12/15/20 79.9 kg (176 lb 3.2 oz)   10/05/20 77 kg (169 lb 12.8 oz)   09/30/20 74.8 kg (165 lb)   01/30/20 74.3 kg (163 lb 12.8 oz)   12/23/19 78.1 kg (172 lb 3.2 oz)     Tends to get the headaches after all of this is done.    16.5 years of diabetes mellitus type 1   Feet are ice cold / heavy feeling / prickly with a hot spot on the feet and your upper half is hot and sweaty, including legs above feet.    Past year he's having symptomatic hypoglycemia for at the very least the last year more and more ? Up to 3 times a week he's seeing below 80.    Separate issue is his throat , with more and more classic dysphagia history.       Review of Systems   Constitutional, HEENT, cardiovascular, pulmonary, gi and gu systems are negative, except as otherwise noted.      Objective    There were no vitals taken for this visit.  There is no height or weight on file to calculate BMI.  Physical Exam   GENERAL: healthy, alert and no distress  NECK: no adenopathy, no asymmetry, masses, or scars and thyroid " normal to palpation  RESP: lungs clear to auscultation - no rales, rhonchi or wheezes  CV: regular rate and rhythm, normal S1 S2, no S3 or S4, no murmur, click or rub, no peripheral edema and peripheral pulses strong  ABDOMEN: soft, nontender, no hepatosplenomegaly, no masses and bowel sounds normal  MS: no gross musculoskeletal defects noted, no edema  NEURO: Normal strength and tone, mentation intact and speech normal  PSYCH: mentation appears normal, affect normal/bright    Orders Placed This Encounter   Procedures     XR Esophagram w Upper GI     NM Gastric Emptying     Hemoglobin A1c     ENDOCRINOLOGY ADULT REFERRAL         I spent a total of 30 minutes face-to-face with Rudolph Tao during today's office visit.  Over 50% of this time was spent counseling the patient and/or coordinating care regarding   Encounter Diagnoses   Name Primary?     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) Yes     Dysphagia, unspecified type      Gastroparesis      Polyneuropathy associated with underlying disease (H)      Right foot pain      History of bunionectomy of right great toe      Erectile dysfunction due to diseases classified elsewhere      Hypoglycemia due to type 1 diabetes mellitus (H)     .  See note for details.

## 2021-01-15 ENCOUNTER — TELEPHONE (OUTPATIENT)
Dept: INTERNAL MEDICINE | Facility: CLINIC | Age: 51
End: 2021-01-15

## 2021-01-15 NOTE — TELEPHONE ENCOUNTER
Haim Bustos MD  [facility] R[facility] Triage Pool             I spoke with this patient about seeing a neurologist during our appointment but had forgotten to actually place referral order during the appointment today. I did place it now and assuming patient is interested lets steer him out towards [facility] for the appointment

## 2021-01-15 NOTE — TELEPHONE ENCOUNTER
Called patient and gave number to call and schedule, nothing else needed.       Ana ANTHONY CMA ([facility])

## 2021-01-19 ENCOUNTER — NURSE TRIAGE (OUTPATIENT)
Dept: NURSING | Facility: CLINIC | Age: 51
End: 2021-01-19

## 2021-01-20 NOTE — TELEPHONE ENCOUNTER
"DM Type 1. One hour ago (7:30 pm) was getting out of shower and felt \"winded\" meaning SOB. /99 at that time.  Since then he has been diaphoretic but feet feel cold. Diaphoresis began 1 hr ago and not improving.  now. Woke today w/ BG 61. BP recheck at 8:10pm 161/90.  C/o nausea, no vomiting. Denies chest discomfort. Denies fever. Says he \"didn't feel well today\" - upset stomach, no appetite, ate 1 pc of toast w/ PB all day. Reports he has gastroparesis and diabetic PN.. Denies abdominal pain. Had same sx 1/13 (6 days). Talked to pcp about it \"but he didn't say anything about it\". Hx Covid and pneumonia about 1 yr ago. Advised ED now. Pt agreed.     Reason for Disposition    Difficulty breathing    Additional Information    Negative: Shock suspected (e.g., cold/pale/clammy skin, too weak to stand, low BP, rapid pulse)    Negative: Sounds like a life-threatening emergency to the triager    Negative: Fever    Negative: Chest pain or cardiac ischemia is suspected    Negative: Weakness    Negative: Dizziness and lightheadedness (e.g., feeling woozy, feeling like he/she might faint)    Negative: [1] Has diabetes (diabetes mellitus) AND [2] sweating from low blood sugar (i.e., < 70 mg/dl or 3.9 mmol/l)    Negative: Heat exhaustion suspected (i.e., dehydration from heat exposure)    Protocols used: [id number]-Y-AF      "

## 2021-01-21 ENCOUNTER — HOSPITAL ENCOUNTER (OUTPATIENT)
Dept: GENERAL RADIOLOGY | Facility: CLINIC | Age: 51
Discharge: HOME | End: 2021-01-21
Attending: INTERNAL MEDICINE | Admitting: INTERNAL MEDICINE
Payer: COMMERCIAL

## 2021-01-21 ENCOUNTER — TELEPHONE (OUTPATIENT)
Dept: FAMILY MEDICINE | Facility: CLINIC | Age: 51
End: 2021-01-21

## 2021-01-21 DIAGNOSIS — R13.10 DYSPHAGIA, UNSPECIFIED TYPE: ICD-10-CM

## 2021-01-21 DIAGNOSIS — K31.84 GASTROPARESIS: ICD-10-CM

## 2021-01-21 PROCEDURE — 74220 X-RAY XM ESOPHAGUS 1CNTRST: CPT

## 2021-01-21 PROCEDURE — 74240 X-RAY XM UPR GI TRC 1CNTRST: CPT

## 2021-01-21 PROCEDURE — 255N000001 HC RX 255: Performed by: INTERNAL MEDICINE

## 2021-01-21 RX ADMIN — ANTACID/ANTIFLATULENT 4 G: 380; 550; 10; 10 GRANULE, EFFERVESCENT ORAL at 11:17

## 2021-01-21 NOTE — TELEPHONE ENCOUNTER
Reason for Call:  Other     Detailed comments: Ciro said that Radiologist missed the upper gi of the test and would the provider want the patient to be seen for this at no charge    Phone Number   Ciro Mark Radiologist  [phone]         Best Time:     Can we leave a detailed message on this number? YES    Call taken on 1/21/2021 at 2:02 PM by Rehana Pineda

## 2021-01-21 NOTE — TELEPHONE ENCOUNTER
Called Ciro at the number provided. He has left for the day but Katt took the message down for him to get in the morning. He will call if anything is needed.     Gloria Heath RN

## 2021-01-27 ENCOUNTER — NURSE TRIAGE (OUTPATIENT)
Dept: NURSING | Facility: CLINIC | Age: 51
End: 2021-01-27

## 2021-01-27 ENCOUNTER — VIRTUAL VISIT (OUTPATIENT)
Dept: FAMILY MEDICINE | Facility: CLINIC | Age: 51
End: 2021-01-27
Payer: COMMERCIAL

## 2021-01-27 DIAGNOSIS — E10.8 DIABETES MELLITUS TYPE 1 WITH COMPLICATIONS (H): ICD-10-CM

## 2021-01-27 DIAGNOSIS — R11.0 NAUSEA: ICD-10-CM

## 2021-01-27 DIAGNOSIS — Z65.8 PSYCHOSOCIAL STRESSORS: ICD-10-CM

## 2021-01-27 DIAGNOSIS — I10 HYPERTENSION GOAL BP (BLOOD PRESSURE) < 140/90: Primary | ICD-10-CM

## 2021-01-27 PROCEDURE — 99214 OFFICE O/P EST MOD 30 MIN: CPT | Mod: 95 | Performed by: PHYSICIAN ASSISTANT

## 2021-01-27 RX ORDER — METOPROLOL SUCCINATE 25 MG/1
25 TABLET, EXTENDED RELEASE ORAL DAILY
Qty: 30 TABLET | Refills: 1 | Status: SHIPPED | OUTPATIENT
Start: 2021-01-27 | End: 2022-02-25

## 2021-01-27 RX ORDER — BUPROPION HYDROCHLORIDE 150 MG/1
150 TABLET ORAL EVERY MORNING
Qty: 30 TABLET | Refills: 1 | Status: SHIPPED | OUTPATIENT
Start: 2021-01-27 | End: 2021-04-20 | Stop reason: DRUGHIGH

## 2021-01-27 NOTE — PROGRESS NOTES
"Kris is a 51 year old who is being evaluated via a billable telephone visit.    2:55-3:13  What phone number would you like to be contacted at? [phone]  How would you like to obtain your AVS? Miguel  Assessment & Plan     Hypertension goal BP (blood pressure) < 140/90  Will add this with hope it will help with fast HR as well.  Follow up with PCP in the next week-call sooner if having more or new symptoms.    - metoprolol succinate ER (TOPROL-XL) 25 MG 24 hr tablet; Take 1 tablet (25 mg) by mouth daily    Psychosocial stressors  Was on this before.  Helped \"level\" him out.  Some of this could be stress.  restart  - buPROPion (WELLBUTRIN XL) 150 MG 24 hr tablet; Take 1 tablet (150 mg) by mouth every morning    Nausea  Has workup in process     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)  May need another stress test due to his hx of DM                               Return in about 1 week (around 2/3/2021) for BP Recheck, mood.    Ketty Miguel PA-C  [facility]    Subjective     Kris is a 51 year old who presents to clinic today for the following health issues     HPI       Hypertension Follow-up      Do you check your blood pressure regularly outside of the clinic? Yes     Are you following a low salt diet? No    Are your blood pressures ever more than 140 on the top number (systolic) OR more   than 90 on the bottom number (diastolic), for example 140/90? No      How many servings of fruits and vegetables do you eat daily?  2-3    On average, how many sweetened beverages do you drink each day (Examples: soda, juice, sweet tea, etc.  Do NOT count diet or artificially sweetened beverages)?   1    How many days per week do you exercise enough to make your heart beat faster? none    How many minutes a day do you exercise enough to make your heart beat faster? none    How many days per week do you miss taking your medication? 0  Saw PCP 2 weeks ago.  A week ago had nausea, vomiting " and not feeling well.  Last week went to ER for this.  Today not feeling well.  No nausea today.  Stomach issues not new-has had some workup for this and has some more coming up.  Has type 1 diabetes.  Having some low sugars.  162/106 in ER.  No excessive alcohol use.  Labs and imaging in ER unremarkable.  Was not tested for covid this time.    Has had for several years where he gets these episodes of feeling hot and feet cold and lasts several hours.  Does admit has been under a lot of stress.        Review of Systems   As above      Objective           Vitals:  No vitals were obtained today due to virtual visit.    Physical Exam   healthy, alert and no distress  PSYCH: Alert and oriented times 3; coherent speech, normal   rate and volume, able to articulate logical thoughts, able   to abstract reason, no tangential thoughts, no hallucinations   or delusions  His affect is normal  RESP: No cough, no audible wheezing, able to talk in full sentences  Remainder of exam unable to be completed due to telephone visits                Phone call duration: 18 minutes

## 2021-01-27 NOTE — TELEPHONE ENCOUNTER
"Patient calling..  States his BP has been up and down.  For awhile lately   This morning;  173/94        173/111  At 11:00am today.        Feels winded , very lethargic, tired.   No cough. \"I just don't feel great\"  No appetite at all.    Patient advised to be seen, and he states his BP goes up when he moves around, therefore wanting a telephone appointment   Today.    Patient sent to make telephone appointment.    Aimee Edward RN on 1/27/2021 at 2:01 PM      Additional Information    Systolic BP >= 160 OR Diastolic >= 100    Protocols used: HIGH BLOOD PRESSURE-A-OH      "

## 2021-02-13 DIAGNOSIS — E10.8 DIABETES MELLITUS TYPE 1 WITH COMPLICATIONS (H): ICD-10-CM

## 2021-02-17 RX ORDER — INSULIN GLARGINE 100 [IU]/ML
INJECTION, SOLUTION SUBCUTANEOUS
Qty: 45 ML | Refills: 0 | Status: SHIPPED | OUTPATIENT
Start: 2021-02-17 | End: 2021-07-12

## 2021-03-24 ENCOUNTER — TELEPHONE (OUTPATIENT)
Dept: FAMILY MEDICINE | Facility: CLINIC | Age: 51
End: 2021-03-24

## 2021-03-24 ENCOUNTER — NURSE TRIAGE (OUTPATIENT)
Dept: NURSING | Facility: CLINIC | Age: 51
End: 2021-03-24

## 2021-03-24 NOTE — TELEPHONE ENCOUNTER
"Pt reports elevated blood pressure and pulse \"last couple of days\". Today 141/101 pulse 110 and before noon 146/97 pulse 103. Pt reports mild headache but no other acute symptoms. Pt takes Lisinopril 40mg daily and was also prescribed metoprolol 25mg but \"haven't been taking it because concerned about blood pressure and heart rate up and down, up and down\". Pt reports he was concerned about low blood pressure. Pt scheduled to go out of town tomorrow and wondering if that is ok.     Advised pt to see PCP within the next three days per protocol. Call back protocol reviewed with pt. Message to PCP to advise.     Pt verbalizes understanding and agrees to plan.     Additional Information    Negative: Difficult to awaken or acting confused (e.g., disoriented, slurred speech)    Negative: Severe difficulty breathing (e.g., struggling for each breath, speaks in single words)    Negative: [1] Weakness of the face, arm or leg on one side of the body AND [2] new onset    Negative: [1] Numbness (i.e., loss of sensation) of the face, arm or leg on one side of the body AND [2] new onset    Negative: [1] Chest pain lasts > 5 minutes AND [2] history of heart disease  (i.e., heart attack, bypass surgery, angina, angioplasty, CHF)    Negative: [1] Chest pain AND [2] took nitrogylcerin AND [3] pain was not relieved    Negative: Sounds like a life-threatening emergency to the triager    Negative: Symptom is main concern  (e.g., headache, chest pain)    Negative: Low blood pressure is main concern    Negative: [1] Systolic BP  >= 160 OR Diastolic >= 100 AND [2] cardiac or neurologic symptoms (e.g., chest pain, difficulty breathing, unsteady gait, blurred vision)    Negative: [1] Pregnant > 20 weeks (or postpartum < 6 weeks) AND [2] new hand or face swelling    Negative: [1] Pregnant > 20 weeks AND [2] BP Systolic BP  >= 140 OR Diastolic >= 90    Negative: [1] Systolic BP  >= 200 OR Diastolic >= 120  AND [2] having NO cardiac or " "neurologic symptoms    Negative: [1] Postpartum < 6 weeks AND [2] BP Systolic BP  >= 140 OR Diastolic >= 90    Negative: [1] Systolic BP  >= 180 OR Diastolic >= 110 AND [2] missed most recent dose of blood pressure medication    Negative: Systolic BP  >= 180 OR Diastolic >= 110    Negative: Ran out of BP medications    Systolic BP  >= 160 OR Diastolic >= 100    Answer Assessment - Initial Assessment Questions  1. BLOOD PRESSURE: \"What is the blood pressure?\" \"Did you take at least two measurements 5 minutes apart?\"      141/101 pulse 110 today previously 146/97 pulse 103 before noon today   2. ONSET: \"When did you take your blood pressure?\"      Today   3. HOW: \"How did you obtain the blood pressure?\" (e.g., visiting nurse, automatic home BP monitor)      Wrist cuff, home monitor  4. HISTORY: \"Do you have a history of high blood pressure?\"      Yes, high blood pressure medication \"several years\"  5. MEDICATIONS: \"Are you taking any medications for blood pressure?\" \"Have you missed any doses recently?\"      Lisinopril 40mg daily  6. OTHER SYMPTOMS: \"Do you have any symptoms?\" (e.g., headache, chest pain, blurred vision, difficulty breathing, weakness)      Pt reports mild headache intermittently  7. PREGNANCY: \"Is there any chance you are pregnant?\" \"When was your last menstrual period?\"     n/a    Protocols used: HIGH BLOOD PRESSURE-A-AH      "

## 2021-03-24 NOTE — TELEPHONE ENCOUNTER
Reason for Call:  Other appointment    Detailed comments: patient needs to be seen sooner that 4/8/21. Per Nurse Jenifer Pisano. Please reach out to patient.     Phone Number Patient can be reached at: Cell number on file:    Telephone Information:   Mobile [phone]       Best Time: any    Can we leave a detailed message on this number? YES    Call taken on 3/24/2021 at 3:13 PM by Anne Quiroz

## 2021-03-25 NOTE — TELEPHONE ENCOUNTER
Please call patient. Get more history. Lets work on appointment for him next week.    Please huddle with me on this case     Haim Bustos MD

## 2021-03-26 NOTE — TELEPHONE ENCOUNTER
Called patient and is having high blood pressure, scheduled appointment for Tuesday at 12:10pm.       Ana ANTHONY CMA ([facility])

## 2021-03-30 ENCOUNTER — OFFICE VISIT (OUTPATIENT)
Dept: INTERNAL MEDICINE | Facility: CLINIC | Age: 51
End: 2021-03-30
Payer: COMMERCIAL

## 2021-03-30 ENCOUNTER — IMMUNIZATION (OUTPATIENT)
Dept: NURSING | Facility: CLINIC | Age: 51
End: 2021-03-30
Payer: COMMERCIAL

## 2021-03-30 VITALS
HEIGHT: 68 IN | OXYGEN SATURATION: 96 % | WEIGHT: 176 LBS | DIASTOLIC BLOOD PRESSURE: 76 MMHG | SYSTOLIC BLOOD PRESSURE: 114 MMHG | BODY MASS INDEX: 26.67 KG/M2 | TEMPERATURE: 98.2 F | RESPIRATION RATE: 14 BRPM | HEART RATE: 87 BPM

## 2021-03-30 DIAGNOSIS — E10.43 TYPE 1 DIABETES MELLITUS WITH DIABETIC AUTONOMIC NEUROPATHY (H): ICD-10-CM

## 2021-03-30 DIAGNOSIS — F41.1 GENERALIZED ANXIETY DISORDER: ICD-10-CM

## 2021-03-30 DIAGNOSIS — E10.8 DIABETES MELLITUS TYPE 1 WITH COMPLICATIONS (H): Primary | ICD-10-CM

## 2021-03-30 DIAGNOSIS — R20.2 PARESTHESIAS: ICD-10-CM

## 2021-03-30 DIAGNOSIS — E78.5 HYPERLIPIDEMIA LDL GOAL <100: ICD-10-CM

## 2021-03-30 DIAGNOSIS — F41.1 GAD (GENERALIZED ANXIETY DISORDER): ICD-10-CM

## 2021-03-30 PROCEDURE — 99215 OFFICE O/P EST HI 40 MIN: CPT | Performed by: INTERNAL MEDICINE

## 2021-03-30 PROCEDURE — 0001A PR COVID VAC PFIZER DIL RECON 30 MCG/0.3 ML IM: CPT

## 2021-03-30 PROCEDURE — 93000 ELECTROCARDIOGRAM COMPLETE: CPT | Performed by: INTERNAL MEDICINE

## 2021-03-30 PROCEDURE — 91300 PR COVID VAC PFIZER DIL RECON 30 MCG/0.3 ML IM: CPT

## 2021-03-30 RX ORDER — BUPROPION HYDROCHLORIDE 300 MG/1
300 TABLET ORAL EVERY MORNING
Qty: 90 TABLET | Refills: 1 | Status: SHIPPED | OUTPATIENT
Start: 2021-03-30 | End: 2021-12-20

## 2021-03-30 RX ORDER — ALPRAZOLAM 0.5 MG
0.5 TABLET ORAL 3 TIMES DAILY PRN
Qty: 30 TABLET | Refills: 0 | Status: SHIPPED | OUTPATIENT
Start: 2021-03-30 | End: 2022-09-08

## 2021-03-30 ASSESSMENT — MIFFLIN-ST. JEOR: SCORE: 1617.83

## 2021-03-30 NOTE — PROGRESS NOTES
Assessment & Plan     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)  This was a difficult and frustrating appointment . This patient with diabetes mellitus type 1 since childhood is developing more and more problems and it's absolutely clear now to him, to me, and to his spouse that we need some additional help with his care. First of all he's getting recurrent hypoglycemia and we made short term recommendations with his Lantus (Insulin Glargine) to be decreased and he's starting finally to use his Jenni home monitor for blood glucose monitoring which he had but was resistant to actually use, then he wants to be considered for use of an insulin pump which is a great idea as well as seeing an endocrinologist   - ENDOCRINOLOGY ADULT REFERRAL  - AMBULATORY ADULT DIABETES EDUCATOR REFERRAL; Future  - Hemoglobin A1c  - EKG 12-lead complete w/read - Clinics    GAIL (generalized anxiety disorder)  A single refill on ALPRAZolam (XANAX)  , it's used very sparingly     Type 1 diabetes mellitus with diabetic autonomic neuropathy (H)  More complex is trying to make sense of his chronic and worsening symptoms of inappropriately fast heart rate and rapidly shoots up with limited exercise and doesn't always make sense. There's vague but persistent symptoms of lightheadedness and dizziness symptoms suggestive of neurogenic orthostatic hypotension (NOH) and more likely caused by diabetes mellitus autonomic dysfunction . This is a complicated diagnosis and patient wants to have his options considered. We need to see neurology this workup   - NEUROLOGY ADULT REFERRAL    GENERALIZED ANXIETY    - ALPRAZolam (XANAX) 0.5 MG tablet; Take 1 tablet (0.5 mg) by mouth 3 times daily as needed for anxiety 1 month supply  - buPROPion (WELLBUTRIN XL) 300 MG 24 hr tablet; Take 1 tablet (300 mg) by mouth every morning    Hyperlipidemia LDL goal <100  Due for recheck   - Lipid panel reflex to direct LDL Fasting    Paresthesias  Due for recheck  "  - CBC with platelets differential  - Comprehensive metabolic panel  0956}     3-6 months     Return in about 6 months (around 9/30/2021).    Haim Bustos MD  [facility] [employer] Ponce is a 51 year old who presents for the following health issues   Encounter Diagnoses   Name Primary?     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) Yes     GAIL (generalized anxiety disorder)      Type 1 diabetes mellitus with diabetic autonomic neuropathy (H)      GENERALIZED ANXIETY      Hyperlipidemia LDL goal <100      Paresthesias      accompanied by his spouse:    HPI   Chief Complaint   Patient presents with     Hypertension     Hypoglycemia     passed out from low blood sugar last night, reading was 30, patients wife revived him last night.      Recheck Medication     refill medication     Referral     [facility]      Concerns     Jenni home monitor for blood glucose monitoring not used yet  Had a severe low blood glucose with loss of consciousness last night  A lot of hypoglycemia lately   44 recently  At 30 last night  We argued with patient that he needs to get onto this  He forgot his meter today   Over the last week he's had 4-5 hypoglycemia reactions    He's had hypoglycemia before but this was far worse with   Recent trip to [place] to [facility] home.  He's also looking at an insulin pump , he's heard the dexcom 770 insulin pump is good .    We spoke about endocrinologists also. Maybe 2-3 years ago, patient had taken offense to what patient perceived as poor changes.     Blood pressure is all over the place. They have a wrist blood pressure cuff .    He asks questions about his medications, was having \"low heart rate \"   He has dizziness symptoms and lightheadedness not always making sense according to his actual level of exercise     Suspicious for diabetic cardiac denervation syndrome or other autonomic nervous system problems .    Episodic of sweating in my head, feet frozen, a lot of " "stomach sick feelings   Neurology     He thinks he probably had Coronavirus (COVID-19) within the last year   He's now had his first vaccine for Coronavirus (COVID-19) , got this today   Patient says he's had a diagnosis of Diabetic Ketoacidosis with only a blood glucose of 250 before , this was roughly 2 years ago.      Review of Systems   Constitutional, HEENT, cardiovascular, pulmonary, gi and gu systems are negative, except as otherwise noted.      Objective    /76   Pulse 87   Temp 98.2  F (36.8  C) (Oral)   Resp 14   Ht 1.727 m (5' 8\")   Wt 79.8 kg (176 lb)   SpO2 96%   BMI 26.76 kg/m    Body mass index is 26.76 kg/m .  Physical Exam   GENERAL: healthy, alert and no distress  NECK: no adenopathy, no asymmetry, masses, or scars and thyroid normal to palpation  RESP: lungs clear to auscultation - no rales, rhonchi or wheezes  CV: regular rate and rhythm, normal S1 S2, no S3 or S4, no murmur, click or rub, no peripheral edema and peripheral pulses strong  MS: no gross musculoskeletal defects noted, no edema  NEURO: Normal strength and tone, mentation intact and speech normal  PSYCH: mentation appears normal, affect normal/bright    Orders Placed This Encounter   Procedures     Hemoglobin A1c     Lipid panel reflex to direct LDL Fasting     CBC with platelets differential     Comprehensive metabolic panel     ENDOCRINOLOGY ADULT REFERRAL     NEUROLOGY ADULT REFERRAL     AMBULATORY ADULT DIABETES EDUCATOR REFERRAL     EKG 12-lead complete w/read - Clinics       40 minutes was spent with the patient with greater than 50% in face-to-face discussion of disease process, treatment options and medicine management.          "

## 2021-04-14 ENCOUNTER — OFFICE VISIT (OUTPATIENT)
Dept: URGENT CARE | Facility: URGENT CARE | Age: 51
End: 2021-04-14
Payer: COMMERCIAL

## 2021-04-14 ENCOUNTER — ANCILLARY PROCEDURE (OUTPATIENT)
Dept: GENERAL RADIOLOGY | Facility: CLINIC | Age: 51
End: 2021-04-14
Attending: PREVENTIVE MEDICINE
Payer: COMMERCIAL

## 2021-04-14 VITALS
HEART RATE: 93 BPM | BODY MASS INDEX: 26.51 KG/M2 | DIASTOLIC BLOOD PRESSURE: 82 MMHG | SYSTOLIC BLOOD PRESSURE: 125 MMHG | OXYGEN SATURATION: 96 % | TEMPERATURE: 96.5 F | WEIGHT: 174.38 LBS | RESPIRATION RATE: 20 BRPM

## 2021-04-14 DIAGNOSIS — M25.571 PAIN IN JOINT INVOLVING ANKLE AND FOOT, RIGHT: ICD-10-CM

## 2021-04-14 DIAGNOSIS — M25.571 PAIN IN JOINT INVOLVING ANKLE AND FOOT, RIGHT: Primary | ICD-10-CM

## 2021-04-14 PROCEDURE — 99214 OFFICE O/P EST MOD 30 MIN: CPT | Performed by: PREVENTIVE MEDICINE

## 2021-04-14 PROCEDURE — 73610 X-RAY EXAM OF ANKLE: CPT | Mod: RT | Performed by: RADIOLOGY

## 2021-04-14 PROCEDURE — 73630 X-RAY EXAM OF FOOT: CPT | Mod: RT | Performed by: RADIOLOGY

## 2021-04-14 PROCEDURE — 73590 X-RAY EXAM OF LOWER LEG: CPT | Mod: RT | Performed by: RADIOLOGY

## 2021-04-20 ENCOUNTER — VIRTUAL VISIT (OUTPATIENT)
Dept: ENDOCRINOLOGY | Facility: CLINIC | Age: 51
End: 2021-04-20
Attending: INTERNAL MEDICINE
Payer: COMMERCIAL

## 2021-04-20 ENCOUNTER — IMMUNIZATION (OUTPATIENT)
Dept: NURSING | Facility: CLINIC | Age: 51
End: 2021-04-20
Attending: FAMILY MEDICINE
Payer: COMMERCIAL

## 2021-04-20 DIAGNOSIS — R74.8 ELEVATED LIVER ENZYMES: ICD-10-CM

## 2021-04-20 DIAGNOSIS — E10.649 TYPE 1 DIABETES MELLITUS WITH HYPOGLYCEMIA UNAWARENESS (H): Primary | ICD-10-CM

## 2021-04-20 PROCEDURE — 91300 PR COVID VAC PFIZER DIL RECON 30 MCG/0.3 ML IM: CPT

## 2021-04-20 PROCEDURE — 0002A PR COVID VAC PFIZER DIL RECON 30 MCG/0.3 ML IM: CPT

## 2021-04-20 PROCEDURE — 99205 OFFICE O/P NEW HI 60 MIN: CPT | Mod: 95 | Performed by: INTERNAL MEDICINE

## 2021-04-20 NOTE — PROGRESS NOTES
"Assessment & Plan       1. Pain in joint involving ankle and foot, right  C/w right ankle sprain  Advise RICE  Also walking boot until pain improves  Follow up if not improving in 2 weeks    - XR Foot Right G/E 3 Views; Future  - XR Ankle Right G/E 3 Views; Future  - XR Tibia & Fibula Right 2 Views; Future  - Miscellaneous Order for DME - ONLY FOR DME          No follow-ups on file.    Gokul Ward MD  [facility]    Subjective     Rudolph Tao is a 51 year old year old male who presents to clinic today for the following health issues:    Patient presents with:  Musculoskeletal Problem: Has wooden stairs in his house, had \"cheaters\" on and missed the last step and landed really hard on his right foot, now it is swollen and painful, he went two steps down, his left foot got cut while going down as well, he does not bruise easily  Laceration: Top of left foot when he missed a step    This is a 50 yo man who fell down the bottom two stairs of his house last night.  He has pain in his right ankle and foot.  It is tolerable.  He has been able to bear weight.  He also has neuropathy in his distal right foot from neuropathy.  Has taken tylenol and placed ice on the foot but it is still quite painful.  There is also a small abrasion on the top of his left foot but this foot is not painful.        Patient Active Problem List   Diagnosis     Hyperlipidemia LDL goal <100     Encounter for long-term (current) use of insulin (H)     Chronic low back pain     Libido, decreased     Restless legs     Tobacco abuse     Cervicalgia     Paresthesias     Persistent disorder of initiating or maintaining sleep     Spasms of the hands or feet     Hypertension goal BP (blood pressure) < 140/90     Right-sided low back pain without sciatica     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)     GAIL (generalized anxiety disorder)     Psychosocial stressors     DDD (degenerative disc disease), lumbar "     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)     Hypoglycemia     Syncopal episodes     Gastroparesis       Current Outpatient Medications   Medication     ALPRAZolam (XANAX) 0.5 MG tablet     aspirin (ASA) 81 MG EC tablet     blood glucose monitoring (ONE TOUCH DELICA) lancets     blood glucose monitoring (ONETOUCH ULTRA) test strip     buPROPion (WELLBUTRIN XL) 300 MG 24 hr tablet     cetirizine (ZYRTEC) 10 MG tablet     Continuous Blood Gluc  (FREESTYLE JUSTIN READER) MYA     continuous blood glucose monitoring (FREESTYLE JUSTIN) sensor     gabapentin (NEURONTIN) 300 MG capsule     insulin aspart (NOVOLOG PEN) 100 UNIT/ML injection     insulin pen needle (B-D U/F) 31G X 5 MM miscellaneous     LANTUS SOLOSTAR 100 UNIT/ML soln     lisinopril (ZESTRIL) 40 MG tablet     metoclopramide (REGLAN) 10 MG tablet     ondansetron (ZOFRAN-ODT) 4 MG ODT tab     pravastatin (PRAVACHOL) 20 MG tablet     rOPINIRole (REQUIP) 0.25 MG tablet     sildenafil (VIAGRA) 100 MG tablet     albuterol (PROAIR HFA/PROVENTIL HFA/VENTOLIN HFA) 108 (90 Base) MCG/ACT inhaler     albuterol (PROVENTIL) (2.5 MG/3ML) 0.083% neb solution     buPROPion (WELLBUTRIN XL) 150 MG 24 hr tablet     diclofenac (VOLTAREN) 75 MG EC tablet     fluticasone (FLONASE) 50 MCG/ACT spray     metoprolol succinate ER (TOPROL-XL) 25 MG 24 hr tablet     No current facility-administered medications for this visit.        Past Medical History:   Diagnosis Date     Adopted      Anxiety      Anxiety      Chronic low back pain      DDD (degenerative disc disease), lumbar 3/8/2018     Diabetes mellitus (H)     TYPE 1     Hallux abducto valgus      Hyperlipaemia      Hyperlipidemia      Hypertension      Lip lesion 8/2/2010     Pain in toe of left foot     2 ND TOE     RLS (restless legs syndrome)      Snoring     MODERATE SEVERITY     Swelling of foot joint 7/13/2012       Social History   reports that he quit smoking about 5 years ago. His smoking use included cigarettes. He  smoked 0.00 packs per day for 20.00 years. He has never used smokeless tobacco. He reports current alcohol use. He reports that he does not use drugs.    Family History   Adopted: Yes   Problem Relation Age of Onset     Unknown/Adopted Mother      Cancer Mother      Unknown/Adopted Father      Unknown/Adopted Maternal Grandmother      Unknown/Adopted Maternal Grandfather      Unknown/Adopted Paternal Grandmother      Unknown/Adopted Paternal Grandfather        Review of Systems  Constitutional, HEENT, cardiovascular, pulmonary, GI, , musculoskeletal, neuro, skin, endocrine and psych systems are negative, except as otherwise noted.      Objective    /82 (BP Location: Left arm, Patient Position: Chair, Cuff Size: Adult Regular)   Pulse 93   Temp 96.5  F (35.8  C) (Tympanic)   Resp 20   Wt 79.1 kg (174 lb 6 oz)   SpO2 96%   BMI 26.51 kg/m    Physical Exam   GENERAL: healthy, alert and no distress  EYES: Eyes grossly normal to inspection, PERRL and conjunctivae and sclerae normal  HENT: ear canals and TM's normal, nose and mouth without ulcers or lesions  NECK: no adenopathy, no asymmetry, masses, or scars and thyroid normal to palpation  RESP: lungs clear to auscultation - no rales, rhonchi or wheezes  CV: regular rate and rhythm, normal S1 S2, no S3 or S4, no murmur, click or rub, no peripheral edema and peripheral pulses strong  ABDOMEN: soft, nontender, no hepatosplenomegaly, no masses and bowel sounds normal  MS: no gross musculoskeletal defects noted, no edema except   R ankle - ttp lateral malleolus, swelling there as well.  Nl cms in r leg.    L foot - small abrasion on dorsal aspect of foot.  No ttp.  Nl cms  SKIN: no suspicious lesions or rashes  NEURO: Normal strength and tone, mentation intact and speech normal  PSYCH: mentation appears normal, affect normal/bright    Results for orders placed or performed in visit on 04/14/21   XR Tibia & Fibula Right 2 Views     Status: None    Narrative     XR FOOT RT G/E 3 VW, XR ANKLE RT G/E 3 VW, XR TIBIA & FIBULA RT  2 VW 4/14/2021 7:54 PM     HISTORY: Pain in joint involving ankle and foot, right      Impression    IMPRESSION:  1. Forefoot nonspecific soft tissue swelling medial to the first  metatarsal head. No adjacent bone erosion or joint space narrowing. No  evidence of right foot fracture.  2. Right ankle lateral soft tissue swelling. Ligament injury is  suspected. No apparent fracture. The ankle mortise appears congruent.  Calcaneal spurs are noted.  3. The tibia and fibula appear within normal limits.  4. Ankle and forefoot small vessel episodic calcification.    ALEX MOORE MD   Results for orders placed or performed in visit on 04/14/21   XR Ankle Right G/E 3 Views     Status: None    Narrative    XR FOOT RT G/E 3 VW, XR ANKLE RT G/E 3 VW, XR TIBIA & FIBULA RT  2 VW 4/14/2021 7:54 PM     HISTORY: Pain in joint involving ankle and foot, right      Impression    IMPRESSION:  1. Forefoot nonspecific soft tissue swelling medial to the first  metatarsal head. No adjacent bone erosion or joint space narrowing. No  evidence of right foot fracture.  2. Right ankle lateral soft tissue swelling. Ligament injury is  suspected. No apparent fracture. The ankle mortise appears congruent.  Calcaneal spurs are noted.  3. The tibia and fibula appear within normal limits.  4. Ankle and forefoot small vessel episodic calcification.    ALEX MOORE MD   Results for orders placed or performed in visit on 04/14/21   XR Foot Right G/E 3 Views     Status: None    Narrative    XR FOOT RT G/E 3 VW, XR ANKLE RT G/E 3 VW, XR TIBIA & FIBULA RT  2 VW 4/14/2021 7:54 PM     HISTORY: Pain in joint involving ankle and foot, right      Impression    IMPRESSION:  1. Forefoot nonspecific soft tissue swelling medial to the first  metatarsal head. No adjacent bone erosion or joint space narrowing. No  evidence of right foot fracture.  2. Right ankle lateral soft tissue swelling. Ligament  injury is  suspected. No apparent fracture. The ankle mortise appears congruent.  Calcaneal spurs are noted.  3. The tibia and fibula appear within normal limits.  4. Ankle and forefoot small vessel episodic calcification.    ALEX MOORE MD

## 2021-04-20 NOTE — PROGRESS NOTES
Video-Visit Details    Type of service:  Video Visit    Video call duration:  Started 12:35 pm   Ended 1:47 pm     Originating Location (pt. Location): Home  Distant Location (provider location):  [facility]   Platform used for Video Visit: Doximity     Due to the COVID 19 pandemic this visit was converted to a video visit in order to help prevent spread of infection in this patient and the general population.     The patient is seen for evaluation of type 1 diabetes.  He was previously seen by Dr. Tipton.    Kris Tao is a 51 year old male with a past medical history significant for anxiety, hypertension, hyperlipidemia, degenerative changes of the spine, diagnosed with type 1 diabetes at age 34, in 2004.  GAIL 65 antibodies were strongly positive, in 2018.  Average A1c over the years has been around 8%.  Most recent A1c was lower, at 6.9%, on 1/14/2021.      His diabetes is now treated with 30 units of Lantus at 1:30 PM (dose recently decreased by his primary care provider, from 32 units) and a NovoLog correction scale of 1 unit per 50 mg above 200.  He checks his blood glucose up to 4 times daily, in the morning and 1 hour after eating, when he uses the correction.  He reports being very sensitive to NovoLog.  If he takes 2 units for blood sugar 250, he will get a hypoglycemic episode in the next 2 to 3 hours.  Denies taking NovoLog before eating.  He was taught carbohydrate counting in the past and he reports being comfortable counting carbohydrates.  He has never been taking NovoLog prior to eating, according to the carbohydrate intake.  On average, he takes approximately 2 units NovoLog per day as correction.  He rarely administers 4 or 6 units.  His average blood glucose on the glucometer is 188.  He just received a Jenni sensor and he is planning on using it.    The patient documents having recurrent hypoglycemic episodes.  Currently, they occur 2-3 times a week.  Most of the hypoglycemic  episodes occur in the morning.  Sometimes, he gets a hypoglycemic episode during daytime, mainly if he skips dinner or the meal is delayed.  In general, he has lunch at 1 PM (this is the first meal of the day) and dinner anywhere between 6:30 to 8:30 PM.  He snacks at variable times, on labs, chips, crackers and dips, fruits, cheese, summer sausages.  In the morning, he has coffee or tea, with cream and Splenda.  His sleeping habits are quite variable.    The patient describes not having appetite for quite some time.  He is concerned he might have gastroparesis.  His weight has been stable.    DM Complications:  Retinopathy: Last eye exam in 2020, no DR per patient. Note from 2013 documents DRTorie    Nephropathy: GFR>60, no h/o microalbuminuria; on 40 mg lisinopril daily.   H/o neuropathy on gabapentin; B/L numbness, left foot burning sensation big toe - since having foot surgery. Occasional burning sensation R foot, too.   DKA: 3/2020, 4/2018, 1/2018, 9/2017     Admitted for severe hypoglycemia in 2015  Feels very lethargic when BG is low. He does not have palpitations, tremor or sweating. He sometimes experiences muscle jerks at the time the BG is low. The lowest BG number he remembers experiencing was 25. He corrects the lows with 19-26 gm of carbs of sweetened juiced. He doesn't always check the BG afterwards. Denies prior episodes of LOC due to hypoglycemia. He has glucagon at home and his family knows how to use it.   Most recent LDL cholesterol was 98, HDL 44 and triglycerides 175, in February 2020.  On 20 mg pravastatin daily. Was on atorvastatin in the past and he doesn't remember the reason why it was discontinued.   Exercise: none   TSH was normal at 1.86, in June 2016.    2021 esophagram -no evidence of mass or stricture    PMH/PSH:  Past Medical History:   Diagnosis Date     Adopted      Anxiety      Anxiety      Chronic low back pain      DDD (degenerative disc disease), lumbar 3/8/2018     Diabetes  mellitus (H)     TYPE 1     Hallux abducto valgus      Hyperlipaemia      Hyperlipidemia      Hypertension      Lip lesion 2010     Pain in toe of left foot     2 ND TOE     RLS (restless legs syndrome)      Snoring     MODERATE SEVERITY     Swelling of foot joint 2012   Elevated LFTs  ED  Pneumonia 2019 ? Covid   Collar bone fracture        Past Surgical History:   Procedure Laterality Date     APPENDECTOMY       OSTEOTOMY FOOT  2013    Procedure: OSTEOTOMY FOOT;  Left Foot Distal First Metarsal Osteotomy, Second Toe Hammer Toe Correction, Second Metatarsal Weil Osteotomy;  Surgeon: Robert Augustine DPM;  Location: [facility]     SURGICAL HISTORY OF -       Appy       Family Hx:  Adopted. Mother  of cancer.     Social Hx:  . He has 1 child.  Former smoker for ~ 25 years, up to  PPD, quit in .  He has been drinking alcohol for ~30 years, now a couple of scotch drinks a night.  He denies using illicit drugs.  Occupation: unemployed. Used to work as a  for [email or web address].        MEDICATIONS:    Current Outpatient Medications:      albuterol (PROAIR HFA/PROVENTIL HFA/VENTOLIN HFA) 108 (90 Base) MCG/ACT inhaler, Inhale 1-2 puffs into the lungs every 4 hours as needed for shortness of breath / dyspnea or wheezing, Disp: 1 Inhaler, Rfl: 0     albuterol (PROVENTIL) (2.5 MG/3ML) 0.083% neb solution, Take 1 vial (2.5 mg) by nebulization every 6 hours as needed for shortness of breath / dyspnea or wheezing, Disp: 1 Box, Rfl: 0     ALPRAZolam (XANAX) 0.5 MG tablet, Take 1 tablet (0.5 mg) by mouth 3 times daily as needed for anxiety 1 month supply, Disp: 30 tablet, Rfl: 0     aspirin (ASA) 81 MG EC tablet, Take 1 tablet (81 mg) by mouth daily, Disp: 90 tablet, Rfl: 3     blood glucose monitoring (ONE TOUCH DELICA) lancets, Use to test blood sugar 4-6 times daily. May test up to 10 times per day, Disp: 300 each, Rfl: 1     blood glucose monitoring (ONETOUCH ULTRA) test strip,  Use to test blood sugar 4-6 times daily, may test up to 10 times per day., Disp: 300 strip, Rfl: 1     buPROPion (WELLBUTRIN XL) 300 MG 24 hr tablet, Take 1 tablet (300 mg) by mouth every morning, Disp: 90 tablet, Rfl: 1     cetirizine (ZYRTEC) 10 MG tablet, Take 10 mg by mouth daily, Disp: , Rfl:      gabapentin (NEURONTIN) 300 MG capsule, Take 1 capsule (300 mg) by mouth See Admin Instructions 2 tabs am, 1 noon, 2 pm, Disp: 150 capsule, Rfl: 5     insulin aspart (NOVOLOG PEN) 100 UNIT/ML injection, Inject 30 Units Subcutaneous daily Use with sliding scale, approximately 2-5 units daily (Patient taking differently: Inject 32 Units Subcutaneous daily Use with sliding scale, approximately 2-5 units daily), Disp: 15 mL, Rfl: 3     insulin pen needle (B-D U/F) 31G X 5 MM miscellaneous, by Device route 6 times daily, Disp: 600 each, Rfl: 2     LANTUS SOLOSTAR 100 UNIT/ML soln, INJECT 30 UNITS SUBCUTANEOUSLY ONCE DAILY, Disp: 45 mL, Rfl: 0     lisinopril (ZESTRIL) 40 MG tablet, Take 1 tablet (40 mg) by mouth daily No refills. Needs visit, Disp: 90 tablet, Rfl: 1     metoclopramide (REGLAN) 10 MG tablet, Take 1 tablet (10 mg) by mouth 4 times daily as needed, Disp: 30 tablet, Rfl: 1     ondansetron (ZOFRAN-ODT) 4 MG ODT tab, Take 1 tablet (4 mg) by mouth every 8 hours as needed for nausea, Disp: 20 tablet, Rfl: 0     pravastatin (PRAVACHOL) 20 MG tablet, Take 1 tablet (20 mg) by mouth daily, Disp: 90 tablet, Rfl: 3     rOPINIRole (REQUIP) 0.25 MG tablet, TAKE 1-2 TABLETS BY MOUTH AT BEDTIME, Disp: 180 tablet, Rfl: 0     sildenafil (VIAGRA) 100 MG tablet, Take 1 tablet (100 mg) by mouth daily as needed (sexual functioning), Disp: 8 tablet, Rfl: 11     Continuous Blood Gluc  (FREESTYLE JUSTIN READER) MYA, 1 Device See Admin Instructions (Patient not taking: Reported on 4/20/2021), Disp: 1 Device, Rfl: 0     continuous blood glucose monitoring (FREESTYLE JUSTIN) sensor, For use with Freestyle Justin Flash  for  continuous monitioring of blood glucose levels. Replace sensor every 10 days. (Patient not taking: Reported on 4/20/2021), Disp: 1 each, Rfl: 11     diclofenac (VOLTAREN) 75 MG EC tablet, Take 1 tablet (75 mg) by mouth 2 times daily for 7 days, Disp: 14 tablet, Rfl: 0     fluticasone (FLONASE) 50 MCG/ACT spray, Spray 1-2 sprays into both nostrils daily (Patient not taking: Reported on 4/14/2021), Disp: 1 Bottle, Rfl: 11     metoprolol succinate ER (TOPROL-XL) 25 MG 24 hr tablet, Take 1 tablet (25 mg) by mouth daily (Patient not taking: Reported on 4/14/2021), Disp: 30 tablet, Rfl: 1    ROS:   Systemic symptoms: some fatigue; weight stable   Eye symptoms: No eye symptoms.  Otolaryngeal symptoms: intermittently, the food gets struck in his throat; just solid foods, for the last year and stable; some voice hoarseness noted for a 1 year, no cough    Breast symptoms: No breast symptoms.  Cardiovascular symptoms: No cardiovascular symptoms.    Pulmonary symptoms: occasional SOB with exertion - after intense physical activity   Gastrointestinal symptoms: episodes of sweating for 3-4 hrs, associated with feeling cold - followed by nausea and vomiting, 2-3 episodes in 1 day; once or twice a month   Genitourinary symptoms: urinates 0-1 times a night   Endocrine symptoms: as above     Hematologic symptoms: No hematologic symptoms.  Musculoskeletal symptoms: back pain; finger joints are painful   Neurological symptoms: hands tremor or the last 8 months; rare headaches; lightheadedness when he stands up - worsen in the past few months - lasting less than 1 minute   Psychological symptoms: anxiety   Skin symptoms: dry skin     Physical Exam   Wt Readings from Last 10 Encounters:   04/14/21 79.1 kg (174 lb 6 oz)   03/30/21 79.8 kg (176 lb)   12/15/20 79.9 kg (176 lb 3.2 oz)   10/05/20 77 kg (169 lb 12.8 oz)   09/30/20 74.8 kg (165 lb)   01/30/20 74.3 kg (163 lb 12.8 oz)   12/23/19 78.1 kg (172 lb 3.2 oz)   08/21/18 75.3 kg (166  lb)   06/07/18 75.8 kg (167 lb)   05/16/18 77.1 kg (170 lb)     BP Readings from Last 6 Encounters:   04/14/21 125/82   03/30/21 114/76   12/15/20 130/84   10/05/20 (!) 144/82   01/30/20 (!) 149/104   12/23/19 (!) 149/86      There were no vitals taken for this visit.  Physical Exam  General Appearance: alert, no distress noted   Eyes: grossly normal to inspection, conjunctivae and sclerae normal, no lid lag or stare   Respiratory: no audible wheeze, cough, or visible cyanosis.  No visible retractions or increased work of breathing.  Able to speak fully in complete sentences.  Neurological: Cranial nerves grossly intact, mentation intact and speech normal; no tremor of the hands   Skin: no lesions on exposed skin   Psychological: mentation appears normal, affect normal, judgement and insight intact, normal speech and appearance well-groomed    LABS:  All pertinent notes, labs, personally reviewed by me.     Assessment and plan:    1. Type 1 diabetes, complicated by hypoglycemia unawareness and neuropathy.  I had a long discussion with the patient regarding the pharmacokinetics of short and long-acting insulin, the correct administration of short acting insulin based on the carbohydrate intake, carbohydrate counting, the correct use of the correction scale before meals and at bedtime.  The hypoglycemic episodes are most likely due to the fact that he is basically managing his diabetes only with long-acting insulin.  Discussed that, in a well-controlled diabetic patient, 50% of the total daily dose should be short acting insulin.  He falsely attributes the hypoglycemic episodes to NovoLog when they are, actually, caused by Lantus.  He maintains a good A1c at the expense of a wide range of variation of the blood sugar numbers, with frequent hypoglycemia.      I recommended the following insulin regimen:  Decrease the dose of Lantus to 17 units  Administer NovoLog at 1 unit per 15 g carbohydrates for all meals and  snacks.  NovoLog should be administered 10 to 15 minutes prior to eating  Schedule an appointment with the diabetes educator, to review insulin administration and carbohydrate counting.  Prior to that appointment, I advised the patient to keep written records of his meals and snacks and try to evaluate by himself the carbohydrate content.  Use a correction scale of 1 unit per 50 above 150 for the premeal and bedtime blood sugar.  The correction scale should only be given before eating and at bedtime, no sooner than 4 hours apart.  It should not be used before having snacks.  Start using the Jenni sensor  Record the insulin dose and the carbohydrate intake using the Jenni   Schedule a follow-up appointment in 3 to 4 weeks  We are going to try to obtain a copy of his eye exam    The patient is reluctant to implement to the above regimen.  I advised him to first schedule an appointment with the diabetes educator, to review carbohydrate counting and insulin administration.    2.  Elevated liver enzymes   I suspect this is due to alcohol.  He has had prior episodes of alcoholic ketosis and he does have a tremor which might be withdrawal tremor.  I advised him to work on quitting drinking alcohol.    3.  Cardiovascular risk reduction  He would benefit more from being on medications like atorvastatin or rosuvastatin.  I am going to discuss about this at his next appointment.    No orders of the defined types were placed in this encounter.    Total visit time 72 minutes, out of which 50% was spent counseling and coordinating care.

## 2021-04-20 NOTE — PROGRESS NOTES
Patient states he does not have his readings available but did wake up this morning at 43.    Kris is a 51 year old who is being evaluated via a billable telephone visit.      What phone number would you like to be contacted at? [phone]  How would you like to obtain your AVS? Miguel Dillon [facility]  Adult Endocrinology  [facility]

## 2021-04-20 NOTE — LETTER
4/20/2021         RE: Rudolph Tao  [address] [address]        Dear Colleague,    Thank you for referring your patient, Rudolph Tao, to the [facility]. Please see a copy of my visit note below.    Video-Visit Details    Type of service:  Video Visit    Video call duration:  Started 12:35 pm   Ended 1:47 pm     Originating Location (pt. Location): Home  Distant Location (provider location):  [facility]   Platform used for Video Visit: Doximity     Due to the COVID 19 pandemic this visit was converted to a video visit in order to help prevent spread of infection in this patient and the general population.     The patient is seen for evaluation of type 1 diabetes.  He was previously seen by Dr. Tipton.    Kris Tao is a 51 year old male with a past medical history significant for anxiety, hypertension, hyperlipidemia, degenerative changes of the spine, diagnosed with type 1 diabetes at age 34, in 2004.  GAIL 65 antibodies were strongly positive, in 2018.  Average A1c over the years has been around 8%.  Most recent A1c was lower, at 6.9%, on 1/14/2021.      His diabetes is now treated with 30 units of Lantus at 1:30 PM (dose recently decreased by his primary care provider, from 32 units) and a NovoLog correction scale of 1 unit per 50 mg above 200.  He checks his blood glucose up to 4 times daily, in the morning and 1 hour after eating, when he uses the correction.  He reports being very sensitive to NovoLog.  If he takes 2 units for blood sugar 250, he will get a hypoglycemic episode in the next 2 to 3 hours.  Denies taking NovoLog before eating.  He was taught carbohydrate counting in the past and he reports being comfortable counting carbohydrates.  He has never been taking NovoLog prior to eating, according to the carbohydrate intake.  On average, he takes approximately 2 units NovoLog per day as correction.  He rarely administers 4 or  6 units.  His average blood glucose on the glucometer is 188.  He just received a Jenni sensor and he is planning on using it.    The patient documents having recurrent hypoglycemic episodes.  Currently, they occur 2-3 times a week.  Most of the hypoglycemic episodes occur in the morning.  Sometimes, he gets a hypoglycemic episode during daytime, mainly if he skips dinner or the meal is delayed.  In general, he has lunch at 1 PM (this is the first meal of the day) and dinner anywhere between 6:30 to 8:30 PM.  He snacks at variable times, on labs, chips, crackers and dips, fruits, cheese, summer sausages.  In the morning, he has coffee or tea, with cream and Splenda.  His sleeping habits are quite variable.    The patient describes not having appetite for quite some time.  He is concerned he might have gastroparesis.  His weight has been stable.    DM Complications:  Retinopathy: Last eye exam in 2020, no DR per patient. Note from 2013 documents DR.    Nephropathy: GFR>60, no h/o microalbuminuria; on 40 mg lisinopril daily.   H/o neuropathy on gabapentin; B/L numbness, left foot burning sensation big toe - since having foot surgery. Occasional burning sensation R foot, too.   DKA: 3/2020, 4/2018, 1/2018, 9/2017     Admitted for severe hypoglycemia in 2015  Feels very lethargic when BG is low. He does not have palpitations, tremor or sweating. He sometimes experiences muscle jerks at the time the BG is low. The lowest BG number he remembers experiencing was 25. He corrects the lows with 19-26 gm of carbs of sweetened juiced. He doesn't always check the BG afterwards. Denies prior episodes of LOC due to hypoglycemia. He has glucagon at home and his family knows how to use it.   Most recent LDL cholesterol was 98, HDL 44 and triglycerides 175, in February 2020.  On 20 mg pravastatin daily. Was on atorvastatin in the past and he doesn't remember the reason why it was discontinued.   Exercise: none   TSH was normal at  1.86, in  esophagram -no evidence of mass or stricture    PMH/PSH:  Past Medical History:   Diagnosis Date     Adopted      Anxiety      Anxiety      Chronic low back pain      DDD (degenerative disc disease), lumbar 3/8/2018     Diabetes mellitus (H)     TYPE 1     Hallux abducto valgus      Hyperlipaemia      Hyperlipidemia      Hypertension      Lip lesion 2010     Pain in toe of left foot     2 ND TOE     RLS (restless legs syndrome)      Snoring     MODERATE SEVERITY     Swelling of foot joint 2012   Elevated LFTs  ED  Pneumonia  ? Covid   Collar bone fracture        Past Surgical History:   Procedure Laterality Date     APPENDECTOMY       OSTEOTOMY FOOT  2013    Procedure: OSTEOTOMY FOOT;  Left Foot Distal First Metarsal Osteotomy, Second Toe Hammer Toe Correction, Second Metatarsal Weil Osteotomy;  Surgeon: Robert Augustine DPM;  [facility]: [facility]     SURGICAL HISTORY OF -       Appy       Family Hx:  Adopted. Mother  of cancer.     Social Hx:  . He has 1 child.  Former smoker for ~ 25 years, up to 1/2 PPD, quit in .  He has been drinking alcohol for ~30 years, now a couple of scotch drinks a night.  He denies using illicit drugs.  Occupation: unemployed. Used to work as a  for [employer].        MEDICATIONS:    Current Outpatient Medications:      albuterol (PROAIR HFA/PROVENTIL HFA/VENTOLIN HFA) 108 (90 Base) MCG/ACT inhaler, Inhale 1-2 puffs into the lungs every 4 hours as needed for shortness of breath / dyspnea or wheezing, Disp: 1 Inhaler, Rfl: 0     albuterol (PROVENTIL) (2.5 MG/3ML) 0.083% neb solution, Take 1 vial (2.5 mg) by nebulization every 6 hours as needed for shortness of breath / dyspnea or wheezing, Disp: 1 Box, Rfl: 0     ALPRAZolam (XANAX) 0.5 MG tablet, Take 1 tablet (0.5 mg) by mouth 3 times daily as needed for anxiety 1 month supply, Disp: 30 tablet, Rfl: 0     aspirin (ASA) 81 MG EC tablet, Take 1 tablet (81  mg) by mouth daily, Disp: 90 tablet, Rfl: 3     blood glucose monitoring (ONE TOUCH DELICA) lancets, Use to test blood sugar 4-6 times daily. May test up to 10 times per day, Disp: 300 each, Rfl: 1     blood glucose monitoring (ONETOUCH ULTRA) test strip, Use to test blood sugar 4-6 times daily, may test up to 10 times per day., Disp: 300 strip, Rfl: 1     buPROPion (WELLBUTRIN XL) 300 MG 24 hr tablet, Take 1 tablet (300 mg) by mouth every morning, Disp: 90 tablet, Rfl: 1     cetirizine (ZYRTEC) 10 MG tablet, Take 10 mg by mouth daily, Disp: , Rfl:      gabapentin (NEURONTIN) 300 MG capsule, Take 1 capsule (300 mg) by mouth See Admin Instructions 2 tabs am, 1 noon, 2 pm, Disp: 150 capsule, Rfl: 5     insulin aspart (NOVOLOG PEN) 100 UNIT/ML injection, Inject 30 Units Subcutaneous daily Use with sliding scale, approximately 2-5 units daily (Patient taking differently: Inject 32 Units Subcutaneous daily Use with sliding scale, approximately 2-5 units daily), Disp: 15 mL, Rfl: 3     insulin pen needle (B-D U/F) 31G X 5 MM miscellaneous, by Device route 6 times daily, Disp: 600 each, Rfl: 2     LANTUS SOLOSTAR 100 UNIT/ML soln, INJECT 30 UNITS SUBCUTANEOUSLY ONCE DAILY, Disp: 45 mL, Rfl: 0     lisinopril (ZESTRIL) 40 MG tablet, Take 1 tablet (40 mg) by mouth daily No refills. Needs visit, Disp: 90 tablet, Rfl: 1     metoclopramide (REGLAN) 10 MG tablet, Take 1 tablet (10 mg) by mouth 4 times daily as needed, Disp: 30 tablet, Rfl: 1     ondansetron (ZOFRAN-ODT) 4 MG ODT tab, Take 1 tablet (4 mg) by mouth every 8 hours as needed for nausea, Disp: 20 tablet, Rfl: 0     pravastatin (PRAVACHOL) 20 MG tablet, Take 1 tablet (20 mg) by mouth daily, Disp: 90 tablet, Rfl: 3     rOPINIRole (REQUIP) 0.25 MG tablet, TAKE 1-2 TABLETS BY MOUTH AT BEDTIME, Disp: 180 tablet, Rfl: 0     sildenafil (VIAGRA) 100 MG tablet, Take 1 tablet (100 mg) by mouth daily as needed (sexual functioning), Disp: 8 tablet, Rfl: 11     Continuous Blood  Gluc  (FREESTYLE JUSTIN READER) MYA, 1 Device See Admin Instructions (Patient not taking: Reported on 4/20/2021), Disp: 1 Device, Rfl: 0     continuous blood glucose monitoring (FREESTYLE JUSTIN) sensor, For use with Freestyle Justin Flash  for continuous monitioring of blood glucose levels. Replace sensor every 10 days. (Patient not taking: Reported on 4/20/2021), Disp: 1 each, Rfl: 11     diclofenac (VOLTAREN) 75 MG EC tablet, Take 1 tablet (75 mg) by mouth 2 times daily for 7 days, Disp: 14 tablet, Rfl: 0     fluticasone (FLONASE) 50 MCG/ACT spray, Spray 1-2 sprays into both nostrils daily (Patient not taking: Reported on 4/14/2021), Disp: 1 Bottle, Rfl: 11     metoprolol succinate ER (TOPROL-XL) 25 MG 24 hr tablet, Take 1 tablet (25 mg) by mouth daily (Patient not taking: Reported on 4/14/2021), Disp: 30 tablet, Rfl: 1    ROS:   Systemic symptoms: some fatigue; weight stable   Eye symptoms: No eye symptoms.  Otolaryngeal symptoms: intermittently, the food gets struck in his throat; just solid foods, for the last year and stable; some voice hoarseness noted for a 1 year, no cough    Breast symptoms: No breast symptoms.  Cardiovascular symptoms: No cardiovascular symptoms.    Pulmonary symptoms: occasional SOB with exertion - after intense physical activity   Gastrointestinal symptoms: episodes of sweating for 3-4 hrs, associated with feeling cold - followed by nausea and vomiting, 2-3 episodes in 1 day; once or twice a month   Genitourinary symptoms: urinates 0-1 times a night   Endocrine symptoms: as above     Hematologic symptoms: No hematologic symptoms.  Musculoskeletal symptoms: back pain; finger joints are painful   Neurological symptoms: hands tremor or the last 8 months; rare headaches; lightheadedness when he stands up - worsen in the past few months - lasting less than 1 minute   Psychological symptoms: anxiety   Skin symptoms: dry skin     Physical Exam   Wt Readings from Last 10  Encounters:   04/14/21 79.1 kg (174 lb 6 oz)   03/30/21 79.8 kg (176 lb)   12/15/20 79.9 kg (176 lb 3.2 oz)   10/05/20 77 kg (169 lb 12.8 oz)   09/30/20 74.8 kg (165 lb)   01/30/20 74.3 kg (163 lb 12.8 oz)   12/23/19 78.1 kg (172 lb 3.2 oz)   08/21/18 75.3 kg (166 lb)   06/07/18 75.8 kg (167 lb)   05/16/18 77.1 kg (170 lb)     BP Readings from Last 6 Encounters:   04/14/21 125/82   03/30/21 114/76   12/15/20 130/84   10/05/20 (!) 144/82   01/30/20 (!) 149/104   12/23/19 (!) 149/86      There were no vitals taken for this visit.  Physical Exam  General Appearance: alert, no distress noted   Eyes: grossly normal to inspection, conjunctivae and sclerae normal, no lid lag or stare   Respiratory: no audible wheeze, cough, or visible cyanosis.  No visible retractions or increased work of breathing.  Able to speak fully in complete sentences.  Neurological: Cranial nerves grossly intact, mentation intact and speech normal; no tremor of the hands   Skin: no lesions on exposed skin   Psychological: mentation appears normal, affect normal, judgement and insight intact, normal speech and appearance well-groomed    LABS:  All pertinent notes, labs, personally reviewed by me.     Assessment and plan:    1. Type 1 diabetes, complicated by hypoglycemia unawareness and neuropathy.  I had a long discussion with the patient regarding the pharmacokinetics of short and long-acting insulin, the correct administration of short acting insulin based on the carbohydrate intake, carbohydrate counting, the correct use of the correction scale before meals and at bedtime.  The hypoglycemic episodes are most likely due to the fact that he is basically managing his diabetes only with long-acting insulin.  Discussed that, in a well-controlled diabetic patient, 50% of the total daily dose should be short acting insulin.  He falsely attributes the hypoglycemic episodes to NovoLog when they are, actually, caused by Lantus.  He maintains a good A1c at  the expense of a wide range of variation of the blood sugar numbers, with frequent hypoglycemia.      I recommended the following insulin regimen:  Decrease the dose of Lantus to 17 units  Administer NovoLog at 1 unit per 15 g carbohydrates for all meals and snacks.  NovoLog should be administered 10 to 15 minutes prior to eating  Schedule an appointment with the diabetes educator, to review insulin administration and carbohydrate counting.  Prior to that appointment, I advised the patient to keep written records of his meals and snacks and try to evaluate by himself the carbohydrate content.  Use a correction scale of 1 unit per 50 above 150 for the premeal and bedtime blood sugar.  The correction scale should only be given before eating and at bedtime, no sooner than 4 hours apart.  It should not be used before having snacks.  Start using the Jenni sensor  Record the insulin dose and the carbohydrate intake using the Jenni   Schedule a follow-up appointment in 3 to 4 weeks  We are going to try to obtain a copy of his eye exam    The patient is reluctant to implement to the above regimen.  I advised him to first schedule an appointment with the diabetes educator, to review carbohydrate counting and insulin administration.    2.  Elevated liver enzymes   I suspect this is due to alcohol.  He has had prior episodes of alcoholic ketosis and he does have a tremor which might be withdrawal tremor.  I advised him to work on quitting drinking alcohol.    3.  Cardiovascular risk reduction  He would benefit more from being on medications like atorvastatin or rosuvastatin.  I am going to discuss about this at his next appointment.    No orders of the defined types were placed in this encounter.    Total visit time 72 minutes, out of which 50% was spent counseling and coordinating care.    Patient states he does not have his readings available but did wake up this morning at 43.    Kris is a 51 year old who is being  evaluated via a billable telephone visit.      What phone number would you like to be contacted at? [phone]  How would you like to obtain your AVS? Miguel Dillon [facility]  Adult Endocrinology  [facility]          Again, thank you for allowing me to participate in the care of your patient.        Sincerely,        Annabelle Freire MD

## 2021-04-20 NOTE — PATIENT INSTRUCTIONS
I recommend the following insulin regimen:  Decrease the dose of Lantus to 17 units  Administer NovoLog at 1 unit per 15 g carbohydrates for all meals and snacks.  NovoLog should be administered 10 to 15 minutes prior to eating  Schedule an appointment with the diabetes educator, to review insulin administration and carbohydrate counting.  Prior to that appointment, you should keep written records of his meals and snacks and try to evaluate by yourself the carbohydrate content.  Use a correction scale of 1 unit per 50 above 150 for the premeal and bedtime blood sugar.  The correction scale should only be administered in the morning, prior to lunch, prior to dinner and at bedtime, no sooner than 4 hours apart.  It should not be used before having snacks.  Start using the Jenni sensor  Record the insulin dose and the carbohydrate intake using the Jenni   
none

## 2021-04-21 ENCOUNTER — TELEPHONE (OUTPATIENT)
Dept: ENDOCRINOLOGY | Facility: CLINIC | Age: 51
End: 2021-04-21

## 2021-04-21 NOTE — TELEPHONE ENCOUNTER
4/21 1st attempt.  LVM for patient to schedule a return visit with Leida Stanton Diabetes Educator around 5/18/21.    Please assist patient in scheduling when he calls back.    Thanks    Idalmis Perdomo  Pediatric Specialty    [facility]

## 2021-04-29 ENCOUNTER — MYC MEDICAL ADVICE (OUTPATIENT)
Dept: INTERNAL MEDICINE | Facility: CLINIC | Age: 51
End: 2021-04-29

## 2021-04-30 ENCOUNTER — MYC MEDICAL ADVICE (OUTPATIENT)
Dept: FAMILY MEDICINE | Facility: CLINIC | Age: 51
End: 2021-04-30

## 2021-05-10 DIAGNOSIS — E10.42 TYPE 1 DIABETES MELLITUS WITH DIABETIC POLYNEUROPATHY (H): ICD-10-CM

## 2021-05-10 RX ORDER — GABAPENTIN 300 MG/1
CAPSULE ORAL
Qty: 150 CAPSULE | Refills: 0 | Status: SHIPPED | OUTPATIENT
Start: 2021-05-10 | End: 2021-06-21

## 2021-05-10 NOTE — TELEPHONE ENCOUNTER
Routing refill request to provider for review/approval because:  Drug not on the [facility] refill protocol   Requested Prescriptions   Pending Prescriptions Disp Refills     gabapentin (NEURONTIN) 300 MG capsule [Pharmacy Med Name: Gabapentin 300 MG Oral Capsule] 150 capsule 0     Sig: TAKE 2 CAPSULES BY MOUTH IN THE MORNING, 1 CAPSULE AT NOON, AND 2 CAPSULES IN THE EVENING       There is no refill protocol information for this order

## 2021-05-25 ENCOUNTER — VIRTUAL VISIT (OUTPATIENT)
Dept: EDUCATION SERVICES | Facility: CLINIC | Age: 51
End: 2021-05-25
Attending: INTERNAL MEDICINE
Payer: COMMERCIAL

## 2021-05-25 DIAGNOSIS — E10.649 TYPE 1 DIABETES MELLITUS WITH HYPOGLYCEMIA UNAWARENESS (H): ICD-10-CM

## 2021-05-25 PROCEDURE — G0108 DIAB MANAGE TRN  PER INDIV: HCPCS | Mod: 95

## 2021-05-25 NOTE — LETTER
"    5/25/2021         RE: Rudolph Tao  [address] [address] [address]        Dear Colleague,    Thank you for referring your patient, Rudolph Tao, to the [facility]. Please see a copy of my visit note below.    Diabetes Self-Management Education & Support    Presents for: Personal CGM Start    Type of service:  Video Visit    If the video visit is dropped, the video visit invitation should be resent by: Other e-mail: [place]    Originating Location (pt. Location): Home  Distant Location (provider location): Home  Mode of Communication:  Video Conference via Be Here Start Time: 2:30  Video End Time (time video stopped): 3:30    How would patient like to obtain AVS? Miguel      SUBJECTIVE/OBJECTIVE:  Presents for: Personal CGM Start  Accompanied by: Self, Spouse  Diabetes education in the past 24mo: No  Focus of Visit: Monitoring, Problem Solving  Diabetes type: Type 1  Date of diagnosis: 17 years ago  Disease course: Getting harder to manage  Diabetes management related comments/concerns: He feels like he is getting dehydrated a lot- his feet feels like they are frozen while upper body is sweating profusely, vomiting 1-4 times, tried to eat a piece of bread and then threw up. he uses juice and granola bars by his bed  Transportation concerns: No  Difficulty affording diabetes medication?: Sometimes(was bouncing around to different insurances for the last 3 months)  Difficulty affording diabetes testing supplies?: Sometimes  Cultural Influences/Ethnic Background:  Other    Diabetes Symptoms & Complications:   Patient Problem List and Family Medical History reviewed for relevant medical history, current medical status, and diabetes risk factors.    Vitals:  There were no vitals taken for this visit.  Estimated body mass index is 26.51 kg/m  as calculated from the following:    Height as of 3/30/21: 1.727 m (5' 8\").    Weight as of 4/14/21: 79.1 kg (174 lb 6 " oz).   Last 3 BP:   BP Readings from Last 3 Encounters:   04/14/21 125/82   03/30/21 114/76   12/15/20 130/84       History   Smoking Status     Former Smoker     Packs/day: 0.00     Years: 20.00     Types: Cigarettes     Quit date: 10/1/2015   Smokeless Tobacco     Never Used     Comment: 2-4  cigarettes daily       Labs:  Lab Results   Component Value Date    A1C 6.9 01/14/2021     Lab Results   Component Value Date     03/03/2020     Lab Results   Component Value Date    LDL 98 02/01/2020     HDL Cholesterol   Date Value Ref Range Status   02/01/2020 44 >40 mg/dL Final   ]  GFR Estimate   Date Value Ref Range Status   03/03/2020 >60 >60 ml/min/1.73m2 Final     GFR Estimate If Black   Date Value Ref Range Status   03/03/2020 >60 >60 ml/min/1.73m2 Final     Lab Results   Component Value Date    CR 0.91 03/03/2020     No results found for: MICROALBUMIN    Healthy Eating:  Healthy Eating Assessed Today: Yes  Lunch: 1 1/2 piece of pizza OR tuna steak  Dinner: Chipotle- small serving OR meat with vegetables and carbohydrates (potato, noodles)  Beverages: Juice, Water(juice throughout the day (18-24 grams of carbs))    Being Active:       Monitoring:  Monitoring Assessed Today: No    Helped patient get started with Dexcom, did not review BG today    Taking Medications:  Diabetes Medication(s)     Insulin       insulin aspart (NOVOLOG PEN) 100 UNIT/ML injection    Inject 30 Units Subcutaneous daily Use with sliding scale, approximately 2-5 units daily     Patient taking differently: Inject 32 Units Subcutaneous daily Use with sliding scale, approximately 2-5 units daily     LANTUS SOLOSTAR 100 UNIT/ML soln    INJECT 30 UNITS SUBCUTANEOUSLY ONCE DAILY        Taking insulin as follows:     30 units of Lantus  Novolog: takes after meals - within 1 hour   200: 1 unit  250: 2 units  300: 3 units  350: 4 units    Taking Medication Assessed Today: Yes  Current Treatments: Insulin Injections  Given by:  Patient  Injection/Infusion sites: Abdomen    Problem Solving:  Problem Solving Assessed Today: Yes  Is the patient at risk for hypoglycemia?: Yes  Hypoglycemia Frequency: Weekly  Hypoglycemia Treatment: Juice, Other food(granola bar)  Medical ID: Not Needed  Is the patient at risk for DKA?: Yes    Reducing Risks:       Healthy Coping:     Patient Activation Measure Survey Score:  LAUREL Score (Last Two) 9/19/2011   LAUREL Raw Score 51   Activation Score 91.6   LAUREL Level 4       Diabetes knowledge and skills assessment:   Patient is knowledgeable in diabetes management concepts related to: Monitoring    Patient needs further education on the following diabetes management concepts: Healthy Eating, Being Active, Monitoring, Taking Medication, Problem Solving, Reducing Risks and Healthy Coping    Based on learning assessment above, most appropriate setting for further diabetes education would be: Individual setting.      INTERVENTIONS:         Sensor was inserted with no resistance or bleeding at insertion site.    Education provided today on:  AADE Self-Care Behaviors:  Healthy Eating: consistency in amount, composition, and timing of food intake  Monitoring: purpose, proper technique, log and interpret results, individual blood glucose targets and frequency of monitoring  Taking Medication: action of prescribed medication and when to take medications  Problem Solving: low blood glucose - causes, signs/symptoms, treatment and prevention    CGM-specific education:   Dexcom sensor: insertion technique, sensor site location and rotation, insulin administration in relation to sensor placement, Dexcom Mobile christian and Dexcom Clarity software       Education Materials Provided:  [facility] Living with Diabetes Book, Carbohydrate Counting, Hypoglycemia Signs and Symptoms, DKA and My Plate Planner    ASSESSMENT:  Kris has had diabetes for many years. He mentions that he has had some episodes lately which he believes are  triggered by dehydration, his feet get cold and the rest of his body sweats profusely and he gets nauseas and vomits 1-4 times. His appetite has been poor as well, he will eat once/day. We discussed gastroparesis, which was already on patients radar and plans to discuss with PCP. He has also been having low blood sugars. We started the Dexcom CGM during our video visit today. Did not have time to review BG.     Kris states that he takes his Novolog about 1 hour after he eats, based on his BG. He states that he is very sensitive to Novolog/Humalog and if he takes too much his BG drops a couple hours later.  Suspect that the low BG are happening because he isn't taking it as prescribed before meals.  Writer recommended to shad in Dexcom christian when he is eating and when he is taking Novolog and will use reports to explain the action of Novolog.      Pt verbalized understanding of concepts discussed and recommendations provided today.    PLAN  See Patient Instructions for co-developed, patient-stated behavior change goals.  AVS printed and provided to patient today. See Follow-Up section for recommended follow-up.    Patient to start using Dexcom and follow-up in clinic to review Clarity reports    Laureen Choe RD, LD, CDE  Time Spent: 60 minutes  Encounter Type: Individual    Any diabetes medication dose changes were made via the CDE Protocol and Collaborative Practice Agreement with the patient's primary care provider. A copy of this encounter was shared with the provider.

## 2021-05-25 NOTE — Clinical Note
Dr. Bustos,  Just sending as an FYI as Kris doesn't plan on following up with the endo that he saw. He started the Dexcom and I will see him in clinic in a few weeks to review reports.   Laureen Choe, ALKA, LD, CDE

## 2021-05-25 NOTE — PROGRESS NOTES
"Diabetes Self-Management Education & Support    Presents for: Personal CGM Start    Type of service:  Video Visit    If the video visit is dropped, the video visit invitation should be resent by: Other e-mail: [email or web address]    Originating Location (pt. Location): Home  Distant Location (provider location): Home  Mode of Communication:  Video Conference via [employer]    Video Start Time: 2:30  Video End Time (time video stopped): 3:30    How would patient like to obtain AVS? Miguel      SUBJECTIVE/OBJECTIVE:  Presents for: Personal CGM Start  Accompanied by: Self, Spouse  Diabetes education in the past 24mo: No  Focus of Visit: Monitoring, Problem Solving  Diabetes type: Type 1  Date of diagnosis: 17 years ago  Disease course: Getting harder to manage  Diabetes management related comments/concerns: He feels like he is getting dehydrated a lot- his feet feels like they are frozen while upper body is sweating profusely, vomiting 1-4 times, tried to eat a piece of bread and then threw up. he uses juice and granola bars by his bed  Transportation concerns: No  Difficulty affording diabetes medication?: Sometimes(was bouncing around to different insurances for the last 3 months)  Difficulty affording diabetes testing supplies?: Sometimes  Cultural Influences/Ethnic Background:  Other    Diabetes Symptoms & Complications:   Patient Problem List and Family Medical History reviewed for relevant medical history, current medical status, and diabetes risk factors.    Vitals:  There were no vitals taken for this visit.  Estimated body mass index is 26.51 kg/m  as calculated from the following:    Height as of 3/30/21: 1.727 m (5' 8\").    Weight as of 4/14/21: 79.1 kg (174 lb 6 oz).   Last 3 BP:   BP Readings from Last 3 Encounters:   04/14/21 125/82   03/30/21 114/76   12/15/20 130/84       History   Smoking Status     Former Smoker     Packs/day: 0.00     Years: 20.00     Types: Cigarettes     Quit date: 10/1/2015   Smokeless " Tobacco     Never Used     Comment: 2-4  cigarettes daily       Labs:  Lab Results   Component Value Date    A1C 6.9 01/14/2021     Lab Results   Component Value Date     03/03/2020     Lab Results   Component Value Date    LDL 98 02/01/2020     HDL Cholesterol   Date Value Ref Range Status   02/01/2020 44 >40 mg/dL Final   ]  GFR Estimate   Date Value Ref Range Status   03/03/2020 >60 >60 ml/min/1.73m2 Final     GFR Estimate If Black   Date Value Ref Range Status   03/03/2020 >60 >60 ml/min/1.73m2 Final     Lab Results   Component Value Date    CR 0.91 03/03/2020     No results found for: MICROALBUMIN    Healthy Eating:  Healthy Eating Assessed Today: Yes  Lunch: 1 1/2 piece of pizza OR tuna steak  Dinner: Chipotle- small serving OR meat with vegetables and carbohydrates (potato, noodles)  Beverages: Juice, Water(juice throughout the day (18-24 grams of carbs))    Being Active:       Monitoring:  Monitoring Assessed Today: No    Helped patient get started with Dexcom, did not review BG today    Taking Medications:  Diabetes Medication(s)     Insulin       insulin aspart (NOVOLOG PEN) 100 UNIT/ML injection    Inject 30 Units Subcutaneous daily Use with sliding scale, approximately 2-5 units daily     Patient taking differently: Inject 32 Units Subcutaneous daily Use with sliding scale, approximately 2-5 units daily     LANTUS SOLOSTAR 100 UNIT/ML soln    INJECT 30 UNITS SUBCUTANEOUSLY ONCE DAILY        Taking insulin as follows:     30 units of Lantus  Novolog: takes after meals - within 1 hour   200: 1 unit  250: 2 units  300: 3 units  350: 4 units    Taking Medication Assessed Today: Yes  Current Treatments: Insulin Injections  Given by: Patient  Injection/Infusion sites: Abdomen    Problem Solving:  Problem Solving Assessed Today: Yes  Is the patient at risk for hypoglycemia?: Yes  Hypoglycemia Frequency: Weekly  Hypoglycemia Treatment: Juice, Other food(granola bar)  Medical ID: Not Needed  Is the patient  at risk for DKA?: Yes    Reducing Risks:       Healthy Coping:     Patient Activation Measure Survey Score:  LAUREL Score (Last Two) 9/19/2011   LAUREL Raw Score 51   Activation Score 91.6   LAUREL Level 4       Diabetes knowledge and skills assessment:   Patient is knowledgeable in diabetes management concepts related to: Monitoring    Patient needs further education on the following diabetes management concepts: Healthy Eating, Being Active, Monitoring, Taking Medication, Problem Solving, Reducing Risks and Healthy Coping    Based on learning assessment above, most appropriate setting for further diabetes education would be: Individual setting.      INTERVENTIONS:         Sensor was inserted with no resistance or bleeding at insertion site.    Education provided today on:  AADE Self-Care Behaviors:  Healthy Eating: consistency in amount, composition, and timing of food intake  Monitoring: purpose, proper technique, log and interpret results, individual blood glucose targets and frequency of monitoring  Taking Medication: action of prescribed medication and when to take medications  Problem Solving: low blood glucose - causes, signs/symptoms, treatment and prevention    CGM-specific education:   Dexcom sensor: insertion technique, sensor site location and rotation, insulin administration in relation to sensor placement, Dexcom Mobile christian and Dexcom Clarity software       Education Materials Provided:  [facility] Healthy Living with Diabetes Book, Carbohydrate Counting, Hypoglycemia Signs and Symptoms, DKA and My Plate Planner    ASSESSMENT:  Kris has had diabetes for many years. He mentions that he has had some episodes lately which he believes are triggered by dehydration, his feet get cold and the rest of his body sweats profusely and he gets nauseas and vomits 1-4 times. His appetite has been poor as well, he will eat once/day. We discussed gastroparesis, which was already on patients radar and plans to discuss with PCP. He  has also been having low blood sugars. We started the Dexcom CGM during our video visit today. Did not have time to review BG.     Kris states that he takes his Novolog about 1 hour after he eats, based on his BG. He states that he is very sensitive to Novolog/Humalog and if he takes too much his BG drops a couple hours later.  Suspect that the low BG are happening because he isn't taking it as prescribed before meals.  Writer recommended to shad in Dexcom christian when he is eating and when he is taking Novolog and will use reports to explain the action of Novolog.      Pt verbalized understanding of concepts discussed and recommendations provided today.    PLAN  See Patient Instructions for co-developed, patient-stated behavior change goals.  AVS printed and provided to patient today. See Follow-Up section for recommended follow-up.    Patient to start using Dexcom and follow-up in clinic to review Clarity reports    Laureen Choe RD, LD, CDE  Time Spent: 60 minutes  Encounter Type: Individual    Any diabetes medication dose changes were made via the CDE Protocol and Collaborative Practice Agreement with the patient's primary care provider. A copy of this encounter was shared with the provider.

## 2021-05-26 NOTE — PATIENT INSTRUCTIONS
Plan:  Start using Dexcom with Dexcom and Clarity Marek and we will follow-up in clinic to review Clarity reports    Diabetes Support Resources:  Websites: American Association of Diabetes Educators Participant Resources: www.diabeteseducator.org/living-with-diabetes   American Diabetes Association: www.diabetes.org  Diabetes Together 2 Goal: http://ykjhvdsq3rgfa.org     Bring blood glucose meter and logbook with you to all doctor and follow-up appointments.    Diabetes Education Telephone Visit Follow-up:    We realize your time is valuable and your health is important! We offer a convenient Telephone Visit follow up! It s a quick way to check in for a medication dose adjustment without having to come back to clinic as soon.    Telephone Visits are often covered by insurance. Please check with your insurance plan to see if this type of visit is covered. If not, the cost is less expensive than an office visit:      Up to 10 minutes (Code 39225): $30    11-20 minutes (Code 64483): $59    More than 20 minutes (Code 17048): $85    Talk with your Diabetes Educator if you want to learn more.      [facility]:  For Your Diabetes Education and Nutrition Appointments Call:  [phone]   For Diabetes Education or Nutrition Related Questions:   Phone: [phone]  Send [employer] Message   If you need a medication refill please contact your pharmacy. Please allow 3 business days for your refills to be completed.

## 2021-05-27 ENCOUNTER — VIRTUAL VISIT (OUTPATIENT)
Dept: INTERNAL MEDICINE | Facility: CLINIC | Age: 51
End: 2021-05-27
Payer: COMMERCIAL

## 2021-05-27 DIAGNOSIS — M79.671 RIGHT FOOT PAIN: ICD-10-CM

## 2021-05-27 DIAGNOSIS — K31.84 GASTROPARESIS: ICD-10-CM

## 2021-05-27 DIAGNOSIS — R00.0 RAPID HEART BEAT: ICD-10-CM

## 2021-05-27 DIAGNOSIS — E10.8 DIABETES MELLITUS TYPE 1 WITH COMPLICATIONS (H): Primary | ICD-10-CM

## 2021-05-27 PROCEDURE — 99214 OFFICE O/P EST MOD 30 MIN: CPT | Mod: 95 | Performed by: INTERNAL MEDICINE

## 2021-05-27 NOTE — Clinical Note
After our appointment ended I now want patient to have a 1 week long Zio Patch . This is to help the cardiologist with his workup . Please notify patient of this information.

## 2021-05-27 NOTE — PROGRESS NOTES
Kris is a 51 year old who is being evaluated via a billable video visit.      How would you like to obtain your AVS? Jeysonhart  If the video visit is dropped, the invitation should be resent by: Text to cell phone: [phone]  Will anyone else be joining your video visit? No, wife     Video Start Time: 3:44 PM    Assessment & Plan     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)  Today was a virtual office visit with this interesting gentleman with diabetes mellitus type 1 and generalized anxiety disorder and some coexisting co-morbidities . We continue to suspect he has gastroparesis and for whatever reason he's been reticent to have the - [place] Gastric Emptying Study done. Nevertheless he's actually already taking metoclopramide (REGLAN) and Ondansetron (ZOFRAN) for his symptoms which are intermittent. The other problem is his proclivity for low blood glucose and other problems with his diabetes mellitus not being properly controlled. This is not a new problem and I think it does stem most likely from some fundamental misunderstandings of his blood glucose variability and treatment plan. He has a conviction that he has a special weird and unusual sensitivity to rapid acting insulin like Humalog (insulin lispro) or novolog (ultra-short acting insulin aspart) and so he's basically not taking any, and according to his endocrine consultation note which I reviewed , it sounds like he's going to be having over corrected blood glucose readings due to too much Lantus (Insulin Glargine) and under-treated post-meal blood glucose readings. He's had at least 2 different endocrine consultations who both wanted him to take more rapid acting insulin and he's not interested to try this, due to his experiences. He is indeed working with a certified diabetes educator and is apparently soon to receive a diagnostic continuous glucose monitor  Report and so this is clearly something that should head us into the right directions with  further treatment recommendations to following. Patient does want me to place an order for a new endocrine consultation for essentially a second opinion and I am ok with this keeping the scenario I painted above in mind. The other problem is his heart rate problem has gotten worse with marked episodic fast heart rate not tying in with normal reasons, his heart rate often speeds up with little to no exercise and he's also having episodic lightheadedness symptoms that are puzzling . I am at a  Point where I am unwilling to jockey medications around and patient was interested to have a cardiologist review his symptoms. I think this is reasonable and will in the meantime suggest we fit patient with a Zio Patch for 1 week  - CARDIOLOGY EVAL ADULT REFERRAL; Future  - Adult Endocrinology Referral; Future    Gastroparesis  I reviewed his intermittent symptoms and the only way we can really exclude gastroparesis is to have him receive a - [place] Gastric Emptying Study which is already ordered .he agrees to schedule    Right foot pain  This patient has a marked peripheral neuropathy but he has a particular toe pain that is near his site of the bunionectomy and this is driving him insane with hot burning pain like a knife is driving into his foot and this needs to be evaluated from  the podiatric perspective   - Orthopedic & Spine  Referral; Future    Review of the result(s) of each unique test - GES  Prescription drug management  51 minutes spent on the date of the encounter doing chart review, history and exam, documentation and further activities per the note  :[id number]}      Return in about 6 months (around 11/27/2021).    Haim Bustos MD  [facility] EUSEBIO Ponce is a 51 year old who presents for the following health issues   Encounter Diagnoses   Name Primary?     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) Yes     Gastroparesis      Right foot pain      Rapid heart beat        HPI    Chief Complaint   Patient presents with     Multiple concerns     ankle injury follow up, chills with body aches, high heart rate, fatigue, feet pain, excessive sweating with sweating, no appetite      Talked about his office visits with Dr. Arthur, and possibly gastroparesis. Patient doesn't think he has it but he's having these periods were his feet feel frozen, but the spouse says room  Upper body sweating, for 4-14 hours, and feels cold and clammy during these times  He will usually vomit 1-4 times during these episoses, he feels drained from this and can't eat anything during these times  Is the dehydrated status causing his problems ?  Not back to work in over a year   Used to always drink fluids   Could his symptoms be from dehydrated ?  Symptoms seems to have increase    No - [place] Gastric Emptying Study was ever done by the patient but he doubts the diagnosis . He's talked with his friends and they have described his symptoms differently.    His symptoms are helped with Ondansetron (ZOFRAN) and metoclopramide (REGLAN) . He was only taking 5 milligrams but  I recommended taking 10 milligrams . Patient agrees now to have the - [place] Gastric Emptying Study     Having heart rate low and high and blood pressure low and high and getting dizziness symptoms especially with exercise and gets close to almost passing out. Wife adds this isn't due to exercise     Dr. Crockett  Has some diabetes neuropathy   Left foot where he had a bunionectomy around 7 years ago, he has extreme burning symptoms on and off with right fot. He has had such severe sensitivity sometimes he can't even have a bedsheet on this. Podiatrist     Father  form a massive ruptured cerebral aneurysm at 70. So patient avoids salt.    Patient was not willing to consider the information given to him by the endocrinologist and feels he has a special supersensitivity to rapid acting insulin like humalog or novolog (ultra-short acting insulin aspart) and  disregards the advice given by      Review of Systems   Constitutional, HEENT, cardiovascular, pulmonary, gi and gu systems are negative, except as otherwise noted.      Objective           Vitals:  No vitals were obtained today due to virtual visit.    Physical Exam   GENERAL: Healthy, alert and no distress  EYES: Eyes grossly normal to inspection.  No discharge or erythema, or obvious scleral/conjunctival abnormalities.  RESP: No audible wheeze, cough, or visible cyanosis.  No visible retractions or increased work of breathing.    SKIN: Visible skin clear. No significant rash, abnormal pigmentation or lesions.  NEURO: Cranial nerves grossly intact.  Mentation and speech appropriate for age.  PSYCH: Mentation appears normal, affect normal/bright, judgement and insight intact, normal speech and appearance well-groomed.    Orders Placed This Encounter   Procedures     CARDIOLOGY EVAL ADULT REFERRAL     Orthopedic & Spine  Referral     Adult Endocrinology Referral     Leadless EKG Monitor 3 to 7 Days             Video-Visit Details    Type of service:  Video Visit    Video End Time:4:18 PM    Originating Location (pt. Location): Home    Distant Location (provider location):  [facility]     Platform used for Video Visit: [employer]

## 2021-05-28 ENCOUNTER — TELEPHONE (OUTPATIENT)
Dept: INTERNAL MEDICINE | Facility: CLINIC | Age: 51
End: 2021-05-28

## 2021-05-28 NOTE — TELEPHONE ENCOUNTER
Haim Bustos MD  P  [employer]             After our appointment ended I now want patient to have a 1 week long Zio Patch . This is to help the cardiologist with his workup . Please notify patient of this information.

## 2021-05-29 ENCOUNTER — HEALTH MAINTENANCE LETTER (OUTPATIENT)
Age: 51
End: 2021-05-29

## 2021-06-01 NOTE — TELEPHONE ENCOUNTER
Action    Action Taken 6-1: Requested EKGs 1-31-20, 1-29-20, 4-22-19 from [facility]  6-8: sent EKGs to scanning

## 2021-06-08 DIAGNOSIS — I10 HYPERTENSION GOAL BP (BLOOD PRESSURE) < 140/90: ICD-10-CM

## 2021-06-09 RX ORDER — LISINOPRIL 40 MG/1
TABLET ORAL
Qty: 90 TABLET | Refills: 1 | Status: SHIPPED | OUTPATIENT
Start: 2021-06-09 | End: 2022-02-25

## 2021-06-18 ENCOUNTER — PRE VISIT (OUTPATIENT)
Dept: CARDIOLOGY | Facility: CLINIC | Age: 51
End: 2021-06-18

## 2021-06-19 ENCOUNTER — MYC MEDICAL ADVICE (OUTPATIENT)
Dept: INTERNAL MEDICINE | Facility: CLINIC | Age: 51
End: 2021-06-19

## 2021-06-19 DIAGNOSIS — E10.42 TYPE 1 DIABETES MELLITUS WITH DIABETIC POLYNEUROPATHY (H): ICD-10-CM

## 2021-06-19 DIAGNOSIS — E10.8 DIABETES MELLITUS TYPE 1 WITH COMPLICATIONS (H): ICD-10-CM

## 2021-06-20 DIAGNOSIS — E10.42 TYPE 1 DIABETES MELLITUS WITH DIABETIC POLYNEUROPATHY (H): ICD-10-CM

## 2021-06-21 RX ORDER — GABAPENTIN 300 MG/1
CAPSULE ORAL
Qty: 150 CAPSULE | Refills: 0 | Status: SHIPPED | OUTPATIENT
Start: 2021-06-21 | End: 2021-08-01

## 2021-06-21 RX ORDER — FLASH GLUCOSE SENSOR
KIT MISCELLANEOUS
Qty: 1 EACH | Refills: 11 | Status: SHIPPED | OUTPATIENT
Start: 2021-06-21 | End: 2021-09-16 | Stop reason: ALTCHOICE

## 2021-06-21 NOTE — TELEPHONE ENCOUNTER
Routing refill request to provider for review/approval because:  Drug not on the [facility] refill protocol           Pending Prescriptions:                       Disp   Refills    gabapentin (NEURONTIN) 300 MG capsule [Pha*150 ca*0        Sig: TAKE 2 CAPSULES BY MOUTH IN THE MORNING, 1 BY MOUTH           AT NOON, 2 BY MOUTH IN THE EVENING        Van Branch RN

## 2021-06-21 NOTE — TELEPHONE ENCOUNTER
Routing refill request to provider for review/approval because:  Drug not on the [facility] refill protocol           Pending Prescriptions:                       Disp   Refills    continuous blood glucose monitoring (FREES*1 each 11       Sig: For use with Freestyle Jenni Flash  for           continuous monitioring of blood glucose levels.           Replace sensor every 10 days.        Van Branch RN

## 2021-06-25 ENCOUNTER — OFFICE VISIT (OUTPATIENT)
Dept: ENDOCRINOLOGY | Facility: CLINIC | Age: 51
End: 2021-06-25
Payer: COMMERCIAL

## 2021-06-25 VITALS
HEIGHT: 68 IN | DIASTOLIC BLOOD PRESSURE: 91 MMHG | OXYGEN SATURATION: 95 % | WEIGHT: 172.2 LBS | BODY MASS INDEX: 26.1 KG/M2 | HEART RATE: 106 BPM | SYSTOLIC BLOOD PRESSURE: 126 MMHG

## 2021-06-25 DIAGNOSIS — E78.5 HYPERLIPIDEMIA LDL GOAL <100: ICD-10-CM

## 2021-06-25 DIAGNOSIS — E10.8 DIABETES MELLITUS TYPE 1 WITH COMPLICATIONS (H): ICD-10-CM

## 2021-06-25 DIAGNOSIS — I10 HYPERTENSION GOAL BP (BLOOD PRESSURE) < 140/90: ICD-10-CM

## 2021-06-25 DIAGNOSIS — E10.649 TYPE 1 DIABETES MELLITUS WITH HYPOGLYCEMIA UNAWARENESS (H): Primary | ICD-10-CM

## 2021-06-25 DIAGNOSIS — R11.2 NAUSEA AND VOMITING, INTRACTABILITY OF VOMITING NOT SPECIFIED, UNSPECIFIED VOMITING TYPE: ICD-10-CM

## 2021-06-25 DIAGNOSIS — I10 HYPERTENSION GOAL BP (BLOOD PRESSURE) < 140/90: Primary | ICD-10-CM

## 2021-06-25 LAB
ANION GAP SERPL CALCULATED.3IONS-SCNC: 5 MMOL/L (ref 3–14)
BUN SERPL-MCNC: 9 MG/DL (ref 7–30)
CALCIUM SERPL-MCNC: 9 MG/DL (ref 8.5–10.1)
CHLORIDE SERPL-SCNC: 104 MMOL/L (ref 94–109)
CHOLEST SERPL-MCNC: 199 MG/DL
CO2 SERPL-SCNC: 32 MMOL/L (ref 20–32)
CREAT SERPL-MCNC: 0.72 MG/DL (ref 0.66–1.25)
GFR SERPL CREATININE-BSD FRML MDRD: >90 ML/MIN/{1.73_M2}
GLUCOSE SERPL-MCNC: 128 MG/DL (ref 70–99)
HBA1C MFR BLD: 7.3 % (ref 0–5.6)
HDLC SERPL-MCNC: 90 MG/DL
LDLC SERPL CALC-MCNC: 96 MG/DL
NONHDLC SERPL-MCNC: 109 MG/DL
POTASSIUM SERPL-SCNC: 4 MMOL/L (ref 3.4–5.3)
SODIUM SERPL-SCNC: 141 MMOL/L (ref 133–144)
TRIGL SERPL-MCNC: 66 MG/DL
TSH SERPL DL<=0.005 MIU/L-ACNC: 1.82 MU/L (ref 0.4–4)

## 2021-06-25 PROCEDURE — 36415 COLL VENOUS BLD VENIPUNCTURE: CPT | Performed by: INTERNAL MEDICINE

## 2021-06-25 PROCEDURE — 80048 BASIC METABOLIC PNL TOTAL CA: CPT | Performed by: INTERNAL MEDICINE

## 2021-06-25 PROCEDURE — 84443 ASSAY THYROID STIM HORMONE: CPT | Performed by: INTERNAL MEDICINE

## 2021-06-25 PROCEDURE — 80061 LIPID PANEL: CPT | Performed by: INTERNAL MEDICINE

## 2021-06-25 PROCEDURE — 83036 HEMOGLOBIN GLYCOSYLATED A1C: CPT | Performed by: INTERNAL MEDICINE

## 2021-06-25 PROCEDURE — 99214 OFFICE O/P EST MOD 30 MIN: CPT | Performed by: INTERNAL MEDICINE

## 2021-06-25 ASSESSMENT — MIFFLIN-ST. JEOR: SCORE: 1600.59

## 2021-06-25 NOTE — PROGRESS NOTES
"S: Pt being seen in f/u for DM.  Previously seen by Dr Freire:  \"Kris Tao is a 51 year old male with a past medical history significant for anxiety, hypertension, hyperlipidemia, degenerative changes of the spine, diagnosed with type 1 diabetes at age 34, in 2004.  GAIL 65 antibodies were strongly positive, in 2018.  Average A1c over the years has been around 8%.  Most recent A1c was lower, at 6.9%, on 1/14/2021.       His diabetes is now treated with 30 units of Lantus at 1:30 PM (dose recently decreased by his primary care provider, from 32 units) and a NovoLog correction scale of 1 unit per 50 mg above 200.  He checks his blood glucose up to 4 times daily, in the morning and 1 hour after eating, when he uses the correction.  He reports being very sensitive to NovoLog.  If he takes 2 units for blood sugar 250, he will get a hypoglycemic episode in the next 2 to 3 hours.  Denies taking NovoLog before eating.  He was taught carbohydrate counting in the past and he reports being comfortable counting carbohydrates.  He has never been taking NovoLog prior to eating, according to the carbohydrate intake.  On average, he takes approximately 2 units NovoLog per day as correction.  He rarely administers 4 or 6 units.  His average blood glucose on the glucometer is 188.  He just received a Jenni sensor and he is planning on using it.     The patient documents having recurrent hypoglycemic episodes.  Currently, they occur 2-3 times a week.  Most of the hypoglycemic episodes occur in the morning.  Sometimes, he gets a hypoglycemic episode during daytime, mainly if he skips dinner or the meal is delayed.  In general, he has lunch at 1 PM (this is the first meal of the day) and dinner anywhere between 6:30 to 8:30 PM.  He snacks at variable times, on labs, chips, crackers and dips, fruits, cheese, summer sausages.  In the morning, he has coffee or tea, with cream and Splenda.  His sleeping habits are quite variable.     The " "patient describes not having appetite for quite some time.  He is concerned he might have gastroparesis.  His weight has been stable.     DM Complications:  Retinopathy: Last eye exam in 2020, no DR per patient. Note from 2013 documents DR.    Nephropathy: GFR>60, no h/o microalbuminuria; on 40 mg lisinopril daily.   H/o neuropathy on gabapentin; B/L numbness, left foot burning sensation big toe - since having foot surgery. Occasional burning sensation R foot, too.   DKA: 3/2020, 4/2018, 1/2018, 9/2017     Admitted for severe hypoglycemia in 2015  Feels very lethargic when BG is low. He does not have palpitations, tremor or sweating. He sometimes experiences muscle jerks at the time the BG is low. The lowest BG number he remembers experiencing was 25. He corrects the lows with 19-26 gm of carbs of sweetened juiced. He doesn't always check the BG afterwards. Denies prior episodes of LOC due to hypoglycemia. He has glucagon at home and his family knows how to use it.   Most recent LDL cholesterol was 98, HDL 44 and triglycerides 175, in February 2020.  On 20 mg pravastatin daily. Was on atorvastatin in the past and he doesn't remember the reason why it was discontinued.   Exercise: none   TSH was normal at 1.86, in June 2016.     2021 esophagram -no evidence of mass or stricture\"    Currently taking lantus 30 U at 1300 every day.   He was directed to take 1u/15 grams of carbs and 1/50 sliding scale insulin.   However, he does not give insulin until 1 hour after eating. He only gives sliding scale insulin and not carb coverage. States carb based dosing prior to eating he would not eat enough and/or miscount carbs and go low.     Concerned he has gastroparesis.   He has not had an emptying study yet.   Describes sensation of feet being cold and upper body being hot for several hours followed by emesis.     CGM:  90 day average 181.  Highs from MN-[id number].  Saw tooth pattern with overnight lows. Eats as late as " "2200, then boluses 1 hour later and goes to bed.     ROS: 10 point ROS neg other than the symptoms noted above in the HPI.    O:  Vital signs:      BP: (!) 147/101 Pulse: 106     SpO2: 95 %     Height: 172.7 cm (5' 8\") Weight: 78.1 kg (172 lb 3.2 oz)  Estimated body mass index is 26.18 kg/m  as calculated from the following:    Height as of this encounter: 1.727 m (5' 8\").    Weight as of this encounter: 78.1 kg (172 lb 3.2 oz).  Gen: In NAD.   HEENT: no proptosis or lid lag, EOMI, no palpable thyroid tissue.  Card: S1 S2 RRR no m/r/g.  Pulm: CTA b/l.   GI: NT ND +BS.   Ext: no LE edema.   MSK: no gross deformities.  Neuro: no tremor, +2 DTR's. Normal monofilament.     A/P:   Type 1 DM - Chronic and uncontrolled. Questionable gastroparesis. He does not give insulin until 1 hour after eating. He only gives sliding scale insulin and not carb coverage. Explained that with this method, I cannot \"level things out\" as he wishes. He needs to address his GI issue and see whether he has gastroparesis or not.  He needs to take a proactive dosing approach rather than reactive. Part of his trouble with pre-meal dosing in the past was inaccurate carb counts. I urged him to continue to work with CDE/nutrition.   -Labs today.   -Meet with Cardiology as planned.   -Schedule gastric emptying study. If not conclusive, we will send you to GI.   -Continue to work with Laureen.   -No change to lantus dose.   -Take the novolog prior to eating, 15-20 minutes, 1 unit for every 15 grams of carbs.   -Take you sliding scale as needed for pre-meal highs.   -ASA taking.  -BP: elevated. Plans to meet with Cardiology.   -Lipids: due. On pravastatin.   -Microalbumin due. On lisinopril.   -TSH due.   -Eyes: reports normal exam in 11/2020.   -Smoking: former.          Robert Stallings M.D"

## 2021-06-25 NOTE — PATIENT INSTRUCTIONS
-Labs today.     -Meet with Cardiology as planned.   -Schedule gastric emptying study. If not conclusive, we will send you to GI.     -Continue to work with Laureen.     -No change to lantus dose.   -Take the novolog prior to eating, 15-20 minutes, 1 unit for every 15 grams of carbs.   -Take you sliding scale as needed for pre-meal highs.

## 2021-06-25 NOTE — LETTER
June 28, 2021    Rudolph Tao  [id number] [facility] [id number]          Dear ,    We are writing to inform you of your test results.    All of these tests are within acceptable limits , things look good !       Resulted Orders   Basic metabolic panel  (Ca, Cl, CO2, Creat, Gluc, K, Na, BUN)   Result Value Ref Range    Sodium 141 133 - 144 mmol/L    Potassium 4.0 3.4 - 5.3 mmol/L    Chloride 104 94 - 109 mmol/L    Carbon Dioxide 32 20 - 32 mmol/L    Anion Gap 5 3 - 14 mmol/L    Glucose 128 (H) 70 - 99 mg/dL      Comment:      Fasting specimen    Urea Nitrogen 9 7 - 30 mg/dL    Creatinine 0.72 0.66 - 1.25 mg/dL    GFR Estimate >90 >60 mL/min/[1.73_m2]      Comment:      Non  GFR Calc  Starting 12/18/2018, serum creatinine based estimated GFR (eGFR) will be   calculated using the Chronic Kidney Disease Epidemiology Collaboration   (CKD-EPI) equation.      GFR Estimate If Black >90 >60 mL/min/[1.73_m2]      Comment:       GFR Calc  Starting 12/18/2018, serum creatinine based estimated GFR (eGFR) will be   calculated using the Chronic Kidney Disease Epidemiology Collaboration   (CKD-EPI) equation.      Calcium 9.0 8.5 - 10.1 mg/dL   Lipid panel reflex to direct LDL Fasting   Result Value Ref Range    Cholesterol 199 <200 mg/dL    Triglycerides 66 <150 mg/dL      Comment:      Fasting specimen    HDL Cholesterol 90 >39 mg/dL    LDL Cholesterol Calculated 96 <100 mg/dL      Comment:      Desirable:       <100 mg/dl    Non HDL Cholesterol 109 <130 mg/dL   Hemoglobin A1c   Result Value Ref Range    Hemoglobin A1C 7.3 (H) 0 - 5.6 %      Comment:      Normal <5.7% Prediabetes 5.7-6.4%  Diabetes 6.5% or higher - adopted from ADA   consensus guidelines.         If you have any questions or concerns, please call the clinic at the number listed above.       Sincerely,      Haim Bustos MD

## 2021-06-25 NOTE — LETTER
"    6/25/2021         RE: Rudolph Tao  [address] [facility] [id number]        Dear Colleague,    Thank you for referring your patient, Rudolph Tao, to the [facility]. Please see a copy of my visit note below.    S: Pt being seen in f/u for DM.  Previously seen by Dr Freier:  \"Kris Tao is a 51 year old male with a past medical history significant for anxiety, hypertension, hyperlipidemia, degenerative changes of the spine, diagnosed with type 1 diabetes at age 34, in 2004.  GAIL 65 antibodies were strongly positive, in 2018.  Average A1c over the years has been around 8%.  Most recent A1c was lower, at 6.9%, on 1/14/2021.       His diabetes is now treated with 30 units of Lantus at 1:30 PM (dose recently decreased by his primary care provider, from 32 units) and a NovoLog correction scale of 1 unit per 50 mg above 200.  He checks his blood glucose up to 4 times daily, in the morning and 1 hour after eating, when he uses the correction.  He reports being very sensitive to NovoLog.  If he takes 2 units for blood sugar 250, he will get a hypoglycemic episode in the next 2 to 3 hours.  Denies taking NovoLog before eating.  He was taught carbohydrate counting in the past and he reports being comfortable counting carbohydrates.  He has never been taking NovoLog prior to eating, according to the carbohydrate intake.  On average, he takes approximately 2 units NovoLog per day as correction.  He rarely administers 4 or 6 units.  His average blood glucose on the glucometer is 188.  He just received a Jenni sensor and he is planning on using it.     The patient documents having recurrent hypoglycemic episodes.  Currently, they occur 2-3 times a week.  Most of the hypoglycemic episodes occur in the morning.  Sometimes, he gets a hypoglycemic episode during daytime, mainly if he skips dinner or the meal is delayed.  In general, he has lunch at 1 PM (this is the first meal of the " "day) and dinner anywhere between 6:30 to 8:30 PM.  He snacks at variable times, on labs, chips, crackers and dips, fruits, cheese, summer sausages.  In the morning, he has coffee or tea, with cream and Splenda.  His sleeping habits are quite variable.     The patient describes not having appetite for quite some time.  He is concerned he might have gastroparesis.  His weight has been stable.     DM Complications:  Retinopathy: Last eye exam in 2020, no DR per patient. Note from 2013 documents DR.    Nephropathy: GFR>60, no h/o microalbuminuria; on 40 mg lisinopril daily.   H/o neuropathy on gabapentin; B/L numbness, left foot burning sensation big toe - since having foot surgery. Occasional burning sensation R foot, too.   DKA: 3/2020, 4/2018, 1/2018, 9/2017     Admitted for severe hypoglycemia in 2015  Feels very lethargic when BG is low. He does not have palpitations, tremor or sweating. He sometimes experiences muscle jerks at the time the BG is low. The lowest BG number he remembers experiencing was 25. He corrects the lows with 19-26 gm of carbs of sweetened juiced. He doesn't always check the BG afterwards. Denies prior episodes of LOC due to hypoglycemia. He has glucagon at home and his family knows how to use it.   Most recent LDL cholesterol was 98, HDL 44 and triglycerides 175, in February 2020.  On 20 mg pravastatin daily. Was on atorvastatin in the past and he doesn't remember the reason why it was discontinued.   Exercise: none   TSH was normal at 1.86, in June 2016.     2021 esophagram -no evidence of mass or stricture\"    Currently taking lantus 30 U at 1300 every day.   He was directed to take 1u/15 grams of carbs and 1/50 sliding scale insulin.   However, he does not give insulin until 1 hour after eating. He only gives sliding scale insulin and not carb coverage. States carb based dosing prior to eating he would not eat enough and/or miscount carbs and go low.     Concerned he has gastroparesis. " "  He has not had an emptying study yet.   Describes sensation of feet being cold and upper body being hot for several hours followed by emesis.     CGM:  90 day average 181.  Highs from [id number].  Saw tooth pattern with overnight lows. Eats as late as 2200, then boluses 1 hour later and goes to bed.     ROS: 10 point ROS neg other than the symptoms noted above in the HPI.    O:  Vital signs:      BP: (!) 147/101 Pulse: 106     SpO2: 95 %     Height: 172.7 cm (5' 8\") Weight: 78.1 kg (172 lb 3.2 oz)  Estimated body mass index is 26.18 kg/m  as calculated from the following:    Height as of this encounter: 1.727 m (5' 8\").    Weight as of this encounter: 78.1 kg (172 lb 3.2 oz).  Gen: In NAD.   HEENT: no proptosis or lid lag, EOMI, no palpable thyroid tissue.  Card: S1 S2 RRR no m/r/g.  Pulm: CTA b/l.   GI: NT ND +BS.   Ext: no LE edema.   MSK: no gross deformities.  Neuro: no tremor, +2 DTR's. Normal monofilament.     A/P:   Type 1 DM - Chronic and uncontrolled. Questionable gastroparesis. He does not give insulin until 1 hour after eating. He only gives sliding scale insulin and not carb coverage. Explained that with this method, I cannot \"level things out\" as he wishes. He needs to address his GI issue and see whether he has gastroparesis or not.  He needs to take a proactive dosing approach rather than reactive. Part of his trouble with pre-meal dosing in the past was inaccurate carb counts. I urged him to continue to work with CDE/nutrition.   -Labs today.   -Meet with Cardiology as planned.   -Schedule gastric emptying study. If not conclusive, we will send you to GI.   -Continue to work with Laureen.   -No change to lantus dose.   -Take the novolog prior to eating, 15-20 minutes, 1 unit for every 15 grams of carbs.   -Take you sliding scale as needed for pre-meal highs.   -ASA taking.  -BP: elevated. Plans to meet with Cardiology.   -Lipids: due. On pravastatin.   -Microalbumin due. On lisinopril. "   -TSH due.   -Eyes: reports normal exam in 11/2020.   -Smoking: former.          Robert Stallings M.D                Again, thank you for allowing me to participate in the care of your patient.        Sincerely,        Robert Stallings MD

## 2021-07-10 DIAGNOSIS — E10.8 DIABETES MELLITUS TYPE 1 WITH COMPLICATIONS (H): ICD-10-CM

## 2021-07-12 RX ORDER — INSULIN GLARGINE 100 [IU]/ML
INJECTION, SOLUTION SUBCUTANEOUS
Qty: 45 ML | Refills: 1 | Status: SHIPPED | OUTPATIENT
Start: 2021-07-12 | End: 2022-02-25

## 2021-07-12 NOTE — TELEPHONE ENCOUNTER
Prescription approved per [facility] Refill Protocol.    Padmini Hutchison RN  [facility]

## 2021-07-13 ENCOUNTER — VIRTUAL VISIT (OUTPATIENT)
Dept: EDUCATION SERVICES | Facility: CLINIC | Age: 51
End: 2021-07-13
Payer: COMMERCIAL

## 2021-07-13 DIAGNOSIS — E10.649 TYPE 1 DIABETES MELLITUS WITH HYPOGLYCEMIA UNAWARENESS (H): Primary | ICD-10-CM

## 2021-07-13 PROCEDURE — G0108 DIAB MANAGE TRN  PER INDIV: HCPCS | Mod: 95

## 2021-07-13 NOTE — Clinical Note
Dr. Stallings, nelly oPnce has still not started dosing his insulin prior to meals. We discussed it again today and he is feeling more confident with it.   Laureen Choe, RD, LD, CDE

## 2021-07-14 NOTE — PROGRESS NOTES
".Diabetes Self-Management Education & Support    Presents for: CGM Review    Type of Service: Telephone Visit (Kris was unable to conenct via video)    How would patient like to obtain AVS? Miguel      SUBJECTIVE/OBJECTIVE:  Presents for: CGM Review  Accompanied by: Self, Spouse  Diabetes education in the past 24mo: Yes  Focus of Visit: Monitoring, Problem Solving  Diabetes type: Type 1  Date of diagnosis: 17 years ago  Disease course: Getting harder to manage  Diabetes management related comments/concerns: Kris mentions that his last sensor that he had was not accurate and was off about 100 points and he had a hard time trusting it.  Transportation concerns: No  Difficulty affording diabetes medication?: Sometimes (was bouncing around to different insurances for the last 3 months)  Difficulty affording diabetes testing supplies?: Sometimes  Cultural Influences/Ethnic Background:  Other    Diabetes Symptoms & Complications:     Complications assessed today?: No    Patient Problem List and Family Medical History reviewed for relevant medical history, current medical status, and diabetes risk factors.    Vitals:  There were no vitals taken for this visit.  Estimated body mass index is 26.18 kg/m  as calculated from the following:    Height as of 6/25/21: 1.727 m (5' 8\").    Weight as of 6/25/21: 78.1 kg (172 lb 3.2 oz).   Last 3 BP:   BP Readings from Last 3 Encounters:   06/25/21 (!) 126/91   04/14/21 125/82   03/30/21 114/76       History   Smoking Status     Former Smoker     Packs/day: 0.00     Years: 20.00     Types: Cigarettes     Quit date: 10/1/2015   Smokeless Tobacco     Never Used     Comment: 2-4  cigarettes daily       Labs:  Lab Results   Component Value Date    A1C 7.3 06/25/2021     Lab Results   Component Value Date     06/25/2021     Lab Results   Component Value Date    LDL 96 06/25/2021     HDL Cholesterol   Date Value Ref Range Status   06/25/2021 90 >39 mg/dL Final   ]  GFR Estimate   Date " Value Ref Range Status   06/25/2021 >90 >60 mL/min/[1.73_m2] Final     Comment:     Non  GFR Calc  Starting 12/18/2018, serum creatinine based estimated GFR (eGFR) will be   calculated using the Chronic Kidney Disease Epidemiology Collaboration   (CKD-EPI) equation.       GFR Estimate If Black   Date Value Ref Range Status   06/25/2021 >90 >60 mL/min/[1.73_m2] Final     Comment:      GFR Calc  Starting 12/18/2018, serum creatinine based estimated GFR (eGFR) will be   calculated using the Chronic Kidney Disease Epidemiology Collaboration   (CKD-EPI) equation.       Lab Results   Component Value Date    CR 0.72 06/25/2021     No results found for: MICROALBUMIN    Healthy Eating:  Healthy Eating Assessed Today: Yes  Cultural/Gnosticist diet restrictions?: No  Lunch: 1 1/2 piece of pizza OR tuna steak  Dinner: Chipotle- small serving OR meat with vegetables and carbohydrates (potato, noodles)  Beverages: Juice, Water (juice throughout the day (18-24 grams of carbs))    Being Active:  Being Active Assessed Today: Yes    Monitoring:  Monitoring Assessed Today: No    We were unable to connect to Kris's [email or web address] account. He was going to continue trying to connect to our professional account via the link that writer sent during the visit today. He mentions that his blood sugars have still been fluctuating quite a bit.     Taking Medications:  Diabetes Medication(s)     Insulin       insulin aspart (NOVOLOG PEN) 100 UNIT/ML injection    Inject 30 Units Subcutaneous daily Use with sliding scale, approximately 2-5 units daily     Patient taking differently: Inject 32 Units Subcutaneous daily Use with sliding scale, approximately 2-5 units daily     LANTUS SOLOSTAR 100 UNIT/ML soln    INJECT 30 UNITS SUBCUTANEOUSLY ONCE DAILY          Taking Medication Assessed Today: Yes  Current Treatments: Insulin Injections  Given by: Patient  Injection/Infusion sites: Abdomen    Problem Solving:  Problem  Solving Assessed Today: Yes  Is the patient at risk for hypoglycemia?: Yes  Hypoglycemia Frequency: Weekly  Hypoglycemia Treatment: Juice, Other food (granola bar)  Medical ID: Not Needed  Is the patient at risk for DKA?: Yes    Reducing Risks:       Healthy Coping:     Patient Activation Measure Survey Score:  LAUREL Score (Last Two) 9/19/2011   LAUREL Raw Score 51   Activation Score 91.6   LAUREL Level 4       Diabetes knowledge and skills assessment:   Patient is knowledgeable in diabetes management concepts related to: Monitoring and Problem Solving    Patient needs further education on the following diabetes management concepts: Healthy Eating, Being Active, Monitoring, Taking Medication, Problem Solving, Reducing Risks and Healthy Coping    Based on learning assessment above, most appropriate setting for further diabetes education would be: Individual setting.      INTERVENTIONS:    Education provided today on:  AADE Self-Care Behaviors:  Healthy Eating: carbohydrate counting, consistency in amount, composition, and timing of food intake, portion control and label reading  Taking Medication: action of prescribed medication, drawing up, administering and storing injectable diabetes medications, side effects of prescribed medications and when to take medications  Problem Solving: low blood glucose - causes, signs/symptoms, treatment and prevention    Opportunities for ongoing education and support in diabetes-self management were discussed.    Pt verbalized understanding of concepts discussed and recommendations provided today.       Education Materials Provided:  Carbohydrate Counting      ASSESSMENT:  Kris continues to struggle with episodes of cold feet, hot body and emesis. He states that he is planning to schedule a gastric emptying study to rule out gastroparesis. He doesn't think that it is gastroparesis because a lot of the symptoms don't match up, but plans to do the study to rule it out.      Kris had a sensor  "that was 100 points off of his finger stick meter and he is having a hard time trusting it. Explained that he likely he got a \"bad\" sensor, recommended that he contact Jenni to get a replacement.     Kris has not started dosing Novolog before meal as Dr. Stallings had recommended.  He is very worried about taking his insulin and not finishing his meal and ending up with a low blood sugar.  Discussed the possibility of dosing half of the calculated dose before the meal and 1/2 after the meal is finished to prevent the lows until he gets used to dosing before meal.  He is very worried about the 1:15g ratio and thinks that that might be too much.  Explained that he could try with a higher range (1:30 or so) until he is comfortable with the dosing prior to the meal. Explained the benefit of dosing insulin before meal. He states that he is familiar with the concept of carbohydrate counting, but hasn't counted carbohydrates. He is confident that he will figure it out. Time did not allow to review carbohydrate counting today. Writer will send carbohydrate counting guide.     Patient's most recent   Lab Results   Component Value Date    A1C 7.3 06/25/2021    is not meeting goal of <7.0    PLAN  See Patient Instructions for co-developed, patient-stated behavior change goals.  AVS printed and provided to patient today. See Follow-Up section for recommended follow-up.    Work on getting Joanie connected    Start taking Novolog before meals    Laureen Choe, ALKA, LD, CDE  Time Spent: 40 minutes  Encounter Type: Individual    Any diabetes medication dose changes were made via the CDE Protocol and Collaborative Practice Agreement with the patient's referring provider. A copy of this encounter was shared with the provider.    "

## 2021-07-22 ENCOUNTER — TELEPHONE (OUTPATIENT)
Dept: EDUCATION SERVICES | Facility: CLINIC | Age: 51
End: 2021-07-22

## 2021-07-22 NOTE — TELEPHONE ENCOUNTER
Called patient who is frustrated with his Jenni sensor as he is getting a big discrepancy.  He states that at one point today his sensor read 485 and finger stick 335 mg/dl, and one time sensor read 377 and finger stick read 219, another time finger stick read 239 and sensor wasn't reading. Explained that it is common to have the sensor and finger stick reading different during times of change and especially when BG are elevated. Kris is waiting a call back from Jenni.      Kris plans to give the Jenni another shot and if it doesn't work, he would like to switch back to finger sticks or Dexcom.     Laureen Choe, RD, LD, CDE

## 2021-07-22 NOTE — TELEPHONE ENCOUNTER
Pt states Jenni is not getting a reading, jumping all over the place and a 100 pt differece from finger sticks. Please advise.     Britany Zavala   [facility]  Diabetes & Nutrition Scheduling

## 2021-07-27 ENCOUNTER — HOSPITAL ENCOUNTER (OUTPATIENT)
Dept: NUCLEAR MEDICINE | Facility: CLINIC | Age: 51
Setting detail: NUCLEAR MEDICINE
Discharge: HOME | End: 2021-07-27
Attending: INTERNAL MEDICINE | Admitting: INTERNAL MEDICINE
Payer: COMMERCIAL

## 2021-07-27 DIAGNOSIS — R11.2 NAUSEA AND VOMITING, INTRACTABILITY OF VOMITING NOT SPECIFIED, UNSPECIFIED VOMITING TYPE: ICD-10-CM

## 2021-07-27 DIAGNOSIS — E10.649 TYPE 1 DIABETES MELLITUS WITH HYPOGLYCEMIA UNAWARENESS (H): ICD-10-CM

## 2021-07-27 PROCEDURE — 343N000001 HC RX 343: Performed by: INTERNAL MEDICINE

## 2021-07-27 PROCEDURE — A9541 TC99M SULFUR COLLOID: HCPCS | Performed by: INTERNAL MEDICINE

## 2021-07-27 PROCEDURE — 78264 GASTRIC EMPTYING IMG STUDY: CPT

## 2021-07-27 RX ADMIN — Medication 1.5 MILLICURIE: at 08:45

## 2021-07-30 DIAGNOSIS — E10.42 TYPE 1 DIABETES MELLITUS WITH DIABETIC POLYNEUROPATHY (H): ICD-10-CM

## 2021-08-01 RX ORDER — GABAPENTIN 300 MG/1
CAPSULE ORAL
Qty: 150 CAPSULE | Refills: 0 | Status: SHIPPED | OUTPATIENT
Start: 2021-08-01 | End: 2021-09-02

## 2021-08-01 NOTE — TELEPHONE ENCOUNTER
Routing refill request to provider for review/approval because:  Drug not on the [facility] refill protocol   Yvette Colindres RN

## 2021-08-18 ENCOUNTER — ALLIED HEALTH/NURSE VISIT (OUTPATIENT)
Dept: EDUCATION SERVICES | Facility: CLINIC | Age: 51
End: 2021-08-18
Payer: COMMERCIAL

## 2021-08-18 DIAGNOSIS — E10.649 TYPE 1 DIABETES MELLITUS WITH HYPOGLYCEMIA UNAWARENESS (H): Primary | ICD-10-CM

## 2021-08-18 PROCEDURE — G0108 DIAB MANAGE TRN  PER INDIV: HCPCS

## 2021-08-18 NOTE — Clinical Note
Just FYI- its possible that I have finally talked Kris into dosing his Novolog before meals. I will see him again in 3 weeks to download meter.   Laureen Choe, RD, LD, CDE

## 2021-08-18 NOTE — PATIENT INSTRUCTIONS
1 unit:30 grams before dinner, test 2 hours after     Continue with sliding scale:  200-250: 1unit  : 2 units  300-350: 3 units  350-400: 4 units  400-450: 5 units    Calorie Christ- christian to count carbohydrates    Laureen Choe, RD, LD, CDE

## 2021-08-19 DIAGNOSIS — E10.649 TYPE 1 DIABETES MELLITUS WITH HYPOGLYCEMIA UNAWARENESS (H): Primary | ICD-10-CM

## 2021-08-19 NOTE — PROGRESS NOTES
"Diabetes Self-Management Education & Support    Presents for: Follow-up    SUBJECTIVE/OBJECTIVE:  Presents for: Follow-up  Accompanied by: Self, Spouse  Diabetes education in the past 24mo: Yes  Diabetes type: Type 1  Date of diagnosis: 17 years ago  Disease course: Getting harder to manage  Diabetes management related comments/concerns: Jenni vs finger sticks, accuracy of CGM?  Appetite, none and is that diabeties related?  Cultural Influences/Ethnic Background:  Other    Diabetes Symptoms & Complications:  Fatigue: No  Neuropathy: Yes  Polydipsia: No  Polyphagia: No  Polyuria: No  Visual change: No  Slow healing wounds: Yes  Weight trend: Stable  Autonomic neuropathy: Other  CVA: No  Heart disease: No  Nephropathy: No  Peripheral neuropathy: Yes  Peripheral Vascular Disease: No  Retinopathy: No  Sexual dysfunction: Yes    Patient Problem List and Family Medical History reviewed for relevant medical history, current medical status, and diabetes risk factors.    Vitals:  There were no vitals taken for this visit.  Estimated body mass index is 26.18 kg/m  as calculated from the following:    Height as of 6/25/21: 1.727 m (5' 8\").    Weight as of 6/25/21: 78.1 kg (172 lb 3.2 oz).   Last 3 BP:   BP Readings from Last 3 Encounters:   06/25/21 (!) 126/91   04/14/21 125/82   03/30/21 114/76       History   Smoking Status     Former Smoker     Packs/day: 0.00     Years: 20.00     Types: Cigarettes     Quit date: 10/1/2015   Smokeless Tobacco     Never Used     Comment: 2-4  cigarettes daily       Labs:  Lab Results   Component Value Date    A1C 7.3 06/25/2021     Lab Results   Component Value Date     06/25/2021     Lab Results   Component Value Date    LDL 96 06/25/2021     HDL Cholesterol   Date Value Ref Range Status   06/25/2021 90 >39 mg/dL Final   ]  GFR Estimate   Date Value Ref Range Status   06/25/2021 >90 >60 mL/min/[1.73_m2] Final     Comment:     Non  GFR Calc  Starting 12/18/2018, serum " creatinine based estimated GFR (eGFR) will be   calculated using the Chronic Kidney Disease Epidemiology Collaboration   (CKD-EPI) equation.       GFR Estimate If Black   Date Value Ref Range Status   06/25/2021 >90 >60 mL/min/[1.73_m2] Final     Comment:      GFR Calc  Starting 12/18/2018, serum creatinine based estimated GFR (eGFR) will be   calculated using the Chronic Kidney Disease Epidemiology Collaboration   (CKD-EPI) equation.       Lab Results   Component Value Date    CR 0.72 06/25/2021     No results found for: MICROALBUMIN    Healthy Eating:  Healthy Eating Assessed Today: Yes  Cultural/Anabaptism diet restrictions?: No  Meal planning/habits: Low carb, Low salt  How many times a week on average do you eat food made away from home (restaurant/take-out)?: 2  Meals include: Lunch, Dinner, Evening Snack  Snacks: granola bars and juice (every night before bed)  Beverages: Water, Tea, Coffee, Diet soda, Sports drinks, Alcohol    Being Active:  Being Active Assessed Today: Yes  Exercise:: Currently not exercising  Barrier to exercise: None    Monitoring:  Blood Glucose Meter: Reli-On, CGM  Times checking blood sugar at home (number): 4  Times checking blood sugar at home (per): Day  Blood glucose trend: Ruel Ponce had his phone with his Jenni readings on them at our appointment but was unable to download the readings. He is having large BG swings, 's daily.     Taking Medications:  Diabetes Medication(s)     Insulin       insulin aspart (NOVOLOG PEN) 100 UNIT/ML injection    Inject 30 Units Subcutaneous daily Use with sliding scale, approximately 2-5 units daily     Patient taking differently: Inject 32 Units Subcutaneous daily Use with sliding scale, approximately 2-5 units daily     LANTUS SOLOSTAR 100 UNIT/ML soln    INJECT 30 UNITS SUBCUTANEOUSLY ONCE DAILY          Taking Medication Assessed Today: Yes  Current Treatments: Insulin Injections  Dose schedule: Other  Given by:  Patient  Injection/Infusion sites: Abdomen    Problem Solving:  Problem Solving Assessed Today: Yes  Is the patient at risk for hypoglycemia?: Yes  Hypoglycemia Frequency: Daily    Reducing Risks:  Reducing Risks Assessed Today: Yes  Diabetes Risks: Age over 45 years, Sedentary Lifestyle  CAD Risks: Diabetes Mellitus, Hypertension, Stress  Has dilated eye exam at least once a year?: Yes  Sees dentist every 6 months?: No  Feet checked by healthcare provider in the last year?: Yes    Healthy Coping:  Healthy Coping Assessed Today: No  Informal Support system:: Spouse  Patient Activation Measure Survey Score:  LAUREL Score (Last Two) 9/19/2011   LAUREL Raw Score 51   Activation Score 91.6   LAUREL Level 4       Diabetes knowledge and skills assessment:   Patient is knowledgeable in diabetes management concepts related to: Monitoring    Patient needs further education on the following diabetes management concepts: Healthy Eating, Being Active, Monitoring, Taking Medication, Problem Solving, Reducing Risks and Healthy Coping    Based on learning assessment above, most appropriate setting for further diabetes education would be: Individual setting.      INTERVENTIONS:    Education provided today on:  AADE Self-Care Behaviors:  Diabetes Pathophysiology  Monitoring: purpose, individual blood glucose targets and frequency of monitoring  Taking Medication: action of prescribed medication, drawing up, administering and storing injectable diabetes medications and when to take medications  Problem Solving: high blood glucose - causes, signs/symptoms, treatment and prevention, low blood glucose - causes, signs/symptoms, treatment and prevention and carrying a carbohydrate source at all times    Opportunities for ongoing education and support in diabetes-self management were discussed.    Pt verbalized understanding of concepts discussed and recommendations provided today.       Education Materials Provided:  No new materials provided  today      ASSESSMENT:  Kris continues to dose his Novolog 1 hour after his meals, if it is given. He discloses that he hasn't been taking his Novolog much as all actually. He notes that his Novolog pen is a couple months old. His BG are reflective of this. Looking through his Jenni data, he typically has one large BG spike each day, between 300-500 mg/dl. There are times that his BG drops low as well. Kris really would like to get his blood sugars under control to prevent any diabetes complications.     Today writer, again, reinforced the importance of dosing Novolog 5-15 minutes before meals.  Kris, again, expresses his worry over going low because he is very sensitive to Novolog.  Writer explained that the sensitivity is likely because the peak of Novolog is not matching up with the BG peak from his meal.  Writer provided a sample of  Jenni 2 sensor and reader with alarms to give him some piece of mind when he starts dosing his Novolog before meals.  Discussed starting at 1:30 ratio, instead of his prescribed 1:15 to prevent lows. Continue to use sliding scale insulin.     Patient's most recent   Lab Results   Component Value Date    A1C 7.3 06/25/2021    is not meeting goal of <7.0    PLAN  See Patient Instructions for co-developed, patient-stated behavior change goals.  AVS printed and provided to patient today. See Follow-Up section for recommended follow-up.    Start dosing insulin prior to meals: 1u :30 grams  Wear Jenni 2 sensor for 2 weeks and bring reader back to our appt in 3 weeks to download  Will meals/insulin in Jenni reader    Patient is interested in Inpen- routing to Endo provider for approval.    Laureen Choe, RD, LD, CDE  Time Spent: 60 minutes  Encounter Type: Individual    Any diabetes medication dose changes were made via the CDE Protocol and Collaborative Practice Agreement with the patient's referring provider. A copy of this encounter was shared with the provider.

## 2021-08-19 NOTE — TELEPHONE ENCOUNTER
Kris Anthony is interested in the Inpen.     Please sign pended orders if you agree with plan.     Thanks!  Laureen Choe, ALKA, LD, CDE

## 2021-09-02 DIAGNOSIS — E10.42 TYPE 1 DIABETES MELLITUS WITH DIABETIC POLYNEUROPATHY (H): ICD-10-CM

## 2021-09-02 RX ORDER — GABAPENTIN 300 MG/1
CAPSULE ORAL
Qty: 150 CAPSULE | Refills: 1 | Status: SHIPPED | OUTPATIENT
Start: 2021-09-02 | End: 2021-11-08

## 2021-09-08 ENCOUNTER — TELEPHONE (OUTPATIENT)
Dept: EDUCATION SERVICES | Facility: CLINIC | Age: 51
End: 2021-09-08

## 2021-09-08 DIAGNOSIS — E10.8 DIABETES MELLITUS TYPE 1 WITH COMPLICATIONS (H): Primary | ICD-10-CM

## 2021-09-08 NOTE — TELEPHONE ENCOUNTER
Dr. Bustos,    Please sign pended order for Jenni 2 sensors if you agree with plan. He is upgrading from the 14 day Jenni.     Laureen Choe, RD, LD, CDE

## 2021-09-16 ENCOUNTER — TELEPHONE (OUTPATIENT)
Dept: FAMILY MEDICINE | Facility: CLINIC | Age: 51
End: 2021-09-16

## 2021-09-16 ENCOUNTER — TELEPHONE (OUTPATIENT)
Dept: EDUCATION SERVICES | Facility: CLINIC | Age: 51
End: 2021-09-16

## 2021-09-16 NOTE — TELEPHONE ENCOUNTER
Called in asking about jenni prescriptions.  Kris picked up jenni 1 sensors, but would prefer jenni 2.  Reviewed medication list and likely Jenni 1 sensors need to be discontinued at the pharmacy to prevent future confusion.  Informed [facility] that they will likely not take back the sensors so he may be without alarms for 1 month until he goes back to Jenni 2.    Discontinued freestyle jenni 1 sensors and confirmed receipt by pharmacy.    Radha Armendariz MS, RD, LD, CDE

## 2021-09-16 NOTE — TELEPHONE ENCOUNTER
Prior Authorization Retail Medication Request    Medication/Dose:Continuous Blood Gluc Sensor (FREESTYLE JUSTIN 2 SENSOR[place] [facility] [place]   ICD code (if different than what is on RX):  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) [E10.8]  - Primary   Previously Tried and Failed:    Rationale:      Insurance Name:  [employer]  Insurance ID:  [id number]       Pharmacy Information (if different than what is on RX)  Name:  [facility]  Phone:  [phone]

## 2021-09-17 NOTE — TELEPHONE ENCOUNTER
PRIOR AUTHORIZATION DENIED    Medication: freestyle loida 2     Denial Date: 9/17/2021    Denial Rational: Pharmacy benefits do not cover this CGM.  May be covered under medical benefits.  Please refer back to clinic staff for PA submissions through medical benefits.

## 2021-09-17 NOTE — TELEPHONE ENCOUNTER
Central Prior Authorization Team   Phone: [phone]    PA Initiation    Medication: freestyle loida 2   Insurance Company: [employer] - Phone [phone] Fax [phone]  Pharmacy Filling the Rx: [facility] [place] [facility] [address]  Filling Pharmacy Phone: [phone]  Filling Pharmacy Fax:    Start Date: 9/17/2021

## 2021-09-18 ENCOUNTER — HEALTH MAINTENANCE LETTER (OUTPATIENT)
Age: 51
End: 2021-09-18

## 2021-09-20 NOTE — TELEPHONE ENCOUNTER
We just need to be sure patient is aware of this. Then you can close this encounter     Haim Bustos MD

## 2021-11-07 DIAGNOSIS — E10.42 TYPE 1 DIABETES MELLITUS WITH DIABETIC POLYNEUROPATHY (H): ICD-10-CM

## 2021-11-08 RX ORDER — GABAPENTIN 300 MG/1
CAPSULE ORAL
Qty: 150 CAPSULE | Refills: 0 | Status: SHIPPED | OUTPATIENT
Start: 2021-11-08 | End: 2021-12-20

## 2021-11-08 NOTE — TELEPHONE ENCOUNTER
Routing refill request to provider for review/approval because:  Drug not on the [facility] refill protocol       Padmini Hutchison RN  [facility]

## 2021-11-13 ENCOUNTER — HEALTH MAINTENANCE LETTER (OUTPATIENT)
Age: 51
End: 2021-11-13

## 2021-12-07 DIAGNOSIS — K31.84 GASTROPARESIS: ICD-10-CM

## 2021-12-09 ENCOUNTER — OFFICE VISIT (OUTPATIENT)
Dept: URGENT CARE | Facility: URGENT CARE | Age: 51
End: 2021-12-09
Payer: COMMERCIAL

## 2021-12-09 VITALS
TEMPERATURE: 98.9 F | DIASTOLIC BLOOD PRESSURE: 80 MMHG | HEART RATE: 104 BPM | SYSTOLIC BLOOD PRESSURE: 112 MMHG | OXYGEN SATURATION: 97 %

## 2021-12-09 DIAGNOSIS — Z20.822 SUSPECTED 2019 NOVEL CORONAVIRUS INFECTION: Primary | ICD-10-CM

## 2021-12-09 DIAGNOSIS — E10.69 TYPE 1 DIABETES MELLITUS WITH OTHER SPECIFIED COMPLICATION (H): ICD-10-CM

## 2021-12-09 PROCEDURE — 99214 OFFICE O/P EST MOD 30 MIN: CPT | Performed by: PHYSICIAN ASSISTANT

## 2021-12-09 PROCEDURE — U0005 INFEC AGEN DETEC AMPLI PROBE: HCPCS | Performed by: PHYSICIAN ASSISTANT

## 2021-12-09 PROCEDURE — U0003 INFECTIOUS AGENT DETECTION BY NUCLEIC ACID (DNA OR RNA); SEVERE ACUTE RESPIRATORY SYNDROME CORONAVIRUS 2 (SARS-COV-2) (CORONAVIRUS DISEASE [COVID-19]), AMPLIFIED PROBE TECHNIQUE, MAKING USE OF HIGH THROUGHPUT TECHNOLOGIES AS DESCRIBED BY CMS-2020-01-R: HCPCS | Performed by: PHYSICIAN ASSISTANT

## 2021-12-09 RX ORDER — ONDANSETRON 4 MG/1
TABLET, ORALLY DISINTEGRATING ORAL
Qty: 20 TABLET | Refills: 0 | Status: SHIPPED | OUTPATIENT
Start: 2021-12-09 | End: 2022-02-25

## 2021-12-09 RX ORDER — METOCLOPRAMIDE 10 MG/1
TABLET ORAL
Qty: 30 TABLET | Refills: 0 | Status: SHIPPED | OUTPATIENT
Start: 2021-12-09 | End: 2022-02-25

## 2021-12-09 ASSESSMENT — ENCOUNTER SYMPTOMS
DYSURIA: 0
HEADACHES: 1
ABDOMINAL PAIN: 0
COUGH: 1
CHEST TIGHTNESS: 0
CHILLS: 0
PALPITATIONS: 0
WHEEZING: 0
FREQUENCY: 0
HEMATURIA: 0
FEVER: 1
NAUSEA: 0
SHORTNESS OF BREATH: 1
FATIGUE: 1
VOMITING: 0
DIARRHEA: 0
MYALGIAS: 1
ALLERGIC/IMMUNOLOGIC NEGATIVE: 1
SORE THROAT: 0

## 2021-12-09 NOTE — TELEPHONE ENCOUNTER
"Prescription approved per [facility] Refill Protocol.  Requested Prescriptions   Pending Prescriptions Disp Refills     ondansetron (ZOFRAN-ODT) 4 MG ODT tab [Pharmacy Med Name: Ondansetron 4 MG Oral Tablet Disintegrating] 20 tablet 0     Sig: DISSOLVE 1 TABLET IN MOUTH EVERY 8 HOURS AS NEEDED FOR NAUSEA        Antivertigo/Antiemetic Agents Passed - 12/7/2021  5:49 PM        Passed - Recent (12 mo) or future (30 days) visit within the authorizing provider's specialty     Patient has had an office visit with the authorizing provider or a provider within the authorizing providers department within the previous 12 mos or has a future within next 30 days. See \"Patient Info\" tab in inbasket, or \"Choose Columns\" in Meds & Orders section of the refill encounter.              Passed - Medication is active on med list        Passed - Patient is 18 years of age or older           metoclopramide (REGLAN) 10 MG tablet [Pharmacy Med Name: Metoclopramide HCl 10 MG Oral Tablet] 30 tablet 0     Sig: TAKE 1 TABLET BY MOUTH 4 TIMES DAILY AS NEEDED        Antivertigo/Antiemetic Agents Passed - 12/7/2021  5:49 PM        Passed - Recent (12 mo) or future (30 days) visit within the authorizing provider's specialty     Patient has had an office visit with the authorizing provider or a provider within the authorizing providers department within the previous 12 mos or has a future within next 30 days. See \"Patient Info\" tab in inbasket, or \"Choose Columns\" in Meds & Orders section of the refill encounter.              Passed - Medication is active on med list        Passed - Patient is 18 years of age or older           Elina Middleton RN on 12/9/2021 at 3:10 PM    "

## 2021-12-09 NOTE — PATIENT INSTRUCTIONS
"Discharge Instructions for COVID-19 Patients  You have--or may have--COVID-19. Please follow the instructions listed below.   If you have a weakened immune system, discuss with your doctor any other actions you need to take.  How can I protect others?  If you have symptoms (fever, cough, body aches or trouble breathing):    Stay home and away from others (self-isolate) until:  ? Your other symptoms have resolved (gotten better). And   ? You've had no fever--and no medicine that reduces fever--for 1 full day (24 hours). And   ? At least 10 days have passed since your symptoms started. (You may need to wait 20 days. Follow the advice of your care team.)  If you don't show symptoms, but testing showed that you have COVID-19:    Stay home and away from others (self-isolate) until at least 10 days have passed since the date of your first positive COVID-19 test.  During this time    Stay in your own room, even for meals. Use your own bathroom if you can.    Stay away from others in your home. No hugging, kissing or shaking hands. No visitors.    Don't go to work, school or anywhere else.    Clean \"high touch\" surfaces often (doorknobs, counters, handles). Use household cleaning spray or wipes.    You'll find a full list of  on the EPA website: www.epa.gov/pesticide-registration/list-n-disinfectants-use-against-sars-cov-2.    Cover your mouth and nose with a mask or other face covering to avoid spreading germs.    Wash your hands and face often. Use soap and water.    Caregivers in these groups are at risk for severe illness due to COVID-19:  ? People 65 years and older  ? People who live in a nursing home or long-term care facility  ? People with chronic disease (lung, heart, cancer, diabetes, kidney, liver, immunologic)  ? People who have a weakened immune system, including those who:    Are in cancer treatment    Take medicine that weakens the immune system, such as corticosteroids    Had a bone marrow or organ " transplant    Have an immune deficiency    Have poorly controlled HIV or AIDS    Are obese (body mass index of 40 or higher)    Smoke regularly    Caregivers should wear gloves while washing dishes, handling laundry and cleaning bedrooms and bathrooms.    Use caution when washing and drying laundry: Don't shake dirty laundry and use the warmest water setting that you can.    For more tips on managing your health at home, go to www.cdc.gov/coronavirus/2019-ncov/downloads/10Things.pdf.  How can I take care of myself at home?  1. Get lots of rest. Drink extra fluids (unless a doctor has told you not to).  2. Take Tylenol (acetaminophen) for fever or pain. If you have liver or kidney problems, ask your family doctor if it's okay to take Tylenol.   Adults can take either:   ? 650 mg (two 325 mg pills) every 4 to 6 hours, or   ? 1,000 mg (two 500 mg pills) every 8 hours as needed.  ? Note: Don't take more than 3,000 mg in one day. Acetaminophen is found in many medicines (both prescribed and over-the-counter medicines). Read all labels to be sure you don't take too much.   For children, check the Tylenol bottle for the right dose. The dose is based on the child's age or weight.  3. If you have other health problems (like cancer, heart failure, an organ transplant or severe kidney disease): Call your specialty clinic if you don't feel better in the next 2 days.  4. Know when to call 911. Emergency warning signs include:  ? Trouble breathing or shortness of breath  ? Pain or pressure in the chest that doesn't go away  ? Feeling confused like you haven't felt before, or not being able to wake up  ? Bluish-colored lips or face  5. Your doctor may have prescribed a blood thinner medicine. Follow their instructions.  Where can I get more information?     [facility] - About COVID-19:   [email or web address]    CDC - What to Do If You're Sick:  www.cdc.gov/coronavirus/2019-ncov/about/steps-when-sick.html    CDC - Ending Home Isolation: www.cdc.gov/coronavirus/2019-ncov/hcp/disposition-in-home-patients.html    CDC - Caring for Someone: www.cdc.gov/coronavirus/2019-ncov/if-you-are-sick/care-for-someone.html    [facility] - Interim Guidance for Hospital Discharge to Home: www.health[facility]./diseases/coronavirus/hcp/hospdischarge.pdf    Below are the COVID-19 hotlines at the [place] Department of Health ([facility]). Interpreters are available.  ? For health questions: Call 762-819-6530 or 1-196.501.5510 (7 a.m. to 7 p.m.)  ? For questions about schools and childcare: Call 183-004-4548 or 1-674.671.3939 (7 a.m. to 7 p.m.)    For informational purposes only. Not to replace the advice of your health care provider. Clinically reviewed by Dr. García Mathias.   Copyright   2020 [facility]. All rights reserved. Vozeeme [id number] - REV 01/05/21.

## 2021-12-09 NOTE — PROGRESS NOTES
"Chief Complaint:     Chief Complaint   Patient presents with     Suspected Covid     headache, deep cough, and sob when moving-[place] went [place] this [place], hasn't eaten in 3 days, can't even keep water down, last night was the first night that a full sleep        No results found for any visits on 12/09/21.    Medical Decision Making:    Vital signs reviewed by Rudolph Devries PA-C  /80 (BP Location: Right arm, Patient Position: Sitting, Cuff Size: Adult Regular)   Pulse (!) 149   Temp 98.9  F (37.2  C) (Tympanic)   SpO2 98%     Differential Diagnosis:  URI Adult/Peds:  Allergic rhinitis, Influenza, Strep pharyngitis, Tonsilitis, Viral pharyngitis, Viral syndrome and Viral upper respiratory illness        ASSESSMENT    1. Suspected 2019 novel coronavirus infection    2. Type 1 diabetes mellitus with other specified complication (H)        PLAN    Patient is in no acute distress.    Temp is 98.9 in clinic today, lung sounds were clear, and O2 sats at 98% on RA.    COVID swab collected in clinic today.  Rest, Push fluids, vaporizer, elevation of head of bed.  Ibuprofen and or Tylenol for any fever or body aches.  Over the counter cough suppressant- PRN- as discussed.   Patient instructed to monitor blood sugars closely with illness and follow up with PCP for any DM medication changes if needed.  If symptoms worsen, recheck immediately otherwise follow up with your PCP in 1 week if symptoms are not improving.  Worrisome symptoms discussed with instructions to go to the ED.  Patient given COVID isolation instructions.  Patient verbalized understanding and agreed with this plan.    Labs:    No results found for any visits on 12/09/21.     Vital signs reviewed by Rudolph Devries PA-C  /80 (BP Location: Right arm, Patient Position: Sitting, Cuff Size: Adult Regular)   Pulse (!) 149   Temp 98.9  F (37.2  C) (Tympanic)   SpO2 98%     Current Meds      Current Outpatient Medications:      albuterol " (PROAIR HFA/PROVENTIL HFA/VENTOLIN HFA) 108 (90 Base) MCG/ACT inhaler, Inhale 1-2 puffs into the lungs every 4 hours as needed for shortness of breath / dyspnea or wheezing, Disp: 1 Inhaler, Rfl: 0     albuterol (PROVENTIL) (2.5 MG/3ML) 0.083% neb solution, Take 1 vial (2.5 mg) by nebulization every 6 hours as needed for shortness of breath / dyspnea or wheezing, Disp: 1 Box, Rfl: 0     ALPRAZolam (XANAX) 0.5 MG tablet, Take 1 tablet (0.5 mg) by mouth 3 times daily as needed for anxiety 1 month supply, Disp: 30 tablet, Rfl: 0     aspirin (ASA) 81 MG EC tablet, Take 1 tablet (81 mg) by mouth daily, Disp: 90 tablet, Rfl: 3     blood glucose monitoring (ONE TOUCH DELICA) lancets, Use to test blood sugar 4-6 times daily. May test up to 10 times per day, Disp: 300 each, Rfl: 1     blood glucose monitoring (ONETOUCH ULTRA) test strip, Use to test blood sugar 4-6 times daily, may test up to 10 times per day., Disp: 300 strip, Rfl: 1     buPROPion (WELLBUTRIN XL) 300 MG 24 hr tablet, Take 1 tablet (300 mg) by mouth every morning, Disp: 90 tablet, Rfl: 1     cetirizine (ZYRTEC) 10 MG tablet, Take 10 mg by mouth daily, Disp: , Rfl:      Continuous Blood Gluc  (FREESTYLE JUSTIN READER) MYA, 1 Device See Admin Instructions, Disp: 1 Device, Rfl: 0     Continuous Blood Gluc Sensor (FREESTYLE JUSTIN 2 SENSOR) [facility] [place], 1 each every 14 days, Disp: 2 each, Rfl: 4     diclofenac (VOLTAREN) 75 MG EC tablet, Take 1 tablet (75 mg) by mouth 2 times daily for 7 days, Disp: 14 tablet, Rfl: 0     gabapentin (NEURONTIN) 300 MG capsule, TAKE 2 CAPSULES BY MOUTH IN THE MORNING, 1 CAPSULE AT NOON AND 2 CAPSULES IN THE EVENING, Disp: 150 capsule, Rfl: 0     insulin aspart (NOVOLOG PEN) 100 UNIT/ML injection, Inject 30 Units Subcutaneous daily Use with sliding scale, approximately 2-5 units daily (Patient taking differently: Inject 32 Units Subcutaneous daily Use with sliding scale, approximately 2-5 units daily), Disp: 15 mL, Rfl: 3      "insulin aspart (NOVOPEN ECHO) 100 UNIT/ML cartridge, Inject 1 unit/30 grams of carbohydrates plus 1 unit/50 mg/dl above 200 mg/dl. Max dose of 20 units/day, Disp: 15 mL, Rfl: 11     insulin pen needle (B-D U/F) 31G X 5 MM miscellaneous, by Device route 6 times daily, Disp: 600 each, Rfl: 2     LANTUS SOLOSTAR 100 UNIT/ML soln, INJECT 30 UNITS SUBCUTANEOUSLY ONCE DAILY, Disp: 45 mL, Rfl: 1     lisinopril (ZESTRIL) 40 MG tablet, TAKE 1 TABLET BY MOUTH ONCE DAILY . APPOINTMENT REQUIRED FOR FUTURE REFILLS, Disp: 90 tablet, Rfl: 1     metoclopramide (REGLAN) 10 MG tablet, TAKE 1 TABLET BY MOUTH 4 TIMES DAILY AS NEEDED, Disp: 30 tablet, Rfl: 0     metoprolol succinate ER (TOPROL-XL) 25 MG 24 hr tablet, Take 1 tablet (25 mg) by mouth daily, Disp: 30 tablet, Rfl: 1     ondansetron (ZOFRAN-ODT) 4 MG ODT tab, DISSOLVE 1 TABLET IN MOUTH EVERY 8 HOURS AS NEEDED FOR NAUSEA, Disp: 20 tablet, Rfl: 0     pravastatin (PRAVACHOL) 20 MG tablet, Take 1 tablet (20 mg) by mouth daily, Disp: 90 tablet, Rfl: 3     rOPINIRole (REQUIP) 0.25 MG tablet, TAKE 1-2 TABLETS BY MOUTH AT BEDTIME, Disp: 180 tablet, Rfl: 0     sildenafil (VIAGRA) 100 MG tablet, Take 1 tablet (100 mg) by mouth daily as needed (sexual functioning), Disp: 8 tablet, Rfl: 11      Respiratory History    occasional episodes of bronchitis and pneumonia      SUBJECTIVE    HPI: Rudolph Tao is an 51 year old male who presents with possible covid. Reports he \"went into DKA on Sunday but I didn't want to go to the hospital because I know they are full.\" Reports he was having nausea, vomiting, abdominal pain and lethargy from Monday to Wednesday. Overnight last night, was able to tolerate pedialyte and cottage cheese. Now having a \"heavy cough,\" chest tightness with cough, fatigue, shortness of breath, headache and body aches. Reports blood sugars have been ok. Varied between 470 and 490 last night and currently in clinic, is 67 per his monitor. Also reports he needs to see " cardiology for heart rate control. Unknown if he has had fevers. Woke during the night sweating. Gets very short of breath when climbing the stairs.     Patient denies any recent travel or exposure to known COVID positive tested individual.      ROS:     Review of Systems   Constitutional: Positive for fatigue and fever. Negative for chills.   HENT: Negative for congestion and sore throat.    Respiratory: Positive for cough and shortness of breath. Negative for chest tightness and wheezing.    Cardiovascular: Negative for chest pain and palpitations.   Gastrointestinal: Negative for abdominal pain, diarrhea, nausea and vomiting.   Genitourinary: Negative for dysuria, frequency, hematuria and urgency.   Musculoskeletal: Positive for myalgias.   Skin: Negative for rash.   Allergic/Immunologic: Negative.  Negative for immunocompromised state.   Neurological: Positive for headaches.         Family History   Family History   Adopted: Yes   Problem Relation Age of Onset     Unknown/Adopted Mother      Cancer Mother      Unknown/Adopted Father      Unknown/Adopted Maternal Grandmother      Unknown/Adopted Maternal Grandfather      Unknown/Adopted Paternal Grandmother      Unknown/Adopted Paternal Grandfather         Problem history  Patient Active Problem List   Diagnosis     Hyperlipidemia LDL goal <100     Encounter for long-term (current) use of insulin (H)     Chronic low back pain     Libido, decreased     Restless legs     Tobacco abuse     Cervicalgia     Paresthesias     Persistent disorder of initiating or maintaining sleep     Spasms of the hands or feet     Hypertension goal BP (blood pressure) < 140/90     Right-sided low back pain without sciatica     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)     GAIL (generalized anxiety disorder)     Psychosocial stressors     DDD (degenerative disc disease), lumbar     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)     Hypoglycemia     Syncopal episodes     Gastroparesis         Allergies  Allergies   Allergen Reactions     Avapro [Irbesartan]      Profuse sweating     Crestor [Rosuvastatin Calcium]      cough     Sulfa Drugs Swelling and Hives     Simvastatin      Elevated BS readings        Social History  Social History     Socioeconomic History     Marital status:      Spouse name: Not on file     Number of children: Not on file     Years of education: Not on file     Highest education level: Not on file   Occupational History     Not on file   Tobacco Use     Smoking status: Former Smoker     Packs/day: 0.00     Years: 20.00     Pack years: 0.00     Types: Cigarettes     Quit date: 10/1/2015     Years since quittin.1     Smokeless tobacco: Never Used     Tobacco comment: 2-4  cigarettes daily   Substance and Sexual Activity     Alcohol use: Yes     Comment: 2 drinks daily     Drug use: No     Sexual activity: Yes     Partners: Female     Birth control/protection: None   Other Topics Concern     Parent/sibling w/ CABG, MI or angioplasty before 65F 55M? No   Social History Narrative    History scanRy    Customer Service at [employer]    Ex-wife, daughter 13yo as of 2018      Social Determinants of Health     Financial Resource Strain: Not on file   Food Insecurity: Not on file   Transportation Needs: Not on file   Physical Activity: Not on file   Stress: Not on file   Social Connections: Not on file   Intimate Partner Violence: Not on file   Housing Stability: Not on file        OBJECTIVE     Vital signs reviewed by Rudolph Devries PA-C  /80 (BP Location: Right arm, Patient Position: Sitting, Cuff Size: Adult Regular)   Pulse (!) 149   Temp 98.9  F (37.2  C) (Tympanic)   SpO2 98%      Physical Exam  Vitals reviewed.   Constitutional:       General: He is not in acute distress.     Appearance: He is well-developed. He is not ill-appearing, toxic-appearing or diaphoretic.   HENT:      Head: Normocephalic and atraumatic.      Right Ear: Hearing,  tympanic membrane, ear canal and external ear normal. Tympanic membrane is not perforated, erythematous or bulging.      Left Ear: Hearing, tympanic membrane, ear canal and external ear normal. Tympanic membrane is not perforated or erythematous.      Nose: No congestion or rhinorrhea.      Right Turbinates: Not enlarged or swollen.      Left Turbinates: Not enlarged or swollen.      Mouth/Throat:      Mouth: Mucous membranes are dry.      Pharynx: No pharyngeal swelling, oropharyngeal exudate, posterior oropharyngeal erythema or uvula swelling.      Tonsils: No tonsillar exudate.   Eyes:      General: Lids are normal.         Right eye: No discharge.         Left eye: No discharge.      Conjunctiva/sclera: Conjunctivae normal.      Right eye: Right conjunctiva is not injected. No exudate.     Left eye: Left conjunctiva is not injected. No exudate.     Pupils: Pupils are equal, round, and reactive to light.   Cardiovascular:      Rate and Rhythm: Normal rate and regular rhythm.      Heart sounds: Normal heart sounds. No murmur heard.  No friction rub. No gallop.    Pulmonary:      Effort: Pulmonary effort is normal. No accessory muscle usage, respiratory distress or retractions.      Breath sounds: Normal breath sounds and air entry. No stridor, decreased air movement or transmitted upper airway sounds. No decreased breath sounds, wheezing, rhonchi or rales.   Abdominal:      General: Bowel sounds are normal. There is no distension.      Palpations: Abdomen is soft. Abdomen is not rigid. There is no mass.      Tenderness: There is no abdominal tenderness.   Musculoskeletal:         General: Normal range of motion.      Cervical back: Normal range of motion and neck supple.   Lymphadenopathy:      Head:      Right side of head: No submental, submandibular or tonsillar adenopathy.      Left side of head: No submental, submandibular or tonsillar adenopathy.      Cervical:      Right cervical: No superficial cervical  adenopathy.     Left cervical: No superficial cervical adenopathy.   Skin:     General: Skin is warm.      Capillary Refill: Capillary refill takes less than 2 seconds.   Neurological:      Mental Status: He is alert and oriented to person, place, and time.      Cranial Nerves: No cranial nerve deficit.      Sensory: No sensory deficit.      Coordination: Coordination normal.   Psychiatric:         Behavior: Behavior normal. Behavior is cooperative.         Thought Content: Thought content normal.         Judgment: Judgment normal.           Rudolph Devries PA-C  12/9/2021, 5:14 PM

## 2021-12-10 LAB — SARS-COV-2 RNA RESP QL NAA+PROBE: NEGATIVE

## 2021-12-16 DIAGNOSIS — E10.42 TYPE 1 DIABETES MELLITUS WITH DIABETIC POLYNEUROPATHY (H): ICD-10-CM

## 2021-12-16 DIAGNOSIS — E10.8 DIABETES MELLITUS TYPE 1 WITH COMPLICATIONS (H): ICD-10-CM

## 2021-12-16 DIAGNOSIS — F41.1 GENERALIZED ANXIETY DISORDER: ICD-10-CM

## 2021-12-20 RX ORDER — PRAVASTATIN SODIUM 20 MG
TABLET ORAL
Qty: 90 TABLET | Refills: 0 | Status: SHIPPED | OUTPATIENT
Start: 2021-12-20 | End: 2022-02-25

## 2021-12-20 RX ORDER — BUPROPION HYDROCHLORIDE 300 MG/1
TABLET ORAL
Qty: 90 TABLET | Refills: 0 | Status: SHIPPED | OUTPATIENT
Start: 2021-12-20 | End: 2022-09-06

## 2021-12-20 RX ORDER — GABAPENTIN 300 MG/1
CAPSULE ORAL
Qty: 150 CAPSULE | Refills: 0 | Status: SHIPPED | OUTPATIENT
Start: 2021-12-20 | End: 2022-01-19

## 2021-12-20 NOTE — TELEPHONE ENCOUNTER
"Routing refill request to provider for review/approval because:  Drug not on the [facility] refill protocol   Drug interaction warning      Requested Prescriptions   Pending Prescriptions Disp Refills     buPROPion (WELLBUTRIN XL) 300 MG 24 hr tablet [Pharmacy Med Name: buPROPion HCl ER (XL) 300 MG Oral Tablet Extended Release 24 Hour] 90 tablet 0     Sig: TAKE 1 TABLET BY MOUTH IN THE MORNING       SSRIs Protocol Passed - 12/16/2021  8:14 AM        Passed - Recent (12 mo) or future (30 days) visit within the authorizing provider's specialty     Patient has had an office visit with the authorizing provider or a provider within the authorizing providers department within the previous 12 mos or has a future within next 30 days. See \"Patient Info\" tab in inbasket, or \"Choose Columns\" in Meds & Orders section of the refill encounter.              Passed - Medication is Bupropion     If the medication is Bupropion (Wellbutrin), and the patient is taking for smoking cessation; OK to refill.          Passed - Medication is active on med list        Passed - Patient is age 18 or older           pravastatin (PRAVACHOL) 20 MG tablet [Pharmacy Med Name: Pravastatin Sodium 20 MG Oral Tablet] 90 tablet 0     Sig: Take 1 tablet by mouth once daily       Statins Protocol Passed - 12/16/2021  8:14 AM        Passed - LDL on file in past 12 months     Recent Labs   Lab Test 06/25/21  1152   LDL 96             Passed - No abnormal creatine kinase in past 12 months     Recent Labs   Lab Test 08/10/15  1950                   Passed - Recent (12 mo) or future (30 days) visit within the authorizing provider's specialty     Patient has had an office visit with the authorizing provider or a provider within the authorizing providers department within the previous 12 mos or has a future within next 30 days. See \"Patient Info\" tab in inbasket, or \"Choose Columns\" in Meds & Orders section of the refill encounter.              Passed - " Medication is active on med list        Passed - Patient is age 18 or older           gabapentin (NEURONTIN) 300 MG capsule [Pharmacy Med Name: Gabapentin 300 MG Oral Capsule] 150 capsule 0     Sig: TAKE 2 CAPSULES BY MOUTH IN THE MORNING AND 1 AT NOON AND 2 IN THE EVENING       There is no refill protocol information for this order        Elina Middleton RN on 12/20/2021 at 8:58 AM

## 2021-12-31 DIAGNOSIS — E10.9 TYPE 1 DIABETES MELLITUS WITHOUT COMPLICATION (H): ICD-10-CM

## 2022-01-03 RX ORDER — FLURBIPROFEN SODIUM 0.3 MG/ML
SOLUTION/ DROPS OPHTHALMIC
Qty: 100 EACH | Refills: 0 | Status: SHIPPED | OUTPATIENT
Start: 2022-01-03 | End: 2022-03-16

## 2022-01-03 NOTE — TELEPHONE ENCOUNTER
Pending Prescriptions:                       Disp   Refills    B-D U/F insulin pen needle [Pharmacy Med *       0            Sig: USE 6 TIMES DAILY    Routing refill request to provider for review/approval because:  Drug not on the [facility] refill protocol   Last visit 5/2021.     Hayley BAH RN, [facility] 01/03/22 10:33 AM

## 2022-01-08 ENCOUNTER — HEALTH MAINTENANCE LETTER (OUTPATIENT)
Age: 52
End: 2022-01-08

## 2022-01-19 DIAGNOSIS — E10.42 TYPE 1 DIABETES MELLITUS WITH DIABETIC POLYNEUROPATHY (H): ICD-10-CM

## 2022-01-19 RX ORDER — GABAPENTIN 300 MG/1
CAPSULE ORAL
Qty: 150 CAPSULE | Refills: 0 | Status: SHIPPED | OUTPATIENT
Start: 2022-01-19 | End: 2022-02-17

## 2022-01-26 ENCOUNTER — IMMUNIZATION (OUTPATIENT)
Dept: NURSING | Facility: CLINIC | Age: 52
End: 2022-01-26
Payer: COMMERCIAL

## 2022-01-26 DIAGNOSIS — Z23 NEED FOR PROPHYLACTIC VACCINATION AND INOCULATION AGAINST INFLUENZA: ICD-10-CM

## 2022-01-26 DIAGNOSIS — Z23 HIGH PRIORITY FOR 2019-NCOV VACCINE: Primary | ICD-10-CM

## 2022-01-26 PROCEDURE — 90471 IMMUNIZATION ADMIN: CPT

## 2022-01-26 PROCEDURE — 0064A COVID-19,PF,MODERNA (18+ YRS BOOSTER .25ML): CPT

## 2022-01-26 PROCEDURE — 91306 COVID-19,PF,MODERNA (18+ YRS BOOSTER .25ML): CPT

## 2022-01-26 PROCEDURE — 90682 RIV4 VACC RECOMBINANT DNA IM: CPT

## 2022-02-16 DIAGNOSIS — E10.42 TYPE 1 DIABETES MELLITUS WITH DIABETIC POLYNEUROPATHY (H): ICD-10-CM

## 2022-02-17 RX ORDER — GABAPENTIN 300 MG/1
CAPSULE ORAL
Qty: 150 CAPSULE | Refills: 0 | Status: SHIPPED | OUTPATIENT
Start: 2022-02-17 | End: 2022-04-25

## 2022-02-25 ENCOUNTER — OFFICE VISIT (OUTPATIENT)
Dept: INTERNAL MEDICINE | Facility: CLINIC | Age: 52
End: 2022-02-25
Payer: COMMERCIAL

## 2022-02-25 VITALS
TEMPERATURE: 98 F | BODY MASS INDEX: 26.58 KG/M2 | OXYGEN SATURATION: 100 % | HEART RATE: 84 BPM | WEIGHT: 174.8 LBS | SYSTOLIC BLOOD PRESSURE: 150 MMHG | DIASTOLIC BLOOD PRESSURE: 91 MMHG | RESPIRATION RATE: 14 BRPM

## 2022-02-25 DIAGNOSIS — G63 POLYNEUROPATHY ASSOCIATED WITH UNDERLYING DISEASE (H): ICD-10-CM

## 2022-02-25 DIAGNOSIS — F10.930 ALCOHOL WITHDRAWAL SYNDROME WITHOUT COMPLICATION (H): ICD-10-CM

## 2022-02-25 DIAGNOSIS — E10.8 DIABETES MELLITUS TYPE 1 WITH COMPLICATIONS (H): Primary | ICD-10-CM

## 2022-02-25 DIAGNOSIS — Z12.11 SPECIAL SCREENING FOR MALIGNANT NEOPLASMS, COLON: ICD-10-CM

## 2022-02-25 DIAGNOSIS — Z12.2 SCREENING FOR LUNG CANCER: ICD-10-CM

## 2022-02-25 DIAGNOSIS — E10.42 DIABETIC POLYNEUROPATHY ASSOCIATED WITH TYPE 1 DIABETES MELLITUS (H): ICD-10-CM

## 2022-02-25 DIAGNOSIS — Z72.0 TOBACCO ABUSE: ICD-10-CM

## 2022-02-25 DIAGNOSIS — M75.101 ROTATOR CUFF SYNDROME, RIGHT: ICD-10-CM

## 2022-02-25 DIAGNOSIS — N52.1 ERECTILE DYSFUNCTION DUE TO DISEASES CLASSIFIED ELSEWHERE: ICD-10-CM

## 2022-02-25 DIAGNOSIS — E88.89 KETOSIS (H): ICD-10-CM

## 2022-02-25 DIAGNOSIS — F41.1 GAD (GENERALIZED ANXIETY DISORDER): ICD-10-CM

## 2022-02-25 DIAGNOSIS — Z12.11 SCREEN FOR COLON CANCER: ICD-10-CM

## 2022-02-25 DIAGNOSIS — Z23 NEED FOR DIPHTHERIA-TETANUS-PERTUSSIS (TDAP) VACCINE: ICD-10-CM

## 2022-02-25 DIAGNOSIS — I10 HYPERTENSION GOAL BP (BLOOD PRESSURE) < 140/90: ICD-10-CM

## 2022-02-25 DIAGNOSIS — K31.84 GASTROPARESIS: ICD-10-CM

## 2022-02-25 DIAGNOSIS — E78.5 HYPERLIPIDEMIA LDL GOAL <100: ICD-10-CM

## 2022-02-25 DIAGNOSIS — M72.0 DUPUYTREN CONTRACTURE: ICD-10-CM

## 2022-02-25 DIAGNOSIS — E10.649 TYPE 1 DIABETES MELLITUS WITH HYPOGLYCEMIA UNAWARENESS (H): ICD-10-CM

## 2022-02-25 LAB
ANION GAP SERPL CALCULATED.3IONS-SCNC: 8 MMOL/L (ref 3–14)
BUN SERPL-MCNC: 11 MG/DL (ref 7–30)
CALCIUM SERPL-MCNC: 9.5 MG/DL (ref 8.5–10.1)
CHLORIDE BLD-SCNC: 99 MMOL/L (ref 94–109)
CHOLEST SERPL-MCNC: 205 MG/DL
CO2 SERPL-SCNC: 28 MMOL/L (ref 20–32)
CREAT SERPL-MCNC: 0.75 MG/DL (ref 0.66–1.25)
FASTING STATUS PATIENT QL REPORTED: NO
GFR SERPL CREATININE-BSD FRML MDRD: >90 ML/MIN/1.73M2
GLUCOSE BLD-MCNC: 277 MG/DL (ref 70–99)
HBA1C MFR BLD: 8.1 % (ref 0–5.6)
HDLC SERPL-MCNC: 70 MG/DL
LDLC SERPL CALC-MCNC: 113 MG/DL
NONHDLC SERPL-MCNC: 135 MG/DL
POTASSIUM BLD-SCNC: 4.6 MMOL/L (ref 3.4–5.3)
SODIUM SERPL-SCNC: 135 MMOL/L (ref 133–144)
TRIGL SERPL-MCNC: 112 MG/DL

## 2022-02-25 PROCEDURE — 90715 TDAP VACCINE 7 YRS/> IM: CPT | Performed by: INTERNAL MEDICINE

## 2022-02-25 PROCEDURE — 80048 BASIC METABOLIC PNL TOTAL CA: CPT | Performed by: INTERNAL MEDICINE

## 2022-02-25 PROCEDURE — 80061 LIPID PANEL: CPT | Performed by: INTERNAL MEDICINE

## 2022-02-25 PROCEDURE — 99207 PR FOOT EXAM NO CHARGE: CPT | Performed by: INTERNAL MEDICINE

## 2022-02-25 PROCEDURE — 99215 OFFICE O/P EST HI 40 MIN: CPT | Mod: 25 | Performed by: INTERNAL MEDICINE

## 2022-02-25 PROCEDURE — 90471 IMMUNIZATION ADMIN: CPT | Performed by: INTERNAL MEDICINE

## 2022-02-25 PROCEDURE — 36415 COLL VENOUS BLD VENIPUNCTURE: CPT | Performed by: INTERNAL MEDICINE

## 2022-02-25 PROCEDURE — 83036 HEMOGLOBIN GLYCOSYLATED A1C: CPT | Performed by: INTERNAL MEDICINE

## 2022-02-25 RX ORDER — PRAVASTATIN SODIUM 20 MG
20 TABLET ORAL DAILY
Qty: 90 TABLET | Refills: 1 | Status: SHIPPED | OUTPATIENT
Start: 2022-02-25 | End: 2024-03-08

## 2022-02-25 RX ORDER — METOPROLOL SUCCINATE 25 MG/1
25 TABLET, EXTENDED RELEASE ORAL DAILY
Qty: 30 TABLET | Refills: 1 | Status: SHIPPED | OUTPATIENT
Start: 2022-02-25 | End: 2024-03-08

## 2022-02-25 RX ORDER — SILDENAFIL 100 MG/1
100 TABLET, FILM COATED ORAL DAILY PRN
Qty: 8 TABLET | Refills: 11 | Status: SHIPPED | OUTPATIENT
Start: 2022-02-25 | End: 2024-03-08

## 2022-02-25 RX ORDER — LISINOPRIL 40 MG/1
TABLET ORAL
Qty: 90 TABLET | Refills: 1 | Status: SHIPPED | OUTPATIENT
Start: 2022-02-25 | End: 2024-03-08

## 2022-02-25 RX ORDER — METOCLOPRAMIDE 10 MG/1
10 TABLET ORAL 4 TIMES DAILY PRN
Qty: 120 TABLET | Refills: 3 | Status: SHIPPED | OUTPATIENT
Start: 2022-02-25

## 2022-02-25 RX ORDER — ONDANSETRON 4 MG/1
TABLET, ORALLY DISINTEGRATING ORAL
Qty: 20 TABLET | Refills: 4 | Status: SHIPPED | OUTPATIENT
Start: 2022-02-25

## 2022-02-25 RX ORDER — INSULIN GLARGINE 100 [IU]/ML
INJECTION, SOLUTION SUBCUTANEOUS
Qty: 45 ML | Refills: 1 | Status: SHIPPED | OUTPATIENT
Start: 2022-02-25 | End: 2022-12-08

## 2022-02-25 NOTE — LETTER
February 28, 2022      Kris Tao  [id number] [facility] [id number]        Dear ,    We are writing to inform you of your test results.    You should strive for somewhat better control of blood glucose readings . Your hemoglobin a1c  [ diabetes test ] is just a little bit higher then we like to see, I think your ideas of getting an insulin pump is a good plan. Please let me know if you have any questions for me or need a referral to the certified diabetes educator          Resulted Orders   Basic metabolic panel  (Ca, Cl, CO2, Creat, Gluc, K, Na, BUN)   Result Value Ref Range    Sodium 135 133 - 144 mmol/L    Potassium 4.6 3.4 - 5.3 mmol/L    Chloride 99 94 - 109 mmol/L    Carbon Dioxide (CO2) 28 20 - 32 mmol/L    Anion Gap 8 3 - 14 mmol/L    Urea Nitrogen 11 7 - 30 mg/dL    Creatinine 0.75 0.66 - 1.25 mg/dL    Calcium 9.5 8.5 - 10.1 mg/dL    Glucose 277 (H) 70 - 99 mg/dL    GFR Estimate >90 >60 mL/min/1.73m2      Comment:      Effective December 21, 2021 eGFRcr in adults is calculated using the 2021 CKD-EPI creatinine equation which includes age and gender (Harshad et al., NEJM, DOI: 10.1056/GDHNzu3370878)   Lipid panel reflex to direct LDL Non-fasting   Result Value Ref Range    Cholesterol 205 (H) <200 mg/dL    Triglycerides 112 <150 mg/dL    Direct Measure HDL 70 >=40 mg/dL    LDL Cholesterol Calculated 113 (H) <=100 mg/dL    Non HDL Cholesterol 135 (H) <130 mg/dL    Patient Fasting > 8hrs? No     Narrative    Cholesterol  Desirable:  <200 mg/dL    Triglycerides  Normal:  Less than 150 mg/dL  Borderline High:  150-199 mg/dL  High:  200-499 mg/dL  Very High:  Greater than or equal to 500 mg/dL    Direct Measure HDL  Female:  Greater than or equal to 50 mg/dL   Male:  Greater than or equal to 40 mg/dL    LDL Cholesterol  Desirable:  <100mg/dL  Above Desirable:  100-129 mg/dL   Borderline High:  130-159 mg/dL   High:  160-189 mg/dL   Very High:  >= 190 mg/dL    Non HDL Cholesterol  Desirable:   130 mg/dL  Above Desirable:  130-159 mg/dL  Borderline High:  160-189 mg/dL  High:  190-219 mg/dL  Very High:  Greater than or equal to 220 mg/dL   Hemoglobin A1c   Result Value Ref Range    Hemoglobin A1C 8.1 (H) 0.0 - 5.6 %      Comment:      Normal <5.7%   Prediabetes 5.7-6.4%    Diabetes 6.5% or higher     Note: Adopted from ADA consensus guidelines.       If you have any questions or concerns, please call the clinic at the number listed above.       Sincerely,      Haim Bustos MD/mancuso

## 2022-02-25 NOTE — PROGRESS NOTES
Assessment & Plan     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)  Rudolph Tao is a patient with diabetes mellitus type 1 and a truly daunting highly complex challenging problem list, and he's not seen as regularly as would be preferred and it has proven quite a challenge to truly get on top of his issues and multiple different diagnoses . His diabetes mellitus type 1 remains controlled within acceptable limits and from that perspective I am pleased. He asks for a endocrine consultation today and is considering the insulin pump   - FOOT EXAM  - Hemoglobin A1c; Future  - Basic metabolic panel  (Ca, Cl, CO2, Creat, Gluc, K, Na, BUN); Future  - REVIEW OF HEALTH MAINTENANCE PROTOCOL ORDERS  - OPTOMETRY REFERRAL; Future  - Hemoglobin A1c; Future  - insulin glargine (LANTUS SOLOSTAR) 100 UNIT/ML pen; INJECT 32 UNITS SUBCUTANEOUSLY ONCE DAILY  - Adult Endocrinology  Referral; Future  - pravastatin (PRAVACHOL) 20 MG tablet; Take 1 tablet (20 mg) by mouth daily  - Basic metabolic panel  (Ca, Cl, CO2, Creat, Gluc, K, Na, BUN)  - Hemoglobin A1c    Hyperlipidemia LDL goal <100  Therapy recommended , see medication list   - Lipid panel reflex to direct LDL Non-fasting; Future  - REVIEW OF HEALTH MAINTENANCE PROTOCOL ORDERS  - Lipid panel reflex to direct LDL Non-fasting    Hypertension goal BP (blood pressure) < 140/90  Controlled within acceptable limits WHEN he takes his medications, he concedes to that ran out of the medication, refills provided today    - Basic metabolic panel  (Ca, Cl, CO2, Creat, Gluc, K, Na, BUN); Future  - REVIEW OF HEALTH MAINTENANCE PROTOCOL ORDERS  - lisinopril (ZESTRIL) 40 MG tablet; TAKE 1 TABLET BY MOUTH ONCE DAILY .  - metoprolol succinate ER (TOPROL-XL) 25 MG 24 hr tablet; Take 1 tablet (25 mg) by mouth daily  - Basic metabolic panel  (Ca, Cl, CO2, Creat, Gluc, K, Na, BUN)    Tobacco abuse  Smoking cessation encouraged   - REVIEW OF HEALTH MAINTENANCE PROTOCOL ORDERS    GAIL  (generalized anxiety disorder)  Clearly an ever-present issue. As of today he had wanted me to remove Wellbutrin (Bupropion Hcl) form his problem list until he found out maybe he actually is taking this.      Special screening for malignant neoplasms, colon  He is to have the [employer] DNA cancer stool screening test done    Need for diphtheria-tetanus-pertussis (Tdap) vaccine  Administered   - TDAP VACCINE (Adacel, Boostrix)  [[id number]]    Screening for lung cancer  We planned to schedule patient for annual low dose CT for scanning high risk pt for lung cancer and this topic was not addressed fully and will need to wait until next appointment     Screen for colon cancer  As detailed above   - FIDEL(EXACT SCIENCES)    Diabetic polyneuropathy associated with type 1 diabetes mellitus (H)  Patient has significant component of diabetes neuropathy but also has some mechanical issues with feet and a podiatry consultation is recommended   - Orthopedic  Referral; Future    Rotator cuff syndrome, right  As we talked today he describes severe right shoulder pain and is seeing a Dr. Cee with [facility] and an impending surgery    Dupuytren contracture  Both hands have obvious Dupuytren contracture although fortunately  This is still in the fairly mild territory , discussed seeing a hand surgeon specialist  For consideration of XIAFLEX  (collagenase clostridium histolyticum) , postponed for now    Type 1 diabetes mellitus with hypoglycemia unawareness (H)  Refills provided   - insulin aspart (NOVOPEN ECHO) 100 UNIT/ML cartridge; Inject 1 unit/30 grams of carbohydrates plus 1 unit/50 mg/dl above 200 mg/dl. Max dose of 20 units/day    Gastroparesis  Patient debates if this gastroparesis diagnosis is correct. He wants refills provided for the medications but is not interested in a - [place] Gastric Emptying Study   - metoclopramide (REGLAN) 10 MG tablet; Take 1 tablet (10 mg) by mouth 4 times daily as  needed  - ondansetron (ZOFRAN-ODT) 4 MG ODT tab; DISSOLVE 1 TABLET IN MOUTH EVERY 8 HOURS AS NEEDED FOR NAUSEA    Erectile dysfunction due to diseases classified elsewhere  Refills provided   - sildenafil (VIAGRA) 100 MG tablet; Take 1 tablet (100 mg) by mouth daily as needed (sexual functioning)    Polyneuropathy associated with underlying disease (H)  Self foot examinations are emphasized      Alcohol withdrawal syndrome without complication (H)  I find this needs better details. It is listed as recently as September 2021 at an emergency room evaluation .    Ketosis (H)  Diabetes mellitus is controlled within acceptable limits       Review of the result(s) of each unique test - see todays labs   Prescription drug management  45 minutes spent on the date of the encounter doing chart review, history and exam, documentation and further activities per the note      Return in about 6 months (around 8/25/2022), or if symptoms worsen or fail to improve.    Haim Bustos MD  [facility] [place] is a 52 year old who presents for the following health issues   Encounter Diagnoses   Name Primary?     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  He exercises with enough effort to increase his heart rate 3 or less days per week.   He is taking medications regularly.     He has an advanced left hand carpal tunnel syndrome with muscle atrophy and some muscle tingling and numbess type sensation consistent with end stage carpal tunnel syndrome median nerve pathology , but there is a separate condition of Dupuytren contracture bilateral as well as right hand ulnar neuritis /  left carpal tunnel syndrome / bilateral Dupuytren contracture , patient never did have clearcut symptoms of carpal tunnel syndrome  Had a painful needle EMG examination and his neuropathy was very advanced    Diabetes Follow-up    How often are you checking your blood sugar? Four or more times daily  Blood sugar testing frequency justification:  Frequent hypoglycemia  What time of day are you checking your blood sugars (select all that apply)?  Before and after meals  Have you had any blood sugars above 200?  Yes   Have you had any blood sugars below 70?  Yes very little    What symptoms do you notice when your blood sugar is low?  Other: hard for patient to explain     What concerns do you have today about your diabetes? None     Do you have any of these symptoms? (Select all that apply)  Numbness in feet and Burning in feet    Have you had a diabetic eye exam in the last 12 months? No    Lab Results   Component Value Date    A1C 7.3 06/25/2021    A1C 6.9 01/14/2021    A1C 7.6 10/05/2020    A1C 8.2 01/30/2020    A1C 7.6 08/21/2018           BP Readings from Last 2 Encounters:   12/09/21 112/80   06/25/21 (!) 126/91     Hemoglobin A1C POCT (%)   Date Value   06/25/2021 7.3 (H)   01/14/2021 6.9 (H)     LDL Cholesterol Calculated (mg/dL)   Date Value   06/25/2021  96   02/01/2020 98         Review of Systems   Constitutional, HEENT, cardiovascular, pulmonary, gi and gu systems are negative, except as otherwise noted.      Objective    BP (!) 150/91   Pulse 84   Temp 98  F (36.7  C) (Oral)   Resp 14   Wt 79.3 kg (174 lb 12.8 oz)   SpO2 100%   BMI 26.58 kg/m    Body mass index is 26.58 kg/m .  Physical Exam   GENERAL: healthy, alert and no distress  EYES: Eyes grossly normal to inspection, PERRL and conjunctivae and sclerae normal  MS: hand findings as detailed above   NEURO: Normal strength and tone, mentation intact and speech normal  PSYCH: mentation appears normal, affect normal/bright    Orders Placed This Encounter   Procedures     FOOT EXAM     REVIEW OF HEALTH MAINTENANCE PROTOCOL ORDERS     TDAP VACCINE (Adacel, Boostrix)  [[id number]]     Lipid panel reflex to direct LDL Non-fasting     Hemoglobin A1c     Basic metabolic panel  (Ca, Cl, CO2, Creat, Gluc, K, Na, BUN)     COLOGALEM(EXACT SCIENCES)     Hemoglobin A1c     OPTOMETRY REFERRAL     Orthopedic  Referral     Adult Endocrinology  Referral       45 minutes was spent with the patient with greater than 50% in face-to-face discussion of disease process, treatment options and medicine management.

## 2022-02-28 ENCOUNTER — TELEPHONE (OUTPATIENT)
Dept: FAMILY MEDICINE | Facility: CLINIC | Age: 52
End: 2022-02-28
Payer: COMMERCIAL

## 2022-02-28 NOTE — TELEPHONE ENCOUNTER
Prior Authorization Retail Medication Request    Medication/Dose: insulin aspart (NOVOPEN ECHO) 100 UNIT/ML cartridge  ICD code (if different than what is on RX): Type 1 diabetes mellitus with hypoglycemia unawareness (H) [E10.649]   Previously Tried and Failed:    Rationale:      Insurance Name:  [employer] [place]   Insurance ID:  [id number]       Pharmacy Information (if different than what is on RX)  Name:  [facility] [address] [place] [address] [address]  Phone: [phone]

## 2022-03-07 NOTE — TELEPHONE ENCOUNTER
Central Prior Authorization Team   Phone: [phone]    PA Initiation    Medication: NovoPen Echo Device  Insurance Company: [facility] - Phone [phone] Fax [phone]  Pharmacy Filling the Rx: [facility] [address] [place] [facility] [address]  Filling Pharmacy Phone: [phone]  Filling Pharmacy Fax:    Start Date: 3/7/2022

## 2022-03-07 NOTE — TELEPHONE ENCOUNTER
PRIOR AUTHORIZATION DENIED    Medication: NovoPen Echo Device    Denial Date: 3/7/2022    Denial Rational:  Per insurance, medication is excluded from patient's pharmacy benefit plan and will not be covered.  This will have to be billed through medical benefits (Medicare Part B). Central PA team does not process medical benefit PA's.                Appeal Information:  N/A

## 2022-03-08 NOTE — TELEPHONE ENCOUNTER
I have no specific plan of follow up care here. Patient needs an alternative choice and lets see what he wants to do. Please let me know if patient has questions for me     Haim Bustos MD

## 2022-03-09 NOTE — TELEPHONE ENCOUNTER
Called and spoke with patient this was approved and he has already picked it up from the clinic. Leana Martinez MA

## 2022-03-15 DIAGNOSIS — E10.9 TYPE 1 DIABETES MELLITUS WITHOUT COMPLICATION (H): ICD-10-CM

## 2022-03-16 RX ORDER — FLURBIPROFEN SODIUM 0.3 MG/ML
SOLUTION/ DROPS OPHTHALMIC
Qty: 100 EACH | Refills: 2 | Status: SHIPPED | OUTPATIENT
Start: 2022-03-16 | End: 2022-07-11

## 2022-03-18 DIAGNOSIS — E10.8 DIABETES MELLITUS TYPE 1 WITH COMPLICATIONS (H): Primary | ICD-10-CM

## 2022-03-25 ENCOUNTER — TELEPHONE (OUTPATIENT)
Dept: INTERNAL MEDICINE | Facility: CLINIC | Age: 52
End: 2022-03-25
Payer: COMMERCIAL

## 2022-03-25 NOTE — TELEPHONE ENCOUNTER
Patient Quality Outreach    Patient is due for the following:   Colon Cancer Screening -  FIT, Colonoscopy and Cologuard  Physical  - Due Now  Immunizations  -  Hepatitis B and Zoster   Lung Cancer Screening    NEXT STEPS:   Schedule a yearly physical  Patient has upcoming appointment, these items will be addressed at that time.    Type of outreach:    Sent letter.      Questions for provider review:    None     Rylie Haney CMA  Chart routed to Care Team.

## 2022-03-25 NOTE — LETTER
[facility] [place]  [address]  [place] [address]  [phone]          March 25, 2022      Rudolph Tao  [address] [address]      Your healthcare team cares about your health. To provide you with the best care,   we have reviewed your chart and based on our findings, we see that you are due to:     - COLON CANCER SCREENING:  Call or mychart the clinic to schedule your colonoscopy or schedule/ your FIT Test, or Cologuard test  - ANNUAL WELLNESS FOLLOW UP:   Schedule an Annual Medicare Wellness Exam. This can be done by in person visit or virtual video visit.   - OTHER FOLLOW UP:  Lung Cancer Screening and Immunizaitons:Hep B and Shingles    If you have already completed these items, please contact the clinic via phone or   Mychart so your care team can review and update your records. Thank you for   choosing [facility] for your healthcare needs. For any questions,   concerns, or to schedule an appointment please contact the clinic.       Healthy Regards,      Your [facility] Care Team

## 2022-03-25 NOTE — LETTER
[facility] [place]  [address]  [place] [address]  [phone]        April 27, 2022      Rudolph Tao  [address] [address]      Your healthcare team cares about your health. To provide you with the best care,   we have reviewed your chart and based on our findings, we see that you are due to:     - COLON CANCER SCREENING:  Call or mychart the clinic to schedule your colonoscopy or schedule/ your FIT Test, or Cologuard test  - ANNUAL WELLNESS FOLLOW UP:   Schedule an Annual Medicare Wellness Exam. This can be done by in person visit or virtual video visit.   - OTHER FOLLOW UP:  Immunizations:Hep B and Zosters(Shingles Shot).    If you have already completed these items, please contact the clinic via phone or   Vitasofthart so your care team can review and update your records. Thank you for   choosing [facility] for your healthcare needs. For any questions,   concerns, or to schedule an appointment please contact the clinic.       Healthy Regards,      Your [facility] Care Team

## 2022-04-12 ENCOUNTER — VIRTUAL VISIT (OUTPATIENT)
Dept: EDUCATION SERVICES | Facility: OTHER | Age: 52
End: 2022-04-12
Attending: INTERNAL MEDICINE
Payer: COMMERCIAL

## 2022-04-12 DIAGNOSIS — E10.649 TYPE 1 DIABETES MELLITUS WITH HYPOGLYCEMIA UNAWARENESS (H): ICD-10-CM

## 2022-04-12 NOTE — PROGRESS NOTES
Called pt for telephone visit.  Pt mentions he forgot visit was scheduled for today.  He stated he didn't have much time to talk.    Pt is mostly concerned his CGM is not accurate (different than fingersticks) and wondering if it is worth it?      Pt also says he is interested in insulin pump.    Explained difference between fingersticks and CGM readings.  Pt mentions he is aware of this, but still can't figure out why they would be 100 points different.  Tried to clarify when he is comparing them, (before meals, after meals, during meals) but pt did not specify.  Pt uses CGM reader (before used phone) due to not being aware christian was now available on [employer].  Told pt christian is now available and encouraged to download and start to use.  Sent email invite.  This way we can start to see blood sugars remotely and provide advice.    As far as the pump is concerned, I encouraged pt to check out insulin pumps online, narrow it down to a few he may be interested in, and check with insurance coverage.  Also informed pt we don't do new insulin pump starts.  Explained we would get him in touch with insulin pump representative and he would also see Endocrinology for initial pump settings.    Pt appreciative of information.      Will also provide scheduling number for pt to reschedule.    No charge for today.    Renetta Huffman RD [facility]  [phone]

## 2022-04-12 NOTE — LETTER
4/12/2022         RE: Rudolph Tao  [address] [address] [address]        Dear Colleague,    Thank you for referring your patient, Rudolph Tao, to the [facility]. Please see a copy of my visit note below.    Called pt for telephone visit.  Pt mentions he forgot visit was scheduled for today.  He stated he didn't have much time to talk.    Pt is mostly concerned his CGM is not accurate (different than fingersticks) and wondering if it is worth it?      Pt also says he is interested in insulin pump.    Explained difference between fingersticks and CGM readings.  Pt mentions he is aware of this, but still can't figure out why they would be 100 points different.  Tried to clarify when he is comparing them, (before meals, after meals, during meals) but pt did not specify.  Pt uses CGM reader (before used phone) due to not being aware christian was now available on [employer].  Told pt christian is now available and encouraged to download and start to use.  Sent email invite.  This way we can start to see blood sugars remotely and provide advice.    As far as the pump is concerned, I encouraged pt to check out insulin pumps online, narrow it down to a few he may be interested in, and check with insurance coverage.  Also informed pt we don't do new insulin pump starts.  Explained we would get him in touch with insulin pump representative and he would also see Endocrinology for initial pump settings.    Pt appreciative of information.      Will also provide scheduling number for pt to reschedule.    No charge for today.    Renetta Huffman RD [facility]  [phone]

## 2022-04-23 DIAGNOSIS — E10.42 TYPE 1 DIABETES MELLITUS WITH DIABETIC POLYNEUROPATHY (H): ICD-10-CM

## 2022-04-25 RX ORDER — GABAPENTIN 300 MG/1
CAPSULE ORAL
Qty: 150 CAPSULE | Refills: 1 | Status: SHIPPED | OUTPATIENT
Start: 2022-04-25 | End: 2022-07-01

## 2022-04-25 NOTE — TELEPHONE ENCOUNTER
Routing refill request to provider for review/approval because:  Drug gabapentin (NEURONTIN not on the [facility] refill protocol       Requested Prescriptions   Pending Prescriptions Disp Refills     gabapentin (NEURONTIN) 300 MG capsule [Pharmacy Med Name: Gabapentin 300 MG Oral Capsule] 150 capsule 0     Sig: TAKE 2 CAPSULES BY MOUTH IN THE MORNING AND 1 AT NOON AND 2 IN THE EVENING . APPOINTMENT REQUIRED FOR FUTURE REFILLS       There is no refill protocol information for this order        Maya Richard RN

## 2022-04-27 NOTE — TELEPHONE ENCOUNTER
Patient Quality Outreach    Patient is due for the following:   Colon Cancer Screening -  FIT, Colonoscopy and Cologuard  Physical  - Due Now  Immunizations  -  Hep B and Zoster.    NEXT STEPS:   Schedule a yearly physical    Type of outreach:    Sent letter.    Next Steps:  Reach out within 90 days via Letter.    Max number of attempts reached: Yes. Will try again in 90 days if patient still on fail list.    Questions for provider review:    None     Rylie Haney CMA  Chart routed to Care Team.

## 2022-05-15 ENCOUNTER — OFFICE VISIT (OUTPATIENT)
Dept: URGENT CARE | Facility: URGENT CARE | Age: 52
End: 2022-05-15
Payer: COMMERCIAL

## 2022-05-15 VITALS
TEMPERATURE: 97.7 F | DIASTOLIC BLOOD PRESSURE: 88 MMHG | SYSTOLIC BLOOD PRESSURE: 143 MMHG | OXYGEN SATURATION: 98 % | HEART RATE: 62 BPM

## 2022-05-15 DIAGNOSIS — Z20.822 SUSPECTED COVID-19 VIRUS INFECTION: ICD-10-CM

## 2022-05-15 DIAGNOSIS — J18.0 BRONCHOPNEUMONIA: Primary | ICD-10-CM

## 2022-05-15 DIAGNOSIS — Z87.891 FORMER SMOKER: ICD-10-CM

## 2022-05-15 DIAGNOSIS — I10 HYPERTENSION GOAL BP (BLOOD PRESSURE) < 140/90: ICD-10-CM

## 2022-05-15 DIAGNOSIS — H65.93 BILATERAL SEROUS OTITIS MEDIA, UNSPECIFIED CHRONICITY: ICD-10-CM

## 2022-05-15 DIAGNOSIS — E10.8 DIABETES MELLITUS TYPE 1 WITH COMPLICATIONS (H): ICD-10-CM

## 2022-05-15 LAB — SARS-COV-2 RNA RESP QL NAA+PROBE: POSITIVE

## 2022-05-15 PROCEDURE — U0005 INFEC AGEN DETEC AMPLI PROBE: HCPCS | Performed by: FAMILY MEDICINE

## 2022-05-15 PROCEDURE — 99215 OFFICE O/P EST HI 40 MIN: CPT | Performed by: FAMILY MEDICINE

## 2022-05-15 PROCEDURE — U0003 INFECTIOUS AGENT DETECTION BY NUCLEIC ACID (DNA OR RNA); SEVERE ACUTE RESPIRATORY SYNDROME CORONAVIRUS 2 (SARS-COV-2) (CORONAVIRUS DISEASE [COVID-19]), AMPLIFIED PROBE TECHNIQUE, MAKING USE OF HIGH THROUGHPUT TECHNOLOGIES AS DESCRIBED BY CMS-2020-01-R: HCPCS | Performed by: FAMILY MEDICINE

## 2022-05-15 RX ORDER — FLUTICASONE PROPIONATE 50 MCG
1 SPRAY, SUSPENSION (ML) NASAL 2 TIMES DAILY
Qty: 16 G | Refills: 0 | Status: SHIPPED | OUTPATIENT
Start: 2022-05-15 | End: 2022-11-10

## 2022-05-15 RX ORDER — BENZONATATE 200 MG/1
200 CAPSULE ORAL 3 TIMES DAILY PRN
Qty: 30 CAPSULE | Refills: 0 | Status: SHIPPED | OUTPATIENT
Start: 2022-05-15 | End: 2022-11-10

## 2022-05-15 RX ORDER — DOXYCYCLINE HYCLATE 100 MG
100 TABLET ORAL 2 TIMES DAILY
Qty: 20 TABLET | Refills: 0 | Status: SHIPPED | OUTPATIENT
Start: 2022-05-15 | End: 2022-05-25

## 2022-05-15 RX ORDER — ALBUTEROL SULFATE 0.83 MG/ML
2.5 SOLUTION RESPIRATORY (INHALATION) EVERY 6 HOURS PRN
Qty: 90 ML | Refills: 0 | Status: SHIPPED | OUTPATIENT
Start: 2022-05-15 | End: 2023-04-06

## 2022-05-15 RX ORDER — ALBUTEROL SULFATE 90 UG/1
2 AEROSOL, METERED RESPIRATORY (INHALATION) EVERY 6 HOURS
Qty: 18 G | Refills: 0 | Status: SHIPPED | OUTPATIENT
Start: 2022-05-15 | End: 2023-04-06

## 2022-05-15 NOTE — PROGRESS NOTES
Patient presents with:  Sinus Problem:     Cough: Chest congestion, and lungs hurt       Subjective     Rudolph Tao is a 52 year old male who presents to clinic today for the following health issues:    HPI     URI Adult    Onset of symptoms was 2 day(s) ago.  Course of illness is worsening.    Severity moderate  Current and Associated symptoms: stuffy nose, cough - productive, wheezing, shortness of breath, sore throat, hoarse voice, headache, body aches and fatigue  Denies fever, chills, ear pain bilateral, eye drainage, facial pain/pressure, nausea, vomiting and diarrhea  Treatment measures tried include OTC Cough med.  Predisposing factors include ill contact: Work.    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     Vaccinated for covid booster times 1  No history of covid,         Past Medical History:   Diagnosis Date     Adopted      Anxiety      Anxiety      Chronic low back pain      DDD (degenerative disc disease), lumbar 3/8/2018     Diabetes mellitus (H)     TYPE 1     Hallux abducto valgus      Hyperlipaemia      Hyperlipidemia      Hypertension      Lip lesion 2010     Pain in toe of left foot     2 ND TOE     RLS (restless legs syndrome)      Snoring     MODERATE SEVERITY     Swelling of foot joint 2012     Social History     Tobacco Use     Smoking status: Former Smoker     Packs/day: 0.00     Years: 20.00     Pack years: 0.00     Types: Cigarettes     Quit date: 10/1/2015     Years since quittin.6     Smokeless tobacco: Never Used     Tobacco comment: 2-4  cigarettes daily   Substance Use Topics     Alcohol use: Yes     Comment: 2 drinks daily       Current Outpatient Medications   Medication Sig Dispense Refill     albuterol (PROAIR HFA/PROVENTIL HFA/VENTOLIN HFA) 108 (90 Base) MCG/ACT inhaler Inhale 2 puffs into the lungs every 6 hours 18 g 0     albuterol (PROVENTIL) (2.5 MG/3ML) 0.083% neb solution Take 1 vial (2.5 mg) by nebulization every 6 hours as needed for  shortness of breath / dyspnea or wheezing 90 mL 0     benzonatate (TESSALON) 200 MG capsule Take 1 capsule (200 mg) by mouth 3 times daily as needed for cough 30 capsule 0     doxycycline hyclate (VIBRA-TABS) 100 MG tablet Take 1 tablet (100 mg) by mouth 2 times daily for 10 days 20 tablet 0     fluticasone (FLONASE) 50 MCG/ACT nasal spray Spray 1 spray into both nostrils 2 times daily 16 g 0     acetone urine (KETOSTIX) test strip 1 strip once as needed (with unexplained blood glucose over 300 mg/dL) Use one strip, as needed, with unexplained blood glucose over 300 mg/dL. 50 strip 6     albuterol (PROVENTIL) (2.5 MG/3ML) 0.083% neb solution Take 1 vial (2.5 mg) by nebulization every 6 hours as needed for shortness of breath / dyspnea or wheezing 1 Box 0     ALPRAZolam (XANAX) 0.5 MG tablet Take 1 tablet (0.5 mg) by mouth 3 times daily as needed for anxiety 1 month supply 30 tablet 0     aspirin (ASA) 81 MG EC tablet Take 1 tablet (81 mg) by mouth daily 90 tablet 3     B-D U/F insulin pen needle USE 6 TIMES DAILY 100 each 2     blood glucose monitoring (ONE TOUCH DELICA) lancets Use to test blood sugar 4-6 times daily. May test up to 10 times per day 300 each 1     blood glucose monitoring (ONETOUCH ULTRA) test strip Use to test blood sugar 4-6 times daily, may test up to 10 times per day. 300 strip 1     buPROPion (WELLBUTRIN XL) 300 MG 24 hr tablet TAKE 1 TABLET BY MOUTH IN THE MORNING 90 tablet 0     cetirizine (ZYRTEC) 10 MG tablet Take 10 mg by mouth daily       Continuous Blood Gluc  (FREESTYLE JUSTIN READER) MYA 1 Device See Admin Instructions 1 Device 0     Continuous Blood Gluc Sensor (FREESTYLE JUSTIN 2 SENSOR) MISC 1 each every 14 days 2 each 4     diclofenac (VOLTAREN) 75 MG EC tablet Take 1 tablet (75 mg) by mouth 2 times daily for 7 days 14 tablet 0     gabapentin (NEURONTIN) 300 MG capsule TAKE 2 CAPSULES BY MOUTH IN THE MORNING AND 1 AT NOON AND 2 IN THE EVENING . 150 capsule 1     insulin  aspart (NOVOPEN ECHO) 100 UNIT/ML cartridge Inject 1 unit/30 grams of carbohydrates plus 1 unit/50 mg/dl above 200 mg/dl. Max dose of 20 units/day 15 mL 11     insulin glargine (LANTUS SOLOSTAR) 100 UNIT/ML pen INJECT 32 UNITS SUBCUTANEOUSLY ONCE DAILY 45 mL 1     lisinopril (ZESTRIL) 40 MG tablet TAKE 1 TABLET BY MOUTH ONCE DAILY . 90 tablet 1     metoclopramide (REGLAN) 10 MG tablet Take 1 tablet (10 mg) by mouth 4 times daily as needed 120 tablet 3     metoprolol succinate ER (TOPROL-XL) 25 MG 24 hr tablet Take 1 tablet (25 mg) by mouth daily 30 tablet 1     nirmatrelvir and ritonavir (PAXLOVID) therapy pack Take 3 tablets by mouth 2 times daily for 5 days Take 2 Nirmatrelvir tablets and 1 Ritonavir tablet twice daily for 5 days. 30 each 0     ondansetron (ZOFRAN-ODT) 4 MG ODT tab DISSOLVE 1 TABLET IN MOUTH EVERY 8 HOURS AS NEEDED FOR NAUSEA 20 tablet 4     pravastatin (PRAVACHOL) 20 MG tablet Take 1 tablet (20 mg) by mouth daily 90 tablet 1     rOPINIRole (REQUIP) 0.25 MG tablet TAKE 1-2 TABLETS BY MOUTH AT BEDTIME 180 tablet 0     sildenafil (VIAGRA) 100 MG tablet Take 1 tablet (100 mg) by mouth daily as needed (sexual functioning) 8 tablet 11     Allergies   Allergen Reactions     Avapro [Irbesartan]      Profuse sweating     Crestor [Rosuvastatin Calcium]      cough     Sulfa Drugs Swelling and Hives     Simvastatin      Elevated BS readings             ROS are negative, except as otherwise noted HPI      Objective    BP (!) 143/88   Pulse 62   Temp 97.7  F (36.5  C) (Tympanic)   SpO2 98%   There is no height or weight on file to calculate BMI.  Physical Exam     BP (!) 143/88   Pulse 62   Temp 97.7  F (36.5  C) (Tympanic)   SpO2 98%     GENERAL: Pleasant and interactive.  Alert and oriented x 3.  Minimal distress.  HEENT: Normocephalic, atraumatic. PEERRLA, EOMI.  Scleras, lids and conjunctivae normal. Pinnas, canals and TM's dull bilateral .  Nose congestion and oropharynx   NECK: supple and free of  adenopathy or masses,  CHEST:  Diffuse  Wheezing, no rhonchi or rales. Normal symmetric air entry throughout both lung fields.  HEART:  S1 and S2 normal, no murmurs, clicks, gallops or rubs. Regular rate and rhythm.    SKIN: well perfused without cyanosis or rashes.        Diagnostic Test Results:  Labs reviewed in Epic  Results for orders placed or performed in visit on 05/15/22   XR Chest 2 Views     Status: None    Narrative    EXAM: XR CHEST 2 VIEW  LOCATION: [facility] [place]  DATE/TIME: 05/15/2022, 2:54 PM    INDICATION: Suspected COVID-19 virus infection.  COMPARISON: None.      Impression    IMPRESSION: Negative chest.     Results for orders placed or performed in visit on 05/15/22   Symptomatic; Yes; 5/13/2022 COVID-19 Virus (Coronavirus) by PCR Nose     Status: Abnormal    Specimen: Nose; Swab   Result Value Ref Range    SARS CoV2 PCR Positive (A) Negative    Narrative    Testing was performed using the Aptima SARS-CoV-2 Assay on the  LPATH Instrument System. Additional information about this  Emergency Use Authorization (EUA) assay can be found via the Lab  Guide. This test should be ordered for the detection of SARS-CoV-2 in  individuals who meet SARS-CoV-2 clinical and/or epidemiological  criteria. Test performance is unknown in asymptomatic patients. This  test is for in vitro diagnostic use under the FDA EUA for  laboratories certified under CLIA to perform high complexity testing.  This test has not been FDA cleared or approved. A negative result  does not rule out the presence of PCR inhibitors in the specimen or  target RNA in concentration below the limit of detection for the  assay. The possibility of a false negative should be considered if  the patient's recent exposure or clinical presentation suggests  COVID-19. This test was validated by the [facility]. This laboratory is certified under  the Clinical Laboratory Improvement  Amendments of 1988 (CLIA-88) as  qualified to perform high complexity laboratory testing.           ASSESSMENT/PLAN:      ICD-10-CM    1. Bronchopneumonia  J18.0 doxycycline hyclate (VIBRA-TABS) 100 MG tablet     benzonatate (TESSALON) 200 MG capsule     albuterol (PROAIR HFA/PROVENTIL HFA/VENTOLIN HFA) 108 (90 Base) MCG/ACT inhaler     albuterol (PROVENTIL) (2.5 MG/3ML) 0.083% neb solution   2. Bilateral serous otitis media, unspecified chronicity  H65.93 fluticasone (FLONASE) 50 MCG/ACT nasal spray   3. Suspected COVID-19 virus infection  Z20.822 Symptomatic; Yes; 5/13/2022 COVID-19 Virus (Coronavirus) by PCR Nose     XR Chest 2 Views   4. Former smoker  Z87.891     25 pack year, quit in 2015   5. 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 Hypertension goal BP (blood pressure) < 140/90  I10     patient self dcd lisinopril for 6-8 months              Reviewed medication instructions and side effects. Follow up if experiences side effects.     I reviewed supportive care, otc meds to use if needed, expected course, and signs of concern.  Follow up as needed or if he does not improve within  1-2 days or if worsens in any way.  Reviewed red flag symptoms and is to go to the ER if experiences any of these.     The use of Dragon/[employer] dictation services may have been used to construct the content in this note; any grammatical or spelling errors are non-intentional. Please contact the author of this note directly if you are in need of any clarification.      On the day of the encounter, time spend on chart review, patient visit, review of testing, documentation was 40  minutes          Patient Instructions     Resume your lisinopril monitor blood pressures about 2-3  days of the week     Schedule an appointment with Dr. Bustos for a routine follow up of your chronic health issues    Start doxycycline 2 times a day for 10 days always take with food to prevent nausea vomiting even the pharmacist tells  you not to take it with food     Start Flonase1 spray in each nostril in am and bedtime     Take tessalon perles ( benzonatate) pills for cough up to 3 times a day will not make you sleepy      Take benadryl ( diphenhydramine) at bedtime will help with congestion and cough will make you sleepy     Start Mucinex -1200 mg daily -the generic is fine -for congestion/sinus congestion-will also help with fluid behind the ear drums          Start albuterol nebulizing treatments up to every 4 hours  for wheezing and as needed for cough   if  still wheezing can nebs as often as every 2 hours for wheezing and coughing episodes   If your do a neb and and no response you can then take a second treatment   If requiring every 2 hours neb treatments and hard to breathe go to ER             Serous otitis media, fluid behind the ear drum    Earaches can happen without an infection. They can occur when air and fluid build up behind the eardrum. They may cause a feeling of fullness and discomfort. They may also impair hearing. This is called otitis media with effusion (OME) or serous otitis media. It means there is fluid in the middle ear. It is not the same as acute otitis media, which is often from an infection.  OME can happen when you have a cold if congestion blocks the passage that drains the middle ear. This passage is called the eustachian tube. OME may also occur with nasal allergies or after a bacterial infection in the middle ear. Other causes are:    Trauma    Improper cleaning of wax from the ear    Bacterial infection of the mastoid bone (mastoiditis)    Tumor    Jaw pain    Changes in pressure, such as from flying or scuba diving    The pain or discomfort may come and go. You may hear clicking or popping sounds when you chew or swallow. You may feel that your balance is off. Or you may hear ringing in the ear.  It often takes from several weeks up to 3 months for the fluid to clear on its own. Oral pain relievers and  ear drops help if there is pain. Decongestants and antihistamines sometimes help. Antibiotics don't help since there is no infection. Your healthcare provider may give you a nasal spray to help reduce swelling in the nose and eustachian tube. This can allow the ear to drain.  If your OME doesn't get better after 3 months, surgery may be used to drain the fluid. A small tube may also be put in the eardrum to help with drainage.  Because the middle ear fluid can become infected, watch for signs of an infection. These may develop later. They may include increased ear pain, fever, or drainage from the ear.  Home care  These home-care tips will help you take care of yourself:    You may use over-the-counter medicine as directed by your healthcare provider to control pain, unless medicine was prescribed. If you have chronic liver or kidney disease or ever had a stomach ulcer or GI bleeding, talk with your healthcare provider before using any medicines.    Aspirin should never be used in anyone younger than age 18 who has a fever. It may cause severe liver damage.    Ask your healthcare provider if you may use over-the-counter decongestants such as phenylephrine or pseudoephedrine. Keep in mind they are not always helpful.    Talk with your healthcare provider about using nasal spray decongestants. Don't use them for more than 3 days, or as directed by your healthcare provider. Longer use can make congestion worse. Prescription nasal sprays from your healthcare provider don't often have such restrictions.    Antihistamines may help if you are also having allergy symptoms.    You may use medicines such as guaifenesin to thin mucus and help with drainage.  Follow-up care  Follow up with your healthcare provider or as advised if you are not feeling better after 3 days.  When to seek medical advice  Call your healthcare provider right away if any of these occur:    Ear pain that gets worse or that does not start to get better      Fever of 100.4 F (38 C) or higher, or as directed by your healthcare provider    Fluid or blood draining from the ear    Headache or sinus pain    Stiff neck    Unusual drowsiness or confusion  Melissa last reviewed this educational content on 12/1/2019 2000-2021 [employer]. All rights reserved. This information is not intended as a substitute for professional medical care. Always follow your healthcare professional's instructions.

## 2022-05-15 NOTE — PATIENT INSTRUCTIONS
Resume your lisinopril monitor blood pressures about 2-3  days of the week     Schedule an appointment with Dr. Bustos for a routine follow up of your chronic health issues    Start doxycycline 2 times a day for 10 days always take with food to prevent nausea vomiting even the pharmacist tells you not to take it with food     Start Flonase1 spray in each nostril in am and bedtime     Take tessalon perles ( benzonatate) pills for cough up to 3 times a day will not make you sleepy      Take benadryl ( diphenhydramine) at bedtime will help with congestion and cough will make you sleepy     Start Mucinex -1200 mg daily -the generic is fine -for congestion/sinus congestion-will also help with fluid behind the ear drums          Start albuterol nebulizing treatments up to every 4 hours  for wheezing and as needed for cough   if  still wheezing can nebs as often as every 2 hours for wheezing and coughing episodes   If your do a neb and and no response you can then take a second treatment   If requiring every 2 hours neb treatments and hard to breathe go to ER             Serous otitis media, fluid behind the ear drum    Earaches can happen without an infection. They can occur when air and fluid build up behind the eardrum. They may cause a feeling of fullness and discomfort. They may also impair hearing. This is called otitis media with effusion (OME) or serous otitis media. It means there is fluid in the middle ear. It is not the same as acute otitis media, which is often from an infection.  OME can happen when you have a cold if congestion blocks the passage that drains the middle ear. This passage is called the eustachian tube. OME may also occur with nasal allergies or after a bacterial infection in the middle ear. Other causes are:  Trauma  Improper cleaning of wax from the ear  Bacterial infection of the mastoid bone (mastoiditis)  Tumor  Jaw pain  Changes in pressure, such as from flying or scuba diving    The pain  or discomfort may come and go. You may hear clicking or popping sounds when you chew or swallow. You may feel that your balance is off. Or you may hear ringing in the ear.  It often takes from several weeks up to 3 months for the fluid to clear on its own. Oral pain relievers and ear drops help if there is pain. Decongestants and antihistamines sometimes help. Antibiotics don't help since there is no infection. Your healthcare provider may give you a nasal spray to help reduce swelling in the nose and eustachian tube. This can allow the ear to drain.  If your OME doesn't get better after 3 months, surgery may be used to drain the fluid. A small tube may also be put in the eardrum to help with drainage.  Because the middle ear fluid can become infected, watch for signs of an infection. These may develop later. They may include increased ear pain, fever, or drainage from the ear.  Home care  These home-care tips will help you take care of yourself:  You may use over-the-counter medicine as directed by your healthcare provider to control pain, unless medicine was prescribed. If you have chronic liver or kidney disease or ever had a stomach ulcer or GI bleeding, talk with your healthcare provider before using any medicines.  Aspirin should never be used in anyone younger than age 18 who has a fever. It may cause severe liver damage.  Ask your healthcare provider if you may use over-the-counter decongestants such as phenylephrine or pseudoephedrine. Keep in mind they are not always helpful.  Talk with your healthcare provider about using nasal spray decongestants. Don't use them for more than 3 days, or as directed by your healthcare provider. Longer use can make congestion worse. Prescription nasal sprays from your healthcare provider don't often have such restrictions.  Antihistamines may help if you are also having allergy symptoms.  You may use medicines such as guaifenesin to thin mucus and help with  drainage.  Follow-up care  Follow up with your healthcare provider or as advised if you are not feeling better after 3 days.  When to seek medical advice  Call your healthcare provider right away if any of these occur:  Ear pain that gets worse or that does not start to get better   Fever of 100.4 F (38 C) or higher, or as directed by your healthcare provider  Fluid or blood draining from the ear  Headache or sinus pain  Stiff neck  Unusual drowsiness or confusion  Melissa last reviewed this educational content on 12/1/2019 2000-2021 [employer]. All rights reserved. This information is not intended as a substitute for professional medical care. Always follow your healthcare professional's instructions.

## 2022-05-16 ENCOUNTER — TELEPHONE (OUTPATIENT)
Dept: NURSING | Facility: CLINIC | Age: 52
End: 2022-05-16
Payer: COMMERCIAL

## 2022-05-16 ENCOUNTER — NURSE TRIAGE (OUTPATIENT)
Dept: NURSING | Facility: CLINIC | Age: 52
End: 2022-05-16
Payer: COMMERCIAL

## 2022-05-16 NOTE — TELEPHONE ENCOUNTER
Patient classified as COVID treatment eligible by Epic high risk algorithm:  Yes   **PATIENTS VOICEMAIL FULL, UNABLE TO LEAVE MESSAGE**    Coronavirus (COVID-19) Notification    Reason for call  Notify of POSITIVE COVID-19 lab result, assess symptoms,  review [facility] recommendations    Lab Result   Lab test for 2019-nCoV rRt-PCR or SARS-COV-2 PCR  Oropharyngeal AND/OR nasopharyngeal swabs were POSITIVE for 2019-nCoV RNA [OR] SARS-COV-2 RNA (COVID-19) RNA     We have been unable to reach patient by phone at this time to notify of their Positive COVID-19 result.    Left voicemail message requesting a call back to [phone] [facility] for results.        A Positive COVID-19 letter will be sent via [employer] or the mail. (Exception, no letters sent to Presurgerical/Preprocedure Patients)    Ninoska Yo

## 2022-05-16 NOTE — TELEPHONE ENCOUNTER
Triage call:     Patient calling COVID positive 5/15   He was seen in urgent care yesterday   No new symptoms at the time of the call.   He is requesting documentation for work.   Letter sent this morning.    [facility]ather patient reported symptoms   Assessment   Current Symptoms at time of phone call, reported by patient: (if no symptoms, document No symptoms] Cough, headache, body aches    Date of Symptom(s) onset (if applicable) 5/13     If at time of call, Patients symptoms hare worsened, the Patient should contact 911 or have someone drive them to Emergency Dept promptly:      If Patient calling 911, inform 911 personal that you have tested positive for the Coronavirus (COVID-19).  Place mask on and await 911 to arrive.    If Emergency Dept, If possible, please have another adult drive you to the Emergency Dept but you need to wear mask when in contact with other people.      Monoclonal Antibody Administration    You may be eligible to receive a new treatment with a monoclonal antibody for preventing hospitalization in patients at high risk for complications from COVID-19. This medication is still experimental and available on a limited basis; it is given through an IV and must be given at an infusion center. Please note that not all people who are eligible will receive the medication since it is in limited supply.  Is the patient symptomatic and onset of symptoms within the last 7 days?  Yes  Is the patient interested in a visit with a provider to discuss treatment options?: Yes  Is the patient seen at [facility]?  No: Warm transfer caller to [phone] to be scheduled with a virtual urgent provider.  During transfer, instruct  on appropriate time frame for visit     Review information with Patient    Your result was positive. This means you have COVID-19 (coronavirus).      How can I protect others?    These guidelines are for isolating before returning to work, school or .       If you  DO have symptoms:  o Stay home and away from others  - For at least 5 days after your symptoms started, AND   - You are fever free for 24 hours (with no medicine that reduces fever), AND  - Your other symptoms are better.  o Wear a mask for 10 full days any time you are around others.    If you DON'T have symptoms:  o Stay at home and away from others for at least 5 days after your positive test.  o Wear a mask for 10 full days any time you are around others.    There may be different guidelines for healthcare facilities. Please check with the specific sites before arriving.     If you've been told by a doctor that you were severely ill with COVID-19 or are immunocompromised, you should isolate for at least 10 days.    You should not go back to work until you meet the guidelines above for ending your home isolation. You don't need to be retested for COVID-19 before going back to work--studies show that you won't spread the virus if it's been at least 10 days since your symptoms started (or 20 days, if you have a weak immune system).    Employers, schools, and daycares: This is an official notice for this person's medical guidelines for returning in-person. They must meet the above guidelines before going back to work, school, or  in person.    You will receive a positive COVID-19 letter via [employer] or the mail soon with additional self-care information (exception, no letters sent to presurgical/preprocedure patients).     Would you like me to review some of that information with you now?  Yes    How can I take care of myself?      Get lots of rest. Drink extra fluids (unless a doctor has told you not to).      Take Tylenol (acetaminophen) for fever or pain. If you have liver or kidney problems, ask your family doctor if it's okay to take Tylenol.     Take either:     650 mg (two 325 mg pills) every 4 to 6 hours, or     1,000 mg (two 500 mg pills) every 8 hours as needed.     Note: Do not take more than 3,000  mg in one day. Acetaminophen is found in many medicines (both prescribed and over-the-counter medicines). Read all labels to be sure you don't take too much.    For children, check the Tylenol bottle for the right dose (based on their age or weight).      If you have other health problems (like cancer, heart failure, an organ transplant or severe kidney disease): Call your specialty clinic if you don't feel better in the next 2 days.      Know when to call 911: Emergency warning signs include:    Trouble breathing or shortness of breath    Pain or pressure in the chest that doesn't go away    Feeling confused like you haven't felt before, or not being able to wake up    Bluish-colored lips or face        If you were tested for an upcoming procedure, please contact your provider for next steps.     Odessa Knight RN

## 2022-05-17 ENCOUNTER — VIRTUAL VISIT (OUTPATIENT)
Dept: FAMILY MEDICINE | Facility: CLINIC | Age: 52
End: 2022-05-17
Payer: COMMERCIAL

## 2022-05-17 DIAGNOSIS — U07.1 CLINICAL DIAGNOSIS OF COVID-19: Primary | ICD-10-CM

## 2022-05-17 PROCEDURE — 99215 OFFICE O/P EST HI 40 MIN: CPT | Mod: GT | Performed by: PHYSICIAN ASSISTANT

## 2022-05-17 NOTE — LETTER
May 17, 2022      Rudolph Tao  [address] [id number]        To Whom It May Concern:    Rudolph Tao  was seen on 5/17/22.  Please excuse him until 5/24/22 due to illness. He can return to work on 5/25/22.        Sincerely,        Melody Linn PA-C

## 2022-05-17 NOTE — PROGRESS NOTES
"Kris is a 52 year old who is being evaluated via a billable video visit.      How would you like to obtain your AVS? MyChart  If the video visit is dropped, the invitation should be resent by: Text to cell phone: [phone]   Will anyone else be joining your video visit? No      Video Start Time: 1:12 PM    Assessment & Plan     Clinical diagnosis of COVID-19  See below.  - nirmatrelvir and ritonavir (PAXLOVID) therapy pack; Take 3 tablets by mouth 2 times daily for 5 days Take 2 Nirmatrelvir tablets and 1 Ritonavir tablet twice daily for 5 days.    Prescription drug management  44 minutes spent on the date of the encounter doing chart review, history and exam, documentation and further activities per the note         COVID-19 positive patient.  Encounter for consideration of medication intervention. Patient does qualify for a prescription. Full discussion with patient including medication options, risks and benefits. Potential drug interactions reviewed with patient.     Treatment Planned Paxlovid, Rx sent to [facility]  [facility] [phone]  [address], [address]  [place] [address]    Hours:  Mon-Fri: 7:00a - 7:00p    Drive-thru services available.  Temporary change to home medications: Do not take the Xanax, pravastatin or sildenafil while on the Paxlovid.    Estimated body mass index is 26.58 kg/m  as calculated from the following:    Height as of 6/25/21: 1.727 m (5' 8\").    Weight as of 2/25/22: 79.3 kg (174 lb 12.8 oz).  GFR Estimate   Date Value Ref Range Status   02/25/2022 >90 >60 mL/min/1.73m2 Final     Comment:     Effective December 21, 2021 eGFRcr in adults is calculated using the 2021 CKD-EPI creatinine equation which includes age and gender (Harshad vang al., [place], DOI: 10.1056/SBBBtd6886543)   06/25/2021 >90 >60 mL/min/[1.73_m2] Final     Comment:     Non  GFR Calc  Starting 12/18/2018, serum creatinine based estimated GFR (eGFR) will be   calculated using " "the Chronic Kidney Disease Epidemiology Collaboration   (CKD-EPI) equation.       Lab Results   Component Value Date    [id number] Positive (A) 05/15/2022       Return if symptoms worsen or fail to improve.    Melody Linn PA-C  [facility]    Ai Ponce is a 52 year old who presents for the following health issues    HPI       COVID-19 Symptom Review  How many days ago did these symptoms start? 5/13/22    Are any of the following symptoms significant for you?    New or worsening difficulty breathing? No    Worsening cough? Yes, it's a dry cough.     Fever or chills? Yes, I felt feverish or had chills.    Headache: YES    Sore throat: YES    Chest pain: no    Diarrhea: no    Body aches? YES    What treatments has patient tried? Doxycycline, Albuterol, Tylenol  Does patient live in a nursing home, group home, or shelter? no  Does patient have a way to get food/medications during quarantined? Yes, I have a friend or family member who can help me.    Patient has history of diabetes type I with polyneuopathy and gastroparesis as well as hypertension.    He has had the COVID vaccine, Pfizer, with Moderna Booster- 1/26/22.    The patient was seen in Urgent Care 2 days ago. He had a chest x-ray and was started on doxycycline. He was also prescribed albuterol nebulizers and an inhaler.            Review of Systems   GENERAL:  As noted in HPI        Objective    Vitals - Patient Reported  Weight (Patient Reported): 77.1 kg (170 lb)  Height (Patient Reported): 172.7 cm (5' 8\")  BMI (Based on Pt Reported Ht/Wt): 25.85      Vitals:  No vitals were obtained today due to virtual visit.    Physical Exam   GENERAL: Healthy, alert and no distress  EYES: Eyes grossly normal to inspection.  No discharge or erythema, or obvious scleral/conjunctival abnormalities.  RESP: Occasional cough.  No visible retractions or increased work of breathing.    SKIN: Visible skin clear. No significant rash, " abnormal pigmentation or lesions.  NEURO: Cranial nerves grossly intact.  Mentation and speech appropriate for age.  PSYCH: Mentation appears normal, affect normal/bright, judgement and insight intact, normal speech and appearance well-groomed.          Video-Visit Details    Type of service:  Video Visit    Video End Time:1:51 PM    Originating Location (pt. Location): Home    Distant Location (provider location):  Home    Platform used for Video Visit: [employer]

## 2022-05-19 ENCOUNTER — TELEPHONE (OUTPATIENT)
Dept: INTERNAL MEDICINE | Facility: CLINIC | Age: 52
End: 2022-05-19
Payer: COMMERCIAL

## 2022-05-19 NOTE — TELEPHONE ENCOUNTER
Pt calling. States he had a virtual visit with provider, Melody Linn PA-C on 5/17/22. Was diagnosed with covid and Paxlovid was prescribed x 5 days. Pt heard on the news and read that pt's may need a 10 day course of Paxlovid because if only taking for 5 days, 3-4 days later symptoms are returning. Is wondering if he should do a 2nd go around of Paxlovid? Is on day 2 of Paxlovid. Has type 1 diabetes. States his symptoms come and go. Voice is not what it should be. Chest feels heavy. Cough isn't as bad. Has headache and stuffiness. Overall aches but not as bad. Has a sore throat and it comes and goes, but is back again today. Has been taking albuterol and this has helped. Pt was advised to schedule a follow up appt with PCP and appt was scheduled for 5/26. Pt asked about work note written on 5/17/22. Advised that it should appear in "[employer]hart under communications and letters. Asked pt to contact clinic if he is not able to find this. Routing to PCP and Melody Linn PA-C to advise.    Padmini Hutchison RN  [facility]

## 2022-05-19 NOTE — TELEPHONE ENCOUNTER
Please let the patient that a 5 days course is all that is authorized at this time. If symptoms worsen to the point of need for hospitalization then other treatments may be considered.

## 2022-05-25 NOTE — PROGRESS NOTES
Answers for HPI/ROS submitted by the patient on 5/25/2022  How many servings of fruits and vegetables do you eat daily?: 2-3  On average, how many sweetened beverages do you drink each day (Examples: soda, juice, sweet tea, etc.  Do NOT count diet or artificially sweetened beverages)?: 1  How many minutes a day do you exercise enough to make your heart beat faster?: 60 or more  How many days a week do you exercise enough to make your heart beat faster?: 4  How many days per week do you miss taking your medication?: 0  What is the reason for your visit today?: Covid positive  When did your symptoms begin?: 1-2 weeks ago  What are your symptoms?: covid  How would you describe these symptoms?: Moderate  Are your symptoms:: Improving  Have you had these symptoms before?: No  Is there anything that makes you feel better?: Rest

## 2022-05-26 ENCOUNTER — VIRTUAL VISIT (OUTPATIENT)
Dept: INTERNAL MEDICINE | Facility: CLINIC | Age: 52
End: 2022-05-26
Payer: COMMERCIAL

## 2022-05-26 DIAGNOSIS — E10.8 DIABETES MELLITUS TYPE 1 WITH COMPLICATIONS (H): ICD-10-CM

## 2022-05-26 DIAGNOSIS — Z12.2 ENCOUNTER FOR SCREENING FOR LUNG CANCER: ICD-10-CM

## 2022-05-26 DIAGNOSIS — U07.1 INFECTION DUE TO 2019 NOVEL CORONAVIRUS: Primary | ICD-10-CM

## 2022-05-26 DIAGNOSIS — Z23 NEED FOR COVID-19 VACCINE: ICD-10-CM

## 2022-05-26 DIAGNOSIS — E78.5 HYPERLIPIDEMIA LDL GOAL <100: ICD-10-CM

## 2022-05-26 DIAGNOSIS — Z23 NEED FOR SHINGLES VACCINE: ICD-10-CM

## 2022-05-26 DIAGNOSIS — Z12.11 ENCOUNTER FOR SCREENING COLONOSCOPY: ICD-10-CM

## 2022-05-26 DIAGNOSIS — Z23 NEED FOR PROPHYLACTIC VACCINATION AGAINST STREPTOCOCCUS PNEUMONIAE (PNEUMOCOCCUS): ICD-10-CM

## 2022-05-26 DIAGNOSIS — Z78.9 NONSMOKER: ICD-10-CM

## 2022-05-26 DIAGNOSIS — Z71.89 ADVANCED DIRECTIVES, COUNSELING/DISCUSSION: ICD-10-CM

## 2022-05-26 PROCEDURE — 99213 OFFICE O/P EST LOW 20 MIN: CPT | Mod: TEL | Performed by: INTERNAL MEDICINE

## 2022-05-26 NOTE — PROGRESS NOTES
Kris is a 52 year old who is being evaluated via a billable telephone visit.      What phone number would you like to be contacted at? [phone]  How would you like to obtain your AVS? MyChart    Assessment & Plan     Infection due to 2019 novel coronavirus  Today was a telephone MD visit in order to to discuss this patients concerns. Ultimately, he is mainly concerned that he continues to feel weak and run down after his documented Coronavirus (COVID-19) infection, treated with PAXLOVID  (nirmatrelvir tablets; ritonavir tablets). I am not hearing anything that strikes me as unusual or untowards. Ultimately we reviewed his symptoms and further follow up depending on how things go. Give it more time. At the very least 2-4 weeks.     Need for COVID-19 vaccine  Postponed     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)  Problem is stable and ongoing monitoring      Hyperlipidemia LDL goal <100  Problem is stable and ongoing monitoring      Encounter for screening for lung cancer  Postponed    Encounter for screening colonoscopy  Postponed     Need for prophylactic vaccination against Streptococcus pneumoniae (pneumococcus)  Postponed     Advanced directives, counseling/discussion  Postponed     Need for shingles vaccine  Postponed     Nonsmoker  Changed form his history of smoking to the correct diagnosis. He doesn't meet screening criteria for annual low dose CT for scanning high risk pt for lung cancer       Review of the result(s) of each unique test - recent covid test  Prescription drug management  23 minutes spent on the date of the encounter doing chart review, history and exam, documentation and further activities per the note  [id number]}      Return in about 3 months (around 8/26/2022).    Haim Bustos MD  [facility]    1:37 PM   Had a Coronavirus (COVID-19) , threw me for a loop. Chest congestion , headache, already took PAXLOVID  (nirmatrelvir tablets; ritonavir tablets) , the cough  "is lingering . Remains very tired and easily out of breath . Feels really worn down.   He started a new job in January 55-75 hours per week. Did  the Coronavirus (COVID-19) almost certainly at work. He feels the tiredness is baffling. He is sleeping 10-12 hours per day . He uses the albuterol for just a little bit of benefit .   Quit smoking about 7 years ago  Started in his 20's never more then a half a pack per day. Overall sounds like this patient has a relatively light smoking .  No asthma   No bad pneumonias, no lung disease history   \" I can still feel it in the chest\"  Sense of taste of smell and taste is coming back.      Subjective   Kris is a 52 year old who presents for the following health issues   Encounter Diagnoses   Name Primary?     Infection due to 2019 novel coronavirus Yes     Need for COVID-19 vaccine      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)      Hyperlipidemia LDL goal <100      Encounter for screening for lung cancer      Encounter for screening colonoscopy      Need for prophylactic vaccination against Streptococcus pneumoniae (pneumococcus)      Advanced directives, counseling/discussion      Need for shingles vaccine      Nonsmoker        History of Present Illness       Reason for visit:  Covid positive  Symptom onset:  1-2 weeks ago  Symptoms include:  Covid  Symptom intensity:  Moderate  Symptom progression:  Improving  Had these symptoms before:  No  What makes it better:  Rest    He eats 2-3 servings of fruits and vegetables daily.He consumes 1 sweetened beverage(s) daily.He exercises with enough effort to increase his heart rate 60 or more minutes per day.  He exercises with enough effort to increase his heart rate 4 days per week.   He is taking medications regularly.       ED/UC Followup:    Facility:  [facility]  Date of visit: 05/15/2022  Reason for visit: Covid Positive   Current Status: Pt stated he is not feeling better, still very tired, sore throat, " coughing, body aches       Last appointment with me was 2-25-22 and typically these are level 5 visits covering a tremendous number of issues , for complete details please see most recent previous office visit with me. I did not expect to see after[t back until 8/2022 so this may be a problem focused visit   Health Maintenance Due   Topic Date Due     ADVANCE CARE PLANNING  Never done     COLORECTAL CANCER SCREENING  Never done     HEPATITIS B IMMUNIZATION (1 of 3 - Risk 3-dose series) Never done     Pneumococcal Vaccine: Pediatrics (0 to 5 Years) and At-Risk Patients (6 to 64 Years) (2 - PCV) 11/17/2006     EYE EXAM  08/16/2014     MICROALBUMIN  08/22/2014     ZOSTER IMMUNIZATION (1 of 2) Never done     LUNG CANCER SCREENING  Never done     PREVENTIVE CARE VISIT  08/21/2019     A1C  05/25/2022     LIPID  05/25/2022     COVID-19 Vaccine (4 - Booster for Pfizer series) 05/26/2022      Coronavirus (COVID-19) diagnosed 5-17-22  5-15-22 diagnosis of pneumonia via urgent care clinic   4-12-22 had virtual office visit with certified diabetes educator   Lab Results   Component Value Date    A1C 8.1 02/25/2022    A1C 7.3 06/25/2021    A1C 6.9 01/14/2021    A1C 7.6 10/05/2020    A1C 8.2 01/30/2020    A1C 7.6 08/21/2018     Need to do a walk-through with patient regarding which if any refills he actually needs        Review of Systems   Constitutional, HEENT, cardiovascular, pulmonary, gi and gu systems are negative, except as otherwise noted.      Objective           Vitals:  No vitals were obtained today due to virtual visit.    Physical Exam   healthy, alert and no distress  PSYCH: Alert and oriented times 3; coherent speech, normal   rate and volume, able to articulate logical thoughts, able   to abstract reason, no tangential thoughts, no hallucinations   or delusions  His affect is normal  RESP: No cough, no audible wheezing, able to talk in full sentences  Remainder of exam unable to be completed due to telephone  visits    No orders of the defined types were placed in this encounter.            Phone call duration: 23 minutes    1:53 PM

## 2022-06-03 ENCOUNTER — TELEPHONE (OUTPATIENT)
Dept: INTERNAL MEDICINE | Facility: CLINIC | Age: 52
End: 2022-06-03
Payer: COMMERCIAL

## 2022-06-03 NOTE — TELEPHONE ENCOUNTER
Routing to Pre Authorization    Key:[id number]  Patient's Last Name- Green  :1970    Patient's Pharmacy sent a Prior Authorization Follow up      Medication: NovoLog Pen Fill 100Units/ml      Rylie Haney CMA  [facility]

## 2022-06-06 ENCOUNTER — MYC MEDICAL ADVICE (OUTPATIENT)
Dept: ENDOCRINOLOGY | Facility: CLINIC | Age: 52
End: 2022-06-06
Payer: COMMERCIAL

## 2022-06-06 NOTE — PROGRESS NOTES
Outcome for 06/06/22 3:32 PM: Mychart message sent  Diamond Merrill, COLLEEN  Outcome for 06/06/22 3:43 PM: Replied to mychart message  COLLEEN Gil  Outcome for 06/06/22 4:05 PM: Per patient, will send BG readings via [employer]  Diamond Merrill, CLOLEEN  Outcome for 06/10/22 11:17 AM: [employer] message sent  Diamond Merrill, VF

## 2022-06-13 ENCOUNTER — VIRTUAL VISIT (OUTPATIENT)
Dept: ENDOCRINOLOGY | Facility: CLINIC | Age: 52
End: 2022-06-13
Attending: INTERNAL MEDICINE
Payer: COMMERCIAL

## 2022-06-13 DIAGNOSIS — E10.8 DIABETES MELLITUS TYPE 1 WITH COMPLICATIONS (H): Primary | ICD-10-CM

## 2022-06-13 DIAGNOSIS — G62.9 PERIPHERAL POLYNEUROPATHY: ICD-10-CM

## 2022-06-13 PROCEDURE — 99215 OFFICE O/P EST HI 40 MIN: CPT | Mod: GT | Performed by: INTERNAL MEDICINE

## 2022-06-13 NOTE — PROGRESS NOTES
Kris is a 52 year old who is being evaluated via a billable video visit.      How would you like to obtain your AVS? MyChart  If the video visit is dropped, the invitation should be resent by: Text to cell phone: [phone]  Will anyone else be joining your video visit? No

## 2022-06-13 NOTE — PROGRESS NOTES
Endocrinology Clinic Visit    Chief Complaint: Video Visit (Wants to look at getting pump)     Information obtained from:Patient      Assessment/Treatment Plan:      Type 1 diabetes complicated with peripheral neuropathy:  Diagnosed in his 30's; about 20 years ago.  Diabetes managed with multiple daily injections of insulin.  Takes long-acting insulin however no mealtime insulin [only corrects with NovoLog insulin for high blood sugars].  Reviewed blood sugar readings and there is a fluctuation in blood sugar readings between the 60s- 400's; which is a typical pattern when there is no mealtime insulin.  Please start using mealtime insulin 1 unit for every 15 g of carbohydrates with meals and snacks.  Mealtime insulin needs to be taken 10 minutes before eating.  Prefers to keep Lantus at the same dose therefore will continue at the same dose for now.  The following correction scale provided.  We will complete annual labs including TSH and vitamin B12 from peripheral neuropathy standpoint.  Plan      Continue Lantus 32 units daily.    Novolog insulin with meals and snacks: 1 unit for every 15 grams of carbohydrates with meals and snacks. Take mealtime insulin -Novolog 10 minutes before eat.     Please follow the following correction scale with meals three times per day.   For Pre-Meal  - 189 give 1 unit.   For Pre-Meal  - 239 give 2 units.   For Pre-Meal  - 289 give 3 units.   For Pre-Meal  - 339 give 4 units.   For Pre-Meal BG = or > 340 give 5 units.   Please call if any low BG below 70.    Peripheral neuropathy-optimal blood glucose control.  Check TSH and B12.  Continue gabapentin as prescribed for symptom control.  Patient Instructions   Kris,      Continue Lantus 32 units daily.    Novolog insulin with meals and snacks: 1 unit for every 15 grams of carbohydrates with meals and snacks. Take mealtime insulin -Novolog 10 minutes before eat.     Please follow the following correction scale  with meals three times per day.   For Pre-Meal  - 189 give 1 unit.   For Pre-Meal  - 239 give 2 units.   For Pre-Meal  - 289 give 3 units.   For Pre-Meal  - 339 give 4 units.   For Pre-Meal BG = or > 340 give 5 units.   Please call if any low BG below 70.    We will schedule an appointment with diabetes educator.     Kimo Hui MD          Return to clinic in 3 months with CDE within 4 weeks.    Test and/or medications prescribed today:  Orders Placed This Encounter   Procedures     TSH with free T4 reflex     Vitamin B12     Hemoglobin A1c     Albumin Random Urine Quantitative with Creat Ratio     Lipid panel reflex to direct LDL Fasting         Kimo Hui MD  Staff Endocrinologist    Division of Endocrinology and Diabetes      Subjective:         HPI: Rudolph Tao is a 52 year old male with history of type 1 diabetes who is seen in consultation at [facility]'s request for the same.    At the age of 34 - diagnosis Type 1 (A1c 7.3-8.1)  Lantus 32 daily  Novolog only correction dose after eating 1 unit for every 50 above 150.   No mealtime insulin.  Blood glucose readings as documented below.  No nighttime lows or lows during the daytime other than mentioned below.  History of DKA in the past.  No recent DKA's.  No history of severe hypoglycemia.  Has hypoglycemia awareness.  6/13/22 124 pm 204  6/12/22  1101 pm   258  6/12/22 1:14 pm 221  6/11/22 1055 pm 174  6/11/22 1232 pm 308  6/10/22 1136 pm (night) 162  6/10/22 1217 pm 295  6/9/22 1104 pm (night) 190  6/9/22 1253 pm 407  6/9/22 908 am 193  6/8/22 646 pm 60  6/8/22 1255 pm 253  6/7/22 1130 pm 147  6/7/22  823 pm 71      Allergies   Allergen Reactions     Avapro [Irbesartan]      Profuse sweating     Crestor [Rosuvastatin Calcium]      cough     Sulfa Drugs Swelling and Hives     Simvastatin      Elevated BS readings       Current Outpatient Medications   Medication Sig Dispense Refill     acetone urine (KETOSTIX)  test strip 1 strip once as needed (with unexplained blood glucose over 300 mg/dL) Use one strip, as needed, with unexplained blood glucose over 300 mg/dL. 50 strip 6     albuterol (PROAIR HFA/PROVENTIL HFA/VENTOLIN HFA) 108 (90 Base) MCG/ACT inhaler Inhale 2 puffs into the lungs every 6 hours 18 g 0     albuterol (PROVENTIL) (2.5 MG/3ML) 0.083% neb solution Take 1 vial (2.5 mg) by nebulization every 6 hours as needed for shortness of breath / dyspnea or wheezing 90 mL 0     albuterol (PROVENTIL) (2.5 MG/3ML) 0.083% neb solution Take 1 vial (2.5 mg) by nebulization every 6 hours as needed for shortness of breath / dyspnea or wheezing 1 Box 0     ALPRAZolam (XANAX) 0.5 MG tablet Take 1 tablet (0.5 mg) by mouth 3 times daily as needed for anxiety 1 month supply 30 tablet 0     aspirin (ASA) 81 MG EC tablet Take 1 tablet (81 mg) by mouth daily 90 tablet 3     B-D U/F insulin pen needle USE 6 TIMES DAILY 100 each 2     benzonatate (TESSALON) 200 MG capsule Take 1 capsule (200 mg) by mouth 3 times daily as needed for cough 30 capsule 0     cetirizine (ZYRTEC) 10 MG tablet Take 10 mg by mouth daily       fluticasone (FLONASE) 50 MCG/ACT nasal spray Spray 1 spray into both nostrils 2 times daily 16 g 0     gabapentin (NEURONTIN) 300 MG capsule TAKE 2 CAPSULES BY MOUTH IN THE MORNING AND 1 AT NOON AND 2 IN THE EVENING . 150 capsule 1     insulin aspart (NOVOPEN ECHO) 100 UNIT/ML cartridge Inject 1 unit/30 grams of carbohydrates plus 1 unit/50 mg/dl above 200 mg/dl. Max dose of 20 units/day 15 mL 11     insulin glargine (LANTUS SOLOSTAR) 100 UNIT/ML pen INJECT 32 UNITS SUBCUTANEOUSLY ONCE DAILY 45 mL 1     lisinopril (ZESTRIL) 40 MG tablet TAKE 1 TABLET BY MOUTH ONCE DAILY . 90 tablet 1     metoclopramide (REGLAN) 10 MG tablet Take 1 tablet (10 mg) by mouth 4 times daily as needed 120 tablet 3     metoprolol succinate ER (TOPROL-XL) 25 MG 24 hr tablet Take 1 tablet (25 mg) by mouth daily 30 tablet 1     ondansetron  (ZOFRAN-ODT) 4 MG ODT tab DISSOLVE 1 TABLET IN MOUTH EVERY 8 HOURS AS NEEDED FOR NAUSEA 20 tablet 4     pravastatin (PRAVACHOL) 20 MG tablet Take 1 tablet (20 mg) by mouth daily 90 tablet 1     sildenafil (VIAGRA) 100 MG tablet Take 1 tablet (100 mg) by mouth daily as needed (sexual functioning) 8 tablet 11     blood glucose monitoring (ONE TOUCH DELICA) lancets Use to test blood sugar 4-6 times daily. May test up to 10 times per day (Patient not taking: Reported on 6/13/2022) 300 each 1     blood glucose monitoring (ONETOUCH ULTRA) test strip Use to test blood sugar 4-6 times daily, may test up to 10 times per day. (Patient not taking: Reported on 6/13/2022) 300 strip 1     buPROPion (WELLBUTRIN XL) 300 MG 24 hr tablet TAKE 1 TABLET BY MOUTH IN THE MORNING (Patient not taking: Reported on 6/13/2022) 90 tablet 0     Continuous Blood Gluc  (FREESTYLE JUSTIN READER) MYA 1 Device See Admin Instructions (Patient not taking: Reported on 6/13/2022) 1 Device 0     Continuous Blood Gluc Sensor (FREESTYLE JUSTIN 2 SENSOR) MISC 1 each every 14 days (Patient not taking: Reported on 6/13/2022) 2 each 4     diclofenac (VOLTAREN) 75 MG EC tablet Take 1 tablet (75 mg) by mouth 2 times daily for 7 days (Patient not taking: Reported on 6/13/2022) 14 tablet 0     rOPINIRole (REQUIP) 0.25 MG tablet TAKE 1-2 TABLETS BY MOUTH AT BEDTIME (Patient not taking: Reported on 6/13/2022) 180 tablet 0       Review of Systems     8 point review system as per HPI above.  Has a stressful job.  Adopted and part of family history unknown.  Past medical history, past surgical history, social history and family history reviewed in epic.      Objective:   GENERAL:  alert and no distress  EYES: Eyes grossly normal to inspection.  N  RESP: No audible wheeze, cough, or visible cyanosis.    PSYCH: Mentation appears normal, affect normal/bright, judgement and insight intact, normal speech and appearance well-groomed.  In House Labs:   Lab Results    Component Value Date    A1C 8.1 02/25/2022    A1C 7.3 06/25/2021    A1C 6.9 01/14/2021    A1C 7.6 10/05/2020    A1C 8.2 01/30/2020    A1C 7.6 08/21/2018       TSH   Date Value Ref Range Status   06/25/2021 1.82 0.40 - 4.00 mU/L Final   06/07/2019 1.86 0.30 - 4.50 uIU/mL Final   03/21/2018 2.73 0.40 - 4.00 mU/L Final   09/21/2017 1.92 0.40 - 4.00 mU/L Final   05/11/2015 1.14 0.40 - 4.00 mU/L Final       Creatinine   Date Value Ref Range Status   02/25/2022 0.75 0.66 - 1.25 mg/dL Final   06/25/2021 0.72 0.66 - 1.25 mg/dL Final   ]    Recent Labs   Lab Test 02/25/22  1612 06/25/21  1152 12/09/15  1021 10/08/14  1213   CHOL 205* 199   < > 220*   HDL 70 90   < > 83   * 96   < > 121   TRIG 112 66   < > 78   CHOLHDLRATIO  --   --   --  2.7    < > = values in this interval not displayed.       No results found for: [id number], [id number], [id number]    This note has been dictated using voice recognition software.  As a result, there may be errors in the documentation that have gone undetected.  Please consider this when interpreting information in this documentation.    Video= 34 minutes. Amwell.   54 minutes spent on the date of the encounter doing chart review, history and exam, documentation and further activities per the note.

## 2022-06-13 NOTE — PATIENT INSTRUCTIONS
Kris,    Continue Lantus 32 units daily.  Novolog insulin with meals and snacks: 1 unit for every 15 grams of carbohydrates with meals and snacks. Take mealtime insulin -Novolog 10 minutes before eat.   Please follow the following correction scale with meals three times per day.   For Pre-Meal  - 189 give 1 unit.   For Pre-Meal  - 239 give 2 units.   For Pre-Meal  - 289 give 3 units.   For Pre-Meal  - 339 give 4 units.   For Pre-Meal BG = or > 340 give 5 units.   Please call if any low BG below 70.    We will schedule an appointment with diabetes educator.     Kimo Hui MD

## 2022-06-13 NOTE — LETTER
6/13/2022         RE: Rudolph Tao  [id number] [id number]        Dear Colleague,    Thank you for referring your patient, Rudolph Tao, to the [facility]. Please see a copy of my visit note below.    Outcome for 06/06/22 3:32 PM: iFlipd message sent  Diamond Merrill VF  Outcome for 06/06/22 3:43 PM: Replied to [id number] message  Diamond Merrill VF  Outcome for 06/06/22 4:05 PM: Per patient, will send BG readings via [employer]  Diamond Merrill VF  Outcome for 06/10/22 11:17 AM: iFlipd message sent  COLLEEN Gil            Kris is a 52 year old who is being evaluated via a billable video visit.      How would you like to obtain your AVS? [employer]"  If the video visit is dropped, the invitation should be resent by: Text to cell phone: [phone]  Will anyone else be joining your video visit? No          Readings from last 2 weeks    6/13/22 124 pm 204  6/12/22  1101 pm   258  6/12/22 1:14 pm 221  6/11/22 1055 pm 174  6/11/22 1232 pm 308  6/10/22 1136 pm (night) 162  6/10/22 1217 pm 295  6/9/22 1104 pm (night) 190  6/9/22 1253 pm 407  6/9/22 908 am 193  6/8/22 646 pm 60  6/8/22 1255 pm 253  6/7/22 1130 pm 147  6/7/22  823 pm 71          Endocrinology Clinic Visit    Chief Complaint: Video Visit (Wants to look at getting pump)     Information obtained from:Patient      Assessment/Treatment Plan:      Type 1 diabetes complicated with peripheral neuropathy:  Diagnosed in his 30's; about 20 years ago.  Diabetes managed with multiple daily injections of insulin.  Takes long-acting insulin however no mealtime insulin [only corrects with NovoLog insulin for high blood sugars].  Reviewed blood sugar readings and there is a fluctuation in blood sugar readings between the 60s- 400's; which is a typical pattern when there is no mealtime insulin.  Please start using mealtime insulin 1 unit for every 15 g of carbohydrates with meals and snacks.  Mealtime insulin needs  to be taken 10 minutes before eating.  Prefers to keep Lantus at the same dose therefore will continue at the same dose for now.  The following correction scale provided.  We will complete annual labs including TSH and vitamin B12 from peripheral neuropathy standpoint.  Plan      Continue Lantus 32 units daily.    Novolog insulin with meals and snacks: 1 unit for every 15 grams of carbohydrates with meals and snacks. Take mealtime insulin -Novolog 10 minutes before eat.     Please follow the following correction scale with meals three times per day.   For Pre-Meal  - 189 give 1 unit.   For Pre-Meal  - 239 give 2 units.   For Pre-Meal  - 289 give 3 units.   For Pre-Meal  - 339 give 4 units.   For Pre-Meal BG = or > 340 give 5 units.   Please call if any low BG below 70.    Peripheral neuropathy-optimal blood glucose control.  Check TSH and B12.  Continue gabapentin as prescribed for symptom control.  Patient Instructions   Kris,      Continue Lantus 32 units daily.    Novolog insulin with meals and snacks: 1 unit for every 15 grams of carbohydrates with meals and snacks. Take mealtime insulin -Novolog 10 minutes before eat.     Please follow the following correction scale with meals three times per day.   For Pre-Meal  - 189 give 1 unit.   For Pre-Meal  - 239 give 2 units.   For Pre-Meal  - 289 give 3 units.   For Pre-Meal  - 339 give 4 units.   For Pre-Meal BG = or > 340 give 5 units.   Please call if any low BG below 70.    We will schedule an appointment with diabetes educator.     Kimo Hui MD          Return to clinic in 3 months with CDE within 4 weeks.    Test and/or medications prescribed today:  Orders Placed This Encounter   Procedures     TSH with free T4 reflex     Vitamin B12     Hemoglobin A1c     Albumin Random Urine Quantitative with Creat Ratio     Lipid panel reflex to direct LDL Fasting         Kimo Hui MD  Staff Endocrinologist     Division of Endocrinology and Diabetes      Subjective:         HPI: Rudolph Tao is a 52 year old male with history of type 1 diabetes who is seen in consultation at [facility]'s request for the same.    At the age of 34 - diagnosis Type 1 (A1c 7.3-8.1)  Lantus 32 daily  Novolog only correction dose after eating 1 unit for every 50 above 150.   No mealtime insulin.  Blood glucose readings as documented below.  No nighttime lows or lows during the daytime other than mentioned below.  History of DKA in the past.  No recent DKA's.  No history of severe hypoglycemia.  Has hypoglycemia awareness.  6/13/22 124 pm 204  6/12/22  1101 pm   258  6/12/22 1:14 pm 221  6/11/22 1055 pm 174  6/11/22 1232 pm 308  6/10/22 1136 pm (night) 162  6/10/22 1217 pm 295  6/9/22 1104 pm (night) 190  6/9/22 1253 pm 407  6/9/22 908 am 193  6/8/22 646 pm 60  6/8/22 1255 pm 253  6/7/22 1130 pm 147  6/7/22  823 pm 71      Allergies   Allergen Reactions     Avapro [Irbesartan]      Profuse sweating     Crestor [Rosuvastatin Calcium]      cough     Sulfa Drugs Swelling and Hives     Simvastatin      Elevated BS readings       Current Outpatient Medications   Medication Sig Dispense Refill     acetone urine (KETOSTIX) test strip 1 strip once as needed (with unexplained blood glucose over 300 mg/dL) Use one strip, as needed, with unexplained blood glucose over 300 mg/dL. 50 strip 6     albuterol (PROAIR HFA/PROVENTIL HFA/VENTOLIN HFA) 108 (90 Base) MCG/ACT inhaler Inhale 2 puffs into the lungs every 6 hours 18 g 0     albuterol (PROVENTIL) (2.5 MG/3ML) 0.083% neb solution Take 1 vial (2.5 mg) by nebulization every 6 hours as needed for shortness of breath / dyspnea or wheezing 90 mL 0     albuterol (PROVENTIL) (2.5 MG/3ML) 0.083% neb solution Take 1 vial (2.5 mg) by nebulization every 6 hours as needed for shortness of breath / dyspnea or wheezing 1 Box 0     ALPRAZolam (XANAX) 0.5 MG tablet Take 1 tablet (0.5 mg) by mouth 3 times daily as  needed for anxiety 1 month supply 30 tablet 0     aspirin (ASA) 81 MG EC tablet Take 1 tablet (81 mg) by mouth daily 90 tablet 3     B-D U/F insulin pen needle USE 6 TIMES DAILY 100 each 2     benzonatate (TESSALON) 200 MG capsule Take 1 capsule (200 mg) by mouth 3 times daily as needed for cough 30 capsule 0     cetirizine (ZYRTEC) 10 MG tablet Take 10 mg by mouth daily       fluticasone (FLONASE) 50 MCG/ACT nasal spray Spray 1 spray into both nostrils 2 times daily 16 g 0     gabapentin (NEURONTIN) 300 MG capsule TAKE 2 CAPSULES BY MOUTH IN THE MORNING AND 1 AT NOON AND 2 IN THE EVENING . 150 capsule 1     insulin aspart (NOVOPEN ECHO) 100 UNIT/ML cartridge Inject 1 unit/30 grams of carbohydrates plus 1 unit/50 mg/dl above 200 mg/dl. Max dose of 20 units/day 15 mL 11     insulin glargine (LANTUS SOLOSTAR) 100 UNIT/ML pen INJECT 32 UNITS SUBCUTANEOUSLY ONCE DAILY 45 mL 1     lisinopril (ZESTRIL) 40 MG tablet TAKE 1 TABLET BY MOUTH ONCE DAILY . 90 tablet 1     metoclopramide (REGLAN) 10 MG tablet Take 1 tablet (10 mg) by mouth 4 times daily as needed 120 tablet 3     metoprolol succinate ER (TOPROL-XL) 25 MG 24 hr tablet Take 1 tablet (25 mg) by mouth daily 30 tablet 1     ondansetron (ZOFRAN-ODT) 4 MG ODT tab DISSOLVE 1 TABLET IN MOUTH EVERY 8 HOURS AS NEEDED FOR NAUSEA 20 tablet 4     pravastatin (PRAVACHOL) 20 MG tablet Take 1 tablet (20 mg) by mouth daily 90 tablet 1     sildenafil (VIAGRA) 100 MG tablet Take 1 tablet (100 mg) by mouth daily as needed (sexual functioning) 8 tablet 11     blood glucose monitoring (ONE TOUCH DELICA) lancets Use to test blood sugar 4-6 times daily. May test up to 10 times per day (Patient not taking: Reported on 6/13/2022) 300 each 1     blood glucose monitoring (ONETOUCH ULTRA) test strip Use to test blood sugar 4-6 times daily, may test up to 10 times per day. (Patient not taking: Reported on 6/13/2022) 300 strip 1     buPROPion (WELLBUTRIN XL) 300 MG 24 hr tablet TAKE 1 TABLET  BY MOUTH IN THE MORNING (Patient not taking: Reported on 6/13/2022) 90 tablet 0     Continuous Blood Gluc  (FREESTYLE JUSTIN READER) MYA 1 Device See Admin Instructions (Patient not taking: Reported on 6/13/2022) 1 Device 0     Continuous Blood Gluc Sensor (FREESTYLE JUSTIN 2 SENSOR) MISC 1 each every 14 days (Patient not taking: Reported on 6/13/2022) 2 each 4     diclofenac (VOLTAREN) 75 MG EC tablet Take 1 tablet (75 mg) by mouth 2 times daily for 7 days (Patient not taking: Reported on 6/13/2022) 14 tablet 0     rOPINIRole (REQUIP) 0.25 MG tablet TAKE 1-2 TABLETS BY MOUTH AT BEDTIME (Patient not taking: Reported on 6/13/2022) 180 tablet 0       Review of Systems     8 point review system as per HPI above.  Has a stressful job.  Adopted and part of family history unknown.  Past medical history, past surgical history, social history and family history reviewed in epic.      Objective:   GENERAL:  alert and no distress  EYES: Eyes grossly normal to inspection.  N  RESP: No audible wheeze, cough, or visible cyanosis.    PSYCH: Mentation appears normal, affect normal/bright, judgement and insight intact, normal speech and appearance well-groomed.  In House Labs:   Lab Results   Component Value Date    A1C 8.1 02/25/2022    A1C 7.3 06/25/2021    A1C 6.9 01/14/2021    A1C 7.6 10/05/2020    A1C 8.2 01/30/2020    A1C 7.6 08/21/2018       TSH   Date Value Ref Range Status   06/25/2021 1.82 0.40 - 4.00 mU/L Final   06/07/2019 1.86 0.30 - 4.50 uIU/mL Final   03/21/2018 2.73 0.40 - 4.00 mU/L Final   09/21/2017 1.92 0.40 - 4.00 mU/L Final   05/11/2015 1.14 0.40 - 4.00 mU/L Final       Creatinine   Date Value Ref Range Status   02/25/2022 0.75 0.66 - 1.25 mg/dL Final   06/25/2021 0.72 0.66 - 1.25 mg/dL Final   ]    Recent Labs   Lab Test 02/25/22  1612 06/25/21  1152 12/09/15  1021 10/08/14  1213   CHOL 205* 199   < > 220*   HDL 70 90   < > 83   * 96   < > 121   TRIG 112 66   < > 78   CHOLHDLRATIO  --   --   --   2.7    < > = values in this interval not displayed.       No results found for: [id number], [id number], [id number]    This note has been dictated using voice recognition software.  As a result, there may be errors in the documentation that have gone undetected.  Please consider this when interpreting information in this documentation.    Video= 34 minutes. Amwell.   54 minutes spent on the date of the encounter doing chart review, history and exam, documentation and further activities per the note.      Again, thank you for allowing me to participate in the care of your patient.        Sincerely,        Kimo Hui MD

## 2022-06-13 NOTE — PROGRESS NOTES
Readings from last 2 weeks    6/13/22 124 pm 204  6/12/22  1101 pm   258  6/12/22 1:14 pm 221  6/11/22 1055 pm 174  6/11/22 1232 pm 308  6/10/22 1136 pm (night) 162  6/10/22 1217 pm 295  6/9/22 1104 pm (night) 190  6/9/22 1253 pm 407  6/9/22 908 am 193  6/8/22 646 pm 60  6/8/22 1255 pm 253  6/7/22 1130 pm 147  6/7/22  823 pm 71

## 2022-06-15 ENCOUNTER — TELEPHONE (OUTPATIENT)
Dept: FAMILY MEDICINE | Facility: CLINIC | Age: 52
End: 2022-06-15
Payer: COMMERCIAL

## 2022-06-15 NOTE — TELEPHONE ENCOUNTER
Plan does not cover insulin aspart (NOVOPEN ECHO) 100 UNIT/ML cartridge. Please call [phone] to start prior authorization.    Patient ID#: [id number]

## 2022-06-15 NOTE — PATIENT INSTRUCTIONS
1. Pain in joint involving ankle and foot, right  C/w right ankle sprain  Advise RICE  Also walking boot until pain improves  Follow up if not improving in 2 weeks    - XR Foot Right G/E 3 Views; Future  - XR Ankle Right G/E 3 Views; Future  - XR Tibia & Fibula Right 2 Views; Future  - Miscellaneous Order for DME - ONLY FOR DME     None Known

## 2022-06-20 ENCOUNTER — TELEPHONE (OUTPATIENT)
Dept: ENDOCRINOLOGY | Facility: CLINIC | Age: 52
End: 2022-06-20
Payer: COMMERCIAL

## 2022-06-20 NOTE — TELEPHONE ENCOUNTER
Prior Authorization Not Needed per Insurance    Medication: insulin aspart (NOVOPEN ECHO) 100 UNIT/ML cartridge  Insurance Company: [employer] ([facility]) - Phone [phone] Fax [phone]  Expected CoPay:      Pharmacy Filling the Rx: [facility] [id number] [address] [address]  Pharmacy Notified: Yes  Patient Notified: Yes  **Instructed pharmacy to notify patient when script is ready to /ship.**

## 2022-06-20 NOTE — TELEPHONE ENCOUNTER
Writer left message for patient to return call for lab appointment scheduling. Please schedule fasting lab 1 week prior to seeing Dr Hui.    Blair Schliling [facility]  Adult Endocrinology  [facility]

## 2022-06-20 NOTE — TELEPHONE ENCOUNTER
Central Prior Authorization Team   Phone: [phone]    PA Initiation    Medication: insulin aspart (NOVOPEN ECHO) 100 UNIT/ML cartridge  Insurance Company: "[employer] ([facility]) - Phone [phone] Fax [phone]  Pharmacy Filling the Rx: [facility] [address] [place], [place] - [address] [address]  Filling Pharmacy Phone: [phone]  Filling Pharmacy Fax:    Start Date: 6/20/2022

## 2022-06-25 ENCOUNTER — HEALTH MAINTENANCE LETTER (OUTPATIENT)
Age: 52
End: 2022-06-25

## 2022-07-01 DIAGNOSIS — E10.42 TYPE 1 DIABETES MELLITUS WITH DIABETIC POLYNEUROPATHY (H): ICD-10-CM

## 2022-07-01 RX ORDER — GABAPENTIN 300 MG/1
CAPSULE ORAL
Qty: 150 CAPSULE | Refills: 0 | Status: SHIPPED | OUTPATIENT
Start: 2022-07-01 | End: 2022-08-09

## 2022-07-01 NOTE — TELEPHONE ENCOUNTER
Routing refill request to provider for review/approval because:  Drug not on the [facility] refill protocol     Requested Prescriptions   Pending Prescriptions Disp Refills    gabapentin (NEURONTIN) 300 MG capsule [Pharmacy Med Name: Gabapentin 300 MG Oral Capsule] 150 capsule 0     Sig: TAKE 2 CAPSULES BY MOUTH ONCE DAILY IN THE MORNING AND TAKE  1 CAPSULE AT NOON AND TAKE 2 CAPSULES IN THE EVENING        There is no refill protocol information for this order           Nicky Parker RN  [facility]     No

## 2022-07-05 DIAGNOSIS — G25.81 RESTLESS LEG SYNDROME: ICD-10-CM

## 2022-07-06 RX ORDER — ROPINIROLE 0.25 MG/1
TABLET, FILM COATED ORAL
Qty: 180 TABLET | Refills: 0 | Status: SHIPPED | OUTPATIENT
Start: 2022-07-06 | End: 2024-03-08

## 2022-07-06 NOTE — TELEPHONE ENCOUNTER
"Requested Prescriptions   Pending Prescriptions Disp Refills     rOPINIRole (REQUIP) 0.25 MG tablet 180 tablet 0     Sig: TAKE 1-2 TABLETS BY MOUTH AT BEDTIME       Antiparkinson's Agents Protocol Failed - 7/5/2022  8:33 AM        Failed - Blood pressure under 140/90 in past 12 months     BP Readings from Last 3 Encounters:   05/15/22 (!) 143/88   02/25/22 (!) 150/91   12/09/21 112/80                 Failed - CBC on record in past 12 months     Recent Labs   Lab Test 09/19/16  1155   WBC 5.1   RBC 4.49   HGB 15.3   HCT 43.2                    Failed - ALT on record in past 12 months         Recent Labs   Lab Test 02/01/20  0000   ALT 60*             Passed - Serum Creatinine on file in past 12 months     Recent Labs   Lab Test 02/25/22  1612   CR 0.75       Ok to refill medication if creatinine is low          Passed - Medication is active on med list        Passed - Patient is age 18 or older        Passed - Recent (6 mo) or future (30 days) visit within the authorizing provider's specialty     Patient had office visit in the last 6 months or has a visit in the next 30 days with authorizing provider or within the authorizing provider's specialty.  See \"Patient Info\" tab in inbasket, or \"Choose Columns\" in Meds & Orders section of the refill encounter.               Routing refill request to provider for review/approval because:  Labs not current:  CBC,ALT  BP    Maria Antonia Mckeon RN  [facility]           "

## 2022-07-09 DIAGNOSIS — E10.9 TYPE 1 DIABETES MELLITUS WITHOUT COMPLICATION (H): ICD-10-CM

## 2022-07-11 RX ORDER — FLURBIPROFEN SODIUM 0.3 MG/ML
SOLUTION/ DROPS OPHTHALMIC
Qty: 200 EACH | Refills: 1 | Status: SHIPPED | OUTPATIENT
Start: 2022-07-11 | End: 2023-03-28

## 2022-08-05 ENCOUNTER — NURSE TRIAGE (OUTPATIENT)
Dept: NURSING | Facility: CLINIC | Age: 52
End: 2022-08-05

## 2022-08-05 NOTE — TELEPHONE ENCOUNTER
S: pt calling with concerns of high blood pressure.    B:BP readings have been 140's/100's for the past few days. 's. Pt does take lisinopril. Pt states he also feels shaky, unsteady. Pt is diabetic and he took his blood sugar with writer on the phone. . Denies chest pain, denies difficulty breathing, denies fever.     A: Elevated BP    R: To ED now. Pt states his wife is coming home in the next 1/2 hour and will take him to the ED. Pt agrees toplan and will call back or call 911 if symptoms worsen.    AYAD FOY RN       Reason for Disposition    Systolic BP >= 160 OR Diastolic >= 100, and any cardiac or neurologic symptoms (e.g., chest pain, difficulty breathing, unsteady gait, blurred vision)    Additional Information    Negative: Sounds like a life-threatening emergency to the triager    Negative: Symptom is main concern (e.g., headache, chest pain)    Negative: Low blood pressure is main concern    Protocols used: BLOOD PRESSURE - HIGH-A-OH

## 2022-08-08 DIAGNOSIS — E10.42 TYPE 1 DIABETES MELLITUS WITH DIABETIC POLYNEUROPATHY (H): ICD-10-CM

## 2022-08-09 RX ORDER — GABAPENTIN 300 MG/1
CAPSULE ORAL
Qty: 150 CAPSULE | Refills: 0 | Status: SHIPPED | OUTPATIENT
Start: 2022-08-09 | End: 2022-09-06

## 2022-08-09 NOTE — TELEPHONE ENCOUNTER
Routing refill request to provider for review/approval because:  Drug not on the [facility] refill protocol     Requested Prescriptions   Pending Prescriptions Disp Refills    gabapentin (NEURONTIN) 300 MG capsule [Pharmacy Med Name: Gabapentin 300 MG Oral Capsule] 150 capsule 0     Sig: TAKE 2 CAPSULES BY MOUTH ONCE DAILY IN THE MORNING AND 1 CAPSULE AT NOON AND 2 CAPSULES IN THE EVENING        There is no refill protocol information for this order            Ping Diaz RN   [facility]

## 2022-08-17 ENCOUNTER — MYC MEDICAL ADVICE (OUTPATIENT)
Dept: ENDOCRINOLOGY | Facility: CLINIC | Age: 52
End: 2022-08-17

## 2022-09-01 ENCOUNTER — MYC MEDICAL ADVICE (OUTPATIENT)
Dept: INTERNAL MEDICINE | Facility: CLINIC | Age: 52
End: 2022-09-01

## 2022-09-01 DIAGNOSIS — E10.42 TYPE 1 DIABETES MELLITUS WITH DIABETIC POLYNEUROPATHY (H): ICD-10-CM

## 2022-09-06 RX ORDER — GABAPENTIN 300 MG/1
CAPSULE ORAL
Qty: 270 CAPSULE | Refills: 1 | Status: SHIPPED | OUTPATIENT
Start: 2022-09-06 | End: 2022-09-26

## 2022-09-07 ENCOUNTER — MYC MEDICAL ADVICE (OUTPATIENT)
Dept: INTERNAL MEDICINE | Facility: CLINIC | Age: 52
End: 2022-09-07

## 2022-09-07 DIAGNOSIS — F41.1 GENERALIZED ANXIETY DISORDER: ICD-10-CM

## 2022-09-07 NOTE — TELEPHONE ENCOUNTER
Routing refill request to provider for review/approval because:  Drug not on the [facility] refill protocol     Thanks,  Maria Antonia RN  [facility]

## 2022-09-08 RX ORDER — ALPRAZOLAM 0.5 MG
0.5 TABLET ORAL 3 TIMES DAILY PRN
Qty: 30 TABLET | Refills: 0 | Status: SHIPPED | OUTPATIENT
Start: 2022-09-08 | End: 2022-11-23

## 2022-09-13 ENCOUNTER — NURSE TRIAGE (OUTPATIENT)
Dept: NURSING | Facility: CLINIC | Age: 52
End: 2022-09-13

## 2022-09-13 NOTE — TELEPHONE ENCOUNTER
Called patient. Notified him of message from PCP. Pt verbalized understanding and would like to come in on 09/15 to see PCP. Added to provider schedule. Pt understands to monitor for any worsening symptoms: red streaking, fever, purulent drainage and seek care sooner if needed.     Kathy Ferguson RN   [facility]

## 2022-09-13 NOTE — TELEPHONE ENCOUNTER
"Triage Call:    Pt is calling to make an appt with his provider to take a look at a sore on his right foot, but there are no appts in clinic any time soon. Pt was able to make an appt at the [facility] for next week, but is asking for a message to be sent to his PCP for advisement if he needs to be seen sooner.     Pt reports the following;   Type 1 diabetic and has an ulcer on his great toe on his right foot; characterized as a \"pressure ulcer\"   Pt has a spacer to separate the toes to relieve the pressure at times  Wound is \"calloused\" and is dark in color and not open or draining  Size of a dime    Pain at the site: Sore    Disposition: See today or tomorrow. Writer gave patient the care advice and the soonest he was able to make an in-clinic appt was next week, 9/20/2022 at the [facility].     Due to an ongoing message in IID, writer is routing this message to patient's PCP to advise patient if he should be seen sooner as the disposition states at the [facility] or should patient keep his in-clinic appt for next week.     Negar Romero RN  [facility] Nurse Advisor 12:19 PM 9/13/2022        Reason for Disposition    Has diabetes (diabetes mellitus) and has minor cut or scratch on foot    Additional Information    Negative: Major bleeding (actively dripping or spurting) that can't be stopped    Negative: Amputation    Negative: Shock suspected (e.g., cold/pale/clammy skin, too weak to stand, low BP, rapid pulse)    Negative: Knife wound (or other possibly deep cut) to chest, abdomen, back, neck, or head    Negative: Sounds like a life-threatening emergency to the triager    Negative: Animal bite    Negative: Human bite    Negative: Puncture wound    Negative: Foreign body in skin (e.g., splinter, sliver)    Negative: Wound infection suspected (cut or other wound now looks infected)    Negative: Burn    Negative: High pressure injection injury (e.g., from grease gun or paint gun, usually " work-related)    Negative: Skin loss goes very deep (e.g., can see bones or tendons)    Negative: Skin loss involves more than 10% of body surface area    Negative: Skin is split open or gaping (length > 1/2 inch or 12 mm on the skin, 1/4 inch or 6 mm on the face)    Negative: Bleeding won't stop after 10 minutes of direct pressure (using correct technique)    Negative: Deep cut and can see bone or tendons    Negative: Dirt in the wound and not removed after 15 minutes of scrubbing    Negative: Wound causes numbness (i.e., loss of sensation)    Negative: Wound causes weakness (i.e., decreased ability to move hand, finger, toe)    Negative: Sounds like a serious injury to the triager    Negative: Looks infected (fever, spreading redness, pus, or red streak)    Negative: Raised bruise with size > 2 inches (5 cm) that is getting bigger    Negative: SEVERE pain (e.g., excruciating)    Negative: No prior tetanus shots (or is not fully vaccinated) and any wound (e.g., cut or scrape)    Negative: HIV positive or severe immunodeficiency (severely weak immune system) and DIRTY cut or scrape    Negative: Patient wants to be seen    Protocols used: SKIN INJURY-A-OH

## 2022-09-13 NOTE — TELEPHONE ENCOUNTER
This is a matter of opinion here. From the sound of things this ulcer definitely needs to be looked at but there's no outright purulence and it is not an emergency. He's to be watching for purulence , pain, fever or chills, lymphangitic streaking, all the basic signs or symptoms of infection.    If developing it's an urgent situation. With none of this it's not urgent.    I looked at the upcoming Thursday and Friday schedule for 9-15 - 9-16 and I see I could have patient squeezed in for an urgent appointment at 1:00 pm either Thursday or Friday. If this is to be preferred put him onto the schedule , Friday is to be preferred    Haim Bustos MD

## 2022-09-15 ENCOUNTER — OFFICE VISIT (OUTPATIENT)
Dept: INTERNAL MEDICINE | Facility: CLINIC | Age: 52
End: 2022-09-15
Payer: COMMERCIAL

## 2022-09-15 VITALS
SYSTOLIC BLOOD PRESSURE: 112 MMHG | RESPIRATION RATE: 16 BRPM | OXYGEN SATURATION: 98 % | TEMPERATURE: 98.8 F | BODY MASS INDEX: 26 KG/M2 | HEART RATE: 118 BPM | WEIGHT: 171 LBS | DIASTOLIC BLOOD PRESSURE: 82 MMHG

## 2022-09-15 DIAGNOSIS — Z23 NEED FOR PROPHYLACTIC VACCINATION AGAINST STREPTOCOCCUS PNEUMONIAE (PNEUMOCOCCUS): ICD-10-CM

## 2022-09-15 DIAGNOSIS — Z23 NEEDS FLU SHOT: ICD-10-CM

## 2022-09-15 DIAGNOSIS — M21.611 BUNION, RIGHT: Primary | ICD-10-CM

## 2022-09-15 DIAGNOSIS — Z12.11 SCREEN FOR COLON CANCER: ICD-10-CM

## 2022-09-15 DIAGNOSIS — E10.8 DIABETES MELLITUS TYPE 1 WITH COMPLICATIONS (H): ICD-10-CM

## 2022-09-15 DIAGNOSIS — N52.9 ERECTILE DYSFUNCTION, UNSPECIFIED ERECTILE DYSFUNCTION TYPE: ICD-10-CM

## 2022-09-15 DIAGNOSIS — G63 POLYNEUROPATHY ASSOCIATED WITH UNDERLYING DISEASE (H): ICD-10-CM

## 2022-09-15 DIAGNOSIS — L89.891 PRESSURE INJURY OF TOE OF RIGHT FOOT, STAGE 1: ICD-10-CM

## 2022-09-15 PROCEDURE — 90682 RIV4 VACC RECOMBINANT DNA IM: CPT | Performed by: INTERNAL MEDICINE

## 2022-09-15 PROCEDURE — 90472 IMMUNIZATION ADMIN EACH ADD: CPT | Performed by: INTERNAL MEDICINE

## 2022-09-15 PROCEDURE — 90471 IMMUNIZATION ADMIN: CPT | Performed by: INTERNAL MEDICINE

## 2022-09-15 PROCEDURE — 99213 OFFICE O/P EST LOW 20 MIN: CPT | Mod: 25 | Performed by: INTERNAL MEDICINE

## 2022-09-15 PROCEDURE — 90677 PCV20 VACCINE IM: CPT | Performed by: INTERNAL MEDICINE

## 2022-09-15 NOTE — PROGRESS NOTES
Answers for HPI/ROS submitted by the patient on 9/15/2022  How many servings of fruits and vegetables do you eat daily?: 2-3  On average, how many sweetened beverages do you drink each day (Examples: soda, juice, sweet tea, etc.  Do NOT count diet or artificially sweetened beverages)?: 0  How many minutes a day do you exercise enough to make your heart beat faster?: 9 or less  How many days a week do you exercise enough to make your heart beat faster?: 3 or less  How many days per week do you miss taking your medication?: 0  What is the reason for your visit today?: Pressure ulcer on right big toe  When did your symptoms begin?: More than a month  How would you describe these symptoms?: Moderate  Are your symptoms:: Staying the same  Have you had these symptoms before?: No  Is there anything that makes you feel worse?: No  Is there anything that makes you feel better?: No

## 2022-09-15 NOTE — PROGRESS NOTES
Assessment & Plan     Bunion, right  Seeing patient today as a squeezed in for an urgent appointment. The worry was the possibility that he had a true diabetic foot ulcer but fortunately the history and exam is revealing first of all a bunion and then due to deviation of the great toe laterally, there is now developing a chronic pressure ulcer / damaged skin area between 1st and 2nd toes that is likely to get steadily worse, adding into this his history of diabetes neuropathy . I am concerned for the future and while I don't know if a bunionectomy is the right plan, certainly the views of a Podiatrist for this gentleman is What makes the most sense to me    - Orthopedic  Referral; Future    Pressure injury of toe of right foot, stage 1  As above   - Orthopedic  Referral; Future    Screen for colon cancer  Declines, he says he has a home FIT test he is waiting to send us    Needs flu shot    - INFLUENZA QUAD, RECOMBINANT, P-FREE (RIV4) (FLUBLOK) AGE 50-64 [SOW023]    Need for prophylactic vaccination against Streptococcus pneumoniae (pneumococcus)    - Pneumococcal 20 Valent Conjugate (Prevnar 20)    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)  Due for  The eye exam  - Adult Eye  Referral; Future    Erectile dysfunction, unspecified erectile dysfunction type  This patient has a fairly severe diabetes mediated erectile dysfunction and he has tried phosphodiesterase type 5 (PDE5) inhibitors without success. In my opinion he needs to consider the full range of possibilities including The suppository-form of alprostadil also known as MUSE (Medicated Urethral System for Erection). , versus Alprostadil injection-form [Caverject, Edex, and Prostin VR] versus penile implant   - Adult Urology  Referral; Future    Polyneuropathy associated with underlying disease (H)  He has also noted from blood pressure variability and some intermittent lightheadedness symptoms that actually are  suspicious for possibly diabetes mellitus autonomic dysfunction . This is a complicated diagnosis to be certain of and so I am not referring him for autonomic nervous system testing just yet but we discussed what to be on the watch for  , update me if significant changes have taken place       Return in about 6 months (around 3/15/2023).    Haim Bustos MD  [facility] EUSEBIO Ponce is a 52 year old presenting for the following health issues:  WOUND CARE (Would on toe )    Squeezed in for an urgent appointment , advanced diabetes mellitus type 1 patient with diabetic foot ulcer      last appointment with me was virtual office visit for Coronavirus (COVID-19) range of motion 5-26-22          Review of Systems   Constitutional, HEENT, cardiovascular, pulmonary, gi and gu systems are negative, except as otherwise noted.      Objective    /82   Pulse 118   Temp 98.8  F (37.1  C) (Oral)   Resp 16   Wt 77.6 kg (171 lb)   SpO2 98%   BMI 26.00 kg/m    Body mass index is 26 kg/m .  Physical Exam   GENERAL: healthy, alert and no distress  EYES: Eyes grossly normal to inspection, PERRL and conjunctivae and sclerae normal  MS: no gross musculoskeletal defects noted, no edema  SKIN: see as detailed above     Orders Placed This Encounter   Procedures     Pneumococcal 20 Valent Conjugate (Prevnar 20)     INFLUENZA QUAD, RECOMBINANT, P-FREE (RIV4) (FLUBLOK) AGE 50-64 [QNP473]     Adult Eye  Referral     Orthopedic  Referral     Adult Urology  Referral               Answers for HPI/ROS submitted by the patient on 9/15/2022  How many servings of fruits and vegetables do you eat daily?: 2-3  On average, how many sweetened beverages do you drink each day (Examples: soda, juice, sweet tea, etc.  Do NOT count diet or artificially sweetened beverages)?: 0  How many minutes a day do you exercise enough to make your heart beat faster?: 9 or less  How many days a week do you  exercise enough to make your heart beat faster?: 3 or less  How many days per week do you miss taking your medication?: 0  What is the reason for your visit today?: Pressure ulcer on right big toe  When did your symptoms begin?: More than a month  How would you describe these symptoms?: Moderate  Are your symptoms:: Staying the same  Have you had these symptoms before?: No  Is there anything that makes you feel worse?: No  Is there anything that makes you feel better?: No

## 2022-09-22 ENCOUNTER — TELEPHONE (OUTPATIENT)
Dept: INTERNAL MEDICINE | Facility: CLINIC | Age: 52
End: 2022-09-22

## 2022-09-22 DIAGNOSIS — E10.42 TYPE 1 DIABETES MELLITUS WITH DIABETIC POLYNEUROPATHY (H): ICD-10-CM

## 2022-09-22 NOTE — TELEPHONE ENCOUNTER
Pt calling. States his dose of Gabapentin was increased to 3 pills tid and is having issues with filling this. States he was dispensed a 10 day supply and is now out of medication. Feet are burning since he has been out of the medication. Has 2 interviews for jobs he has to be on his feet all day and states he can't do that with the burning sensation in his feet.  Pt was wondering if there is some issue with insurance since he is not able to get his Gabapentin filled.     Called pharmacy and spoke with pharmacist. States #90 was dispensed on 9/6 and when running through insurance says next fill is on 9/30, which would leave pt short of medication. Pharmacist will contact insurance regarding this and will fax our office if anything further is needed.    Pt was wondering if there are other treatment options for his foot burning (neuropathy). Pt was referring to other clinics that don't use medications for treatment? Pt requested a message be sent to PCP to advise.    Padmini Hutchison RN  [facility]

## 2022-09-22 NOTE — TELEPHONE ENCOUNTER
Prior Authorization Retail Medication Request    Medication/Dose: gabapentin (NEURONTIN) 300 MG capsule  ICD code (if different than what is on RX):  E10.42    Insurance Name:  [employer]  Insurance ID:  [id number]       Pharmacy Information (if different than what is on RX)  Name:  [facility]  Phone:  [phone]

## 2022-09-23 NOTE — TELEPHONE ENCOUNTER
Central Prior Authorization Team  Phone: [phone]    PA Initiation    Medication: Gabapentin 300 mg  Insurance Company: [employer] - Phone [phone] Fax [phone]  Pharmacy Filling the Rx: [facility] [address] [place], [place] - [address] [address]  Filling Pharmacy Phone: [phone]  Filling Pharmacy Fax:    Start Date: 9/23/2022

## 2022-09-23 NOTE — TELEPHONE ENCOUNTER
Central Prior Authorization Team  Phone: [phone]    Prior Authorization Not Needed per Insurance    Medication: Gabapentin 300 mg  Insurance Company: [employer] - Phone [phone] Fax [phone]  Expected CoPay:      Pharmacy Filling the Rx: [facility] [address] [facility] [address] [address]  Pharmacy Notified: Yes  Patient Notified: Yes

## 2022-09-23 NOTE — TELEPHONE ENCOUNTER
This message confused me just a little bit     asus the prescription is correct [ 3 pills 3 times a day ] that's 270 pills a month. That's what I wrote for.    If he'd prefer we can go with a 3 month supply which is 810 pills for a 3 month supply.    If I need to rewrite this prescription or resend this, please reroute chart.    As far as other treatments, diabetes neuropathy is one of the more stubborn problems people face. There are no easy answers . There's clinics that claim all sorts of things, generally unsubstantiated and bogus. Using forces such as magnetism, electric currents, various tactile stimulate including vibrations. Hot and cold, etc. I comment that all these approaches are generally completely bogus.    Medications: if the Gabapentin [Neurontin] just isn't adequate we need to either consider adding an additional medication such as Cymbalta (duloxetine)  OR refer patient for an appointment with neurologist.    See what he wants to do    Haim Bustos MD

## 2022-09-26 RX ORDER — GABAPENTIN 600 MG/1
600 TABLET ORAL 3 TIMES DAILY
Qty: 90 TABLET | Refills: 3 | Status: SHIPPED | OUTPATIENT
Start: 2022-09-26 | End: 2023-02-20

## 2022-09-26 RX ORDER — GABAPENTIN 300 MG/1
CAPSULE ORAL
Qty: 90 CAPSULE | Refills: 3 | Status: SHIPPED | OUTPATIENT
Start: 2022-09-26 | End: 2023-02-20

## 2022-09-26 NOTE — TELEPHONE ENCOUNTER
Ok so I am hearing the following , please confirm with patient     He wants me to send a     90 tabs for 600 milligrams 3 times a day AND   90 TABS  For the 300 milligram tabs to = a total daily dose of of 900 milligram 3 times a day     He wants a one month with some refills and mary ann contact us back if necessary to swap out for LYRICA (pregabalin)?    Prescription is sent to the pharmacy     Haim Bustos MD

## 2022-09-26 NOTE — TELEPHONE ENCOUNTER
Spoke with pt. Reviewed provider's message as written. States he is not sure if Gabapentin 300mg tid is going to work for him. Has been out of the medication and has had to use his Wife's medication to get by and her dose is not as much as his dose. Pt states he never heard back from the pharmacy. Last thing he heard was that the pharmacy needed pre authorization.    States he wanted to give the medication 1-2 months to see if that helps and if it's not working, would consider Lyrica or Cymbalta that might work differently with his body chemistry. Writer advised pt that I will check with pharmacy to see what is needed. Per chart review, encounter from 9/22/22 states no PA is needed.   Called pharmacy and was advised that Insurance will not pay for that many of the 300mg. Thinks insurance wants 600mg plus a 300mg. Plan limits the amount of capsules. Routing to provider to advise.    Padmini Hutchison RN  [facility]

## 2022-09-27 ENCOUNTER — OFFICE VISIT (OUTPATIENT)
Dept: PODIATRY | Facility: CLINIC | Age: 52
End: 2022-09-27
Payer: COMMERCIAL

## 2022-09-27 VITALS — SYSTOLIC BLOOD PRESSURE: 129 MMHG | HEART RATE: 90 BPM | DIASTOLIC BLOOD PRESSURE: 86 MMHG

## 2022-09-27 DIAGNOSIS — E10.8 DIABETES MELLITUS TYPE 1 WITH COMPLICATIONS (H): ICD-10-CM

## 2022-09-27 DIAGNOSIS — L97.519 ULCERATED, FOOT, RIGHT, WITH UNSPECIFIED SEVERITY (H): Primary | ICD-10-CM

## 2022-09-27 DIAGNOSIS — G63 POLYNEUROPATHY ASSOCIATED WITH UNDERLYING DISEASE (H): ICD-10-CM

## 2022-09-27 DIAGNOSIS — M21.619 BUNION: ICD-10-CM

## 2022-09-27 PROCEDURE — 99244 OFF/OP CNSLTJ NEW/EST MOD 40: CPT | Performed by: PODIATRIST

## 2022-09-27 NOTE — TELEPHONE ENCOUNTER
Called patient. Left detailed voice message and requested call back if patient has any further questions.     Kathy Ferguson RN   [facility]

## 2022-09-27 NOTE — LETTER
9/27/2022         RE: Rudolph Tao  [address] [facility] [id number]        Dear Colleague,    Thank you for referring your patient, Rudolph Tao, to the [facility]. Please see a copy of my visit note below.    Subjective:    Pt is seen today in consult from Dr. Bustos for foot exam.  His chief complaint today is pain and points to lateral right hallux.  This is the first time he had this.  He said this for approximately a month.  He has been treating this with a silicone toe spacers.  Is slowly getting better.  Has history of bilateral bunions.  Had left bunionectomy and second and third hammertoe repair in the past.  Patient denies purulence odor drainage erythema or edema.  He has numbness and tingling in his feet.  Quit smoking in 2015.  History of alcohol use.     ROS:  A 10-point review of systems was performed and is positive for that noted in the HPI and as seen below.  All other areas are negative.          Allergies   Allergen Reactions     Avapro [Irbesartan]      Profuse sweating     Crestor [Rosuvastatin Calcium]      cough     Sulfa Drugs Swelling and Hives     Simvastatin      Elevated BS readings       Current Outpatient Medications   Medication Sig Dispense Refill     acetone urine (KETOSTIX) test strip 1 strip once as needed (with unexplained blood glucose over 300 mg/dL) Use one strip, as needed, with unexplained blood glucose over 300 mg/dL. 50 strip 6     albuterol (PROAIR HFA/PROVENTIL HFA/VENTOLIN HFA) 108 (90 Base) MCG/ACT inhaler Inhale 2 puffs into the lungs every 6 hours 18 g 0     albuterol (PROVENTIL) (2.5 MG/3ML) 0.083% neb solution Take 1 vial (2.5 mg) by nebulization every 6 hours as needed for shortness of breath / dyspnea or wheezing 90 mL 0     albuterol (PROVENTIL) (2.5 MG/3ML) 0.083% neb solution Take 1 vial (2.5 mg) by nebulization every 6 hours as needed for shortness of breath / dyspnea or wheezing 1 Box 0     ALPRAZolam (XANAX) 0.5  MG tablet Take 1 tablet (0.5 mg) by mouth 3 times daily as needed for anxiety 1 month supply 30 tablet 0     aspirin (ASA) 81 MG EC tablet Take 1 tablet (81 mg) by mouth daily 90 tablet 3     B-D U/F insulin pen needle USE 6 TIMES DAILY 200 each 1     benzonatate (TESSALON) 200 MG capsule Take 1 capsule (200 mg) by mouth 3 times daily as needed for cough 30 capsule 0     cetirizine (ZYRTEC) 10 MG tablet Take 10 mg by mouth daily       fluticasone (FLONASE) 50 MCG/ACT nasal spray Spray 1 spray into both nostrils 2 times daily 16 g 0     gabapentin (NEURONTIN) 300 MG capsule Take with a With a 600 milligram tab to = a total daily dose of of 900 milligrams tid 90 capsule 3     gabapentin (NEURONTIN) 600 MG tablet Take 1 tablet (600 mg) by mouth 3 times daily With a 300 milligram tab to = a total daily dose of of 900 milligrams tid 90 tablet 3     insulin aspart (NOVOPEN ECHO) 100 UNIT/ML cartridge Inject 1 unit/30 grams of carbohydrates plus 1 unit/50 mg/dl above 200 mg/dl. Max dose of 20 units/day 15 mL 11     insulin glargine (LANTUS SOLOSTAR) 100 UNIT/ML pen INJECT 32 UNITS SUBCUTANEOUSLY ONCE DAILY 45 mL 1     lisinopril (ZESTRIL) 40 MG tablet TAKE 1 TABLET BY MOUTH ONCE DAILY . 90 tablet 1     metoclopramide (REGLAN) 10 MG tablet Take 1 tablet (10 mg) by mouth 4 times daily as needed 120 tablet 3     metoprolol succinate ER (TOPROL-XL) 25 MG 24 hr tablet Take 1 tablet (25 mg) by mouth daily 30 tablet 1     ondansetron (ZOFRAN-ODT) 4 MG ODT tab DISSOLVE 1 TABLET IN MOUTH EVERY 8 HOURS AS NEEDED FOR NAUSEA 20 tablet 4     pravastatin (PRAVACHOL) 20 MG tablet Take 1 tablet (20 mg) by mouth daily 90 tablet 1     rOPINIRole (REQUIP) 0.25 MG tablet TAKE 1-2 TABLETS BY MOUTH AT BEDTIME, no further refills until appointment 180 tablet 0     sildenafil (VIAGRA) 100 MG tablet Take 1 tablet (100 mg) by mouth daily as needed (sexual functioning) 8 tablet 11       Patient Active Problem List   Diagnosis     Hyperlipidemia LDL  goal <100     Encounter for long-term (current) use of insulin (H)     Chronic low back pain     Libido, decreased     Restless legs     Tobacco abuse     Cervicalgia     Paresthesias     Persistent disorder of initiating or maintaining sleep     Spasms of the hands or feet     Hypertension goal BP (blood pressure) < 140/90     Right-sided low back pain without sciatica     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)     GAIL (generalized anxiety disorder)     Psychosocial stressors     DDD (degenerative disc disease), lumbar     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)     Hypoglycemia     Syncopal episodes     Gastroparesis     Polyneuropathy associated with underlying disease (H)     Alcohol withdrawal syndrome without complication (H)     Ketosis (H)       Past Medical History:   Diagnosis Date     Adopted      Anxiety      Anxiety      Chronic low back pain      DDD (degenerative disc disease), lumbar 3/8/2018     Diabetes mellitus (H)     TYPE 1     Hallux abducto valgus      Hyperlipaemia      Hyperlipidemia      Hypertension      Lip lesion 8/2/2010     Pain in toe of left foot     2 ND TOE     RLS (restless legs syndrome)      Snoring     MODERATE SEVERITY     Swelling of foot joint 7/13/2012       Past Surgical History:   Procedure Laterality Date     APPENDECTOMY  1990     OSTEOTOMY FOOT  1/8/2013    Procedure: OSTEOTOMY FOOT;  Left Foot Distal First Metarsal Osteotomy, Second Toe Hammer Toe Correction, Second Metatarsal Weil Osteotomy;  Surgeon: Robert Augustine DPM;  Location: [facility]     SURGICAL HISTORY OF -   1992    [facility]       Family History   Adopted: Yes   Problem Relation Age of Onset     Unknown/Adopted Mother      Cancer Mother      Unknown/Adopted Father      Unknown/Adopted Maternal Grandmother      Unknown/Adopted Maternal Grandfather      Unknown/Adopted Paternal Grandmother      Unknown/Adopted Paternal Grandfather        Social History     Tobacco Use     Smoking status: Former Smoker      Packs/day: 0.00     Years: 20.00     Pack years: 0.00     Types: Cigarettes     Quit date: 10/1/2015     Years since quittin.0     Smokeless tobacco: Never Used     Tobacco comment: 2-4  cigarettes daily   Substance Use Topics     Alcohol use: Yes     Comment: 2 drinks daily         Exam:    Vitals: /86   Pulse 90   BMI: There is no height or weight on file to calculate BMI.  Height: Data Unavailable    Constitutional/ general:  Pt is in no apparent distress, appears well-nourished.  Cooperative with history and physical exam.  Patient seen with wife today.    Psych:  The patient answered questions appropriately.  Normal affect.  Seems to have reasonable expectations, in terms of treatment.     Eyes:  Visual scanning/ tracking without deficit.     Ears:  Response to auditory stimuli is normal.  negative hearing aid devices.  Auricles in proper alignment.     Lymphatic:  Popliteal lymph nodes not enlarged.     Lungs:  Non labored breathing, non labored speech. No cough.  No audible wheezing. Even, quiet breathing.       Vascular:  positive pedal pulses bilaterally for both the DP and PT arteries.  CFT < 3 sec.  negative ankle edema.  positive pedal hair growth.    Neuro:  Alert and oriented x 3. Coordinated gait.  Light touch sensation is decreased in digits.  No evidence of neurological-based weakness, spasticity, or contracture in the lower extremities.  Monofilament intact on digits    Derm: Normal texture and turgor.  No erythema, ecchymosis, or cyanosis.      Musculoskeletal:    Lower extremity muscle strength is normal.  Patient is ambulatory without an assistive device or brace.  Normal arch.  Bilateral bunions with right being worse.  Right hallux lateral IPJ discolored tissue noted.  There is a 5 x 1 mm dry eschar.  Appears to be healing.  No erythema or edema.  No lesion on the medial right second toe.  With signs of breakdown in the skin bilaterally.    Radiographic Exam:  X-Ray Findings:  I  personally reviewed the films.  Consistent with above     Latest Reference Range & Units 02/25/22 16:12   Hemoglobin A1C 0.0 - 5.6 % 8.1 (H)   (H): Data is abnormally high    A:  Diabetes mellitus with peripheral neuropathy   Bilateral bunion deformities  Right hallux ulcer    P:  Discussed with patient importance of controlling blood sugars well at all times.  He will watch feet daily and keep protected with shoes.  Discussed mechanical cause of right hallux ulcer.  This is healing.  He will keep offloading with silicone pads.  Discussed rocker-bottom shoes to help offload this as well.  We wrote him a prescription for orthotics to help support and protect foot.  Patient notices any signs of infection he will contact a provider immediately.  RTC as needed.  Thank you for allowing me participate in the care of this patient.        Yonatan Denise DPM, FACFAS                Again, thank you for allowing me to participate in the care of your patient.        Sincerely,        Yonatan Denise DPM

## 2022-09-29 ENCOUNTER — OFFICE VISIT (OUTPATIENT)
Dept: OPHTHALMOLOGY | Facility: CLINIC | Age: 52
End: 2022-09-29
Payer: COMMERCIAL

## 2022-09-29 DIAGNOSIS — H43.812 POSTERIOR VITREOUS DETACHMENT OF LEFT EYE: ICD-10-CM

## 2022-09-29 DIAGNOSIS — Z01.01 ENCOUNTER FOR EXAMINATION OF EYES AND VISION WITH ABNORMAL FINDINGS: ICD-10-CM

## 2022-09-29 DIAGNOSIS — E11.3299 BACKGROUND DIABETIC RETINOPATHY (H): ICD-10-CM

## 2022-09-29 DIAGNOSIS — E10.8 DIABETES MELLITUS TYPE 1 WITH COMPLICATIONS (H): Primary | ICD-10-CM

## 2022-09-29 DIAGNOSIS — H52.4 PRESBYOPIA: ICD-10-CM

## 2022-09-29 DIAGNOSIS — E11.311 DIABETIC MACULAR EDEMA OF RIGHT EYE (H): ICD-10-CM

## 2022-09-29 DIAGNOSIS — H25.813 COMBINED FORMS OF AGE-RELATED CATARACT OF BOTH EYES: ICD-10-CM

## 2022-09-29 DIAGNOSIS — H40.002 GLAUCOMA SUSPECT OF LEFT EYE: ICD-10-CM

## 2022-09-29 PROCEDURE — 92004 COMPRE OPH EXAM NEW PT 1/>: CPT | Performed by: OPHTHALMOLOGY

## 2022-09-29 PROCEDURE — 92015 DETERMINE REFRACTIVE STATE: CPT | Performed by: OPHTHALMOLOGY

## 2022-09-29 ASSESSMENT — REFRACTION_MANIFEST
OS_ADD: +2.50
OD_AXIS: 155
OD_ADD: +2.50
OS_AXIS: 034
OS_CYLINDER: +0.25
OD_SPHERE: -8.75
OD_CYLINDER: +0.50
OS_SPHERE: -8.00

## 2022-09-29 ASSESSMENT — TONOMETRY
OD_IOP_MMHG: 19
OS_IOP_MMHG: 19
IOP_METHOD: APPLANATION

## 2022-09-29 ASSESSMENT — VISUAL ACUITY
OS_CC: 20/50
OD_CC: 20/50
CORRECTION_TYPE: CONTACTS
METHOD: SNELLEN - LINEAR

## 2022-09-29 ASSESSMENT — CONF VISUAL FIELD
OS_NORMAL: 1
OD_NORMAL: 1

## 2022-09-29 NOTE — LETTER
9/29/2022         RE: Rudolph Tao  [address] [facility] [address]        Dear Colleague,    Thank you for referring your patient, Rudolph Tao, to the [facility]. Please see a copy of my visit note below.    Current Eye Medications:  None    Subjective:  He is here for Diabetic eye exam.   He wears contacts. AV Oasys?  He has noticed his vision is getting a little worse.   He complains of tearing     Floaters for years. No flashes of lights.     MANUEL Crowe  2:09 PM 09/29/2022     Usually contacts and OTC readers.  No history of surgery or laser.    Objective:  See Ophthalmology Exam.      Assessment:  Baseline eye exam in patient with mild cataracts, background diabetic retinopathy both eyes with diabetic macular edema right eye, and possible glaucomatous disc changes left eye.      ICD-10-CM    1. 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 Background diabetic retinopathy, mild, ou  E11.3299    3. Diabetic macular edema of right eye (H)  E11.311    4. Combined forms of age-related cataract of both eyes  H25.813    5. Glaucoma suspect of left eye  H40.002    6. Posterior vitreous detachment of left eye  H43.812    7. Encounter for examination of eyes and vision with abnormal findings  Z01.01    8. Presbyopia  H52.4        Plan:  Glasses Rx given - optional  Return visit 1-2 weeks for an intraocular pressure check, glaucoma OCT, retinal OCT, and Torres Visual Field.  Ciaran Clifton M.D.  [phone]        Again, thank you for allowing me to participate in the care of your patient.        Sincerely,        Ciaran Clifton MD

## 2022-09-29 NOTE — PROGRESS NOTES
Subjective:    Pt is seen today in consult from Dr. Bustos for foot exam.  His chief complaint today is pain and points to lateral right hallux.  This is the first time he had this.  He said this for approximately a month.  He has been treating this with a silicone toe spacers.  Is slowly getting better.  Has history of bilateral bunions.  Had left bunionectomy and second and third hammertoe repair in the past.  Patient denies purulence odor drainage erythema or edema.  He has numbness and tingling in his feet.  Quit smoking in 2015.  History of alcohol use.     ROS:  A 10-point review of systems was performed and is positive for that noted in the HPI and as seen below.  All other areas are negative.          Allergies   Allergen Reactions     Avapro [Irbesartan]      Profuse sweating     Crestor [Rosuvastatin Calcium]      cough     Sulfa Drugs Swelling and Hives     Simvastatin      Elevated BS readings       Current Outpatient Medications   Medication Sig Dispense Refill     acetone urine (KETOSTIX) test strip 1 strip once as needed (with unexplained blood glucose over 300 mg/dL) Use one strip, as needed, with unexplained blood glucose over 300 mg/dL. 50 strip 6     albuterol (PROAIR HFA/PROVENTIL HFA/VENTOLIN HFA) 108 (90 Base) MCG/ACT inhaler Inhale 2 puffs into the lungs every 6 hours 18 g 0     albuterol (PROVENTIL) (2.5 MG/3ML) 0.083% neb solution Take 1 vial (2.5 mg) by nebulization every 6 hours as needed for shortness of breath / dyspnea or wheezing 90 mL 0     albuterol (PROVENTIL) (2.5 MG/3ML) 0.083% neb solution Take 1 vial (2.5 mg) by nebulization every 6 hours as needed for shortness of breath / dyspnea or wheezing 1 Box 0     ALPRAZolam (XANAX) 0.5 MG tablet Take 1 tablet (0.5 mg) by mouth 3 times daily as needed for anxiety 1 month supply 30 tablet 0     aspirin (ASA) 81 MG EC tablet Take 1 tablet (81 mg) by mouth daily 90 tablet 3     B-D U/F insulin pen needle USE 6 TIMES DAILY 200 each 1      benzonatate (TESSALON) 200 MG capsule Take 1 capsule (200 mg) by mouth 3 times daily as needed for cough 30 capsule 0     cetirizine (ZYRTEC) 10 MG tablet Take 10 mg by mouth daily       fluticasone (FLONASE) 50 MCG/ACT nasal spray Spray 1 spray into both nostrils 2 times daily 16 g 0     gabapentin (NEURONTIN) 300 MG capsule Take with a With a 600 milligram tab to = a total daily dose of of 900 milligrams tid 90 capsule 3     gabapentin (NEURONTIN) 600 MG tablet Take 1 tablet (600 mg) by mouth 3 times daily With a 300 milligram tab to = a total daily dose of of 900 milligrams tid 90 tablet 3     insulin aspart (NOVOPEN ECHO) 100 UNIT/ML cartridge Inject 1 unit/30 grams of carbohydrates plus 1 unit/50 mg/dl above 200 mg/dl. Max dose of 20 units/day 15 mL 11     insulin glargine (LANTUS SOLOSTAR) 100 UNIT/ML pen INJECT 32 UNITS SUBCUTANEOUSLY ONCE DAILY 45 mL 1     lisinopril (ZESTRIL) 40 MG tablet TAKE 1 TABLET BY MOUTH ONCE DAILY . 90 tablet 1     metoclopramide (REGLAN) 10 MG tablet Take 1 tablet (10 mg) by mouth 4 times daily as needed 120 tablet 3     metoprolol succinate ER (TOPROL-XL) 25 MG 24 hr tablet Take 1 tablet (25 mg) by mouth daily 30 tablet 1     ondansetron (ZOFRAN-ODT) 4 MG ODT tab DISSOLVE 1 TABLET IN MOUTH EVERY 8 HOURS AS NEEDED FOR NAUSEA 20 tablet 4     pravastatin (PRAVACHOL) 20 MG tablet Take 1 tablet (20 mg) by mouth daily 90 tablet 1     rOPINIRole (REQUIP) 0.25 MG tablet TAKE 1-2 TABLETS BY MOUTH AT BEDTIME, no further refills until appointment 180 tablet 0     sildenafil (VIAGRA) 100 MG tablet Take 1 tablet (100 mg) by mouth daily as needed (sexual functioning) 8 tablet 11       Patient Active Problem List   Diagnosis     Hyperlipidemia LDL goal <100     Encounter for long-term (current) use of insulin (H)     Chronic low back pain     Libido, decreased     Restless legs     Tobacco abuse     Cervicalgia     Paresthesias     Persistent disorder of initiating or maintaining sleep      Spasms of the hands or feet     Hypertension goal BP (blood pressure) < 140/90     Right-sided low back pain without sciatica     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)     GAIL (generalized anxiety disorder)     Psychosocial stressors     DDD (degenerative disc disease), lumbar     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)     Hypoglycemia     Syncopal episodes     Gastroparesis     Polyneuropathy associated with underlying disease (H)     Alcohol withdrawal syndrome without complication (H)     Ketosis (H)       Past Medical History:   Diagnosis Date     Adopted      Anxiety      Anxiety      Chronic low back pain      DDD (degenerative disc disease), lumbar 3/8/2018     Diabetes mellitus (H)     TYPE 1     Hallux abducto valgus      Hyperlipaemia      Hyperlipidemia      Hypertension      Lip lesion 2010     Pain in toe of left foot     2 ND TOE     RLS (restless legs syndrome)      Snoring     MODERATE SEVERITY     Swelling of foot joint 2012       Past Surgical History:   Procedure Laterality Date     APPENDECTOMY       OSTEOTOMY FOOT  2013    Procedure: OSTEOTOMY FOOT;  Left Foot Distal First Metarsal Osteotomy, Second Toe Hammer Toe Correction, Second Metatarsal Weil Osteotomy;  Surgeon: Robert Augustine DPM;  Location: [facility]     SURGICAL HISTORY OF -       [facility]       Family History   Adopted: Yes   Problem Relation Age of Onset     Unknown/Adopted Mother      Cancer Mother      Unknown/Adopted Father      Unknown/Adopted Maternal Grandmother      Unknown/Adopted Maternal Grandfather      Unknown/Adopted Paternal Grandmother      Unknown/Adopted Paternal Grandfather        Social History     Tobacco Use     Smoking status: Former Smoker     Packs/day: 0.00     Years: 20.00     Pack years: 0.00     Types: Cigarettes     Quit date: 10/1/2015     Years since quittin.0     Smokeless tobacco: Never Used     Tobacco comment: 2-4  cigarettes daily   Substance Use Topics     Alcohol use:  Yes     Comment: 2 drinks daily         Exam:    Vitals: /86   Pulse 90   BMI: There is no height or weight on file to calculate BMI.  Height: Data Unavailable    Constitutional/ general:  Pt is in no apparent distress, appears well-nourished.  Cooperative with history and physical exam.  Patient seen with wife today.    Psych:  The patient answered questions appropriately.  Normal affect.  Seems to have reasonable expectations, in terms of treatment.     Eyes:  Visual scanning/ tracking without deficit.     Ears:  Response to auditory stimuli is normal.  negative hearing aid devices.  Auricles in proper alignment.     Lymphatic:  Popliteal lymph nodes not enlarged.     Lungs:  Non labored breathing, non labored speech. No cough.  No audible wheezing. Even, quiet breathing.       Vascular:  positive pedal pulses bilaterally for both the DP and PT arteries.  CFT < 3 sec.  negative ankle edema.  positive pedal hair growth.    Neuro:  Alert and oriented x 3. Coordinated gait.  Light touch sensation is decreased in digits.  No evidence of neurological-based weakness, spasticity, or contracture in the lower extremities.  Monofilament intact on digits    Derm: Normal texture and turgor.  No erythema, ecchymosis, or cyanosis.      Musculoskeletal:    Lower extremity muscle strength is normal.  Patient is ambulatory without an assistive device or brace.  Normal arch.  Bilateral bunions with right being worse.  Right hallux lateral IPJ discolored tissue noted.  There is a 5 x 1 mm dry eschar.  Appears to be healing.  No erythema or edema.  No lesion on the medial right second toe.  With signs of breakdown in the skin bilaterally.    Radiographic Exam:  X-Ray Findings:  I personally reviewed the films.  Consistent with above     Latest Reference Range & Units 02/25/22 16:12   Hemoglobin A1C 0.0 - 5.6 % 8.1 (H)   (H): Data is abnormally high    A:  Diabetes mellitus with peripheral neuropathy   Bilateral bunion  deformities  Right hallux ulcer    P:  Discussed with patient importance of controlling blood sugars well at all times.  He will watch feet daily and keep protected with shoes.  Discussed mechanical cause of right hallux ulcer.  This is healing.  He will keep offloading with silicone pads.  Discussed rocker-bottom shoes to help offload this as well.  We wrote him a prescription for orthotics to help support and protect foot.  Patient notices any signs of infection he will contact a provider immediately.  RTC as needed.  Thank you for allowing me participate in the care of this patient.        Yonatan Denise, MERYL, FACFAS

## 2022-09-29 NOTE — PATIENT INSTRUCTIONS
Glasses Rx given - optional  Return visit 1-2 weeks for Return visit 6 months for an intraocular pressure check, glaucoma OCT, retinal OCT, and Torres Visual Field.  Ciaran Clifton M.D.  [phone]    Patient Education   Diabetes weakens the blood vessels all over the body, including the eyes. Damage to the blood vessels in the eyes can cause swelling or bleeding into part of the eye (called the retina). This is called diabetic retinopathy (LU-tin--[facility]-thee). If not treated, this disease can cause vision loss or blindness.   Symptoms may include blurred or distorted vision, but many people have no symptoms. It's important to see your eye doctor regularly to check for problems.   Early treatment and good control can help protect your vision. Here are the things you can do to help prevent vision loss:      1. Keep your blood sugar levels under tight control.      2. Bring high blood pressure under control.      3. No smoking.      4. Have yearly dilated eye exams.

## 2022-09-29 NOTE — PROGRESS NOTES
Current Eye Medications:  None    Subjective:  He is here for Diabetic eye exam.   He wears contacts. AV Oasys?  He has noticed his vision is getting a little worse.   He complains of tearing     Floaters for years. No flashes of lights.     MANUEL Crowe  2:09 PM 09/29/2022     Usually contacts and OTC readers.  No history of surgery or laser.    Objective:  See Ophthalmology Exam.      Assessment:  Baseline eye exam in patient with mild cataracts, background diabetic retinopathy both eyes with diabetic macular edema right eye, and possible glaucomatous disc changes left eye.      ICD-10-CM    1. 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 Background diabetic retinopathy, mild, ou  E11.3299    3. Diabetic macular edema of right eye (H)  E11.311    4. Combined forms of age-related cataract of both eyes  H25.813    5. Glaucoma suspect of left eye  H40.002    6. Posterior vitreous detachment of left eye  H43.812    7. Encounter for examination of eyes and vision with abnormal findings  Z01.01    8. Presbyopia  H52.4        Plan:  Glasses Rx given - optional  Return visit 1-2 weeks for an intraocular pressure check, glaucoma OCT, retinal OCT, and Torres Visual Field.  Ciaran Clifton M.D.  [phone]

## 2022-10-01 PROBLEM — H43.812 POSTERIOR VITREOUS DETACHMENT OF LEFT EYE: Status: ACTIVE | Noted: 2022-10-01

## 2022-10-01 PROBLEM — E11.311 DIABETIC MACULAR EDEMA OF RIGHT EYE (H): Status: ACTIVE | Noted: 2022-10-01

## 2022-10-01 PROBLEM — E11.3299 BACKGROUND DIABETIC RETINOPATHY (H): Status: ACTIVE | Noted: 2022-10-01

## 2022-10-01 PROBLEM — H40.002 GLAUCOMA SUSPECT OF LEFT EYE: Status: ACTIVE | Noted: 2022-10-01

## 2022-10-01 PROBLEM — H25.813 COMBINED FORMS OF AGE-RELATED CATARACT OF BOTH EYES: Status: ACTIVE | Noted: 2022-10-01

## 2022-10-01 ASSESSMENT — EXTERNAL EXAM - RIGHT EYE: OD_EXAM: NORMAL

## 2022-10-01 ASSESSMENT — EXTERNAL EXAM - LEFT EYE: OS_EXAM: NORMAL

## 2022-10-01 ASSESSMENT — SLIT LAMP EXAM - LIDS
COMMENTS: 1+ PTOSIS, 1+ SCLERAL SHOW
COMMENTS: 1+ PTOSIS, 1+ SCLERAL SHOW

## 2022-10-01 ASSESSMENT — CUP TO DISC RATIO
OS_RATIO: 0.8
OD_RATIO: 0.3

## 2022-10-14 DIAGNOSIS — E10.8 DIABETES MELLITUS TYPE 1 WITH COMPLICATIONS (H): Primary | ICD-10-CM

## 2022-11-10 ENCOUNTER — OFFICE VISIT (OUTPATIENT)
Dept: FAMILY MEDICINE | Facility: CLINIC | Age: 52
End: 2022-11-10
Payer: COMMERCIAL

## 2022-11-10 VITALS
BODY MASS INDEX: 25.85 KG/M2 | OXYGEN SATURATION: 99 % | HEART RATE: 96 BPM | SYSTOLIC BLOOD PRESSURE: 118 MMHG | RESPIRATION RATE: 16 BRPM | WEIGHT: 170 LBS | TEMPERATURE: 96.7 F | DIASTOLIC BLOOD PRESSURE: 84 MMHG

## 2022-11-10 DIAGNOSIS — E10.8 DIABETES MELLITUS TYPE 1 WITH COMPLICATIONS (H): ICD-10-CM

## 2022-11-10 DIAGNOSIS — G56.22 ENTRAPMENT OF LEFT ULNAR NERVE: ICD-10-CM

## 2022-11-10 DIAGNOSIS — Z01.818 PREOP GENERAL PHYSICAL EXAM: Primary | ICD-10-CM

## 2022-11-10 DIAGNOSIS — Z12.5 SCREENING FOR PROSTATE CANCER: ICD-10-CM

## 2022-11-10 LAB
CHOLEST SERPL-MCNC: 197 MG/DL
FASTING STATUS PATIENT QL REPORTED: NO
HBA1C MFR BLD: 7.3 % (ref 0–5.6)
HDLC SERPL-MCNC: 112 MG/DL
LDLC SERPL CALC-MCNC: 70 MG/DL
NONHDLC SERPL-MCNC: 85 MG/DL
PSA SERPL-MCNC: 0.46 UG/L (ref 0–4)
TRIGL SERPL-MCNC: 77 MG/DL
TSH SERPL DL<=0.005 MIU/L-ACNC: 2.58 MU/L (ref 0.4–4)
VIT B12 SERPL-MCNC: 815 PG/ML (ref 232–1245)

## 2022-11-10 PROCEDURE — 80061 LIPID PANEL: CPT | Performed by: FAMILY MEDICINE

## 2022-11-10 PROCEDURE — 84443 ASSAY THYROID STIM HORMONE: CPT | Performed by: FAMILY MEDICINE

## 2022-11-10 PROCEDURE — 91312 COVID-19,PF,PFIZER BOOSTER BIVALENT: CPT | Performed by: FAMILY MEDICINE

## 2022-11-10 PROCEDURE — G0103 PSA SCREENING: HCPCS | Performed by: FAMILY MEDICINE

## 2022-11-10 PROCEDURE — 99214 OFFICE O/P EST MOD 30 MIN: CPT | Performed by: FAMILY MEDICINE

## 2022-11-10 PROCEDURE — 82607 VITAMIN B-12: CPT | Performed by: FAMILY MEDICINE

## 2022-11-10 PROCEDURE — 36415 COLL VENOUS BLD VENIPUNCTURE: CPT | Performed by: FAMILY MEDICINE

## 2022-11-10 PROCEDURE — 0124A COVID-19,PF,PFIZER BOOSTER BIVALENT: CPT | Performed by: FAMILY MEDICINE

## 2022-11-10 PROCEDURE — 83036 HEMOGLOBIN GLYCOSYLATED A1C: CPT | Performed by: FAMILY MEDICINE

## 2022-11-10 NOTE — PROGRESS NOTES
[facility]  [address] [address] [place] [address]  Phone: [phone]  Primary Provider: Haim Bustos  Pre-op Performing Provider: DENNY TOSCANO      PREOPERATIVE EVALUATION:  Today's date: 11/10/2022    Rudolph Tao is a 52 year old male who presents for a preoperative evaluation.    Surgical Information:  Surgery/Procedure: LEFT ULNAR NERVE DECOMPRESSION WITH ANTERIOR TRANSPOSITION  Surgery Location: [facility]  Surgeon: Palmira  Surgery Date: 11/22/22  Time of Surgery: 730am  Where patient plans to recover: At home with family   Fax number for surgical facility: [phone]      Type of Anesthesia Anticipated: General     Assessment & Plan     The proposed surgical procedure is considered INTERMEDIATE risk.    1. Preop general physical exam    2. Entrapment of left ulnar nerve    3. Screening for prostate cancer  - PSA, screen; Future  - PSA, screen    4. 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  May resume afternoon Lantus after surgery procedure.   - Lipid panel reflex to direct LDL Fasting  - Hemoglobin A1c  - Vitamin B12  - TSH with free T4 reflex  - Albumin Random Urine Quantitative with Creat Ratio; Future        Possible Sleep Apnea:    Patient plans to be evaluated for ELVIS   Implanted Device:  Patient advised to remove the Jenni continuous glucose monitor the day of the procedure.    Risks and Recommendations:  The patient has the following additional risks and recommendations for perioperative complications:  Diabetes:  - Patient is on insulin therapy; diabetic NPO guidelines provided and discussed.    Medication Instructions:  - short acting insulin (e.g. regular, lispro, aspart): HOLD on the morning of surgery.    - Continuous Glucose Monitor (CGM): Patient was made aware on the day of surgery, they should be prepared to remove the Continuous Glucose Monitor (CGM)  prior to the operation in order to avoid damage to the equipment during the procedure. The CGM will not be the source of glucose monitoring during the operation.   - naproxen (Aleve, Naprosyn): HOLD 4 days before surgery.     RECOMMENDATION:  APPROVAL GIVEN to proceed with proposed procedure, without further diagnostic evaluation.        Subjective     HPI related to upcoming procedure: 53 yo male with left ulnar nerve compression.     Preop Questions 10/22/2021   1. Have you ever had a heart attack or stroke? No   2. Have you ever had surgery on your heart or blood vessels, such as a stent placement, a coronary artery bypass, or surgery on an artery in your head, neck, heart, or legs? No   3. Do you have chest pain with activity? No   4. Do you have a history of  heart failure? No   5. Do you currently have a cold, bronchitis or symptoms of other infection? No   6. Do you have a cough, shortness of breath, or wheezing? No   7. Do you or anyone in your family have previous history of blood clots? No   8. Do you or does anyone in your family have a serious bleeding problem such as prolonged bleeding following surgeries or cuts? No   9. Have you ever had problems with anemia or been told to take iron pills? No   10. Have you had any abnormal blood loss such as black, tarry or bloody stools? No   11. Have you ever had a blood transfusion? No   12. Are you willing to have a blood transfusion if it is medically needed before, during, or after your surgery? Yes   13. Have you or any of your relatives ever had problems with anesthesia? No   14. Do you have sleep apnea, excessive snoring or daytime drowsiness? YES - Has not been formally diagnosed with ELVIS   14a. Do you have a CPAP machine? No   15. Do you have any artifical heart valves or other implanted medical devices like a pacemaker, defibrillator, or continuous glucose monitor? YES - Has the jenni   15a. What type of device do you have? Jenni CGM   15b. Name of the  clinic that manages your device:  [facility]   16. Do you have artificial joints? YES - Left foot hardware   17. Are you allergic to latex? No       Health Care Directive:  Patient does not have a Health Care Directive or Living Will: Patient states has Advance Directive and will bring in a copy to clinic.    Preoperative Review of :   reviewed - controlled substances reflected in medication list.      Status of Chronic Conditions:  DIABETES - Patient has a longstanding history of DiabetesType Type I . Patient is being treated with insulin injections and denies significant side effects. Control has been fair. Complicating factors include but are not limited to: neuropathy.       Review of Systems  CONSTITUTIONAL: NEGATIVE for fever, chills, change in weight  ENT/MOUTH: NEGATIVE for ear, mouth and throat problems  RESP: NEGATIVE for significant cough or SOB  CV: NEGATIVE for chest pain, palpitations or peripheral edema    Patient Active Problem List    Diagnosis Date Noted     Background diabetic retinopathy, mild, ou 10/01/2022     Priority: Medium     Posterior vitreous detachment of left eye 10/01/2022     Priority: Medium     Combined forms of age-related cataract of both eyes 10/01/2022     Priority: Medium     Diabetic macular edema of right eye (H) 10/01/2022     Priority: Medium     Glaucoma suspect of left eye 10/01/2022     Priority: Medium     Polyneuropathy associated with underlying disease (H) 02/25/2022     Priority: Medium     Alcohol withdrawal syndrome without complication (H) 02/25/2022     Priority: Medium     Ketosis (H) 02/25/2022     Priority: Medium     Gastroparesis 07/11/2018     Priority: Medium     DDD (degenerative disc disease), lumbar 03/08/2018     Priority: Medium     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) 09/18/2017     Priority: Medium     GAIL (generalized anxiety disorder) 09/11/2017     Priority: Medium     Psychosocial stressors 09/11/2017     Priority: Medium     Hypoglycemia  11/26/2015     Priority: Medium     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) 10/17/2015     Priority: Medium     Right-sided low back pain without sciatica 07/06/2015     Priority: Medium     Hypertension goal BP (blood pressure) < 140/90 05/12/2015     Priority: Medium     Spasms of the hands or feet 05/11/2015     Priority: Medium     Persistent disorder of initiating or maintaining sleep 05/19/2014     Priority: Medium     Cervicalgia 10/01/2013     Priority: Medium     Paresthesias 10/01/2013     Priority: Medium     Tobacco abuse 05/13/2013     Priority: Medium     Restless legs 02/18/2013     Priority: Medium     Libido, decreased 12/18/2012     Priority: Medium     Chronic low back pain 01/02/2012     Priority: Medium     Encounter for long-term (current) use of insulin (H) 09/19/2011     Priority: Medium     Syncopal episodes 12/19/2009     Priority: Medium     Hyperlipidemia LDL goal <100 09/03/2009     Priority: Medium      Past Medical History:   Diagnosis Date     Adopted      Anxiety      Anxiety      Chronic low back pain      DDD (degenerative disc disease), lumbar 3/8/2018     Diabetes mellitus (H)     TYPE 1     Hallux abducto valgus      Hyperlipaemia      Hyperlipidemia      Hypertension      Lip lesion 8/2/2010     Pain in toe of left foot     2 ND TOE     RLS (restless legs syndrome)      Snoring     MODERATE SEVERITY     Swelling of foot joint 7/13/2012     Past Surgical History:   Procedure Laterality Date     APPENDECTOMY  1990     OSTEOTOMY FOOT  1/8/2013    Procedure: OSTEOTOMY FOOT;  Left Foot Distal First Metarsal Osteotomy, Second Toe Hammer Toe Correction, Second Metatarsal Weil Osteotomy;  Surgeon: Robert Augustine DPM;  [facility]     SURGICAL HISTORY OF -   1992    Appy     Current Outpatient Medications   Medication Sig Dispense Refill     acetone urine (KETOSTIX) test strip 1 strip once as needed (with unexplained blood glucose over 300 mg/dL) Use one strip, as  needed, with unexplained blood glucose over 300 mg/dL. 50 strip 6     albuterol (PROAIR HFA/PROVENTIL HFA/VENTOLIN HFA) 108 (90 Base) MCG/ACT inhaler Inhale 2 puffs into the lungs every 6 hours 18 g 0     albuterol (PROVENTIL) (2.5 MG/3ML) 0.083% neb solution Take 1 vial (2.5 mg) by nebulization every 6 hours as needed for shortness of breath / dyspnea or wheezing 90 mL 0     ALPRAZolam (XANAX) 0.5 MG tablet Take 1 tablet (0.5 mg) by mouth 3 times daily as needed for anxiety 1 month supply 30 tablet 0     aspirin (ASA) 81 MG EC tablet Take 1 tablet (81 mg) by mouth daily 90 tablet 3     B-D U/F insulin pen needle USE 6 TIMES DAILY 200 each 1     cetirizine (ZYRTEC) 10 MG tablet Take 10 mg by mouth daily       Continuous Blood Gluc Sensor (FREESTYLE JUSTIN 2 SENSOR) MISC CHANGE SENSOR EVERY 14 DAYS 2 each 11     gabapentin (NEURONTIN) 300 MG capsule Take with a With a 600 milligram tab to = a total daily dose of of 900 milligrams tid 90 capsule 3     gabapentin (NEURONTIN) 600 MG tablet Take 1 tablet (600 mg) by mouth 3 times daily With a 300 milligram tab to = a total daily dose of of 900 milligrams tid 90 tablet 3     insulin aspart (NOVOPEN ECHO) 100 UNIT/ML cartridge Inject 1 unit/30 grams of carbohydrates plus 1 unit/50 mg/dl above 200 mg/dl. Max dose of 20 units/day 15 mL 11     insulin glargine (LANTUS SOLOSTAR) 100 UNIT/ML pen INJECT 32 UNITS SUBCUTANEOUSLY ONCE DAILY 45 mL 1     lisinopril (ZESTRIL) 40 MG tablet TAKE 1 TABLET BY MOUTH ONCE DAILY . 90 tablet 1     metoclopramide (REGLAN) 10 MG tablet Take 1 tablet (10 mg) by mouth 4 times daily as needed 120 tablet 3     metoprolol succinate ER (TOPROL-XL) 25 MG 24 hr tablet Take 1 tablet (25 mg) by mouth daily 30 tablet 1     ondansetron (ZOFRAN-ODT) 4 MG ODT tab DISSOLVE 1 TABLET IN MOUTH EVERY 8 HOURS AS NEEDED FOR NAUSEA 20 tablet 4     pravastatin (PRAVACHOL) 20 MG tablet Take 1 tablet (20 mg) by mouth daily 90 tablet 1     rOPINIRole (REQUIP) 0.25 MG  tablet TAKE 1-2 TABLETS BY MOUTH AT BEDTIME, no further refills until appointment 180 tablet 0     sildenafil (VIAGRA) 100 MG tablet Take 1 tablet (100 mg) by mouth daily as needed (sexual functioning) 8 tablet 11       Allergies   Allergen Reactions     Avapro [Irbesartan]      Profuse sweating     Crestor [Rosuvastatin Calcium]      cough     Sulfa Drugs Swelling and Hives     Simvastatin      Elevated BS readings        Social History     Tobacco Use     Smoking status: Former     Packs/day: 0.00     Years: 20.00     Pack years: 0.00     Types: Cigarettes     Quit date: 10/1/2015     Years since quittin.1     Smokeless tobacco: Never     Tobacco comments:     2-4  cigarettes daily   Substance Use Topics     Alcohol use: Yes     Comment: 2 drinks daily     Family History   Adopted: Yes   Problem Relation Age of Onset     Unknown/Adopted Mother      Cancer Mother      Unknown/Adopted Father      Unknown/Adopted Maternal Grandmother      Unknown/Adopted Maternal Grandfather      Unknown/Adopted Paternal Grandmother      Unknown/Adopted Paternal Grandfather      History   Drug Use No         Objective     /84   Pulse 96   Temp (!) 96.7  F (35.9  C) (Tympanic)   Resp 16   Wt 77.1 kg (170 lb)   SpO2 99%   BMI 25.85 kg/m      Physical Exam  Constitutional:       General: He is not in acute distress.     Appearance: He is well-developed and well-nourished.   HENT:      Head: Normocephalic and atraumatic.      Nose: Nose normal.   Eyes:      Extraocular Movements: EOM normal.      Conjunctiva/sclera: Conjunctivae normal.   Neck:      Trachea: No tracheal deviation.   Cardiovascular:      Rate and Rhythm: Normal rate and regular rhythm.      Heart sounds: Normal heart sounds.   Pulmonary:      Effort: Pulmonary effort is normal.      Breath sounds: No wheezing.   Musculoskeletal:         General: Normal range of motion.      Cervical back: Normal range of motion.   Skin:     Findings: No erythema or rash.    Neurological:      Mental Status: He is alert and oriented to person, place, and time.   Psychiatric:         Mood and Affect: Mood and affect normal.         Behavior: Behavior normal.           Recent Labs   Lab Test 02/25/22  1612 06/25/21  1152    141   POTASSIUM 4.6 4.0   CR 0.75 0.72   A1C 8.1* 7.3*        Diagnostics:  No labs were ordered during this visit.   No EKG required, no history of coronary heart disease, significant arrhythmia, peripheral arterial disease or other structural heart disease.    Revised Cardiac Risk Index (RCRI):  The patient has the following serious cardiovascular risks for perioperative complications:   - Diabetes Mellitus (on Insulin) = 1 point     RCRI Interpretation: 1 point: Class II (low risk - 0.9% complication rate)           Signed Electronically by: Chuy Choudhury DO  Copy of this evaluation report is provided to requesting physician.

## 2022-11-10 NOTE — PATIENT INSTRUCTIONS
Damian Ponce,    Thank you for allowing [facility] to manage your care.    I ordered some blood work, please go to the laboratory to get your laboratory studies.    For your convenience, test results are released as soon as they are available  Please allow 1-2 business days for me to send you a comment about your results.  If not done so, I encourage you to login into [employer] ([email or web address] to review your results in real time.     Please submit your Cologuard at your earliest convenience.    Please drop off a copy of your advanced directive.     If you have any questions or concerns, please feel free to call us at [phone].    Sincerely,    Dr. Choudhury    Did you know?      You can schedule a video visit for follow-up appointments as well as future appointments for certain conditions.  Please see the below link.     [email or web address]    If you have not already done so,  I encourage you to sign up for [employer] ([email or web address]).  This will allow you to review your results, securely communicate with a provider, and schedule virtual visits as well.    Preparing for Your Surgery  Getting started  A nurse will call you to review your health history and instructions. They will give you an arrival time based on your scheduled surgery time. Please be ready to share:  Your doctor s clinic name and phone number  Your medical, surgical, and anesthesia history  A list of allergies and sensitivities  A list of medicines, including herbal treatments and over-the-counter drugs  Whether the patient has a legal guardian (ask how to send us the papers in advance)  Please tell us if you re pregnant--or if there s any chance you might be pregnant. Some surgeries may injure a fetus (unborn baby), so they require a pregnancy test. Surgeries that are safe for a fetus don t always need a test, and you can choose whether to have one.   If you have a child  who s having surgery, please ask for a copy of Preparing for Your Child s Surgery.    Preparing for surgery  Within 10 to 30 days of surgery: Have a pre-op exam (sometimes called an H&P, or History and Physical). This can be done at a clinic or pre-operative center.  If you re having a , you may not need this exam. Talk to your care team.  At your pre-op exam, talk to your care team about all medicines you take. If you need to stop any medicines before surgery, ask when to start taking them again.  We do this for your safety. Many medicines can make you bleed too much during surgery. Some change how well surgery (anesthesia) drugs work.  Call your insurance company to let them know you re having surgery. (If you don t have insurance, call [phone].)  Call your clinic if there s any change in your health. This includes signs of a cold or flu (sore throat, runny nose, cough, rash, fever). It also includes a scrape or scratch near the surgery site.  If you have questions on the day of surgery, call your hospital or surgery center.  COVID testing  You may need to be tested for COVID-19 before having surgery. If so, we will give you instructions (or click here).  Eating and drinking guidelines  For your safety: Unless your surgeon tells you otherwise, follow the guidelines below.  Eat and drink as usual until 8 hours before you arrive for surgery. After that, no food or milk.  Drink clear liquids until 2 hours before you arrive. These are liquids you can see through, like water, Gatorade, and Propel Water. They also include plain black coffee and tea (no cream or milk), candy, and breath mints. You can spit out gum when you arrive.  If you drink alcohol: Stop drinking it the night before surgery.  If your care team tells you to take medicine on the morning of surgery, it s okay to take it with a sip of water.  Preventing infection  Shower or bathe the night before and morning of your surgery. Follow the  instructions your clinic gave you. (If no instructions, use regular soap.)  Don t shave or clip hair near your surgery site. We ll remove the hair if needed.  Don t smoke or vape the morning of surgery. You may chew nicotine gum up to 2 hours before surgery. A nicotine patch is okay.  Note: Some surgeries require you to completely quit smoking and nicotine. Check with your surgeon.  Your care team will make every effort to keep you safe from infection. We will:  Clean our hands often with soap and water (or an alcohol-based hand rub).  Clean the skin at your surgery site with a special soap that kills germs.  Give you a special gown to keep you warm. (Cold raises the risk of infection.)  Wear special hair covers, masks, gowns and gloves during surgery.  Give antibiotic medicine, if prescribed. Not all surgeries need antibiotics.  What to bring on the day of surgery  Photo ID and insurance card  Copy of your health care directive, if you have one  Glasses and hearing aids (bring cases)  You can t wear contacts during surgery  Inhaler and eye drops, if you use them (tell us about these when you arrive)  CPAP machine or breathing device, if you use them  A few personal items, if spending the night  If you have . . .  A pacemaker, ICD (cardiac defibrillator) or other implant: Bring the ID card.  An implanted stimulator: Bring the remote control.  A legal guardian: Bring a copy of the certified (court-stamped) guardianship papers.  Please remove any jewelry, including body piercings. Leave jewelry and other valuables at home.  If you re going home the day of surgery  You must have a responsible adult drive you home. They should stay with you overnight as well.  If you don t have someone to stay with you, and you aren t safe to go home alone, we may keep you overnight. Insurance often won t pay for this.  After surgery  If it s hard to control your pain or you need more pain medicine, please call your surgeon s  office.  Questions?   If you have any questions for your care team, list them here:   ____________________________________________________________________________________________________________________________________________________________________________________________________________________________________________________________________  For informational purposes only. Not to replace the advice of your health care provider. Copyright   2003, 2019 [facility]. All rights reserved. Clinically reviewed by Viry Connolly MD. [employer] 467598 - REV 10/22.    How to Take Your Medication Before Surgery  - HOLD (do not take) alleve five days prior to the procedure  -HOLD your Novolog the day of your procedure    Instructions About Your Continuous Glucose Monitor  You should be prepared to remove the Continuous Glucose Monitor prior to the operation in order to avoid damage to the equipment during the procedure. Your Continuous Glucose Monitor will not be the source of glucose monitoring during the operation.    How to Manage Your Diabetes Before Surgery  If you use insulin for your diabetes, follow these steps to keep your blood sugar in a safe range before surgery, when you ve been told not to eat or drink:   Check your blood sugar every 4 hours   If your blood sugar is high, take a corrective dose (not a meal dose) of sliding-scale insulin, if that is what you re used to doing  If your blood sugar is below 100, or you have symptoms of hypoglycemia, follow these steps:   Have 1 item from this list:  4 oz (1/2 cup) of fruit juice without pulp    4 oz (1/2 cup) of regular soda (not diet soda)    3 glucose gels    5 sugar cubes or sugar packets   Check your blood sugar again after 15 minutes  Repeat steps 1 and 2 again until your blood sugar is greater than 100

## 2022-11-15 NOTE — RESULT ENCOUNTER NOTE
Dear Kris,     Here are your recent results which are within the expected range.   Vitamin B12 is within the normal range.  Thyroid blood work result is within the normal limits.  Cholesterol results have improved compared to previous results.  Hemoglobin A1c has improved to 7.3 from previous number of 8.1.  Further adjustment in diabetic medication will be discussed at the time of your follow-up appointment.    Please continue with your current plan of care and let us know if you have any questions or concerns.    Regards,  Kimo Hui MD

## 2022-11-23 DIAGNOSIS — F41.1 GENERALIZED ANXIETY DISORDER: ICD-10-CM

## 2022-11-23 RX ORDER — ALPRAZOLAM 0.5 MG
TABLET ORAL
Qty: 6 TABLET | Refills: 0 | Status: SHIPPED | OUTPATIENT
Start: 2022-11-23 | End: 2023-01-24

## 2022-11-23 NOTE — TELEPHONE ENCOUNTER
I can give #6 tabs for now. He can follow-up with his primary when his primary is back in the office.     Signed Prescriptions:                        Disp   Refills    ALPRAZolam (XANAX) 0.5 MG tablet           6 tabl*0        Sig: TAKE 1 TABLET BY MOUTH THREE TIMES DAILY AS NEEDED           FOR ANXIETY *ONE MONTH SUPPLY*  Authorizing Provider: ROBERTO BURKS

## 2022-11-23 NOTE — TELEPHONE ENCOUNTER
"Please clarify what info provider would like team to ask patient    Per 3/30/2021 OV notes, Haim Mae refilled Xanax for generalized anxiety and noted that \"it's used very sparingly\"  Med was refilled by Haim Mae again on 9/8/2022    Nicky Parker RN  [facility]    "

## 2022-11-23 NOTE — LETTER
November 28, 2022      Rudolph Tao  [address] [address] [address]      Dear Rudolph,      Your provider has sent a  alec refill of ALPRAZolam (XANAX) 0.5 MG tablet. You are due for an appointment for further refills.  We ask that you schedule a visit with your provider. Please contact the clinic or use [employer] to schedule an appointment for further refills.       Thank you,      Naheed Cota MD/AV

## 2022-12-08 DIAGNOSIS — E10.8 DIABETES MELLITUS TYPE 1 WITH COMPLICATIONS (H): ICD-10-CM

## 2022-12-08 RX ORDER — INSULIN GLARGINE 100 [IU]/ML
INJECTION, SOLUTION SUBCUTANEOUS
Qty: 15 ML | Refills: 0 | Status: SHIPPED | OUTPATIENT
Start: 2022-12-08 | End: 2023-01-24

## 2023-01-24 DIAGNOSIS — E10.8 DIABETES MELLITUS TYPE 1 WITH COMPLICATIONS (H): ICD-10-CM

## 2023-01-24 DIAGNOSIS — F41.1 GENERALIZED ANXIETY DISORDER: ICD-10-CM

## 2023-01-24 RX ORDER — INSULIN GLARGINE 100 [IU]/ML
INJECTION, SOLUTION SUBCUTANEOUS
Qty: 15 ML | Refills: 0 | Status: SHIPPED | OUTPATIENT
Start: 2023-01-24 | End: 2023-03-13

## 2023-01-24 RX ORDER — ALPRAZOLAM 0.5 MG
TABLET ORAL
Qty: 30 TABLET | Refills: 0 | Status: SHIPPED | OUTPATIENT
Start: 2023-01-24 | End: 2023-05-30

## 2023-01-24 RX ORDER — BUPROPION HYDROCHLORIDE 300 MG/1
TABLET ORAL
Qty: 90 TABLET | Refills: 0 | Status: SHIPPED | OUTPATIENT
Start: 2023-01-24 | End: 2023-07-03

## 2023-01-27 ENCOUNTER — TELEPHONE (OUTPATIENT)
Dept: FAMILY MEDICINE | Facility: CLINIC | Age: 53
End: 2023-01-27
Payer: COMMERCIAL

## 2023-01-27 NOTE — TELEPHONE ENCOUNTER
Pt calling regards stating over the last several days had some low back pain     Most of the pain is mostly located low back into the left hip.     PT states the pain is stabbing and is unable to walk at all     PT is unable to get comfortable.     Denies fever and any other signs of infectious etiologies.   Denies recent trauma   Feeling like left leg is giving out  Denies having this is the past   Denies loss of bowel or bladder   The pain has intensified over the last 24 hours.     Pt wondering if he should go to [facility] or ED for initial eval     RN states that given the pain is so severe and the pt is unable to walk at all , then it may be better to get initially evaluated in an urgent care or ED.       Pt plans to follow this advice and have his spouse take him.     Jannie Michelle RN  [facility]

## 2023-02-20 DIAGNOSIS — E10.42 TYPE 1 DIABETES MELLITUS WITH DIABETIC POLYNEUROPATHY (H): ICD-10-CM

## 2023-02-20 RX ORDER — GABAPENTIN 600 MG/1
TABLET ORAL
Qty: 90 TABLET | Refills: 1 | Status: SHIPPED | OUTPATIENT
Start: 2023-02-20 | End: 2023-05-11

## 2023-02-20 RX ORDER — GABAPENTIN 300 MG/1
CAPSULE ORAL
Qty: 90 CAPSULE | Refills: 1 | Status: SHIPPED | OUTPATIENT
Start: 2023-02-20 | End: 2023-05-11

## 2023-03-12 DIAGNOSIS — E10.649 TYPE 1 DIABETES MELLITUS WITH HYPOGLYCEMIA UNAWARENESS (H): ICD-10-CM

## 2023-03-12 DIAGNOSIS — E10.8 DIABETES MELLITUS TYPE 1 WITH COMPLICATIONS (H): ICD-10-CM

## 2023-03-13 RX ORDER — INSULIN ASPART 100 [IU]/ML
INJECTION, SOLUTION INTRAVENOUS; SUBCUTANEOUS
Qty: 15 ML | Refills: 0 | Status: SHIPPED | OUTPATIENT
Start: 2023-03-13 | End: 2024-03-04

## 2023-03-13 RX ORDER — INSULIN GLARGINE-YFGN 100 [IU]/ML
INJECTION, SOLUTION SUBCUTANEOUS
Qty: 15 ML | Refills: 0 | Status: SHIPPED | OUTPATIENT
Start: 2023-03-13 | End: 2023-04-06

## 2023-03-28 DIAGNOSIS — E10.9 TYPE 1 DIABETES MELLITUS WITHOUT COMPLICATION (H): ICD-10-CM

## 2023-03-28 RX ORDER — FLURBIPROFEN SODIUM 0.3 MG/ML
SOLUTION/ DROPS OPHTHALMIC
Qty: 600 EACH | Refills: 1 | Status: SHIPPED | OUTPATIENT
Start: 2023-03-28 | End: 2024-05-06

## 2023-04-06 ENCOUNTER — TELEPHONE (OUTPATIENT)
Dept: INTERNAL MEDICINE | Facility: CLINIC | Age: 53
End: 2023-04-06
Payer: COMMERCIAL

## 2023-04-06 DIAGNOSIS — E10.8 TYPE 1 DIABETES MELLITUS WITH COMPLICATIONS (H): Primary | ICD-10-CM

## 2023-04-06 DIAGNOSIS — U07.1 INFECTION DUE TO 2019 NOVEL CORONAVIRUS: ICD-10-CM

## 2023-04-06 DIAGNOSIS — G25.81 RESTLESS LEG SYNDROME: ICD-10-CM

## 2023-04-06 NOTE — TELEPHONE ENCOUNTER
RN COVID TREATMENT VISIT  04/06/23      The patient has been triaged and does not require a higher level of care.    Rudolph Tao  53 year old  Current weight? 165 lbs    Has the patient been seen by a primary care provider at an [facility] or [facility] within the past two years? Yes.   Have you been in close proximity to/do you have a known exposure to a person with a confirmed case of influenza? No.     General treatment eligibility:  Date of positive COVID test (PCR or at home)?  04/06/2023    Are you or have you been hospitalized for this COVID-19 infection? No.   Have you received monoclonal antibodies or antiviral treatment for COVID-19 since this positive test? No.   Do you have any of the following conditions that place you at risk of being very sick from COVID-19?   - Age 50 years or older  - Diabetes mellitus, type 1 and type 2  - Current or former smoker  Yes, patient has at least one high risk condition as noted above.     04/05/2023  Current COVID symptoms:   - fever or chills  - sore throat  - congestion or runny nose  - nausea or vomiting  Yes. Patient has at least one symptom as selected.     How many days since symptoms started? 5 days or less. Established patient, 12 years or older weighing at least 88.2 lbs, who has symptoms that started in the past 5 days, has not been hospitalized nor received treatment already, and is at risk for being very sick from COVID-19.     Treatment eligibility by RN:    Are you currently pregnant or nursing? No    Do you have a clinically significant hypersensitivity to nirmatrelvir or ritonavir, or toxic epidermal necrolysis (TEN) or Quick-Ed Syndrome? No    Do you have a history of hepatitis, any hepatic impairment on the Problem List (such as Child-Narayan Class C, cirrhosis, fatty liver disease, alcoholic liver disease), or was the last liver lab (hepatic panel, ALT, AST, ALK Phos, bilirubin) elevated in the past 6 months?  No    Do you have any history of severe renal impairment (eGFR < 30mL/min)? No    Is patient eligible to continue?   Yes, patient meets all eligibility requirements for the RN COVID treatment (as denoted by all no responses above).     Current Outpatient Medications   Medication Sig Dispense Refill     acetone urine (KETOSTIX) test strip 1 strip once as needed (with unexplained blood glucose over 300 mg/dL) Use one strip, as needed, with unexplained blood glucose over 300 mg/dL. 50 strip 6     albuterol (PROAIR HFA/PROVENTIL HFA/VENTOLIN HFA) 108 (90 Base) MCG/ACT inhaler Inhale 2 puffs into the lungs every 6 hours 18 g 0     albuterol (PROVENTIL) (2.5 MG/3ML) 0.083% neb solution Take 1 vial (2.5 mg) by nebulization every 6 hours as needed for shortness of breath / dyspnea or wheezing 90 mL 0     ALPRAZolam (XANAX) 0.5 MG tablet TAKE 1 TABLET BY MOUTH THREE TIMES DAILY AS NEEDED FOR ANXIETY *ONE MONTH SUPPLY* 30 tablet 0     aspirin (ASA) 81 MG EC tablet Take 1 tablet (81 mg) by mouth daily 90 tablet 3     B-D U/F insulin pen needle USE  SIX TIMES DAILY 600 each 1     buPROPion (WELLBUTRIN XL) 300 MG 24 hr tablet TAKE 1 TABLET BY MOUTH IN THE MORNING 90 tablet 0     cetirizine (ZYRTEC) 10 MG tablet Take 10 mg by mouth daily       Continuous Blood Gluc Sensor (FREESTYLE JUSTIN 2 SENSOR) MISC CHANGE SENSOR EVERY 14 DAYS 2 each 11     gabapentin (NEURONTIN) 300 MG capsule TAKE 1 CAPSULE BY MOUTH THREE TIMES DAILY WITH  A  600MG  TABLET  TO  EQUAL  900MG  THREE  TIMES  DAILY 90 capsule 1     gabapentin (NEURONTIN) 600 MG tablet TAKE 1 TABLET BY MOUTH THREE TIMES DAILY WITH  A  300MG  CAPSULE  TO  EQUAL  900MG  THREE  TIMES  DAILY 90 tablet 1     insulin aspart (NOVOLOG PENFILL) 100 UNIT/ML cartridge INJECT  1 UNIT PER 30 GRAMS OF CARBOHYDRATES PLUS 1 UNIT PER 50MG/DL ABOVE 200MG/DL. MAX DOSE 20 UNITS PER DAY. 15 mL 0     lisinopril (ZESTRIL) 40 MG tablet TAKE 1 TABLET BY MOUTH ONCE DAILY . 90 tablet 1     metoclopramide  (REGLAN) 10 MG tablet Take 1 tablet (10 mg) by mouth 4 times daily as needed 120 tablet 3     metoprolol succinate ER (TOPROL-XL) 25 MG 24 hr tablet Take 1 tablet (25 mg) by mouth daily 30 tablet 1     ondansetron (ZOFRAN-ODT) 4 MG ODT tab DISSOLVE 1 TABLET IN MOUTH EVERY 8 HOURS AS NEEDED FOR NAUSEA 20 tablet 4     pravastatin (PRAVACHOL) 20 MG tablet Take 1 tablet (20 mg) by mouth daily 90 tablet 1     rOPINIRole (REQUIP) 0.25 MG tablet TAKE 1-2 TABLETS BY MOUTH AT BEDTIME, no further refills until appointment 180 tablet 0     SEMGLEE, YFGN, 100 UNIT/ML SOPN INJECT 32 UNITS SUBCUTANEOUSLY ONCE DAILY 15 mL 0     sildenafil (VIAGRA) 100 MG tablet Take 1 tablet (100 mg) by mouth daily as needed (sexual functioning) 8 tablet 11     Patient is taking: Wellbutrin, Gabapentin, Insulin  Taking as needed (does not require medication daily): Alprazolam, Ceterizine, Metoclopramide, Zofran, Sildenafil, ropinirole   Patient is not taking these but requested that they remain on his medication list so that he can discuss with PCP at next visit: lisinopril, metoprolol succinate, Pravastatin    Medications from List 1 of the standing order (on medications that exclude the use of Paxlovid) that patient is taking: NONE. Is patient taking Johan's Wort? No  Is patient taking Johan's Wort or any meds from List 1? No.   Medications from List 2 of the standing order (on meds that provider needs to adjust) that patient is taking: NONE. Is patient on any of the meds from List 2? No.   Medications from List 3 of standing order (on meds that a RN needs to adjust) that patient is taking: alprazolam (Xanax): Is patient taking as needed and less than daily? Yes, instructed patient to stop taking alprazolam while taking Paxlovid and restart alprazolam 3 days after the completion of Paxlovid.  bupropion (Wellbutrin, Zyban): Instructed patient to continue taking buproprion as directed but know that it may have less effect while taking  Paxlovid.  Is patient on any meds from List 3? No.     Paxlovid has an approximate 90% reduction in hospitalization. Paxlovid can possibly cause altered sense of taste, diarrhea (loose, watery stools), high blood pressure, muscle aches.     Would patient like a Paxlovid prescription?   Yes.   Lab Results   Component Value Date    GFRESTIMATED >90 02/25/2022     Was last eGFR reduced? No, eGFR 60 or greater/ No Result on record. Patient can receive the normal renal function dose. Paxlovid Rx sent to [facility]     Temporary change to home medications: alprazolam (Xanax): Is patient taking as needed and less than daily? Yes, instructed patient to stop taking alprazolam while taking Paxlovid and restart alprazolam 3 days after the completion of Paxlovid.  bupropion (Wellbutrin, Zyban): Instructed patient to continue taking buproprion as directed but know that it may have less effect while taking Paxlovid.      All medication adjustments (holds, etc) were discussed with the patient and patient was asked to repeat back (teachback) their med adjustment.  Did patient understand med adjustment? Yes, patient repeated back and understood correctly.        Reviewed the following instructions with the patient:    Paxlovid (nimatrelvir and ritonavir)    How it works  Two medicines (nirmatrelvir and ritonavir) are taken together. They stop the virus from growing. Less amount of virus is easier for your body to fight.    How to take    Medicine comes in a daily container with both medicine tablets. Take by mouth twice daily (once in the morning, once at night) for 5 days.    The number of tablets to take varies by patient.    Don't chew or break capsules. Swallow whole.    When to take  Take as soon as possible after positive COVID-19 test result, and within 5 days of your first symptoms.    Possible side effects  Can cause altered sense of taste, diarrhea (loose, watery stools), high blood pressure, muscle  marilyn.    Kathy Ferguson RN     MyChart message sent to patient.

## 2023-04-09 ENCOUNTER — HEALTH MAINTENANCE LETTER (OUTPATIENT)
Age: 53
End: 2023-04-09

## 2023-04-26 DIAGNOSIS — E10.8 TYPE 1 DIABETES MELLITUS WITH COMPLICATIONS (H): ICD-10-CM

## 2023-04-26 RX ORDER — INSULIN GLARGINE 100 [IU]/ML
INJECTION, SOLUTION SUBCUTANEOUS
Qty: 15 ML | Refills: 0 | Status: SHIPPED | OUTPATIENT
Start: 2023-04-26 | End: 2023-04-27

## 2023-04-27 DIAGNOSIS — E10.8 TYPE 1 DIABETES MELLITUS WITH COMPLICATIONS (H): ICD-10-CM

## 2023-04-27 RX ORDER — INSULIN GLARGINE 100 [IU]/ML
INJECTION, SOLUTION SUBCUTANEOUS
Qty: 15 ML | Refills: 0 | Status: SHIPPED | OUTPATIENT
Start: 2023-04-27 | End: 2023-07-10

## 2023-04-27 NOTE — TELEPHONE ENCOUNTER
Pending Prescriptions:                       Disp   Refills    insulin glargine (LANTUS SOLOSTAR) 100 UN*15 mL  0            Sig: INJECT 32 UNITS SUBCUTANEOUSLY ONCE DAILY, no           further refills until appointment

## 2023-05-11 DIAGNOSIS — E10.42 TYPE 1 DIABETES MELLITUS WITH DIABETIC POLYNEUROPATHY (H): ICD-10-CM

## 2023-05-11 RX ORDER — GABAPENTIN 300 MG/1
CAPSULE ORAL
Qty: 90 CAPSULE | Refills: 0 | Status: SHIPPED | OUTPATIENT
Start: 2023-05-11 | End: 2023-07-03

## 2023-05-11 RX ORDER — GABAPENTIN 600 MG/1
TABLET ORAL
Qty: 90 TABLET | Refills: 0 | Status: SHIPPED | OUTPATIENT
Start: 2023-05-11 | End: 2023-07-03

## 2023-05-11 NOTE — TELEPHONE ENCOUNTER
Patient is currently out of this medication. Patient is aware he missed last appointment, due to family issues he is planning an rescheduling when he is able.     Gloria Heath RN

## 2023-05-21 ENCOUNTER — NURSE TRIAGE (OUTPATIENT)
Dept: NURSING | Facility: CLINIC | Age: 53
End: 2023-05-21
Payer: COMMERCIAL

## 2023-05-21 NOTE — TELEPHONE ENCOUNTER
Over last couple weeks, Sweating/chilling episodes,Is having to sit under blanket with severe chilling shakes. T 95.5, other day 93.7. This is 3rd episode in last couple weeks. Last episode was 48 hours ago.  Does have anxiety and gets dizziness, tightness in chest and takes Xanax but   this is different. Eyes are watery. Leg cramps also ( Has pre existing dx of restless leg and has meds , Has been drinking per Usual. Is type 1 diabetic and has had more extreme fluctuations in BG levels. He accidentally took extra Rapid acting this am so anticipates a drop soon. . Duration of episodes lasts 4-6 hours.  Legs still sore from Episode of Leg muscle spasms 2 days ago. Mild  Headache also present during episodes.   Thursday had 155 Bg, Shortly after 55. Does not seem to be correlation during episodes of Hypoglycemia.      Protocol reviewed with disposition to ED now or PCP triage. Paged on call provider Dr. Garcia at 219pm with call back occurring at 225pm, who has advised patient to go ED now, If patient prefers,UCC would also be an option. Call placed to patient at 228pm . Advised to ensure he has carbs/sugar added if he has to drive but preference would be if someone could drive him. Patient will probably go to [place] ED for evaluation. To call back with worsening symptoms, further questions/concerns.     .NANCY KAT RN  [facility] nurse advisors  5/21/2023  2:34 PM    Reason for Disposition    Muscle jerks, twitches, or spasm of the body or body part    [1] Muscle rigidity or tightness AND [2] present now    Additional Information    Negative: Difficult to awaken or acting confused (e.g., disoriented, slurred speech)    Negative: Sounds like a life-threatening emergency to the triager    Protocols used: MUSCLE ACHES AND BODY PAIN-A-AH, MUSCLE JERKS - TICS - [id number]

## 2023-05-24 ENCOUNTER — TRANSFERRED RECORDS (OUTPATIENT)
Dept: HEALTH INFORMATION MANAGEMENT | Facility: CLINIC | Age: 53
End: 2023-05-24
Payer: COMMERCIAL

## 2023-05-27 DIAGNOSIS — F41.1 GENERALIZED ANXIETY DISORDER: ICD-10-CM

## 2023-05-30 RX ORDER — ALPRAZOLAM 0.5 MG
TABLET ORAL
Qty: 30 TABLET | Refills: 0 | Status: SHIPPED | OUTPATIENT
Start: 2023-05-30 | End: 2024-02-29

## 2023-06-30 DIAGNOSIS — E10.42 TYPE 1 DIABETES MELLITUS WITH DIABETIC POLYNEUROPATHY (H): ICD-10-CM

## 2023-06-30 DIAGNOSIS — F41.1 GENERALIZED ANXIETY DISORDER: ICD-10-CM

## 2023-07-03 RX ORDER — GABAPENTIN 600 MG/1
TABLET ORAL
Qty: 90 TABLET | Refills: 0 | Status: SHIPPED | OUTPATIENT
Start: 2023-07-03 | End: 2023-08-11

## 2023-07-03 RX ORDER — GABAPENTIN 300 MG/1
CAPSULE ORAL
Qty: 90 CAPSULE | Refills: 0 | Status: SHIPPED | OUTPATIENT
Start: 2023-07-03 | End: 2023-08-11

## 2023-07-03 RX ORDER — BUPROPION HYDROCHLORIDE 300 MG/1
TABLET ORAL
Qty: 90 TABLET | Refills: 0 | Status: SHIPPED | OUTPATIENT
Start: 2023-07-03 | End: 2024-03-08

## 2023-07-10 DIAGNOSIS — E10.8 TYPE 1 DIABETES MELLITUS WITH COMPLICATIONS (H): ICD-10-CM

## 2023-07-10 RX ORDER — INSULIN GLARGINE 100 [IU]/ML
INJECTION, SOLUTION SUBCUTANEOUS
Qty: 15 ML | Refills: 0 | Status: SHIPPED | OUTPATIENT
Start: 2023-07-10 | End: 2023-10-16

## 2023-07-12 DIAGNOSIS — E10.8 TYPE 1 DIABETES MELLITUS WITH COMPLICATIONS (H): ICD-10-CM

## 2023-07-12 RX ORDER — INSULIN GLARGINE 100 [IU]/ML
INJECTION, SOLUTION SUBCUTANEOUS
Qty: 15 ML | Refills: 0 | Status: CANCELLED | OUTPATIENT
Start: 2023-07-12

## 2023-08-11 DIAGNOSIS — E10.42 TYPE 1 DIABETES MELLITUS WITH DIABETIC POLYNEUROPATHY (H): ICD-10-CM

## 2023-08-11 RX ORDER — GABAPENTIN 300 MG/1
CAPSULE ORAL
Qty: 90 CAPSULE | Refills: 0 | Status: SHIPPED | OUTPATIENT
Start: 2023-08-11 | End: 2023-09-25

## 2023-08-11 RX ORDER — GABAPENTIN 600 MG/1
TABLET ORAL
Qty: 90 TABLET | Refills: 0 | Status: SHIPPED | OUTPATIENT
Start: 2023-08-11 | End: 2023-10-16

## 2023-09-10 ENCOUNTER — HEALTH MAINTENANCE LETTER (OUTPATIENT)
Age: 53
End: 2023-09-10

## 2023-09-22 DIAGNOSIS — E10.42 TYPE 1 DIABETES MELLITUS WITH DIABETIC POLYNEUROPATHY (H): ICD-10-CM

## 2023-09-25 RX ORDER — GABAPENTIN 300 MG/1
CAPSULE ORAL
Qty: 90 CAPSULE | Refills: 0 | Status: SHIPPED | OUTPATIENT
Start: 2023-09-25 | End: 2023-10-16

## 2023-10-16 ENCOUNTER — MYC MEDICAL ADVICE (OUTPATIENT)
Dept: INTERNAL MEDICINE | Facility: CLINIC | Age: 53
End: 2023-10-16
Payer: COMMERCIAL

## 2023-10-16 DIAGNOSIS — E10.8 TYPE 1 DIABETES MELLITUS WITH COMPLICATIONS (H): ICD-10-CM

## 2023-10-16 DIAGNOSIS — E10.42 TYPE 1 DIABETES MELLITUS WITH DIABETIC POLYNEUROPATHY (H): ICD-10-CM

## 2023-10-16 RX ORDER — GABAPENTIN 300 MG/1
CAPSULE ORAL
Qty: 90 CAPSULE | Refills: 0 | Status: SHIPPED | OUTPATIENT
Start: 2023-10-16 | End: 2024-02-29

## 2023-10-16 RX ORDER — GABAPENTIN 600 MG/1
TABLET ORAL
Qty: 90 TABLET | Refills: 0 | Status: SHIPPED | OUTPATIENT
Start: 2023-10-16 | End: 2023-12-03

## 2023-10-16 RX ORDER — INSULIN GLARGINE 100 [IU]/ML
INJECTION, SOLUTION SUBCUTANEOUS
Qty: 15 ML | Refills: 0 | Status: SHIPPED | OUTPATIENT
Start: 2023-10-16 | End: 2023-12-02

## 2023-11-10 DIAGNOSIS — E10.8 DIABETES MELLITUS TYPE 1 WITH COMPLICATIONS (H): ICD-10-CM

## 2023-12-01 DIAGNOSIS — E10.42 TYPE 1 DIABETES MELLITUS WITH DIABETIC POLYNEUROPATHY (H): ICD-10-CM

## 2023-12-01 DIAGNOSIS — E10.8 TYPE 1 DIABETES MELLITUS WITH COMPLICATIONS (H): ICD-10-CM

## 2023-12-01 NOTE — TELEPHONE ENCOUNTER
Received call from patient's wife.    She is calling to request an urgent refill of patient's Lantus and Gabapentin. Patient has a follow-up appointment scheduled on 12/12. Patient is completely out of both medications.    Routing as high priority to covering providers in clinic, as PCP out at this time.     CYRUS KapoorN RN  [facility]

## 2023-12-02 ENCOUNTER — NURSE TRIAGE (OUTPATIENT)
Dept: NURSING | Facility: CLINIC | Age: 53
End: 2023-12-02
Payer: COMMERCIAL

## 2023-12-02 DIAGNOSIS — E10.8 TYPE 1 DIABETES MELLITUS WITH COMPLICATIONS (H): ICD-10-CM

## 2023-12-02 RX ORDER — INSULIN GLARGINE 100 [IU]/ML
INJECTION, SOLUTION SUBCUTANEOUS
Qty: 3 ML | Refills: 0 | Status: SHIPPED | OUTPATIENT
Start: 2023-12-02 | End: 2024-01-11

## 2023-12-03 RX ORDER — INSULIN GLARGINE 100 [IU]/ML
INJECTION, SOLUTION SUBCUTANEOUS
Qty: 15 ML | Refills: 0 | Status: SHIPPED | OUTPATIENT
Start: 2023-12-03 | End: 2024-03-08

## 2023-12-03 RX ORDER — GABAPENTIN 600 MG/1
TABLET ORAL
Qty: 90 TABLET | Refills: 0 | Status: SHIPPED | OUTPATIENT
Start: 2023-12-03 | End: 2024-01-11

## 2023-12-03 NOTE — TELEPHONE ENCOUNTER
If patient does not keep appointment with me I have to deny further refill request     Haim Bustos MD

## 2023-12-12 ENCOUNTER — VIRTUAL VISIT (OUTPATIENT)
Dept: FAMILY MEDICINE | Facility: CLINIC | Age: 53
End: 2023-12-12
Payer: COMMERCIAL

## 2023-12-12 DIAGNOSIS — E11.3299 BACKGROUND DIABETIC RETINOPATHY (H): ICD-10-CM

## 2023-12-12 DIAGNOSIS — Z12.11 SCREEN FOR COLON CANCER: ICD-10-CM

## 2023-12-12 DIAGNOSIS — E10.42 TYPE 1 DIABETES MELLITUS WITH DIABETIC POLYNEUROPATHY (H): Primary | ICD-10-CM

## 2023-12-12 DIAGNOSIS — I10 HYPERTENSION GOAL BP (BLOOD PRESSURE) < 140/90: ICD-10-CM

## 2023-12-12 PROCEDURE — 99214 OFFICE O/P EST MOD 30 MIN: CPT | Mod: VID | Performed by: FAMILY MEDICINE

## 2023-12-12 NOTE — PATIENT INSTRUCTIONS
Damian Ponce,    Thank you for allowing [facility] to manage your care.    I ordered a fasting labs, please schedule your appointment.     I made a diabetic educator and colonoscopy referral, they will be calling in approximately 1 week to set up your appointment.  If you do not hear from them, please call the specialty number on your after visit.     For your convenience, test results are released as soon as they are available  Please allow 1-2 business days for me to send you a comment about your results.  If not done so, I encourage you to login into [employer] ([email or web address] to review your results in real time.     If you have any questions or concerns, please feel free to call us at [phone].    Sincerely,    Dr. Choudhury    Did you know?      You can schedule a video visit for follow-up appointments as well as future appointments for certain conditions.  Please see the below link.     [email or web address]    If you have not already done so,  I encourage you to sign up for [employer] ([email or web address]).  This will allow you to review your results, securely communicate with a provider, and schedule virtual visits as well.

## 2023-12-12 NOTE — PROGRESS NOTES
Kris is a 53 year old who is being evaluated via a billable video visit.      How would you like to obtain your AVS? MyChart  If the video visit is dropped, the invitation should be resent by: Text to cell phone: [phone]  Will anyone else be joining your video visit? No        1. Type 1 diabetes mellitus with diabetic polyneuropathy (H)  Currently on Lantus and Aspart.  Stressed the importance of diet.  Patient is interested in the insulin pump.  Discussed in detail the mechanism of Lantus and its impact on late meals.  Patient would like to consider switching from gabapentin in the future to another neuropathic medication.   - Adult Eye  Referral; Future  - AMB Adult Diabetes Educator Referral; Future    2. Background diabetic retinopathy (H)  - Albumin Random Urine Quantitative with Creat Ratio; Future  - HEMOGLOBIN A1C; Future  - Lipid panel reflex to direct LDL Non-fasting; Future    3. Screen for colon cancer  - Colonoscopy Screening  Referral; Future    4. Hypertension goal BP (blood pressure) < 140/90  - BASIC METABOLIC PANEL; Future          Subjective   Kris is a 53 year old, presenting for the following health issues:  Diabetes      12/12/2023     8:34 AM   Additional Questions   Roomed by Richelle   Accompanied by Self       History of Present Illness       Reason for visit:  Med update and referal for ENT and Urology as well as diabetic neuropathy in my hands and feet. Also an alternative to Gabapentin  Symptom onset:  3-4 weeks ago  Symptoms include:  I have a open sore in my left nostril. My hands and feet can be cold and painful  Symptom intensity:  Moderate  Symptom progression:  Staying the same  Had these symptoms before:  Yes  Has tried/received treatment for these symptoms:  Yes  Previous treatment was successful:  No    He eats 2-3 servings of fruits and vegetables daily.He consumes 1 sweetened beverage(s) daily.He exercises with enough effort to increase his heart rate 9 or  less minutes per day.  He exercises with enough effort to increase his heart rate 3 or less days per week.   He is taking medications regularly.     Diabetes follow-up: Patient is interested in insulin pump.  Patient states of elevated blood sugars at night.  Patient is currently using Jenni and noticed blood sugars 300-350 at night.  Patient is currently Lantus 32 U in the afternoon.  Patient takes Aspart 3-4 U with his meals on average.  Patient was diagnosed at age 34.  Patient watches what he eats.  Patient works for [employer].      2. Neuropathy: Patient is currently on gabapentin.  Some area involving feet and hand.     3. Right nostril pain: Very tip  Inside.  Polyp.  Ongoing for the past 2-3 weeks.  No odor.  Intermittent bleeding.     Review of Systems   Constitutional:  Negative for chills and fever.   HENT:  Negative for congestion, ear pain, hearing loss and sore throat.         Right nostril soreness   Respiratory:  Negative for cough and shortness of breath.    Cardiovascular:  Negative for chest pain, palpitations and peripheral edema.   Musculoskeletal:  Negative for arthralgias, joint swelling and myalgias.   Skin:  Negative for rash.   Neurological:  Negative for dizziness, weakness, headaches and paresthesias.   Psychiatric/Behavioral:  Negative for mood changes. The patient is not nervous/anxious.             Objective           Vitals:  No vitals were obtained today due to virtual visit.    Physical Exam   GENERAL: Healthy, alert and no distress  EYES: Eyes grossly normal to inspection.  No discharge or erythema, or obvious scleral/conjunctival abnormalities.  RESP: No audible wheeze, cough, or visible cyanosis.  No visible retractions or increased work of breathing.    SKIN: Visible skin clear. No significant rash, abnormal pigmentation or lesions.  NEURO: Cranial nerves grossly intact.  Mentation and speech appropriate for age.  PSYCH: Mentation appears normal, affect normal/bright, judgement  and insight intact, normal speech and appearance well-groomed.    Component      Latest Ref Rng 6/25/2021  11:52 AM 2/25/2022  4:12 PM 11/10/2022  9:45 AM   Cholesterol      <200 mg/dL 199  205 (H)  197    Triglycerides      <150 mg/dL 66  112  77    HDL Cholesterol      >=40 mg/dL 90  70  112    LDL Cholesterol Calculated      <=100 mg/dL 96  113 (H)  70    VLDL-Cholesterol      0 - 30 mg/dL      Cholesterol/HDL Ratio      0.0 - 5.0       Hemoglobin A1C      0.0 - 5.6 % 7.3 (H)  8.1 (H)  7.3 (H)    Non HDL Cholesterol      <130 mg/dL 109  135 (H)  85    Patient Fasting?  No  No       Legend:  (H) High          Video-Visit Details    Type of service:  Video Visit   Video Start Time:  100  Video End Time: 130    Originating Location (pt. Location): Home    Distant Location (provider location):  Off-site  Platform used for Video Visit: Hasmukh

## 2023-12-13 ENCOUNTER — OFFICE VISIT (OUTPATIENT)
Dept: FAMILY MEDICINE | Facility: CLINIC | Age: 53
End: 2023-12-13
Payer: COMMERCIAL

## 2023-12-13 VITALS
BODY MASS INDEX: 25.01 KG/M2 | DIASTOLIC BLOOD PRESSURE: 76 MMHG | HEIGHT: 68 IN | SYSTOLIC BLOOD PRESSURE: 152 MMHG | OXYGEN SATURATION: 100 % | TEMPERATURE: 98 F | WEIGHT: 165 LBS | RESPIRATION RATE: 16 BRPM | HEART RATE: 96 BPM

## 2023-12-13 DIAGNOSIS — D22.9 CHANGE IN MOLE: ICD-10-CM

## 2023-12-13 DIAGNOSIS — I10 HYPERTENSION GOAL BP (BLOOD PRESSURE) < 140/90: ICD-10-CM

## 2023-12-13 DIAGNOSIS — J34.89 NASAL LESION: Primary | ICD-10-CM

## 2023-12-13 PROCEDURE — 91320 SARSCV2 VAC 30MCG TRS-SUC IM: CPT | Performed by: PHYSICIAN ASSISTANT

## 2023-12-13 PROCEDURE — 90480 ADMN SARSCOV2 VAC 1/ONLY CMP: CPT | Performed by: PHYSICIAN ASSISTANT

## 2023-12-13 PROCEDURE — 90682 RIV4 VACC RECOMBINANT DNA IM: CPT | Performed by: PHYSICIAN ASSISTANT

## 2023-12-13 PROCEDURE — 90471 IMMUNIZATION ADMIN: CPT | Performed by: PHYSICIAN ASSISTANT

## 2023-12-13 PROCEDURE — 99213 OFFICE O/P EST LOW 20 MIN: CPT | Mod: 25 | Performed by: PHYSICIAN ASSISTANT

## 2023-12-13 RX ORDER — MUPIROCIN 20 MG/G
OINTMENT TOPICAL 3 TIMES DAILY
Qty: 30 G | Refills: 0 | Status: SHIPPED | OUTPATIENT
Start: 2023-12-13 | End: 2024-04-10

## 2023-12-13 ASSESSMENT — ENCOUNTER SYMPTOMS
CHILLS: 0
PALPITATIONS: 0
ARTHRALGIAS: 0
FEVER: 0
SHORTNESS OF BREATH: 0
NERVOUS/ANXIOUS: 0
SORE THROAT: 0
PARESTHESIAS: 0
HEADACHES: 0
MYALGIAS: 0
JOINT SWELLING: 0
COUGH: 0
WEAKNESS: 0
DIZZINESS: 0

## 2023-12-13 NOTE — PROGRESS NOTES
Assessment & Plan   Problem List Items Addressed This Visit          Circulatory    Hypertension goal BP (blood pressure) < 140/90    Relevant Orders    Home Blood Pressure Monitor Order for DME - ONLY FOR DME     Other Visit Diagnoses       Nasal lesion    -  Primary    Relevant Medications    mupirocin (BACTROBAN) 2 % external ointment    Other Relevant Orders    Adult ENT  Referral    Change in mole        Relevant Medications    mupirocin (BACTROBAN) 2 % external ointment    Other Relevant Orders    Adult Dermatology  Referral           Unclear cause of lesion to the inside of the left nostril. Could be a patch of impetigo, so will trial bactroban. No history of MRSA. Will refer to both derm and ENT and he will see whichever service is able to evaluate and potentially biopsy to rule out neoplasm first. He certainly doesn't have to see both services if a diagnosis is reached or his symptoms resolve, but I do feel he would benefit from a skin cancer screening exam with derm. Given external derm clinics and he will contact his insurance to see who is covered and reach out to us if he needs further external referrals.     Complete history and physical exam as below. Afebrile with normal vital signs except for elevated bp, which they will monitor at home and contact us if >140/90mmHg on average. Low suspicion for end organ damage as he has no signs/symptoms.     DDx and Dx discussed with and explained to the pt to their satisfaction.  All questions were answered at this time. Pt expressed understanding of and agreement with this dx, tx, and plan. No further workup warranted and standard medication warnings given. I have given the patient a list of pertinent indications for re-evaluation. Will go to the Emergency Department if symptoms worsen or new concerning symptoms arise. Patient left in no apparent distress.     Prescription drug management  BMI:   Estimated body mass index is 25.09 kg/m  as  "calculated from the following:    Height as of this encounter: 1.727 m (5' 8\").    Weight as of this encounter: 74.8 kg (165 lb).     See Patient Instructions    CYNDY Diaz [facility] KATE Ponce is a 53 year old, presenting for the following health issues:  Nose Problem      12/13/2023     3:02 PM   Additional Questions   Roomed by MP   Accompanied by spouse         12/13/2023     3:02 PM   Patient Reported Additional Medications   Patient reports taking the following new medications None per patient       HPI   Description: nose issue- there is a sore or lesion on anterior aspect of inside of his left naris.  Duration: 1 month. Bleeds occasionally. Mildly painful when touched. No history of skin CA. No other worrisome lesions.    Review of Systems   Constitutional, HEENT, cardiovascular, pulmonary, gi and gu systems are negative, except as otherwise noted.      Objective    BP (!) 152/84   Pulse 96   Temp 98  F (36.7  C) (Temporal)   Resp 16   Ht 1.727 m (5' 8\")   Wt 74.8 kg (165 lb)   SpO2 100%   BMI 25.09 kg/m    Body mass index is 25.09 kg/m .  Physical Exam  Vitals and nursing note reviewed.   Constitutional:       General: He is not in acute distress.     Appearance: Normal appearance. He is not diaphoretic.   HENT:      Head: Normocephalic and atraumatic.      Nose: No congestion or rhinorrhea.      Comments: Left naris: there is a 3mm erythematous ulceration along the anterior mucosal-dermal junction. No bleeding, discharge, fluctuance, telangiectasia, melanotic pigmentation or other overlying signs of trauma or infection. No hematomas or other abnormalities.     Mouth/Throat:      Mouth: Mucous membranes are moist.      Pharynx: Oropharynx is clear.   Eyes:      Conjunctiva/sclera: Conjunctivae normal.   Pulmonary:      Effort: Pulmonary effort is normal. No respiratory distress.   Skin:     General: Skin is dry.      Coloration: Skin is not jaundiced or " pale.   Neurological:      General: No focal deficit present.      Mental Status: He is alert. Mental status is at baseline.   Psychiatric:         Mood and Affect: Mood normal.         Behavior: Behavior normal.

## 2023-12-13 NOTE — PATIENT INSTRUCTIONS
Austin Ponce,    Thank you for allowing [facility] to manage your care.    I am unsure of the cause of your symptoms, but your exam is reassuring. Please use the antibiotic ointment in your nose 2-3 times daily for the next 2 weeks.    If you develop worsening/changing symptoms at any time, please be seen in clinic/urgent care or call 911/go to the emergency department for evaluation.    I sent your prescriptions to your pharmacy.    I made a referral to dermatology and ENT. They will be calling in approximately 1 week to set up your appointment.  If you do not hear from them, please call the specialty number on your after visit summary.     Your blood pressure was high today. Get a blood pressure cuff for use at home. Take and record your blood pressure twice daily for 2 weeks. If your blood pressure is greater than or equal to 140/90mm Hg on average, please contact us.    If you have any questions or concerns, please feel free to call us at [phone]    Sincerely,    Stefan Davidson PA-C    Did you know?      You can schedule a video visit for follow-up appointments as well as future appointments for certain conditions.  Please see the below link.     [email or web address]    If you have not already done so,  I encourage you to sign up for [email or web address] ([email or web address]).  This will allow you to review your results, securely communicate with a provider, and schedule virtual visits as well.

## 2024-01-11 DIAGNOSIS — E10.8 TYPE 1 DIABETES MELLITUS WITH COMPLICATIONS (H): ICD-10-CM

## 2024-01-11 DIAGNOSIS — E10.42 TYPE 1 DIABETES MELLITUS WITH DIABETIC POLYNEUROPATHY (H): ICD-10-CM

## 2024-01-11 RX ORDER — INSULIN GLARGINE 100 [IU]/ML
INJECTION, SOLUTION SUBCUTANEOUS
Qty: 3 ML | Refills: 0 | Status: SHIPPED | OUTPATIENT
Start: 2024-01-11 | End: 2024-02-29

## 2024-01-11 RX ORDER — GABAPENTIN 600 MG/1
TABLET ORAL
Qty: 90 TABLET | Refills: 0 | Status: SHIPPED | OUTPATIENT
Start: 2024-01-11 | End: 2024-03-08

## 2024-01-12 NOTE — TELEPHONE ENCOUNTER
Called patient and left message to call back.       Ana ANTHONY CMA ([facility])

## 2024-01-12 NOTE — TELEPHONE ENCOUNTER
My last face to face encounter with this patient was 9-    I will not approve any additional prescriptions or treatment decisions until patient has appointment with me, face to face encounter     One month refill sent    Please review with patient as we have tried transmitting this information with prescriptions without response from patient for months     MIKE LUTZ MD

## 2024-01-12 NOTE — TELEPHONE ENCOUNTER
Team- please notify patient of Dr. Bustos's message.    Note that pt has upcoming appt with Dr. Choudhury to establish care so he can discuss these refills with him if he would like or schedule with Juli for ongoing refills.    Note that gabapentin filled yesterday by Dr. Bustos.     Kathy Ferguson RN

## 2024-01-17 ENCOUNTER — NURSE TRIAGE (OUTPATIENT)
Dept: NURSING | Facility: CLINIC | Age: 54
End: 2024-01-17
Payer: COMMERCIAL

## 2024-01-17 ENCOUNTER — OFFICE VISIT (OUTPATIENT)
Dept: URGENT CARE | Facility: URGENT CARE | Age: 54
End: 2024-01-17
Payer: COMMERCIAL

## 2024-01-17 VITALS
BODY MASS INDEX: 24.33 KG/M2 | OXYGEN SATURATION: 96 % | TEMPERATURE: 98.4 F | HEART RATE: 104 BPM | SYSTOLIC BLOOD PRESSURE: 121 MMHG | RESPIRATION RATE: 16 BRPM | DIASTOLIC BLOOD PRESSURE: 76 MMHG | WEIGHT: 160 LBS

## 2024-01-17 DIAGNOSIS — J06.9 VIRAL UPPER RESPIRATORY TRACT INFECTION WITH COUGH: Primary | ICD-10-CM

## 2024-01-17 DIAGNOSIS — E10.8 DIABETES MELLITUS TYPE 1 WITH COMPLICATIONS (H): ICD-10-CM

## 2024-01-17 LAB
DEPRECATED S PYO AG THROAT QL EIA: NEGATIVE
FLUAV AG SPEC QL IA: NEGATIVE
FLUBV AG SPEC QL IA: NEGATIVE
GROUP A STREP BY PCR: NOT DETECTED

## 2024-01-17 PROCEDURE — 99214 OFFICE O/P EST MOD 30 MIN: CPT | Performed by: PHYSICIAN ASSISTANT

## 2024-01-17 PROCEDURE — 87635 SARS-COV-2 COVID-19 AMP PRB: CPT | Performed by: PHYSICIAN ASSISTANT

## 2024-01-17 PROCEDURE — 87651 STREP A DNA AMP PROBE: CPT | Performed by: PHYSICIAN ASSISTANT

## 2024-01-17 PROCEDURE — 87804 INFLUENZA ASSAY W/OPTIC: CPT | Performed by: PHYSICIAN ASSISTANT

## 2024-01-17 RX ORDER — BENZONATATE 200 MG/1
200 CAPSULE ORAL 3 TIMES DAILY PRN
Qty: 30 CAPSULE | Refills: 0 | Status: SHIPPED | OUTPATIENT
Start: 2024-01-17 | End: 2024-03-08

## 2024-01-17 ASSESSMENT — ENCOUNTER SYMPTOMS
DIARRHEA: 0
PALPITATIONS: 0
EYE PAIN: 0
SHORTNESS OF BREATH: 0
NAUSEA: 0
CHEST TIGHTNESS: 0
EYE DISCHARGE: 0
WEAKNESS: 1
EYE REDNESS: 0
ABDOMINAL PAIN: 0
MUSCULOSKELETAL NEGATIVE: 1
VOMITING: 0
CHILLS: 0
HEADACHES: 0
CARDIOVASCULAR NEGATIVE: 1
WHEEZING: 0
DYSURIA: 0
FATIGUE: 1
EYE ITCHING: 0
RHINORRHEA: 1
SORE THROAT: 1
LIGHT-HEADEDNESS: 1
HEMATURIA: 0
FREQUENCY: 0
SINUS PRESSURE: 1
MYALGIAS: 0
COUGH: 1
GASTROINTESTINAL NEGATIVE: 1
ALLERGIC/IMMUNOLOGIC NEGATIVE: 1
FEVER: 0

## 2024-01-17 NOTE — LETTER
January 17, 2024      Rudolph Tao  [address] [address] [id number]        To Whom It May Concern:    Rudolph Tao was seen on 1/17/2024.  Please excuse him  until fever free due to illness.        Sincerely,        Rudolph Devries PA-C

## 2024-01-17 NOTE — PROGRESS NOTES
Chief Complaint:     Chief Complaint   Patient presents with    Urgent Care     Sx right now severe congestion, trouble breathing, body ache, sore throat, and stuff up, loose bowel movement, but mostly lungs, dark color phlegm, lost sense of taste and smell in the last 24 to 48 hours (had covid 3 times)    URI    Pharyngitis    Cough    Generalized Body Aches    Covid 19 Testing       Results for orders placed or performed in visit on 01/17/24   Streptococcus A Rapid Screen w/Reflex to PCR - Clinic Collect     Status: Normal    Specimen: Throat; Swab   Result Value Ref Range    Group A Strep antigen Negative Negative   Influenza A & B Antigen - Clinic Collect     Status: Normal    Specimen: Nose; Swab   Result Value Ref Range    Influenza A antigen Negative Negative    Influenza B antigen Negative Negative    Narrative    Test results must be correlated with clinical data. If necessary, results should be confirmed by a molecular assay or viral culture.       Medical Decision Making:    Vital signs reviewed by Rudolph Devries PA-C  /76 (BP Location: Left arm, Patient Position: Sitting, Cuff Size: Adult Large)   Pulse 104   Temp 98.4  F (36.9  C) (Tympanic)   Resp 16   Wt 72.6 kg (160 lb)   SpO2 96%   BMI 24.33 kg/m      Differential Diagnosis:  URI Adult/Peds:  Acute right otitis media, Acute left otitis media, Bronchitis-viral, Influenza, Sinusitis, Strep pharyngitis, Tonsilitis, Viral pharyngitis, Viral syndrome, and Viral upper respiratory illness        ASSESSMENT    1. Viral upper respiratory tract infection with cough    2. 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    Temp is 98.4 in clinic today, lung sounds were clear, and O2 sats at 96% on RA.    RST was negative.  We will call with PCR results only if positive.  Influenza was negative.  COVID swab collected in clinic today.  Rest, Push fluids, vaporizer, elevation of head of bed.  Ibuprofen  and or Tylenol for any fever or body aches.  Rx for Tessalon cough suppressant- PRN- as discussed.   Patient at higher risk for severe infection with DM.  Patient instructed to monitor blood sugars closely with illness.  Continue taking your insulin and follow up with your primary provider for any DM medication changes if needed.  If symptoms worsen, recheck immediately otherwise follow up with your PCP in 1 week if symptoms are not improving.  Worrisome symptoms discussed with instructions to go to the ED.  Patient verbalized understanding and agreed with this plan.    Labs:    Results for orders placed or performed in visit on 01/17/24   Streptococcus A Rapid Screen w/Reflex to PCR - Clinic Collect     Status: Normal    Specimen: Throat; Swab   Result Value Ref Range    Group A Strep antigen Negative Negative   Influenza A & B Antigen - Clinic Collect     Status: Normal    Specimen: Nose; Swab   Result Value Ref Range    Influenza A antigen Negative Negative    Influenza B antigen Negative Negative    Narrative    Test results must be correlated with clinical data. If necessary, results should be confirmed by a molecular assay or viral culture.        Vital signs reviewed by Rudolph Devries PA-C  /76 (BP Location: Left arm, Patient Position: Sitting, Cuff Size: Adult Large)   Pulse 104   Temp 98.4  F (36.9  C) (Tympanic)   Resp 16   Wt 72.6 kg (160 lb)   SpO2 96%   BMI 24.33 kg/m      Current Meds      Current Outpatient Medications:     benzonatate (TESSALON) 200 MG capsule, Take 1 capsule (200 mg) by mouth 3 times daily as needed for cough, Disp: 30 capsule, Rfl: 0    gabapentin (NEURONTIN) 300 MG capsule, TAKE 1 CAPSULE BY MOUTH THREE TIMES DAILY WITH A 600MG TABLET FOR 900MG THREE TIMES DAILY. APPOINTMENT REQUIRED FOR FUTURE REFILLS, Disp: 90 capsule, Rfl: 0    gabapentin (NEURONTIN) 600 MG tablet, TAKE 1 TABLET BY MOUTH THREE TIMES DAILY ALONG WITH 300MG CAPSULES FOR A TOTAL OF 900MG 3 TIMES  DAILY - no further refills until appointment, Disp: 90 tablet, Rfl: 0    insulin aspart (NOVOLOG PENFILL) 100 UNIT/ML cartridge, INJECT  1 UNIT PER 30 GRAMS OF CARBOHYDRATES PLUS 1 UNIT PER 50MG/DL ABOVE 200MG/DL. MAX DOSE 20 UNITS PER DAY., Disp: 15 mL, Rfl: 0    LANTUS SOLOSTAR 100 UNIT/ML soln, INJECT 32 UNITS SUBCUTANEOUSLY ONCE DAILY . APPOINTMENT REQUIRED FOR FUTURE REFILLS, Disp: 3 mL, Rfl: 0    LANTUS SOLOSTAR 100 UNIT/ML soln, INJECT 32 UNITS SUBCUTANEOUSLY ONCE DAILY . APPOINTMENT REQUIRED FOR FUTURE REFILLS, Disp: 15 mL, Rfl: 0    acetone urine (KETOSTIX) test strip, 1 strip once as needed (with unexplained blood glucose over 300 mg/dL) Use one strip, as needed, with unexplained blood glucose over 300 mg/dL. (Patient not taking: Reported on 1/17/2024), Disp: 50 strip, Rfl: 6    ALPRAZolam (XANAX) 0.5 MG tablet, TAKE 1 TABLET BY MOUTH THREE TIMES DAILY AS NEEDED FOR ANXIETY *ONE  MONTH  SUPPLY* (Patient not taking: Reported on 1/17/2024), Disp: 30 tablet, Rfl: 0    B-D U/F insulin pen needle, USE  SIX TIMES DAILY (Patient not taking: Reported on 1/17/2024), Disp: 600 each, Rfl: 1    buPROPion (WELLBUTRIN XL) 300 MG 24 hr tablet, TAKE 1 TABLET BY MOUTH IN THE MORNING (Patient not taking: Reported on 1/17/2024), Disp: 90 tablet, Rfl: 0    cetirizine (ZYRTEC) 10 MG tablet, Take 10 mg by mouth daily (Patient not taking: Reported on 1/17/2024), Disp: , Rfl:     Continuous Blood Gluc Sensor (FREESTYLE JUSTIN 2 SENSOR) MISC, CHANGE SENSOR EVERY 14 DAYS (Patient not taking: Reported on 1/17/2024), Disp: 2 each, Rfl: 5    lisinopril (ZESTRIL) 40 MG tablet, TAKE 1 TABLET BY MOUTH ONCE DAILY . (Patient not taking: Reported on 1/17/2024), Disp: 90 tablet, Rfl: 1    metoclopramide (REGLAN) 10 MG tablet, Take 1 tablet (10 mg) by mouth 4 times daily as needed (Patient not taking: Reported on 1/17/2024), Disp: 120 tablet, Rfl: 3    metoprolol succinate ER (TOPROL-XL) 25 MG 24 hr tablet, Take 1 tablet (25 mg) by mouth daily  (Patient not taking: Reported on 1/17/2024), Disp: 30 tablet, Rfl: 1    mupirocin (BACTROBAN) 2 % external ointment, Apply topically 3 times daily (Patient not taking: Reported on 1/17/2024), Disp: 30 g, Rfl: 0    ondansetron (ZOFRAN-ODT) 4 MG ODT tab, DISSOLVE 1 TABLET IN MOUTH EVERY 8 HOURS AS NEEDED FOR NAUSEA (Patient not taking: Reported on 1/17/2024), Disp: 20 tablet, Rfl: 4    pravastatin (PRAVACHOL) 20 MG tablet, Take 1 tablet (20 mg) by mouth daily (Patient not taking: Reported on 1/17/2024), Disp: 90 tablet, Rfl: 1    rOPINIRole (REQUIP) 0.25 MG tablet, TAKE 1-2 TABLETS BY MOUTH AT BEDTIME, no further refills until appointment (Patient not taking: Reported on 1/17/2024), Disp: 180 tablet, Rfl: 0    sildenafil (VIAGRA) 100 MG tablet, Take 1 tablet (100 mg) by mouth daily as needed (sexual functioning) (Patient not taking: Reported on 1/17/2024), Disp: 8 tablet, Rfl: 11      Respiratory History    no history of pneumonia or bronchitis      SUBJECTIVE    HPI: Rudolph Tao is an 53 year old male who presents with chest congestion, productive cough with yellow / brown phlegm, ear pressure, fatigue, and sore throat.  Symptoms began about one week ago and have not been improving. He has been utilizing dayquil, nyquil, and joaquin seltzer for symptomatic management with temporary relief. There is no shortness of breath, wheezing, and chest pain.  Patient is eating and drinking well.  No fever, nausea, vomiting, or diarrhea.    Patient denies any recent travel or exposure to known COVID positive tested individual.      ROS:     Review of Systems   Constitutional:  Positive for fatigue. Negative for chills and fever.   HENT:  Positive for congestion, ear pain, rhinorrhea, sinus pressure and sore throat.    Eyes:  Negative for pain, discharge, redness and itching.   Respiratory:  Positive for cough. Negative for chest tightness, shortness of breath and wheezing.    Cardiovascular: Negative.  Negative for chest pain  and palpitations.   Gastrointestinal: Negative.  Negative for abdominal pain, diarrhea, nausea and vomiting.   Genitourinary:  Negative for dysuria, frequency, hematuria and urgency.   Musculoskeletal: Negative.  Negative for myalgias.   Skin:  Negative for rash.   Allergic/Immunologic: Negative.  Negative for immunocompromised state.   Neurological:  Positive for weakness and light-headedness. Negative for headaches.         Family History   Family History   Adopted: Yes   Problem Relation Age of Onset    Unknown/Adopted Mother     Cancer Mother     Unknown/Adopted Father     Unknown/Adopted Maternal Grandmother     Unknown/Adopted Maternal Grandfather     Unknown/Adopted Paternal Grandmother     Unknown/Adopted Paternal Grandfather         Problem history  Patient Active Problem List   Diagnosis    Hyperlipidemia LDL goal <100    Encounter for long-term (current) use of insulin (H)    Chronic low back pain    Libido, decreased    Restless legs    Tobacco abuse    Cervicalgia    Paresthesias    Persistent disorder of initiating or maintaining sleep    Spasms of the hands or feet    Hypertension goal BP (blood pressure) < 140/90    Right-sided low back pain without sciatica    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)    GAIL (generalized anxiety disorder)    Psychosocial stressors    DDD (degenerative disc disease), lumbar    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)    Hypoglycemia    Syncopal episodes    Gastroparesis    Polyneuropathy associated with underlying disease (H24)    Alcohol withdrawal syndrome without complication (H)    Ketosis (H)    Background diabetic retinopathy, mild, ou    Posterior vitreous detachment of left eye    Combined forms of age-related cataract of both eyes    Diabetic macular edema of right eye (H)    Glaucoma suspect of left eye        Allergies  Allergies   Allergen Reactions    Avapro [Irbesartan]      Profuse sweating    Crestor [Rosuvastatin Calcium]      cough    Sulfa  Antibiotics Swelling and Hives    Simvastatin      Elevated BS readings        Social History  Social History     Socioeconomic History    Marital status:      Spouse name: Not on file    Number of children: Not on file    Years of education: Not on file    Highest education level: Not on file   Occupational History    Not on file   Tobacco Use    Smoking status: Former     Packs/day: 0.00     Years: 20.00     Additional pack years: 0.00     Total pack years: 0.00     Types: Cigarettes     Quit date: 10/1/2015     Years since quittin.3     Passive exposure: Past    Smokeless tobacco: Never    Tobacco comments:     2-4  cigarettes daily   Vaping Use    Vaping Use: Never used   Substance and Sexual Activity    Alcohol use: Yes     Comment: 2 drinks daily    Drug use: No    Sexual activity: Yes     Partners: Female     Birth control/protection: None   Other Topics Concern    Parent/sibling w/ CABG, MI or angioplasty before 65F 55M? No   Social History Narrative    History commercial photography    Customer Service at [employer]    Ex-wife, daughter 15yo as of 2018      Social Determinants of Health     Financial Resource Strain: Low Risk  (2023)    Financial Resource Strain     Within the past 12 months, have you or your family members you live with been unable to get utilities (heat, electricity) when it was really needed?: No   Food Insecurity: Low Risk  (2023)    Food Insecurity     Within the past 12 months, did you worry that your food would run out before you got money to buy more?: No     Within the past 12 months, did the food you bought just not last and you didn t have money to get more?: No   Transportation Needs: Low Risk  (2023)    Transportation Needs     Within the past 12 months, has lack of transportation kept you from medical appointments, getting your medicines, non-medical meetings or appointments, work, or from getting things that you need?: No   Physical Activity: Not  on file   Stress: Not on file   Social Connections: Not on file   Interpersonal Safety: Not on file   Housing Stability: Low Risk  (12/11/2023)    Housing Stability     Do you have housing? : Yes     Are you worried about losing your housing?: No        OBJECTIVE     Vital signs reviewed by Rudolph Devries PA-C  /76 (BP Location: Left arm, Patient Position: Sitting, Cuff Size: Adult Large)   Pulse 104   Temp 98.4  F (36.9  C) (Tympanic)   Resp 16   Wt 72.6 kg (160 lb)   SpO2 96%   BMI 24.33 kg/m       Physical Exam  Vitals reviewed.   Constitutional:       General: He is not in acute distress.     Appearance: He is well-developed. He is not ill-appearing, toxic-appearing or diaphoretic.   HENT:      Head: Normocephalic and atraumatic.      Right Ear: Hearing, tympanic membrane, ear canal and external ear normal. No drainage, swelling or tenderness. Tympanic membrane is not perforated, erythematous, retracted or bulging.      Left Ear: Hearing, tympanic membrane, ear canal and external ear normal. No drainage, swelling or tenderness. Tympanic membrane is not perforated, erythematous, retracted or bulging.      Nose: Congestion and rhinorrhea present. No nasal tenderness or mucosal edema.      Right Turbinates: Not enlarged or swollen.      Left Turbinates: Not enlarged or swollen.      Right Sinus: No maxillary sinus tenderness or frontal sinus tenderness.      Left Sinus: No maxillary sinus tenderness or frontal sinus tenderness.      Mouth/Throat:      Pharynx: Posterior oropharyngeal erythema present. No pharyngeal swelling, oropharyngeal exudate or uvula swelling.      Tonsils: No tonsillar exudate. 0 on the right. 0 on the left.   Eyes:      General: Lids are normal.         Right eye: No discharge.         Left eye: No discharge.      Conjunctiva/sclera: Conjunctivae normal.      Right eye: Right conjunctiva is not injected. No exudate.     Left eye: Left conjunctiva is not injected. No exudate.      Pupils: Pupils are equal, round, and reactive to light.   Cardiovascular:      Rate and Rhythm: Normal rate and regular rhythm.      Heart sounds: Normal heart sounds. No murmur heard.     No friction rub. No gallop.   Pulmonary:      Effort: Pulmonary effort is normal. No accessory muscle usage, respiratory distress or retractions.      Breath sounds: Normal breath sounds and air entry. No stridor, decreased air movement or transmitted upper airway sounds. No decreased breath sounds, wheezing, rhonchi or rales.   Chest:      Chest wall: No tenderness.   Abdominal:      Palpations: Abdomen is soft. Abdomen is not rigid.   Musculoskeletal:         General: Normal range of motion.      Cervical back: Normal range of motion and neck supple. No rigidity.   Lymphadenopathy:      Head:      Right side of head: No submental, submandibular, tonsillar, preauricular or posterior auricular adenopathy.      Left side of head: No submental, submandibular, tonsillar, preauricular or posterior auricular adenopathy.      Cervical: Cervical adenopathy present.      Right cervical: No superficial or posterior cervical adenopathy.     Left cervical: No superficial or posterior cervical adenopathy.   Skin:     General: Skin is warm and dry.   Neurological:      Mental Status: He is alert and oriented to person, place, and time.      Cranial Nerves: No cranial nerve deficit.   Psychiatric:         Behavior: Behavior normal. Behavior is cooperative.         Thought Content: Thought content normal.         Judgment: Judgment normal.           Rudolph Devries PA-C  1/17/2024, 1:26 PM

## 2024-01-17 NOTE — TELEPHONE ENCOUNTER
Nurse Triage SBAR    Is this a 2nd Level Triage? NO    Situation: Pt is calling: due to ongoing illness since Sunday.  - COV test.  Has Congestion.  SOB.  At times going upstairs.  Denies pain behind his knees. Coughing up gray sputum.  Chest is heavy no pain.  .no fever.      Protocol Recommended Disposition:   No disposition on file.    Recommendation: Be seen today or UC.     Reason for Disposition   MILD difficulty breathing (e.g., minimal/no SOB at rest, SOB with walking, pulse < 100) of new-onset or worse than normal    Additional Information   Negative: Sounds like a life-threatening emergency to the triager   Negative: Difficulty breathing, and not from stuffy nose (e.g., not relieved by cleaning out the nose)   Negative: SEVERE difficulty breathing (e.g., struggling for each breath, speaks in single words, pulse > 120)   Negative: Breathing stopped and hasn't returned   Negative: Choking on something   Negative: Bluish (or gray) lips or face   Negative: Difficult to awaken or acting confused (e.g., disoriented, slurred speech)   Negative: Passed out (i.e., fainted, collapsed and was not responding)   Negative: Wheezing started suddenly after medicine, an allergic food, or bee sting   Negative: Stridor (harsh sound while breathing in)   Negative: Slow, shallow and weak breathing   Negative: Sounds like a life-threatening emergency to the triager   Negative: Chest pain   Negative: Wheezing (high pitched whistling sound) and previous asthma attacks or use of asthma medicines   Negative: Difficulty breathing and within 14 days of COVID-19 Exposure   Negative: Difficulty breathing and only present when coughing   Negative: Difficulty breathing and only from stuffy nose   Negative: Difficulty breathing and only from stuffy nose or runny nose from common cold   Negative: MODERATE difficulty breathing (e.g., speaks in phrases, SOB even at rest, pulse 100-120) of new-onset or worse than normal   Negative: Oxygen  "level (e.g., pulse oximetry) 90% or lower   Negative: Wheezing can be heard across the room   Negative: Drooling or spitting out saliva (because can't swallow)   Negative: Any history of prior \"blood clot\" in leg or lungs   Negative: Illness requiring prolonged bedrest in past month (e.g., immobilization, long hospital stay)   Negative: Hip or leg fracture (broken bone) in past month (or had cast on leg or ankle in past month)   Negative: Major surgery in the past month   Negative: Long-distance travel in past month (e.g., car, bus, train, plane; with trip lasting 6 or more hours)   Negative: Cancer treatment in past six months (or has cancer now)   Negative: Extra heartbeats, irregular heart beating, or heart is beating very fast (i.e., 'palpitations')   Negative: Fever > 103 F (39.4 C)   Negative: Fever > 101 F (38.3 C) and over 60 years of age   Negative: Fever > 100.0 F (37.8 C) and bedridden (e.g., nursing home patient, stroke, chronic illness, recovering from surgery)   Negative: Fever > 100.0 F (37.8 C) and diabetes mellitus or weak immune system (e.g., HIV positive, cancer chemo, splenectomy, organ transplant, chronic steroids)   Negative: Periods where breathing stops and then resumes normally and bedridden (e.g., nursing home patient, CVA)   Negative: Pregnant or postpartum (from 0 to 6 weeks after delivery)   Negative: Patient sounds very sick or weak to the triager    Protocols used: Sinus Pain and Congestion-A-OH, Breathing Difficulty-A-OH    Shelby Fagan RN on 1/17/2024 at 10:35 AM   "

## 2024-01-18 LAB — SARS-COV-2 RNA RESP QL NAA+PROBE: NEGATIVE

## 2024-01-19 NOTE — TELEPHONE ENCOUNTER
Called patient informed him of message below patient states he has a new primary that he is seeing so he will go to them for further refills     Thank you   LS

## 2024-01-28 ENCOUNTER — HEALTH MAINTENANCE LETTER (OUTPATIENT)
Age: 54
End: 2024-01-28

## 2024-01-29 ENCOUNTER — TELEPHONE (OUTPATIENT)
Dept: GASTROENTEROLOGY | Facility: CLINIC | Age: 54
End: 2024-01-29
Payer: COMMERCIAL

## 2024-01-29 ENCOUNTER — HOSPITAL ENCOUNTER (OUTPATIENT)
Facility: AMBULATORY SURGERY CENTER | Age: 54
End: 2024-01-29
Attending: SURGERY
Payer: COMMERCIAL

## 2024-01-29 DIAGNOSIS — Z12.11 SPECIAL SCREENING FOR MALIGNANT NEOPLASMS, COLON: Primary | ICD-10-CM

## 2024-01-29 NOTE — TELEPHONE ENCOUNTER
"Endoscopy Scheduling Screen    Have you had a positive Covid test in the last 14 days?  No    Are you active on MyChart?   Yes    What insurance is in the chart?  Other:  BC[employer]    Ordering/Referring Provider:     Chuy Choudhury, DO in [facility]      (If ordering provider performs procedure, schedule with ordering provider unless otherwise instructed. )    BMI: Estimated body mass index is 24.33 kg/m  as calculated from the following:    Height as of 12/13/23: 1.727 m (5' 8\").    Weight as of 1/17/24: 72.6 kg (160 lb).     Sedation Ordered  moderate sedation.   If patient BMI > 50 do not schedule in [facility].    If patient BMI > 45 do not schedule at [facility].    Are you taking methadone or Suboxone?  No    Have you had difficulties, pain, or discomfort during past endoscopy procedures?  No    Are you taking any prescription medications for pain 3 or more times per week?   NO - No RN review required.    Do you have a history of malignant hyperthermia or adverse reaction to anesthesia?  No    (Females) Are you currently pregnant?   No     Have you been diagnosed or told you have pulmonary hypertension?   No    Do you have an LVAD?  No    Have you been told you have moderate to severe sleep apnea?  No    Have you been told you have COPD, asthma, or any other lung disease?  No    Do you have any heart conditions?  No     Have you ever had an organ transplant?   No    Have you ever had or are you awaiting a heart or lung transplant?   No    Have you had a stroke or transient ischemic attack (TIA aka \"mini stroke\" in the last 6 months?   No    Have you been diagnosed with or been told you have cirrhosis of the liver?   No    Are you currently on dialysis?   No    Do you need assistance transferring?   No    BMI: Estimated body mass index is 24.33 kg/m  as calculated from the following:    Height as of 12/13/23: 1.727 m (5' 8\").    Weight as of 1/17/24: 72.6 kg (160 lb).     Is patients BMI > 40 and scheduling location " [facility]?  No    Do you take an injectable medication for weight loss or diabetes (excluding insulin)?  No    Do you take the medication Naltrexone?  No    Do you take blood thinners?  No       Prep   Are you currently on dialysis or do you have chronic kidney disease?  No    Do you have a diagnosis of diabetes?  Yes (Golytely Prep)    Do you have a diagnosis of cystic fibrosis (CF)?  No    On a regular basis do you go 3 -5 days between bowel movements?  No    BMI > 40?  No    Preferred Pharmacy:    [facility] [address] [place], [place] [address], [place] - [address]  [address] [place] [address]  Phone: [phone] Fax: [phone]      Final Scheduling Details   Colonoscopy prep sent?  Standard Golytely    Procedure scheduled  Colonoscopy    Surgeon:  Ricky     Date of procedure:  03/26/2024     Pre-OP / PAC:   No - Not required for this site.    Location  [facility] - [facility] - Patient preference.    Sedation   Moderate Sedation - Per order.      Patient Reminders:   You will receive a call from a Nurse to review instructions and health history.  This assessment must be completed prior to your procedure.  Failure to complete the Nurse assessment may result in the procedure being cancelled.      On the day of your procedure, please designate an adult(s) who can drive you home stay with you for the next 24 hours. The medicines used in the exam will make you sleepy. You will not be able to drive.      You cannot take public transportation, ride share services, or non-medical taxi service without a responsible caregiver.  Medical transport services are allowed with the requirement that a responsible caregiver will receive you at your destination.  We require that drivers and caregivers are confirmed prior to your procedure.

## 2024-02-29 DIAGNOSIS — E10.8 TYPE 1 DIABETES MELLITUS WITH COMPLICATIONS (H): ICD-10-CM

## 2024-02-29 DIAGNOSIS — E10.42 TYPE 1 DIABETES MELLITUS WITH DIABETIC POLYNEUROPATHY (H): ICD-10-CM

## 2024-02-29 DIAGNOSIS — F41.1 GENERALIZED ANXIETY DISORDER: ICD-10-CM

## 2024-02-29 RX ORDER — INSULIN GLARGINE 100 [IU]/ML
INJECTION, SOLUTION SUBCUTANEOUS
Qty: 15 ML | Refills: 0 | Status: SHIPPED | OUTPATIENT
Start: 2024-02-29 | End: 2024-04-15

## 2024-02-29 RX ORDER — GABAPENTIN 300 MG/1
CAPSULE ORAL
Qty: 90 CAPSULE | Refills: 0 | Status: SHIPPED | OUTPATIENT
Start: 2024-02-29 | End: 2024-03-08

## 2024-02-29 RX ORDER — ALPRAZOLAM 0.5 MG
TABLET ORAL
Qty: 30 TABLET | Refills: 0 | Status: SHIPPED | OUTPATIENT
Start: 2024-02-29

## 2024-03-01 DIAGNOSIS — E10.649 TYPE 1 DIABETES MELLITUS WITH HYPOGLYCEMIA UNAWARENESS (H): ICD-10-CM

## 2024-03-04 RX ORDER — INSULIN ASPART 100 [IU]/ML
INJECTION, SOLUTION INTRAVENOUS; SUBCUTANEOUS
Qty: 15 ML | Refills: 0 | Status: SHIPPED | OUTPATIENT
Start: 2024-03-04

## 2024-03-07 SDOH — HEALTH STABILITY: PHYSICAL HEALTH: ON AVERAGE, HOW MANY MINUTES DO YOU ENGAGE IN EXERCISE AT THIS LEVEL?: 60 MIN

## 2024-03-07 SDOH — HEALTH STABILITY: PHYSICAL HEALTH: ON AVERAGE, HOW MANY DAYS PER WEEK DO YOU ENGAGE IN MODERATE TO STRENUOUS EXERCISE (LIKE A BRISK WALK)?: 5 DAYS

## 2024-03-07 ASSESSMENT — SOCIAL DETERMINANTS OF HEALTH (SDOH): HOW OFTEN DO YOU GET TOGETHER WITH FRIENDS OR RELATIVES?: TWICE A WEEK

## 2024-03-08 ENCOUNTER — OFFICE VISIT (OUTPATIENT)
Dept: FAMILY MEDICINE | Facility: CLINIC | Age: 54
End: 2024-03-08
Payer: COMMERCIAL

## 2024-03-08 VITALS
WEIGHT: 164.6 LBS | HEIGHT: 69 IN | DIASTOLIC BLOOD PRESSURE: 84 MMHG | RESPIRATION RATE: 20 BRPM | TEMPERATURE: 97 F | OXYGEN SATURATION: 96 % | HEART RATE: 76 BPM | SYSTOLIC BLOOD PRESSURE: 130 MMHG | BODY MASS INDEX: 24.38 KG/M2

## 2024-03-08 DIAGNOSIS — R68.82 DECREASED LIBIDO: ICD-10-CM

## 2024-03-08 DIAGNOSIS — G63 POLYNEUROPATHY ASSOCIATED WITH UNDERLYING DISEASE (H): ICD-10-CM

## 2024-03-08 DIAGNOSIS — G25.81 RESTLESS LEG SYNDROME: ICD-10-CM

## 2024-03-08 DIAGNOSIS — E11.3299 BACKGROUND DIABETIC RETINOPATHY (H): ICD-10-CM

## 2024-03-08 DIAGNOSIS — I10 HYPERTENSION GOAL BP (BLOOD PRESSURE) < 140/90: ICD-10-CM

## 2024-03-08 DIAGNOSIS — M67.441 GANGLION CYST OF FINGER OF RIGHT HAND: ICD-10-CM

## 2024-03-08 DIAGNOSIS — E10.42 TYPE 1 DIABETES MELLITUS WITH DIABETIC POLYNEUROPATHY (H): Primary | ICD-10-CM

## 2024-03-08 DIAGNOSIS — J06.9 UPPER RESPIRATORY TRACT INFECTION, UNSPECIFIED TYPE: ICD-10-CM

## 2024-03-08 PROBLEM — E88.89 KETOSIS (H): Status: RESOLVED | Noted: 2022-02-25 | Resolved: 2024-03-08

## 2024-03-08 PROBLEM — F10.930 ALCOHOL WITHDRAWAL SYNDROME WITHOUT COMPLICATION (H): Status: RESOLVED | Noted: 2022-02-25 | Resolved: 2024-03-08

## 2024-03-08 PROBLEM — K31.84 GASTROPARESIS: Status: RESOLVED | Noted: 2018-07-11 | Resolved: 2024-03-08

## 2024-03-08 PROBLEM — E11.311 DIABETIC MACULAR EDEMA OF RIGHT EYE (H): Status: RESOLVED | Noted: 2022-10-01 | Resolved: 2024-03-08

## 2024-03-08 LAB
FLUAV AG SPEC QL IA: NEGATIVE
FLUBV AG SPEC QL IA: NEGATIVE

## 2024-03-08 PROCEDURE — 87804 INFLUENZA ASSAY W/OPTIC: CPT | Performed by: FAMILY MEDICINE

## 2024-03-08 PROCEDURE — 99396 PREV VISIT EST AGE 40-64: CPT | Performed by: FAMILY MEDICINE

## 2024-03-08 PROCEDURE — 99214 OFFICE O/P EST MOD 30 MIN: CPT | Mod: 25 | Performed by: FAMILY MEDICINE

## 2024-03-08 RX ORDER — PRAVASTATIN SODIUM 20 MG
20 TABLET ORAL DAILY
Qty: 90 TABLET | Refills: 1 | Status: SHIPPED | OUTPATIENT
Start: 2024-03-08 | End: 2024-09-06

## 2024-03-08 RX ORDER — LISINOPRIL 40 MG/1
TABLET ORAL
Qty: 90 TABLET | Refills: 1 | Status: SHIPPED | OUTPATIENT
Start: 2024-03-08 | End: 2024-04-09

## 2024-03-08 RX ORDER — PREGABALIN 75 MG/1
75 CAPSULE ORAL 2 TIMES DAILY
Qty: 180 CAPSULE | Refills: 2 | Status: SHIPPED | OUTPATIENT
Start: 2024-03-08 | End: 2024-04-09

## 2024-03-08 RX ORDER — ROPINIROLE 0.25 MG/1
TABLET, FILM COATED ORAL
Qty: 180 TABLET | Refills: 2 | Status: SHIPPED | OUTPATIENT
Start: 2024-03-08

## 2024-03-08 ASSESSMENT — ENCOUNTER SYMPTOMS
CONFUSION: 0
PALPITATIONS: 0
SHORTNESS OF BREATH: 0
EYE PAIN: 0
DIZZINESS: 0
FEVER: 0
HEADACHES: 0
APPETITE CHANGE: 0
EYE DISCHARGE: 0
AGITATION: 0
COUGH: 1
ALLERGIC/IMMUNOLOGIC NEGATIVE: 1
HALLUCINATIONS: 0
DECREASED CONCENTRATION: 0
FATIGUE: 0
HEMATOLOGIC/LYMPHATIC NEGATIVE: 1
GASTROINTESTINAL NEGATIVE: 1
NERVOUS/ANXIOUS: 0
COLOR CHANGE: 0
ENDOCRINE NEGATIVE: 1
ACTIVITY CHANGE: 0
WHEEZING: 0
SEIZURES: 0

## 2024-03-08 ASSESSMENT — PAIN SCALES - GENERAL: PAINLEVEL: MILD PAIN (2)

## 2024-03-08 NOTE — PATIENT INSTRUCTIONS
Damian Ponce,    Thank you for allowing [facility] to manage your care.    I ordered some blood work, please go to the laboratory to get your laboratory studies.    I sent your prescriptions to your pharmacy.    For your neuropathy, discontinue gabapentin.  Start Lyrica 75 mg daily.     I made a diabetic educator, optometry, and orthopedic surgery referral, they will be calling in approximately 1 week to set up your appointment.  If you do not hear from them, please call the specialty number on your after visit.     For your convenience, test results are released as soon as they are available  Please allow 1-2 business days for me to send you a comment about your results.  If not done so, I encourage you to login into [employer] ([email or web address] to review your results in real time.     If you have any questions or concerns, please feel free to call us at [phone].    Sincerely,    Dr. Choudhury    Did you know?      You can schedule a video visit for follow-up appointments as well as future appointments for certain conditions.  Please see the below link.     [email or web address]    If you have not already done so,  I encourage you to sign up for [employer] ([email or web address]).  This will allow you to review your results, securely communicate with a provider, and schedule virtual visits as well.

## 2024-03-08 NOTE — PROGRESS NOTES
Preventive Care Visit  [facility] [place] Choudhury DO, Family Medicine  Mar 8, 2024      1. Type 1 diabetes mellitus with diabetic polyneuropathy (H)  Patient is interested in insulin pump. He is currntly taking lantus 32 every afternoon.  He sporadically uses novolog.   - Adult Diabetes Education  Referral; Future  - pravastatin (PRAVACHOL) 20 MG tablet; Take 1 tablet (20 mg) by mouth daily  Dispense: 90 tablet; Refill: 1  - Adult Eye  Referral; Future    2. Polyneuropathy associated with underlying disease (H24)  Chronic.  Will discontinue gabapentin  - pregabalin (LYRICA) 75 MG capsule; Take 1 capsule (75 mg) by mouth 2 times daily  Dispense: 180 capsule; Refill: 2    3. Background diabetic retinopathy (H)  - Adult Eye  Referral; Future    4. Restless leg syndrome  Chronic.   - rOPINIRole (REQUIP) 0.25 MG tablet; TAKE 1-2 TABLETS BY MOUTH AT BEDTIME, no further refills until appointment  Dispense: 180 tablet; Refill: 2    5. Hypertension goal BP (blood pressure) < 140/90  Chronic and stable.   - lisinopril (ZESTRIL) 40 MG tablet; TAKE 1 TABLET BY MOUTH ONCE DAILY .  Dispense: 90 tablet; Refill: 1    6. Upper respiratory tract infection, unspecified type  Most likely viral  - Symptomatic COVID-19 Virus (Coronavirus) by PCR  - Influenza A/B antigen    7. Decreased libido  - Testosterone Free and Total; Future    8. Ganglion cyst of finger of right hand  - Orthopedic  Referral; Future    I spent 45 minutes with patient of which 50% was spent counseling and coordinating patient's care as well as discussing patient's plan of care.    Subjective   Kris is a 54 year old, presenting for the following:  Physical    Patient would still like to discuss the followin.) illness that has been going on since January  2.) skin concerns   3.) medication refills           3/8/2024     9:11 AM   Additional Questions   Roomed by Gisela   Accompanied by n/a         HPI  Acute Illness  Acute illness concerns: congestion, cough, breathing difficulties   Onset/Duration: off and on since January   Symptoms:  Fever: No  Chills/Sweats: No  Headache (location?): YES  Sinus Pressure: YES  Conjunctivitis:  No  Ear Pain: no  Rhinorrhea: YES  Congestion: YES  Sore Throat: YES  Cough: YES-non-productive, with shortness of breath  Wheeze: YES  Decreased Appetite: YES  Nausea: No  Vomiting: No  Diarrhea: No  Dysuria/Freq.: No  Dysuria or Hematuria: No  Fatigue/Achiness: YES  Sick/Strep Exposure: YES - wife   Therapies tried and outcome: OTC cold medications - helps a bit         3/7/2024   General Health   How would you rate your overall physical health? Good   Feel stress (tense, anxious, or unable to sleep) Not at all         3/7/2024   Nutrition   Three or more servings of calcium each day? (!) I DON'T KNOW   Diet: Diabetic   How many servings of fruit and vegetables per day? (!) 2-3   How many sweetened beverages each day? 0-1         3/7/2024   Exercise   Days per week of moderate/strenous exercise 5 days   Average minutes spent exercising at this level 60 min         3/7/2024   Social Factors   Frequency of gathering with friends or relatives Twice a week   Worry food won't last until get money to buy more No   Food not last or not have enough money for food? No   Do you have housing?  Yes   Are you worried about losing your housing? No   Lack of transportation? No   Unable to get utilities (heat,electricity)? No         3/8/2024   Fall Risk   Gait Speed Test (Document in seconds) 0.01   Gait Speed Test Interpretation Less than or equal to 5.00 seconds - PASS          3/7/2024   Dental   Dentist two times every year? Yes         3/7/2024   TB Screening   Were you born outside of [place]?  No         Today's PHQ-2 Score:       3/7/2024    10:38 PM   PHQ-2 ( 1999 [employer])   Q1: Little interest or pleasure in doing things 0   Q2: Feeling down, depressed or hopeless 0   PHQ-2 Score 0   Q1:  Little interest or pleasure in doing things Not at all   Q2: Feeling down, depressed or hopeless Not at all   PHQ-2 Score 0           3/7/2024   Substance Use   Alcohol more than 3/day or more than 7/wk Not Applicable   Do you use any other substances recreationally? No     Social History     Tobacco Use    Smoking status: Former     Packs/day: 0.00     Years: 20.00     Additional pack years: 0.00     Total pack years: 0.00     Types: Cigarettes     Quit date: 10/1/2015     Years since quittin.4     Passive exposure: Past    Smokeless tobacco: Never    Tobacco comments:     2-4  cigarettes daily   Vaping Use    Vaping Use: Never used   Substance Use Topics    Alcohol use: Yes     Comment: 2 drinks daily    Drug use: No           3/7/2024   STI Screening   New sexual partner(s) since last STI/HIV test? No   Last PSA:   PSA   Date Value Ref Range Status   2018 0.44 0 - 4 ug/L Final     Comment:     Assay Method:  Chemiluminescence using Siemens Vista analyzer     Prostate Specific Antigen Screen   Date Value Ref Range Status   11/10/2022 0.46 0.00 - 4.00 ug/L Final     ASCVD Risk   The ASCVD Risk score (Akilah MUÑOZ, et al., 2019) failed to calculate for the following reasons:    The valid HDL cholesterol range is 20 to 100 mg/dL           Reviewed and updated as needed this visit by Provider                    Past Medical History:   Diagnosis Date    Adopted     Anxiety     Anxiety     Chronic low back pain     DDD (degenerative disc disease), lumbar 3/8/2018    Diabetes mellitus (H)     TYPE 1    Hallux abducto valgus     Hyperlipaemia     Hyperlipidemia     Hypertension     Lip lesion 2010    Pain in toe of left foot     2 ND TOE    RLS (restless legs syndrome)     Snoring     MODERATE SEVERITY    Swelling of foot joint 2012     Past Surgical History:   Procedure Laterality Date    APPENDECTOMY      OSTEOTOMY FOOT  2013    Procedure: OSTEOTOMY FOOT;  Left Foot Distal First Metarsal  "Osteotomy, Second Toe Hammer Toe Correction, Second Metatarsal Weil Osteotomy;  Surgeon: Robert Augustine DPM;  [facility]: [facility]    SURGICAL HISTORY OF -   1992    Appy     Establish care    2. Diabetes follow-up: Patient is interested in the pump.  States of extremes in his blood sugars.  Patient is currently on lantus 32 during afternoon.  Patient does not use novolog consistently.  He does not have an appetite which has been chronic.  Patient attributes this his busy schedule at work.  Currently, has loida.  Regarding his neuropathy, patient is taking gabapentin 900 mg twice a day.     3. Anxiety: Takes xanax as needed.    4. URI symptoms: Ongoing for the past week.  Cough and congestion.  Patient was originally sick on January which was negative.    5. Right hand cyst: Involving the dorsal aspect.  No issues with ROM.       Review of Systems   Constitutional:  Negative for activity change, appetite change, fatigue and fever.   HENT: Negative.     Eyes:  Negative for pain, discharge and visual disturbance.   Respiratory:  Positive for cough. Negative for shortness of breath and wheezing.         Congestion   Cardiovascular:  Negative for chest pain, palpitations and leg swelling.   Gastrointestinal: Negative.    Endocrine: Negative.    Genitourinary: Negative.    Skin:  Negative for color change and rash.   Allergic/Immunologic: Negative.    Neurological:  Negative for dizziness, seizures and headaches.        States of neuropathy involving lower extremities   Hematological: Negative.    Psychiatric/Behavioral:  Negative for agitation, confusion, decreased concentration and hallucinations. The patient is not nervous/anxious.           Objective    Exam  /84   Pulse 76   Temp 97  F (36.1  C) (Temporal)   Resp 20   Ht 1.74 m (5' 8.5\")   Wt 74.7 kg (164 lb 9.6 oz)   SpO2 96%   BMI 24.66 kg/m     Estimated body mass index is 24.66 kg/m  as calculated from the following:    Height as of this encounter: " "1.74 m (5' 8.5\").    Weight as of this encounter: 74.7 kg (164 lb 9.6 oz).    Physical Exam  Constitutional:       General: He is not in acute distress.     Appearance: He is well-developed.   HENT:      Head: Normocephalic and atraumatic.      Nose: Nose normal.   Eyes:      Conjunctiva/sclera: Conjunctivae normal.   Neck:      Trachea: No tracheal deviation.   Cardiovascular:      Rate and Rhythm: Normal rate and regular rhythm.      Heart sounds: Normal heart sounds.   Pulmonary:      Effort: Pulmonary effort is normal.      Breath sounds: No wheezing.   Musculoskeletal:         General: Normal range of motion.      Cervical back: Normal range of motion.   Skin:     Findings: No erythema or rash.      Comments: Approximately 2 cm cyst involving the dorsal aspect of 3rd MCP joint   Neurological:      Mental Status: He is alert and oriented to person, place, and time.   Psychiatric:         Behavior: Behavior normal.           Component      Latest Ref Rng 2/25/2022  4:12 PM 11/10/2022  9:45 AM   Cholesterol      <200 mg/dL 205 (H)  197    Triglycerides      <150 mg/dL 112  77    HDL Cholesterol      >=40 mg/dL 70  112    LDL Cholesterol Calculated      <=100 mg/dL 113 (H)  70    VLDL-Cholesterol      0 - 30 mg/dL     Cholesterol/HDL Ratio      0.0 - 5.0      Hemoglobin A1C      0.0 - 5.6 % 8.1 (H)  7.3 (H)    Non HDL Cholesterol      <130 mg/dL 135 (H)  85    Patient Fasting? No  No       Legend:  (H) High  Signed Electronically by: Chuy Choudhury,     "

## 2024-03-08 NOTE — LETTER
March 15, 2024      Kris Tao  [address] [address] [facility] [address]        Dear ,    We are writing to inform you of your test results.    We see you have not read your [facility] messages.   -Influenza result is negative  For additional lab test information, [email or web address] is an excellent reference.    Resulted Orders   Influenza A/B antigen   Result Value Ref Range    Influenza A antigen Negative Negative    Influenza B antigen Negative Negative    Narrative    Test results must be correlated with clinical data. If necessary, results should be confirmed by a molecular assay or viral culture.       If you have any questions or concerns, please call the clinic at the number listed above.       Sincerely,      Chuy Choudhury DO/erin

## 2024-03-11 RX ORDER — BISACODYL 5 MG/1
TABLET, DELAYED RELEASE ORAL
Qty: 4 TABLET | Refills: 0 | Status: SHIPPED | OUTPATIENT
Start: 2024-03-11

## 2024-03-11 NOTE — TELEPHONE ENCOUNTER
Standard Golytely Bowel Prep. Instructions were sent via [employer]. Bowel prep was sent 3/11/2024 to    [facility] [address] - [place], [place] - [address] [address] Amber E. Newman, RN on 3/11/2024 at 10:15 AM

## 2024-03-12 ENCOUNTER — TELEPHONE (OUTPATIENT)
Dept: GASTROENTEROLOGY | Facility: CLINIC | Age: 54
End: 2024-03-12

## 2024-03-13 NOTE — TELEPHONE ENCOUNTER
Attempted to contact patient in order to complete pre assessment questions.     No answer. Left message to return call to [phone] option 4    Missed call communication sent via [employer].        Salma Hardwick RN  Endoscopy Procedure Pre Assessment RN

## 2024-03-13 NOTE — TELEPHONE ENCOUNTER
Pre visit planning completed.      Procedure details:    Patient scheduled for Colonoscopy  on 3/26/24.     Arrival time: 1145. Procedure time 1230    Pre op exam needed? N/A    Facility location: [facility]; [address], [place] [address]    Sedation type: Conscious sedation     Indication for procedure: screening       Chart review:     Electronic implanted devices? No    Recent diagnosis of diverticulitis within the last 6 weeks? No    Diabetic? Yes. See medication holding recommendations.     Diabetic medication HOLDING recommendations: (if applicable)  Oral diabetic medications: No  Diabetic injectables: No  Insulin: Yes. Consult with managing provider       Medication review:    Anticoagulants? No    NSAIDS? No    Other medication HOLDING recommendations:  N/A      Prep for procedure:     Bowel prep recommendation: Standard Golytely. Bowel prep prescription sent to    [facility] [address] [facility] [place] - [address] [address]  Due to: diabetes.     Prep instructions sent via [employer] Schuler RN  Endoscopy Procedure Pre Assessment RN  [phone] option 4

## 2024-03-19 NOTE — TELEPHONE ENCOUNTER
Second call attempt to complete pre assessment.     No answer.  Left message to return call to [phone] #4 by next business day prior to 4PM or procedure will be sent to cancel.     Callback required communication sent via [employer].      Corinne Kliber, RN  Endoscopy Procedure Pre Assessment RN

## 2024-03-21 ENCOUNTER — TELEPHONE (OUTPATIENT)
Dept: GASTROENTEROLOGY | Facility: CLINIC | Age: 54
End: 2024-03-21

## 2024-03-21 NOTE — TELEPHONE ENCOUNTER
No return call received.   Pre assessment was not completed for upcoming scheduled procedure.     Staff message sent to endoscopy scheduling to cancel procedure per policy.       Corinne Kliber, RN   Endoscopy Procedure Pre Assessment RN

## 2024-03-21 NOTE — TELEPHONE ENCOUNTER
Caller: No call    Reason for Reschedule/Cancellation   (please be detailed, any staff messages or encounters to note?): Cancellation policy - pre-assessment call not completed.      Prior to reschedule please review:  Ordering Provider: Chuy Choudhury   Sedation Determined: Moderate  Does patient have any ASC Exclusions, please identify?: No      Notes on Cancelled Procedure:  Procedure: Lower Endoscopy [Colonoscopy]   Date: 3/26/2024  Location: [facility]; [address] [place] [address], [address] Floor, [place] [address]   Surgeon: Ricky      Rescheduled: No, sent MyChart and CTL.        Did you cancel or rescheduled an EUS procedure? No.     CASE IN DEPOT.

## 2024-03-21 NOTE — TELEPHONE ENCOUNTER
----- Message from Corinne Kliber, RN sent at 3/21/2024  8:41 AM CDT -----  Regarding: Cancel per Policy  Pre assessment was not completed for upcoming Colonoscopy  scheduled on 3/26/24.    Please cancel per policy.       Thank you,     Corinne Kliber, RN  Endoscopy Procedure Pre Assessment RN  [phone] option 4

## 2024-04-05 NOTE — PATIENT INSTRUCTIONS
For the thickness of the feet - use urea 40% cream nightly when they are thick    For the itching on the back, try the triamcinolone 0.1% ointment twice daily for 2-3 weeks. If this is not helpful try an over the counter sarna original lotion as much as needed. This may help trick the nerves into being more satisfied. Try not to scratch.         Eczema Action Plan    Name: Rudolph VILLALPANDO Dinh Provider: ANGÉLICA LOUIS Date: 4/9/2024    Step 1: Eczema Under Control (Skin is soft and flexible, not red or itchy)  Moisturize the whole body at least two times a day.  Watch for signs that the skin is turning red, itchy, or dry.  Use a cream in a jar from the list below. Do not use lotions from a pump.  Suggested creams:  CeraVe Cream, Cetaphil Cream, Vanicream, Aquaphor, Vaseline  Use a gentle cleanser/soap in the bath/shower such as dove sensitive cleanser or Cetaphil cleanser only to the armpits and groin areas, or where you are visibly dirty. All other areas should get washed with water only. Do not scrub. After bathing pat the skin dry with a towel instead of rubbing dry. Apply your moisturizer while you're still slightly damp to lock in some of the moisture.     Step 2:  Eczema Flare (Eczema is out of control and not responding to maintenance care)       Continue above procedures  Also, use prescribed steroid ointment two times a day for up to two weeks per month. Apply to red and itchy areas. Put the steroid on the skin first before other creams.   Body: triamcinolone 0.1% ointment     Use one fingertip unit of the ointment for eczema. A fingertip unit is the amount of ointment from the first bend in finger to the fingertip. This amount will cover an area equal to two adult hands. Using steroids for extended periods of time can thin the skin and cause stretch marks. Never use for longer than 3-4 weeks before following up with your provider. Please follow up in clinic if rash persists and does not resolve with this  "regimen in 1 months time.     Moisturize your whole body two times a day with a suggested cream.    Wet Wraps  If your rash is very itchy or getting out of control you can also try applying what we call \"wet wraps\". Use your moisturizers/medications where instructed and THEN cover that body area with a wet cotton clothing or cloth and put a second dry layer over the top. You can do this for a short time or even overnight. For example: if your hands are the problem area, try wet cotton gloves or socks and cover with a dry pair. Or if your legs are affected, wet a pair of cotton pajama pants and ring them out then wear them covered with a dry pair overnight.     About Dry Skin    What is dry skin?  Common skin problem  Can be worse during the winter   Affects all ages  Occurs in people with or without other skin problems    What does it look like?  Fine lines in the skin become more visible   Rough feeling skin   Flaky skin  Most common on the arms and legs  Skin can become cracked, especially on the hands and feet    What are some problems caused by dry skin?   Itching  Rubbing or scratching can cause thickened, rough skin patches  Cracks in skin can be painful  Red, itchy, scaly skin (called eczema) can occur  Yellow crusting or pus could be signs of an infection    What causes dry skin?  A lack of water in the top layer of the skin  Too much soapy water,  hot water, or harsh chemicals  Aging and sun damage    How do I treat dry skin?  Shower or bathe daily for under ten minutes with lukewarm water and mild soap.  Pat yourself dry with a towel gently and leave your skin slightly damp.  Use moisturizing cream or ointment right away.  Avoid lotions.    What kind of mild soap should I be using?  Camay , Dove , Tone , Neutrogena , Purpose , or Oil of Olay   A non-detergent cleanser, like Cetaphil , can be used.    What should I stay away from?  Scented soaps   Bath oils    What moisturizers should I be using?  Cetaphil " Cream,CeraVe Cream, Vanicream, Eucerin, Aquaphor, or Vaseline   Always apply after showering or bathing.  Reapply throughout the day, if possible.  If dry skin affects your hands, always reapply after handwashing.    What else should I know?  Using a humidifier during winter months may help.  If dry skin gets worse or if eczema develops, a steroid cream may be needed.         Patient Education       Proper skin care from [facility]:    -Eliminate harsh soaps as they strip the natural oils from the skin, often resulting in dry itchy skin ( i.e. Dial, Zest, [place])  -Use mild soaps such as Cetaphil or Dove Sensitive Skin in the shower. You do not need to use soap on arms, legs, and trunk every time you shower unless visibly soiled.   -Avoid hot or cold showers.  -After showering, lightly dry off and apply moisturizing within 2-3 minutes. This will help trap moisture in the skin.   -Aggressive use of a moisturizer at least 1-2 times a day to the entire body (including -Vanicream, Cetaphil, Aquaphor or Cerave) and moisturize hands after every washing.  -We recommend using moisturizers that come in a tub that needs to be scooped out, not a pump. This has more of an oil base. It will hold moisture in your skin much better than a water base moisturizer. The above recommended are non-pore clogging.      Wear a sunscreen with at least SPF 30 on your face, ears, neck and V of the chest daily. Wear sunscreen on other areas of the body if those areas are exposed to the sun throughout the day. Sunscreens can contain physical and/or chemical blockers. Physical blockers are less likely to clog pores, these include zinc oxide and titanium dioxide. Reapply every two hour and after swimming.     Sunscreen examples: https://www.ewg.org/sunscreen/    UV radiation  UVA radiation remains constant throughout the day and throughout the year. It is a longer wavelength than UVB and therefore penetrates deeper into the skin  leading to immediate and delayed tanning, photoaging, and skin cancer. 70-80% of UVA and UVB radiation occurs between the hours of 10am-2pm.  UVB radiation  UVB radiation causes the most harmful effects and is more significant during the summer months. However, snow and ice can reflect UVB radiation leading to skin damage during the winter months as well. UVB radiation is responsible for tanning, burning, inflammation, delayed erythema (pinkness), pigmentation (brown spots), and skin cancer.     I recommend self monthly full body exams and yearly full body exams with a dermatology provider. If you develop a new or changing lesion please follow up for examination. Most skin cancers are pink and scaly or pink and pearly. However, we do see blue/brown/black skin cancers.  Consider the ABCDEs of melanoma when giving yourself your monthly full body exam ( don't forget the groin, buttocks, feet, toes, etc). A-asymmetry, B-borders, C-color, D-diameter, E-elevation or evolving. If you see any of these changes please follow up in clinic. If you cannot see your back I recommend purchasing a hand held mirror to use with a larger wall mirror.       Checking for Skin Cancer  You can find cancer early by checking your skin each month. There are 3 kinds of skin cancer. They are melanoma, basal cell carcinoma, and squamous cell carcinoma. Doing monthly skin checks is the best way to find new marks or skin changes. Follow the instructions below for checking your skin.   The ABCDEs of checking moles for melanoma   Check your moles or growths for signs of melanoma using ABCDE:   Asymmetry: the sides of the mole or growth don t match  Border: the edges are ragged, notched, or blurred  Color: the color within the mole or growth varies  Diameter: the mole or growth is larger than 6 mm (size of a pencil eraser)  Evolving: the size, shape, or color of the mole or growth is changing (evolving is not shown in the images below)    Checking  for other types of skin cancer  Basal cell carcinoma or squamous cell carcinoma have symptoms such as:     A spot or mole that looks different from all other marks on your skin  Changes in how an area feels, such as itching, tenderness, or pain  Changes in the skin's surface, such as oozing, bleeding, or scaliness  A sore that does not heal  New swelling or redness beyond the border of a mole    Who s at risk?  Anyone can get skin cancer. But you are at greater risk if you have:   Fair skin, light-colored hair, or light-colored eyes  Many moles or abnormal moles on your skin  A history of sunburns from sunlight or tanning beds  A family history of skin cancer  A history of exposure to radiation or chemicals  A weakened immune system  If you have had skin cancer in the past, you are at risk for recurring skin cancer.   How to check your skin  Do your monthly skin checkups in front of a full-length mirror. Check all parts of your body, including your:   Head (ears, face, neck, and scalp)  Torso (front, back, and sides)  Arms (tops, undersides, upper, and lower armpits)  Hands (palms, backs, and fingers, including under the nails)  Buttocks and genitals  Legs (front, back, and sides)  Feet (tops, soles, toes, including under the nails, and between toes)  If you have a lot of moles, take digital photos of them each month. Make sure to take photos both up close and from a distance. These can help you see if any moles change over time.   Most skin changes are not cancer. But if you see any changes in your skin, call your doctor right away. Only he or she can diagnose a problem. If you have skin cancer, seeing your doctor can be the first step toward getting the treatment that could save your life.   [employer] last reviewed this educational content on 4/1/2019 2000-2020 [employer]. [address] [place] [address], [place] [address]. All rights reserved. This information is not intended as a substitute for  professional medical care. Always follow your healthcare professional's instructions.       When should I call my doctor?  If you are worsening or not improving, please, contact us or seek urgent care as noted below.     Who should I call with questions (adults)?  [facility] (adult and pediatric): [phone]  [facility] (adult): [phone]  [facility] ([facility] and [place]) [phone]  For urgent needs outside of business hours call the [facility] at [phone] and ask for the dermatology resident on call to be paged  If this is a medical emergency and you are unable to reach an ER, Call [phone]      If you need a prescription refill, please contact your pharmacy. Refills are approved or denied by our Physicians during normal business hours, Monday through Fridays    Per office policy, refills will not be granted if you have not been seen within the past year (or sooner depending on your condition)

## 2024-04-09 ENCOUNTER — OFFICE VISIT (OUTPATIENT)
Dept: DERMATOLOGY | Facility: CLINIC | Age: 54
End: 2024-04-09
Payer: COMMERCIAL

## 2024-04-09 ENCOUNTER — OFFICE VISIT (OUTPATIENT)
Dept: FAMILY MEDICINE | Facility: CLINIC | Age: 54
End: 2024-04-09
Payer: COMMERCIAL

## 2024-04-09 VITALS
HEART RATE: 85 BPM | RESPIRATION RATE: 16 BRPM | WEIGHT: 162.2 LBS | DIASTOLIC BLOOD PRESSURE: 88 MMHG | BODY MASS INDEX: 25.46 KG/M2 | OXYGEN SATURATION: 96 % | SYSTOLIC BLOOD PRESSURE: 148 MMHG | TEMPERATURE: 97.1 F | HEIGHT: 67 IN

## 2024-04-09 DIAGNOSIS — D18.01 CHERRY ANGIOMA: ICD-10-CM

## 2024-04-09 DIAGNOSIS — L30.9 ECZEMA, UNSPECIFIED TYPE: Primary | ICD-10-CM

## 2024-04-09 DIAGNOSIS — L85.9 HYPERKERATOSIS: ICD-10-CM

## 2024-04-09 DIAGNOSIS — I10 HYPERTENSION GOAL BP (BLOOD PRESSURE) < 140/90: ICD-10-CM

## 2024-04-09 DIAGNOSIS — L81.4 LENTIGINES: ICD-10-CM

## 2024-04-09 DIAGNOSIS — Z12.83 ENCOUNTER FOR SCREENING FOR MALIGNANT NEOPLASM OF SKIN: ICD-10-CM

## 2024-04-09 DIAGNOSIS — Z12.11 SCREEN FOR COLON CANCER: ICD-10-CM

## 2024-04-09 DIAGNOSIS — Z12.5 SCREENING FOR PROSTATE CANCER: ICD-10-CM

## 2024-04-09 DIAGNOSIS — E78.5 HYPERLIPIDEMIA LDL GOAL <100: ICD-10-CM

## 2024-04-09 DIAGNOSIS — L82.1 SEBORRHEIC KERATOSES: ICD-10-CM

## 2024-04-09 DIAGNOSIS — G63 POLYNEUROPATHY ASSOCIATED WITH UNDERLYING DISEASE (H): ICD-10-CM

## 2024-04-09 DIAGNOSIS — D22.9 MULTIPLE BENIGN NEVI: ICD-10-CM

## 2024-04-09 DIAGNOSIS — E10.8 DIABETES MELLITUS TYPE 1 WITH COMPLICATIONS (H): Primary | ICD-10-CM

## 2024-04-09 LAB — HBA1C MFR BLD: 7.4 % (ref 0–5.6)

## 2024-04-09 PROCEDURE — G0103 PSA SCREENING: HCPCS | Performed by: FAMILY MEDICINE

## 2024-04-09 PROCEDURE — 99204 OFFICE O/P NEW MOD 45 MIN: CPT | Performed by: NURSE PRACTITIONER

## 2024-04-09 PROCEDURE — 83036 HEMOGLOBIN GLYCOSYLATED A1C: CPT | Performed by: FAMILY MEDICINE

## 2024-04-09 PROCEDURE — G2211 COMPLEX E/M VISIT ADD ON: HCPCS | Performed by: FAMILY MEDICINE

## 2024-04-09 PROCEDURE — 80061 LIPID PANEL: CPT | Performed by: FAMILY MEDICINE

## 2024-04-09 PROCEDURE — 80048 BASIC METABOLIC PNL TOTAL CA: CPT | Performed by: FAMILY MEDICINE

## 2024-04-09 PROCEDURE — 99214 OFFICE O/P EST MOD 30 MIN: CPT | Performed by: FAMILY MEDICINE

## 2024-04-09 PROCEDURE — 36415 COLL VENOUS BLD VENIPUNCTURE: CPT | Performed by: FAMILY MEDICINE

## 2024-04-09 RX ORDER — LISINOPRIL AND HYDROCHLOROTHIAZIDE 20; 25 MG/1; MG/1
1 TABLET ORAL DAILY
Qty: 90 TABLET | Refills: 3 | Status: SHIPPED | OUTPATIENT
Start: 2024-04-09

## 2024-04-09 RX ORDER — TRIAMCINOLONE ACETONIDE 1 MG/G
OINTMENT TOPICAL 2 TIMES DAILY
Qty: 80 G | Refills: 2 | Status: SHIPPED | OUTPATIENT
Start: 2024-04-09

## 2024-04-09 RX ORDER — PREGABALIN 75 MG/1
75 CAPSULE ORAL 3 TIMES DAILY
Qty: 270 CAPSULE | Refills: 1 | Status: SHIPPED | OUTPATIENT
Start: 2024-04-09

## 2024-04-09 ASSESSMENT — ENCOUNTER SYMPTOMS
COLOR CHANGE: 0
NERVOUS/ANXIOUS: 0
AGITATION: 0
HALLUCINATIONS: 0
ENDOCRINE NEGATIVE: 1
PALPITATIONS: 0
FEVER: 0
FATIGUE: 0
CONFUSION: 0
GASTROINTESTINAL NEGATIVE: 1
EYE DISCHARGE: 0
APPETITE CHANGE: 0
SEIZURES: 0
DECREASED CONCENTRATION: 0
EYE PAIN: 0
DIZZINESS: 0
ACTIVITY CHANGE: 0
HEADACHES: 0
WHEEZING: 0
COUGH: 0
SHORTNESS OF BREATH: 0
HEMATOLOGIC/LYMPHATIC NEGATIVE: 1
ALLERGIC/IMMUNOLOGIC NEGATIVE: 1

## 2024-04-09 ASSESSMENT — PAIN SCALES - GENERAL: PAINLEVEL: MODERATE PAIN (4)

## 2024-04-09 NOTE — PROGRESS NOTES
[facility] Dermatology Note  Encounter Date: Apr 9, 2024  Office Visit     Reviewed patients past medical history and pertinent chart review prior to patients visit today.     Dermatology Problem List:  Nummular eczema and lip dermatitis, triamcinolone 0.1% ointment given  4/9/2024    Patient denies personal history of skin cancer or dysplastic nevi.        ____________________________________________    Assessment & Plan:     # Eczematous dermatitis and lip dermatitis  -Start triamcinolone 0.1% ointment twice daily for 2-4 weeks, decreasing as patients rash improves, repeat cycle for flares. If not fully resolved in 1 month, patient advised to return to clinic for reevaluation. Discussed risk of skin atrophy with long term chronic use. Not for face, armpits or groin.   -When bathing, use gentle soap such as Dove for Sensitive Skin and lukewarm water on amrpits, groin, and other visibly dirty areas, all other areas let the water run over but no soap is necessary. Pat skin dry instead of vigorous rubbing. Encouraged regular use of an emollient such as Vanicream, Cera Ve Cream or Cetaphil Cream to the entire body.   -Start a humidifier in bedroom when sleeping if possible for patient. Clean regularly.   -Eczema versus neurodermatitis of the left low back.  Advised to try the triamcinolone 0.1% ointment twice daily for 2 to 3 weeks and if this is not helpful to switch over to using Sarna original lotion as needed for itching.  Discussed cessation of itching    Return to clinic in 1 month if not fully resolved, sooner PRN for new or worsening conditions.    # Hyperkeratosis of feet  -Start urea 40% cream nightly to the feet.  This should decrease the amount of callus and   thickening you have on the feet over time.  Use as needed once calluses are under control.      # Benign skin findings including: seborrheic keratoses, cherry angioma, lentigines and benign nevi.   - No further intervention  required. Patient to report changes.   - Patient reassured of the benign nature of these lesions.    #Signs and Symptoms of non-melanoma skin cancer and ABCDEs of melanoma reviewed with patient. Patient encouraged to perform monthly self skin exams and educated on how to perform them. UV precautions reviewed with patient. Patient was asked about new or changing moles/lesions on body.     #Reviewed Sunscreen: Apply 20 minutes prior to going outdoors and reapply every two hours, when wet or sweating. We recommend using an SPF 30 or higher, and to use one that is water resistant.       Follow-up:  1 years for follow up full body skin exam, prn for new or changing lesions or new concerns    Ness Carreon CNP  Dermatology     ____________________________________________    CC: Skin Check (Lesion raised to chest that is increasing in size/Eczema possibly to legs and lower back  ( no OTC or Rx))    HPI:  Mr. Rudolph Tao is a(n) 54 year old male who presents today as a new patient for a full body skin cancer screening. Patient has concerns today about rash on the legs.  He has had rashes like eczema for many years.  He tries to moisturize and has had some topical steroids in the past which he thinks were helpful.  He has not used anything in a long time.  He also gets really dry lips year-round.  He does not tend to put anything on his lips.  He also has thickness of the feet and sometimes they crack.  He is wondering if there is anything we can do to help prevent them from getting so thick.    Patient is otherwise feeling well, without additional skin concerns.     Physical Exam:  Vitals: There were no vitals taken for this visit.  SKIN: Total skin excluding the genitalia areas was performed. The exam included the head/face, neck, both arms, chest, back, abdomen, both legs, digits, mons pubis, buttock and nails.   -hyperkeratosis of feet  -annular erythematous scaly patches on legs  -mild scaling of the lips without  erythema  -several 1-2mm red dome shaped symmetric papules scattered on the trunk  -multiple tan/brown flat round macules and raised papules scattered throughout trunk, extremities and head. No worrisome features for malignancy noted on examination.  -scattered tan, homogenous macules scattered on sun exposed areas of trunk, extremities and face.   -scattered waxy, stuck on tan/brown papules and patches on the trunk     - No other lesions of concern on areas examined.     Medications:  Current Outpatient Medications   Medication Sig Dispense Refill    acetone urine (KETOSTIX) test strip 1 strip once as needed (with unexplained blood glucose over 300 mg/dL) Use one strip, as needed, with unexplained blood glucose over 300 mg/dL. (Patient taking differently: once as needed (with unexplained blood glucose over 300 mg/dL) Use one strip, as needed, with unexplained blood glucose over 300 mg/dL.) 50 strip 6    ALPRAZolam (XANAX) 0.5 MG tablet TAKE 1 TABLET BY MOUTH THREE TIMES DAILY AS NEEDED FOR ANXIETY *ONE  MONTH  SUPPLY* 30 tablet 0    B-D U/F insulin pen needle USE  SIX TIMES DAILY 600 each 1    bisacodyl (DULCOLAX) 5 MG EC tablet Take 2 tablets at 3 pm the day before your procedure. If your procedure is before 11 am, take 2 additional tablets at 11 pm. If your procedure is after 11 am, take 2 additional tablets at 6 am. For additional instructions refer to your colonoscopy prep instructions. 4 tablet 0    Continuous Blood Gluc Sensor (FREESTYLE JUSTIN 2 SENSOR) MISC CHANGE SENSOR EVERY 14 DAYS 2 each 5    LANTUS SOLOSTAR 100 UNIT/ML soln INJECT 32 UNITS SUBCUTANEOUSLY ONCE DAILY . APPOINTMENT REQUIRED FOR FUTURE REFILLS 15 mL 0    lisinopril-hydrochlorothiazide (ZESTORETIC) 20-25 MG tablet Take 1 tablet by mouth daily 90 tablet 3    metoclopramide (REGLAN) 10 MG tablet Take 1 tablet (10 mg) by mouth 4 times daily as needed 120 tablet 3    mupirocin (BACTROBAN) 2 % external ointment Apply topically 3 times daily  (Patient not taking: Reported on 4/9/2024) 30 g 0    NOVOLOG PENFILL 100 UNIT/ML soln INJECT 1 UNIT PER 30 GRAMS OF CARBOHYDRATES PLUS 1 UNIT PER 50MG/DL ABOVE 200MG/DL. MAX DOSE 20 UNITS PER DAY 15 mL 0    ondansetron (ZOFRAN-ODT) 4 MG ODT tab DISSOLVE 1 TABLET IN MOUTH EVERY 8 HOURS AS NEEDED FOR NAUSEA 20 tablet 4    polyethylene glycol (GOLYTELY) 236 g suspension The night before the exam at 6 pm drink an 8-ounce glass every 15 minutes until the jug is half empty. If you arrive before 11 AM: Drink the other half of the Golytely jug at 11 PM night before procedure. If you arrive after 11 AM: Drink the other half of the Golytely jug at 6 AM day of procedure. For additional instructions refer to your colonoscopy prep instructions. 4000 mL 0    pravastatin (PRAVACHOL) 20 MG tablet Take 1 tablet (20 mg) by mouth daily 90 tablet 1    pregabalin (LYRICA) 75 MG capsule Take 1 capsule (75 mg) by mouth 3 times daily 270 capsule 1    rOPINIRole (REQUIP) 0.25 MG tablet TAKE 1-2 TABLETS BY MOUTH AT BEDTIME, no further refills until appointment 180 tablet 2     No current facility-administered medications for this visit.      Past Medical History:   Patient Active Problem List   Diagnosis    Hyperlipidemia LDL goal <100    Encounter for long-term (current) use of insulin (H)    Chronic low back pain    Libido, decreased    Restless legs    Cervicalgia    Paresthesias    Persistent disorder of initiating or maintaining sleep    Spasms of the hands or feet    Hypertension goal BP (blood pressure) < 140/90    Right-sided low back pain without sciatica    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)    GAIL (generalized anxiety disorder)    Psychosocial stressors    DDD (degenerative disc disease), lumbar    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)    Hypoglycemia    Syncopal episodes    Polyneuropathy associated with underlying disease (H24)    Background diabetic retinopathy, mild, ou    Posterior vitreous detachment of left eye     Combined forms of age-related cataract of both eyes    Glaucoma suspect of left eye     Past Medical History:   Diagnosis Date    Adopted     Anxiety     Anxiety     Chronic low back pain     DDD (degenerative disc disease), lumbar 3/8/2018    Diabetes mellitus (H)     TYPE 1    Hallux abducto valgus     Hyperlipaemia     Hyperlipidemia     Hypertension     Lip lesion 8/2/2010    Pain in toe of left foot     2 ND TOE    RLS (restless legs syndrome)     Snoring     MODERATE SEVERITY    Swelling of foot joint 7/13/2012       CC Referred Self, MD  No address on file on close of this encounter.

## 2024-04-09 NOTE — LETTER
4/9/2024         RE: Rudolph Tao  [address] [facility] [id number]        Dear Colleague,    Thank you for referring your patient, Rudolph Tao, to the [facility]. Please see a copy of my visit note below.    [facility] Dermatology Note  Encounter Date: Apr 9, 2024  Office Visit     Reviewed patients past medical history and pertinent chart review prior to patients visit today.     Dermatology Problem List:  Nummular eczema and lip dermatitis, triamcinolone 0.1% ointment given  4/9/2024    Patient denies personal history of skin cancer or dysplastic nevi.        ____________________________________________    Assessment & Plan:     # Eczematous dermatitis and lip dermatitis  -Start triamcinolone 0.1% ointment twice daily for 2-4 weeks, decreasing as patients rash improves, repeat cycle for flares. If not fully resolved in 1 month, patient advised to return to clinic for reevaluation. Discussed risk of skin atrophy with long term chronic use. Not for face, armpits or groin.   -When bathing, use gentle soap such as Dove for Sensitive Skin and lukewarm water on amrpits, groin, and other visibly dirty areas, all other areas let the water run over but no soap is necessary. Pat skin dry instead of vigorous rubbing. Encouraged regular use of an emollient such as Vanicream, Cera Ve Cream or Cetaphil Cream to the entire body.   -Start a humidifier in bedroom when sleeping if possible for patient. Clean regularly.   -Eczema versus neurodermatitis of the left low back.  Advised to try the triamcinolone 0.1% ointment twice daily for 2 to 3 weeks and if this is not helpful to switch over to using Sarna original lotion as needed for itching.  Discussed cessation of itching    Return to clinic in 1 month if not fully resolved, sooner PRN for new or worsening conditions.    # Hyperkeratosis of feet  -Start urea 40% cream nightly to the feet.  This should decrease  the amount of callus and   thickening you have on the feet over time.  Use as needed once calluses are under control.      # Benign skin findings including: seborrheic keratoses, cherry angioma, lentigines and benign nevi.   - No further intervention required. Patient to report changes.   - Patient reassured of the benign nature of these lesions.    #Signs and Symptoms of non-melanoma skin cancer and ABCDEs of melanoma reviewed with patient. Patient encouraged to perform monthly self skin exams and educated on how to perform them. UV precautions reviewed with patient. Patient was asked about new or changing moles/lesions on body.     #Reviewed Sunscreen: Apply 20 minutes prior to going outdoors and reapply every two hours, when wet or sweating. We recommend using an SPF 30 or higher, and to use one that is water resistant.       Follow-up:  1 years for follow up full body skin exam, prn for new or changing lesions or new concerns    Ness Carreon, [facility]  Dermatology     ____________________________________________    CC: Skin Check (Lesion raised to chest that is increasing in size/Eczema possibly to legs and lower back  ( no OTC or Rx))    HPI:  Mr. Rudolph Tao is a(n) 54 year old male who presents today as a new patient for a full body skin cancer screening. Patient has concerns today about rash on the legs.  He has had rashes like eczema for many years.  He tries to moisturize and has had some topical steroids in the past which he thinks were helpful.  He has not used anything in a long time.  He also gets really dry lips year-round.  He does not tend to put anything on his lips.  He also has thickness of the feet and sometimes they crack.  He is wondering if there is anything we can do to help prevent them from getting so thick.    Patient is otherwise feeling well, without additional skin concerns.     Physical Exam:  Vitals: There were no vitals taken for this visit.  SKIN: Total skin excluding the genitalia  areas was performed. The exam included the head/face, neck, both arms, chest, back, abdomen, both legs, digits, mons pubis, buttock and nails.   -hyperkeratosis of feet  -annular erythematous scaly patches on legs  -mild scaling of the lips without erythema  -several 1-2mm red dome shaped symmetric papules scattered on the trunk  -multiple tan/brown flat round macules and raised papules scattered throughout trunk, extremities and head. No worrisome features for malignancy noted on examination.  -scattered tan, homogenous macules scattered on sun exposed areas of trunk, extremities and face.   -scattered waxy, stuck on tan/brown papules and patches on the trunk     - No other lesions of concern on areas examined.     Medications:  Current Outpatient Medications   Medication Sig Dispense Refill     acetone urine (KETOSTIX) test strip 1 strip once as needed (with unexplained blood glucose over 300 mg/dL) Use one strip, as needed, with unexplained blood glucose over 300 mg/dL. (Patient taking differently: once as needed (with unexplained blood glucose over 300 mg/dL) Use one strip, as needed, with unexplained blood glucose over 300 mg/dL.) 50 strip 6     ALPRAZolam (XANAX) 0.5 MG tablet TAKE 1 TABLET BY MOUTH THREE TIMES DAILY AS NEEDED FOR ANXIETY *ONE  MONTH  SUPPLY* 30 tablet 0     B-D U/F insulin pen needle USE  SIX TIMES DAILY 600 each 1     bisacodyl (DULCOLAX) 5 MG EC tablet Take 2 tablets at 3 pm the day before your procedure. If your procedure is before 11 am, take 2 additional tablets at 11 pm. If your procedure is after 11 am, take 2 additional tablets at 6 am. For additional instructions refer to your colonoscopy prep instructions. 4 tablet 0     Continuous Blood Gluc Sensor (FREESTYLE JUSTIN 2 SENSOR) MISC CHANGE SENSOR EVERY 14 DAYS 2 each 5     LANTUS SOLOSTAR 100 UNIT/ML soln INJECT 32 UNITS SUBCUTANEOUSLY ONCE DAILY . APPOINTMENT REQUIRED FOR FUTURE REFILLS 15 mL 0     lisinopril-hydrochlorothiazide  (ZESTORETIC) 20-25 MG tablet Take 1 tablet by mouth daily 90 tablet 3     metoclopramide (REGLAN) 10 MG tablet Take 1 tablet (10 mg) by mouth 4 times daily as needed 120 tablet 3     mupirocin (BACTROBAN) 2 % external ointment Apply topically 3 times daily (Patient not taking: Reported on 4/9/2024) 30 g 0     NOVOLOG PENFILL 100 UNIT/ML soln INJECT 1 UNIT PER 30 GRAMS OF CARBOHYDRATES PLUS 1 UNIT PER 50MG/DL ABOVE 200MG/DL. MAX DOSE 20 UNITS PER DAY 15 mL 0     ondansetron (ZOFRAN-ODT) 4 MG ODT tab DISSOLVE 1 TABLET IN MOUTH EVERY 8 HOURS AS NEEDED FOR NAUSEA 20 tablet 4     polyethylene glycol (GOLYTELY) 236 g suspension The night before the exam at 6 pm drink an 8-ounce glass every 15 minutes until the jug is half empty. If you arrive before 11 AM: Drink the other half of the ShowKitytely jug at 11 PM night before procedure. If you arrive after 11 AM: Drink the other half of the ShowKitytely jug at 6 AM day of procedure. For additional instructions refer to your colonoscopy prep instructions. 4000 mL 0     pravastatin (PRAVACHOL) 20 MG tablet Take 1 tablet (20 mg) by mouth daily 90 tablet 1     pregabalin (LYRICA) 75 MG capsule Take 1 capsule (75 mg) by mouth 3 times daily 270 capsule 1     rOPINIRole (REQUIP) 0.25 MG tablet TAKE 1-2 TABLETS BY MOUTH AT BEDTIME, no further refills until appointment 180 tablet 2     No current facility-administered medications for this visit.      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)     GAIL (generalized anxiety disorder)     Psychosocial  stressors     DDD (degenerative disc disease), lumbar     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)     Hypoglycemia     Syncopal episodes     Polyneuropathy associated with underlying disease (H24)     Background diabetic retinopathy, mild, ou     Posterior vitreous detachment of left eye     Combined forms of age-related cataract of both eyes     Glaucoma suspect of left eye     Past Medical History:   Diagnosis Date     Adopted      Anxiety      Anxiety      Chronic low back pain      DDD (degenerative disc disease), lumbar 3/8/2018     Diabetes mellitus (H)     TYPE 1     Hallux abducto valgus      Hyperlipaemia      Hyperlipidemia      Hypertension      Lip lesion 8/2/2010     Pain in toe of left foot     2 ND TOE     RLS (restless legs syndrome)      Snoring     MODERATE SEVERITY     Swelling of foot joint 7/13/2012       CC Referred Self, MD  No address on file on close of this encounter.      Again, thank you for allowing me to participate in the care of your patient.        Sincerely,        Miriam Carreon, YUE CNP

## 2024-04-09 NOTE — PATIENT INSTRUCTIONS
Damian Ponce,    Thank you for allowing [facility] to manage your care.    I ordered some fasting blood work, please go to the laboratory to get your laboratory studies.    I sent your prescriptions to your pharmacy.    For your high blood pressure, stop lisinopril, and start lisinopril/hydrochlorothiazide 20/25 mg daily.    I am increasing your lyrica 75 mg three times a day.     I made a diabetic education and colonoscopy referral, they will be calling in approximately 1 week to set up your appointment.  If you do not hear from them, please call the specialty number on your after visit.     For your convenience, test results are released as soon as they are available  Please allow 1-2 business days for me to send you a comment about your results.  If not done so, I encourage you to login into [employer] [email or web address] to review your results in real time.     If you have any questions or concerns, please feel free to call us at [phone].    Sincerely,    Dr. Choudhury    Did you know?      You can schedule a video visit for follow-up appointments as well as future appointments for certain conditions.  Please see the below link.     [email or web address]    If you have not already done so,  I encourage you to sign up for [employer] ([email or web address]).  This will allow you to review your results, securely communicate with a provider, and schedule virtual visits as well.     Abdomen soft, non-tender, no guarding.

## 2024-04-09 NOTE — LETTER
April 17, 2024      Kris Tao  [address] [facility] [address]        Dear ,    We are writing to inform you of your test results.    -PSA (prostate specific antigen) test is normal.  This indicates a low likelihood of prostate cancer.  ADVISE: rechecking this in 1 year.  -Cholesterol levels are at your goal levels.  ADVISE: continuing your medication, a regular exercise program with at least 150 minutes of aerobic exercise per week, and eating a low saturated fat/low carbohydrate diet.  Also, you should recheck this fasting cholesterol panel in 12 months.  -Kidney function is fair (Cr, GFR), Sodium is normal, Potassium is normal, Calcium is normal, Glucose is elevated most likely due to your diabetes.  Please keep your upcoming with the diabetic educator.  ADVISE: repeat in 12 months.  For additional lab test information, [email or web address] is an excellent reference.    Resulted Orders   Hemoglobin A1c   Result Value Ref Range    Hemoglobin A1C 7.4 (H) 0.0 - 5.6 %      Comment:      Normal <5.7%   Prediabetes 5.7-6.4%    Diabetes 6.5% or higher     Note: Adopted from ADA consensus guidelines.   PSA, screen   Result Value Ref Range    Prostate Specific Antigen Screen 0.39 0.00 - 3.50 ng/mL    Narrative    This result is obtained using the Roche Elecsys total PSA method on the kaylan e801 immunoassay analyzer. Results obtained with different assay methods or kits cannot be used interchangeably.       If you have any questions or concerns, please call the clinic at the number listed above.       Sincerely,      Chuy Choudhury, DO

## 2024-04-09 NOTE — PROGRESS NOTES
1. 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  Also stressed the importance of keeping appointment with diabetic educator.  Patient is interested in the insulin pump.   - Hemoglobin A1c; Future  - Basic metabolic panel  (Ca, Cl, CO2, Creat, Gluc, K, Na, BUN); Future  - Albumin Random Urine Quantitative with Creat Ratio; Future  - Adult Diabetes Education  Referral; Future  - Hemoglobin A1c  - Basic metabolic panel  (Ca, Cl, CO2, Creat, Gluc, K, Na, BUN)  - Albumin Random Urine Quantitative with Creat Ratio    2. Screen for colon cancer  - Colonoscopy Screening  Referral; Future    3. Hypertension goal BP (blood pressure) < 140/90  Not well controlled.  Would like to discontinue lisinopril and start on zestoretic.   - lisinopril-hydrochlorothiazide (ZESTORETIC) 20-25 MG tablet; Take 1 tablet by mouth daily  Dispense: 90 tablet; Refill: 3    4. Hyperlipidemia LDL goal <100  Currently on pravastatin.  Chronic.   - Lipid panel reflex to direct LDL Fasting; Future  - Lipid panel reflex to direct LDL Fasting    5. Polyneuropathy associated with underlying disease (H24)  Chronic.  Would like to increase to TID.   - pregabalin (LYRICA) 75 MG capsule; Take 1 capsule (75 mg) by mouth 3 times daily  Dispense: 270 capsule; Refill: 1    6. Screening for prostate cancer  - PSA, screen; Future  - PSA, screen    The longitudinal plan of care for the diagnosis(es)/condition(s) as documented were addressed during this visit. Due to the added complexity in care, I will continue to support Kris in the subsequent management and with ongoing continuity of care.        Subjective   Kris is a 54 year old, presenting for the following health issues:    Wants to discuss restarting Wellbutrin   Diabetes      4/9/2024    10:50 AM   Additional Questions   Roomed by Richelle   Accompanied by Self     History of Present Illness       Diabetes:   He presents for follow up of  diabetes.   He is checking home blood glucose with a continuous glucose monitor.   He checks blood glucose before and after meals.  Blood glucose is sometimes over 200 and sometimes under 70. He is aware of hypoglycemia symptoms including shakiness and weakness.   He is concerned about other.   He is having burning in feet.  The patient has not had a diabetic eye exam in the last 12 months.          He eats 2-3 servings of fruits and vegetables daily.He consumes 0 sweetened beverage(s) daily.He exercises with enough effort to increase his heart rate 20 to 29 minutes per day.  He exercises with enough effort to increase his heart rate 3 or less days per week.   He is taking medications regularly.         Diabetes Follow-up    How often are you checking your blood sugar? Continuous glucose monitor  What time of day are you checking your blood sugars (select all that apply)?  Before and after meals  Have you had any blood sugars above 200?  Yes   Have you had any blood sugars below 70?  Yes   What symptoms do you notice when your blood sugar is low?  Shaky and Weak  What concerns do you have today about your diabetes? Other:    Do you have any of these symptoms? (Select all that apply)  Burning in feet  Have you had a diabetic eye exam in the last 12 months? No        BP Readings from Last 2 Encounters:   04/09/24 (!) 156/88   03/08/24 130/84     Hemoglobin A1C (%)   Date Value   11/10/2022 7.3 (H)   02/25/2022 8.1 (H)   06/25/2021 7.3 (H)   01/14/2021 6.9 (H)     LDL Cholesterol Calculated (mg/dL)   Date Value   11/10/2022 70   02/25/2022 113 (H)   06/25/2021 96   02/01/2020 98             Hypertension Follow-up    Do you check your blood pressure regularly outside of the clinic? Yes   Are you following a low salt diet? Yes  Are your blood pressures ever more than 140 on the top number (systolic) OR more   than 90 on the bottom number (diastolic), for example 140/90? Yes  How many servings of fruits and vegetables do  "you eat daily?  2-3  On average, how many sweetened beverages do you drink each day (Examples: soda, juice, sweet tea, etc.  Do NOT count diet or artificially sweetened beverages)?   0  How many days per week do you exercise enough to make your heart beat faster? 3 or less  How many minutes a day do you exercise enough to make your heart beat faster? 20 - 29  How many days per week do you miss taking your medication? 0      Gabapentin: Currently on Lyrica.  States of improvement compared to gabapentin.     2. Diabetes: Blood sugars are 200s and below.  Average of 206. Patient would like to discuss insulin pump.  Currently going through a lot of stressors with work and personal issues (family illness and property issues)    3. Hypertension: Currently on lisinopril. Chronic.     Review of Systems   Constitutional:  Negative for activity change, appetite change, fatigue and fever.   HENT: Negative.     Eyes:  Negative for pain, discharge and visual disturbance.   Respiratory:  Negative for cough, shortness of breath and wheezing.    Cardiovascular:  Negative for chest pain, palpitations and leg swelling.   Gastrointestinal: Negative.    Endocrine: Negative.    Genitourinary: Negative.    Skin:  Negative for color change and rash.   Allergic/Immunologic: Negative.    Neurological:  Negative for dizziness, seizures and headaches.   Hematological: Negative.    Psychiatric/Behavioral:  Negative for agitation, confusion, decreased concentration and hallucinations. The patient is not nervous/anxious.            Objective    BP (!) 156/88   Pulse 85   Temp 97.1  F (36.2  C) (Temporal)   Resp 16   Ht 1.705 m (5' 7.13\")   Wt 73.6 kg (162 lb 3.2 oz)   SpO2 96%   BMI 25.31 kg/m    Body mass index is 25.31 kg/m .  Physical Exam  Constitutional:       General: He is not in acute distress.     Appearance: He is well-developed.   HENT:      Head: Normocephalic and atraumatic.      Nose: Nose normal.   Eyes:      " Conjunctiva/sclera: Conjunctivae normal.   Neck:      Trachea: No tracheal deviation.   Cardiovascular:      Rate and Rhythm: Normal rate and regular rhythm.      Heart sounds: Normal heart sounds.   Pulmonary:      Effort: Pulmonary effort is normal.      Breath sounds: No wheezing.   Musculoskeletal:         General: Normal range of motion.      Cervical back: Normal range of motion.   Skin:     Findings: No erythema or rash.   Neurological:      Mental Status: He is alert and oriented to person, place, and time.   Psychiatric:         Behavior: Behavior normal.              Signed Electronically by: Chuy Choudhury DO

## 2024-04-09 NOTE — LETTER
April 17, 2024      Kris Tao  [address] [facility] [address]        Dear ,    We are writing to inform you of your test results.    Your recent results show the following:  -A1C (test of diabetes control the last 2-3 months) is at your goal. Please continue with your current plan. Also, you should make an appointment to see me and recheck your A1C test in 6 months. Please keep your upcoming diabetic educator referral appointment.  For additional lab test information, [email or web address] is an excellent reference.        Resulted Orders   Hemoglobin A1c   Result Value Ref Range    Hemoglobin A1C 7.4 (H) 0.0 - 5.6 %      Comment:      Normal <5.7%   Prediabetes 5.7-6.4%    Diabetes 6.5% or higher     Note: Adopted from ADA consensus guidelines.       If you have any questions or concerns, please call the clinic at the number listed above.       Sincerely,      Chuy Choudhury, DO

## 2024-04-10 LAB
ANION GAP SERPL CALCULATED.3IONS-SCNC: 14 MMOL/L (ref 7–15)
BUN SERPL-MCNC: 6.1 MG/DL (ref 6–20)
CALCIUM SERPL-MCNC: 9.3 MG/DL (ref 8.6–10)
CHLORIDE SERPL-SCNC: 101 MMOL/L (ref 98–107)
CHOLEST SERPL-MCNC: 186 MG/DL
CREAT SERPL-MCNC: 0.61 MG/DL (ref 0.67–1.17)
DEPRECATED HCO3 PLAS-SCNC: 26 MMOL/L (ref 22–29)
EGFRCR SERPLBLD CKD-EPI 2021: >90 ML/MIN/1.73M2
FASTING STATUS PATIENT QL REPORTED: NO
GLUCOSE SERPL-MCNC: 228 MG/DL (ref 70–99)
HDLC SERPL-MCNC: 76 MG/DL
LDLC SERPL CALC-MCNC: 97 MG/DL
NONHDLC SERPL-MCNC: 110 MG/DL
POTASSIUM SERPL-SCNC: 5 MMOL/L (ref 3.4–5.3)
PSA SERPL DL<=0.01 NG/ML-MCNC: 0.39 NG/ML (ref 0–3.5)
SODIUM SERPL-SCNC: 141 MMOL/L (ref 135–145)
TRIGL SERPL-MCNC: 67 MG/DL

## 2024-04-13 DIAGNOSIS — E10.8 TYPE 1 DIABETES MELLITUS WITH COMPLICATIONS (H): ICD-10-CM

## 2024-04-15 RX ORDER — INSULIN GLARGINE 100 [IU]/ML
INJECTION, SOLUTION SUBCUTANEOUS
Qty: 15 ML | Refills: 0 | Status: SHIPPED | OUTPATIENT
Start: 2024-04-15 | End: 2024-05-29

## 2024-04-30 DIAGNOSIS — E10.8 DIABETES MELLITUS TYPE 1 WITH COMPLICATIONS (H): ICD-10-CM

## 2024-05-06 DIAGNOSIS — E10.9 TYPE 1 DIABETES MELLITUS WITHOUT COMPLICATION (H): ICD-10-CM

## 2024-05-06 RX ORDER — FLURBIPROFEN SODIUM 0.3 MG/ML
SOLUTION/ DROPS OPHTHALMIC
Qty: 200 EACH | Refills: 0 | Status: SHIPPED | OUTPATIENT
Start: 2024-05-06 | End: 2024-08-14

## 2024-05-29 DIAGNOSIS — E10.8 TYPE 1 DIABETES MELLITUS WITH COMPLICATIONS (H): ICD-10-CM

## 2024-05-29 RX ORDER — INSULIN GLARGINE 100 [IU]/ML
INJECTION, SOLUTION SUBCUTANEOUS
Qty: 15 ML | Refills: 0 | Status: SHIPPED | OUTPATIENT
Start: 2024-05-29 | End: 2024-07-15

## 2024-05-31 ENCOUNTER — OFFICE VISIT (OUTPATIENT)
Dept: URGENT CARE | Facility: URGENT CARE | Age: 54
End: 2024-05-31
Payer: MEDICAID

## 2024-05-31 ENCOUNTER — ANCILLARY PROCEDURE (OUTPATIENT)
Dept: GENERAL RADIOLOGY | Facility: CLINIC | Age: 54
End: 2024-05-31
Attending: PHYSICIAN ASSISTANT
Payer: MEDICAID

## 2024-05-31 VITALS
HEART RATE: 103 BPM | OXYGEN SATURATION: 95 % | TEMPERATURE: 97.5 F | WEIGHT: 165 LBS | DIASTOLIC BLOOD PRESSURE: 75 MMHG | SYSTOLIC BLOOD PRESSURE: 118 MMHG | BODY MASS INDEX: 25.75 KG/M2 | RESPIRATION RATE: 18 BRPM

## 2024-05-31 DIAGNOSIS — M25.521 RIGHT ELBOW PAIN: ICD-10-CM

## 2024-05-31 DIAGNOSIS — E10.8 DIABETES MELLITUS TYPE 1 WITH COMPLICATIONS (H): ICD-10-CM

## 2024-05-31 DIAGNOSIS — L03.113 CELLULITIS OF RIGHT ELBOW: Primary | ICD-10-CM

## 2024-05-31 PROCEDURE — 99214 OFFICE O/P EST MOD 30 MIN: CPT | Performed by: PHYSICIAN ASSISTANT

## 2024-05-31 PROCEDURE — 73070 X-RAY EXAM OF ELBOW: CPT | Mod: TC | Performed by: RADIOLOGY

## 2024-05-31 ASSESSMENT — ENCOUNTER SYMPTOMS
FEVER: 0
NAUSEA: 1
NUMBNESS: 0
CHILLS: 0
WOUND: 1
NEUROLOGICAL NEGATIVE: 1
DIARRHEA: 0
ABDOMINAL PAIN: 0
VOMITING: 0
JOINT SWELLING: 0
MYALGIAS: 1
ARTHRALGIAS: 1
CONSTITUTIONAL NEGATIVE: 1

## 2024-05-31 NOTE — PROGRESS NOTES
Chief Complaint:     Chief Complaint   Patient presents with    Urgent Care    Musculoskeletal Problem     Per patient states while moving a ladder he hit his right elbow, then a couple of days later hit it again while in the garage now it's red, swollen and has discomfort       ASSESSMENT     1. Cellulitis of right elbow    2. Right elbow pain    3. 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  Rx for Augmentin for cellulitis.  RICE discussed  Recommended rest and avoidance of activities which cause pain or swelling.  Pain relief: Acetaminophen and or Ibuprofen with food.  Patient at higher risk for severe infection with DM.  Patient instructed to monitor blood sugars closely with infection.  Continue taking your insulin and follow up with your primary provider for any DM medication changes if needed.  Patient verbalized understanding, and agrees with this plan.    HPI: Rudolph Tao is an 54 year old male who presents today for evaluation of right forearm injury. He initially injured his elbow 2 weeks ago after hitting it with a ladder and then last night sustained a puncture wound just inferior to his right elbow. He developed redness and increasing pain prompting him to come be evaluated. Here he describes using topical neosporin and rinsing his wound with hydrogen peroxide with no relief. He endorses surrounding pain. No pain in the elbow joint itself. No pain with range of motion. He has peripheral neuropathy and describes his distal sensation as being unchanged from baseline.    Patient denies any new numbness, tingling, or dysfunction of the distal right forearm/hand.    ROS:      Review of Systems   Constitutional: Negative.  Negative for chills and fever.   HENT: Negative.     Gastrointestinal:  Positive for nausea. Negative for abdominal pain, diarrhea and vomiting.   Musculoskeletal:  Positive for  arthralgias and myalgias. Negative for joint swelling.   Skin:  Positive for wound. Negative for rash.   Neurological: Negative.  Negative for numbness.        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)    GAIL (generalized anxiety disorder)    Psychosocial stressors    DDD (degenerative disc disease), lumbar    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)    Hypoglycemia    Syncopal episodes    Polyneuropathy associated with underlying disease (H24)    Background diabetic retinopathy, mild, ou    Posterior vitreous detachment of left eye    Combined forms of age-related cataract of both eyes    Glaucoma suspect of left eye        Allergies  Allergies   Allergen Reactions    Avapro [Irbesartan]      Profuse sweating    Crestor [Rosuvastatin Calcium]      cough    Sulfa Antibiotics Swelling and Hives    Simvastatin      Elevated BS readings        Smoking History  History   Smoking Status    Former    Types: Cigarettes   Smokeless Tobacco    Never        Current Meds    Current Outpatient Medications:     ALPRAZolam (XANAX) 0.5 MG tablet, TAKE 1 TABLET BY MOUTH THREE TIMES DAILY AS NEEDED FOR ANXIETY *ONE  MONTH  SUPPLY*, Disp: 30 tablet, Rfl: 0    amoxicillin-clavulanate (AUGMENTIN) 875-125 MG tablet, Take 1 tablet by mouth 2 times daily for 7 days, Disp: 14 tablet, Rfl: 0    bisacodyl (DULCOLAX) 5 MG EC tablet, Take 2 tablets at 3 pm the day before your procedure. If your procedure is before 11 am, take 2 additional tablets at 11 pm. If your procedure is after 11 am, take 2 additional tablets at 6 am.  For additional instructions refer to your colonoscopy prep instructions., Disp: 4 tablet, Rfl: 0    Continuous Glucose Sensor (FREESTYLE JUSTIN 2 SENSOR) MISC, CHANGE SENSOR EVERY 14 DAYS, Disp: 2 each, Rfl: 0    insulin pen needle (B-D U/F) 31G X 5 MM miscellaneous, USE A NEW PEN NEEDLE SIX TIMES DAILY, Disp: 200 each, Rfl: 0    LANTUS SOLOSTAR 100 UNIT/ML soln, INJECT 32 UNITS SUBCUTANEOUSLY ONCE DAILY, Disp: 15 mL, Rfl: 0    lisinopril-hydrochlorothiazide (ZESTORETIC) 20-25 MG tablet, Take 1 tablet by mouth daily, Disp: 90 tablet, Rfl: 3    metoclopramide (REGLAN) 10 MG tablet, Take 1 tablet (10 mg) by mouth 4 times daily as needed, Disp: 120 tablet, Rfl: 3    NOVOLOG PENFILL 100 UNIT/ML soln, INJECT 1 UNIT PER 30 GRAMS OF CARBOHYDRATES PLUS 1 UNIT PER 50MG/DL ABOVE 200MG/DL. MAX DOSE 20 UNITS PER DAY, Disp: 15 mL, Rfl: 0    ondansetron (ZOFRAN-ODT) 4 MG ODT tab, DISSOLVE 1 TABLET IN MOUTH EVERY 8 HOURS AS NEEDED FOR NAUSEA, Disp: 20 tablet, Rfl: 4    polyethylene glycol (GOLYTELY) 236 g suspension, The night before the exam at 6 pm drink an 8-ounce glass every 15 minutes until the jug is half empty. If you arrive before 11 AM: Drink the other half of the Golytely jug at 11 PM night before procedure. If you arrive after 11 AM: Drink the other half of the Golytely jug at 6 AM day of procedure. For additional instructions refer to your colonoscopy prep instructions., Disp: 4000 mL, Rfl: 0    pravastatin (PRAVACHOL) 20 MG tablet, Take 1 tablet (20 mg) by mouth daily, Disp: 90 tablet, Rfl: 1    pregabalin (LYRICA) 75 MG capsule, Take 1 capsule (75 mg) by mouth 3 times daily, Disp: 270 capsule, Rfl: 1    rOPINIRole (REQUIP) 0.25 MG tablet, TAKE 1-2 TABLETS BY MOUTH AT BEDTIME, no further refills until appointment, Disp: 180 tablet, Rfl: 2    triamcinolone (KENALOG) 0.1 % external ointment, Apply topically 2 times daily To patches of eczema as needed when flared only, Disp: 80 g, Rfl: 2        Vital signs reviewed by  Rudolph Devries PA-C  /75   Pulse 103   Temp 97.5  F (36.4  C) (Tympanic)   Resp 18   Wt 74.8 kg (165 lb)   SpO2 95%   BMI 25.75 kg/m      Physical Exam     Physical Exam  Vitals and nursing note reviewed.   Constitutional:       General: He is not in acute distress.     Appearance: He is well-developed. He is not ill-appearing, toxic-appearing or diaphoretic.   HENT:      Head: Normocephalic and atraumatic.      Right Ear: Hearing normal.      Left Ear: Hearing normal.      Nose: Nose normal. No congestion or rhinorrhea.      Mouth/Throat:      Pharynx: No oropharyngeal exudate or posterior oropharyngeal erythema.      Tonsils: No tonsillar exudate or tonsillar abscesses. 0 on the right. 0 on the left.   Eyes:      Pupils: Pupils are equal, round, and reactive to light.   Cardiovascular:      Rate and Rhythm: Normal rate and regular rhythm.      Heart sounds: Normal heart sounds, S1 normal and S2 normal. Heart sounds not distant. No murmur heard.     No friction rub. No gallop.   Pulmonary:      Effort: Pulmonary effort is normal. No respiratory distress.      Breath sounds: Normal breath sounds. No decreased breath sounds, wheezing, rhonchi or rales.   Abdominal:      General: Bowel sounds are normal. There is no distension.      Palpations: Abdomen is soft.      Tenderness: There is no abdominal tenderness.   Musculoskeletal:      Right forearm: Swelling, laceration and tenderness present.        Arms:       Cervical back: Normal range of motion and neck supple.      Comments: Localized 2 x 3 cm area of swelling and redness with central puncture hole/scab on right forearm just inferior/lateral to elbow.   Skin:     General: Skin is warm and dry.      Findings: No rash.   Neurological:      Mental Status: He is alert.      Cranial Nerves: No cranial nerve deficit.   Psychiatric:         Attention and Perception: He is attentive.         Speech: Speech normal.         Behavior: Behavior normal.  Behavior is cooperative.         Thought Content: Thought content normal.         Judgment: Judgment normal.             Rudolph Devries PA-C  5/31/2024, 2:58 PM

## 2024-06-16 ENCOUNTER — HEALTH MAINTENANCE LETTER (OUTPATIENT)
Age: 54
End: 2024-06-16

## 2024-07-10 ENCOUNTER — ANESTHESIA EVENT (OUTPATIENT)
Dept: GASTROENTEROLOGY | Facility: CLINIC | Age: 54
End: 2024-07-10
Payer: COMMERCIAL

## 2024-07-10 ASSESSMENT — LIFESTYLE VARIABLES: TOBACCO_USE: 1

## 2024-07-10 NOTE — ANESTHESIA PREPROCEDURE EVALUATION
Anesthesia Pre-Procedure Evaluation    Patient: Rudolph Tao   MRN: [id number] : 1970        Procedure : Procedure(s):  Colonoscopy          Past Medical History:   Diagnosis Date    Adopted     Anxiety     Anxiety     Chronic low back pain     DDD (degenerative disc disease), lumbar 3/8/2018    Diabetes mellitus (H)     TYPE 1    Hallux abducto valgus     Hyperlipaemia     Hyperlipidemia     Hypertension     Lip lesion 2010    Pain in toe of left foot     2 ND TOE    RLS (restless legs syndrome)     Snoring     MODERATE SEVERITY    Swelling of foot joint 2012      Past Surgical History:   Procedure Laterality Date    APPENDECTOMY      OSTEOTOMY FOOT  2013    Procedure: OSTEOTOMY FOOT;  Left Foot Distal First Metarsal Osteotomy, Second Toe Hammer Toe Correction, Second Metatarsal Weil Osteotomy;  Surgeon: Robert Augustine DPM;  Location: [facility]    SURGICAL HISTORY OF -       Appy      Allergies   Allergen Reactions    Avapro [Irbesartan]      Profuse sweating    Crestor [Rosuvastatin Calcium]      cough    Sulfa Antibiotics Swelling and Hives    Simvastatin      Elevated BS readings      Social History     Tobacco Use    Smoking status: Former     Current packs/day: 0.00     Types: Cigarettes     Quit date: 10/1/1995     Years since quittin.7     Passive exposure: Past    Smokeless tobacco: Never    Tobacco comments:     2-4  cigarettes daily   Substance Use Topics    Alcohol use: Yes     Comment: 2 drinks daily      Wt Readings from Last 1 Encounters:   24 74.8 kg (165 lb)        Anesthesia Evaluation   Pt has had prior anesthetic.         ROS/MED HX  ENT/Pulmonary:     (+)                tobacco use, Past use,                       Neurologic:     (+)    peripheral neuropathy, - diabertic retinopathy.                           Cardiovascular:     (+) Dyslipidemia hypertension- -   -  - -             fainting (syncope).                         METS/Exercise Tolerance:    "  Hematologic:       Musculoskeletal: Comment: DDD (degenerative disc disease), lumbar       GI/Hepatic:       Renal/Genitourinary:       Endo:     (+) type I DM,                     Psychiatric/Substance Use:     (+) psychiatric history anxiety       Infectious Disease:       Malignancy:       Other:      (+)  , H/O Chronic Pain,         Physical Exam    Airway  airway exam normal           Respiratory Devices and Support         Dental       (+) Modest Abnormalities - crowns, retainers, 1 or 2 missing teeth      Cardiovascular   cardiovascular exam normal          Pulmonary   pulmonary exam normal                OUTSIDE LABS:  CBC:   Lab Results   Component Value Date    WBC 5.1 09/19/2016    WBC 4.2 07/13/2012    HGB 15.3 09/19/2016    HGB 13.9 07/13/2012    HCT 43.2 09/19/2016    HCT 38.7 (L) 07/13/2012     09/19/2016     07/13/2012     BMP:   Lab Results   Component Value Date     04/09/2024     02/25/2022    POTASSIUM 5.0 04/09/2024    POTASSIUM 4.6 02/25/2022    CHLORIDE 101 04/09/2024    CHLORIDE 99 02/25/2022    CO2 26 04/09/2024    CO2 28 02/25/2022    BUN 6.1 04/09/2024    BUN 11 02/25/2022    CR 0.61 (L) 04/09/2024    CR 0.75 02/25/2022     (H) 04/09/2024     (H) 02/25/2022     COAGS: No results found for: \"PTT\", \"INR\", \"FIBR\"  POC:   Lab Results   Component Value Date     (H) 01/08/2013     HEPATIC:   Lab Results   Component Value Date    ALBUMIN 4.1 09/12/2016    PROTTOTAL 7.7 09/12/2016    ALT 60 (H) 02/01/2020    AST 61 (H) 02/01/2020    ALKPHOS 126 09/12/2016    BILITOTAL 1.0 09/12/2016     OTHER:   Lab Results   Component Value Date    A1C 7.4 (H) 04/09/2024    DANA 9.3 04/09/2024    PHOS 4.3 08/01/2004    MAG 1.5 (L) 11/08/2013    TSH 2.58 11/10/2022    CRP <5.0 11/08/2013    SED 4 09/19/2016       Anesthesia Plan    ASA Status:  3    NPO Status:  NPO Appropriate    Anesthesia Type: General.      Maintenance: Balanced.        Consents    Anesthesia " Plan(s) and associated risks, benefits, and realistic alternatives discussed. Questions answered and patient/representative(s) expressed understanding.     - Discussed:     - Discussed with:  Patient            Postoperative Care            Comments:               YUE Calderon CRNA    I have reviewed the pertinent notes and labs in the chart from the past 30 days and (re)examined the patient.  Any updates or changes from those notes are reflected in this note.              # DMII: A1C = N/A within past 6 months

## 2024-07-14 DIAGNOSIS — E10.8 TYPE 1 DIABETES MELLITUS WITH COMPLICATIONS (H): ICD-10-CM

## 2024-07-15 ENCOUNTER — ANESTHESIA (OUTPATIENT)
Dept: GASTROENTEROLOGY | Facility: CLINIC | Age: 54
End: 2024-07-15
Payer: COMMERCIAL

## 2024-07-15 RX ORDER — INSULIN GLARGINE 100 [IU]/ML
INJECTION, SOLUTION SUBCUTANEOUS
Qty: 15 ML | Refills: 0 | Status: SHIPPED | OUTPATIENT
Start: 2024-07-15 | End: 2024-08-28

## 2024-07-17 ENCOUNTER — OFFICE VISIT (OUTPATIENT)
Dept: OPTOMETRY | Facility: CLINIC | Age: 54
End: 2024-07-17
Payer: COMMERCIAL

## 2024-07-17 DIAGNOSIS — H25.813 COMBINED FORMS OF AGE-RELATED CATARACT OF BOTH EYES: ICD-10-CM

## 2024-07-17 DIAGNOSIS — E10.3393 TYPE 1 DIABETES MELLITUS WITH MODERATE NONPROLIFERATIVE RETINOPATHY OF BOTH EYES, MACULAR EDEMA PRESENCE UNSPECIFIED (H): ICD-10-CM

## 2024-07-17 DIAGNOSIS — H40.002 GLAUCOMA SUSPECT OF LEFT EYE: ICD-10-CM

## 2024-07-17 DIAGNOSIS — Z01.01 VISION EXAM WITH ABNORMAL FINDINGS: Primary | ICD-10-CM

## 2024-07-17 PROCEDURE — 92015 DETERMINE REFRACTIVE STATE: CPT | Performed by: OPTOMETRIST

## 2024-07-17 PROCEDURE — 92004 COMPRE OPH EXAM NEW PT 1/>: CPT | Performed by: OPTOMETRIST

## 2024-07-17 PROCEDURE — 92310 CONTACT LENS FITTING OU: CPT | Mod: GA | Performed by: OPTOMETRIST

## 2024-07-17 ASSESSMENT — REFRACTION_WEARINGRX
OS_ADD: +2.00
OS_CYLINDER: +0.50
SPECS_TYPE: OTC'S
OS_SPHERE: +2.50
OD_ADD: +2.00
OD_SPHERE: +2.50
OD_SPHERE: -8.00
OS_AXIS: 150
SPECS_TYPE: PAL
OD_CYLINDER: SPHERE
OS_SPHERE: -8.50

## 2024-07-17 ASSESSMENT — REFRACTION_MANIFEST
OD_SPHERE: -9.25
OD_CYLINDER: +1.50
OS_CYLINDER: +1.50
OS_AXIS: 056
OS_SPHERE: -9.00
OD_AXIS: 112
OD_AXIS: 125
OS_ADD: +2.25
OD_CYLINDER: +1.25
OS_CYLINDER: +1.25
OS_AXIS: 050
OD_ADD: +2.25
OD_SPHERE: -8.75
OS_SPHERE: -9.00
METHOD_AUTOREFRACTION: 1

## 2024-07-17 ASSESSMENT — CONF VISUAL FIELD
OD_NORMAL: 1
OS_INFERIOR_NASAL_RESTRICTION: 0
OS_SUPERIOR_TEMPORAL_RESTRICTION: 0
OD_SUPERIOR_NASAL_RESTRICTION: 0
OS_SUPERIOR_NASAL_RESTRICTION: 0
OS_NORMAL: 1
OS_INFERIOR_TEMPORAL_RESTRICTION: 0
METHOD: COUNTING FINGERS
OD_INFERIOR_TEMPORAL_RESTRICTION: 0
OD_INFERIOR_NASAL_RESTRICTION: 0
OD_SUPERIOR_TEMPORAL_RESTRICTION: 0

## 2024-07-17 ASSESSMENT — REFRACTION_CURRENTRX
OD_BRAND: J&J ACUVUE OASYS FOR ASTIGMATISM WITH HYDRACLEAR BC 8.6 D 14.5
OS_AXIS: 140
OS_SPHERE: -7.00
OD_BASECURVE: 8.4
OS_BRAND: J&J ACUVUE OASYS FOR ASTIGMATISM WITH HYDRACLEAR BC 8.6 D 14.5
OD_BRAND: AV OASYS
OD_AXIS: 040
OD_DIAMETER: 14.0
OD_CYLINDER: -1.25
OS_BRAND: AV OASYS
OD_SPHERE: -7.00
OS_SPHERE: -7.00
OD_SPHERE: -7.00
OS_BASECURVE: 8.4
OS_CYLINDER: -0.75
OS_DIAMETER: 14.0

## 2024-07-17 ASSESSMENT — VISUAL ACUITY
METHOD: SNELLEN - LINEAR
OS_CC: 20/40
METHOD: SNELLEN - LINEAR
METHOD: SNELLEN - LINEAR
OD_CC: 20/60
OD_PH_CC: 20/40
OS_CC+: -1
OS_CC: 20/40-1
OS_PH_CC+: -1
CORRECTION_TYPE: GLASSES
OS_CC: 20/50
OD_CC: 20/60
OD_CC+: -1
CORRECTION_TYPE: CONTACTS
CORRECTION_TYPE: GLASSES, CONTACTS
OS_PH_CC: 20/30
OD_CC: 20/30

## 2024-07-17 ASSESSMENT — TONOMETRY
IOP_METHOD: APPLANATION
OD_IOP_MMHG: 22
OS_IOP_MMHG: 22

## 2024-07-17 ASSESSMENT — KERATOMETRY
OS_AXISANGLE2_DEGREES: 147
OD_AXISANGLE2_DEGREES: 22
OS_K1POWER_DIOPTERS: 44.75
OD_K2POWER_DIOPTERS: 46.25
OD_K1POWER_DIOPTERS: 45.75
OS_K2POWER_DIOPTERS: 45.50

## 2024-07-17 ASSESSMENT — CUP TO DISC RATIO
OD_RATIO: 0.3
OS_RATIO: 0.8

## 2024-07-17 ASSESSMENT — SLIT LAMP EXAM - LIDS
COMMENTS: 1+ PTOSIS
COMMENTS: 1+ PTOSIS

## 2024-07-17 NOTE — PROGRESS NOTES
Patient presents with:  Diabetic Eye Exam: Has appointment with diabetic educator.  Will be getting a pump soon to improve blood sugar control     Type 1 diabetes x 20 yrs     Hemoglobin A1C   Date Value Ref Range Status   04/09/2024 7.4 (H) 0.0 - 5.6 % Final     Comment:     Normal <5.7%   Prediabetes 5.7-6.4%    Diabetes 6.5% or higher     Note: Adopted from ADA consensus guidelines.   11/10/2022 7.3 (H) 0.0 - 5.6 % Final     Comment:     Normal <5.7%   Prediabetes 5.7-6.4%    Diabetes 6.5% or higher     Note: Adopted from ADA consensus guidelines.   02/25/2022 8.1 (H) 0.0 - 5.6 % Final     Comment:     Normal <5.7%   Prediabetes 5.7-6.4%    Diabetes 6.5% or higher     Note: Adopted from ADA consensus guidelines.   06/25/2021 7.3 (H) 0 - 5.6 % Final     Comment:     Normal <5.7% Prediabetes 5.7-6.4%  Diabetes 6.5% or higher - adopted from ADA   consensus guidelines.     01/14/2021 6.9 (H) 0 - 5.6 % Final     Comment:     Normal <5.7% Prediabetes 5.7-6.4%  Diabetes 6.5% or higher - adopted from ADA   consensus guidelines.     10/05/2020 7.6 (H) 0 - 5.6 % Final     Comment:     Normal <5.7% Prediabetes 5.7-6.4%  Diabetes 6.5% or higher - adopted from ADA   consensus guidelines.        Accompanied by wife, Tiara    Previous contact lens wearer? Yes: Wearing AV Oasys   Comfort of contact lenses :Good   Satisfied with current lenses: Yes        Last Eye Exam: 9/22  Dilated Previously: Yes    What are you currently using to see?  glasses, readers, and contacts. Readers are +2.50    Distance Vision Acuity: Noticed gradual change in both eyes, has noticed when he drives the most. Thinks that his depth perception is off . Concerned about the vision in his left eye, thinks that the cataract is getting bigger or something. Sees lines at night around lights with contact lenses     Near Vision Acuity: Satisfied with vision while reading and using computer with readers over the contacts.  Has some trouble recently with very  small print of low contrast     Eye Comfort: good, sometimes dry   Do you use eye drops? : No  Occupation or Hobbies: Unemployed       Lesvia Fraire Optometric Assistant      Medical, surgical and family histories reviewed and updated 7/17/2024.       OBJECTIVE: See Ophthalmology exam    ASSESSMENT:    ICD-10-CM    1. Retinopathy, background, nonproliferative, moderate  H35.00 Adult Eye  Referral      2. Combined forms of age-related cataract of both eyes  H25.813 Adult Eye  Referral      3. Glaucoma suspect of left eye  H40.002 Adult Eye  Referral      4. Type 1 diabetes mellitus with moderate nonproliferative retinopathy of both eyes, macular edema presence unspecified (H)  E10.3393          PLAN:     Patient Instructions   Fill glasses prescription  Work on improving blood sugar control  Return in 1 year for diabetic eye exam    Use Optifree pure moist to clean and store contacts        Blood sugar and blood pressure control are very important in the prevention of ocular complications from diabetes.       Maura Trevino, OD  [phone]

## 2024-07-17 NOTE — LETTER
7/17/2024      Rudolph Tao  [address] [address] [address]      Dear Colleague,    Thank you for referring your patient, Rudolph Tao, to the [facility]. Please see a copy of my visit note below.    Patient presents with:  Diabetic Eye Exam: Has appointment with diabetic educator.  Will be getting a pump soon to improve blood sugar control     Type 1 diabetes x 20 yrs     Hemoglobin A1C   Date Value Ref Range Status   04/09/2024 7.4 (H) 0.0 - 5.6 % Final     Comment:     Normal <5.7%   Prediabetes 5.7-6.4%    Diabetes 6.5% or higher     Note: Adopted from ADA consensus guidelines.   11/10/2022 7.3 (H) 0.0 - 5.6 % Final     Comment:     Normal <5.7%   Prediabetes 5.7-6.4%    Diabetes 6.5% or higher     Note: Adopted from ADA consensus guidelines.   02/25/2022 8.1 (H) 0.0 - 5.6 % Final     Comment:     Normal <5.7%   Prediabetes 5.7-6.4%    Diabetes 6.5% or higher     Note: Adopted from ADA consensus guidelines.   06/25/2021 7.3 (H) 0 - 5.6 % Final     Comment:     Normal <5.7% Prediabetes 5.7-6.4%  Diabetes 6.5% or higher - adopted from ADA   consensus guidelines.     01/14/2021 6.9 (H) 0 - 5.6 % Final     Comment:     Normal <5.7% Prediabetes 5.7-6.4%  Diabetes 6.5% or higher - adopted from ADA   consensus guidelines.     10/05/2020 7.6 (H) 0 - 5.6 % Final     Comment:     Normal <5.7% Prediabetes 5.7-6.4%  Diabetes 6.5% or higher - adopted from ADA   consensus guidelines.        Accompanied by wife, Tiara    Previous contact lens wearer? Yes: Wearing AV Oasys   Comfort of contact lenses :Good   Satisfied with current lenses: Yes        Last Eye Exam: 9/22  Dilated Previously: Yes    What are you currently using to see?  glasses, readers, and contacts. Readers are +2.50    Distance Vision Acuity: Noticed gradual change in both eyes, has noticed when he drives the most. Thinks that his depth perception is off . Concerned about the vision in his left eye, thinks that  the cataract is getting bigger or something. Sees lines at night around lights with contact lenses     Near Vision Acuity: Satisfied with vision while reading and using computer with readers over the contacts.  Has some trouble recently with very small print of low contrast     Eye Comfort: good, sometimes dry   Do you use eye drops? : No  Occupation or Hobbies: Unemployed       Lesvia Apple Optometric Assistant      Medical, surgical and family histories reviewed and updated 7/17/2024.       OBJECTIVE: See Ophthalmology exam    ASSESSMENT:    ICD-10-CM    1. Retinopathy, background, nonproliferative, moderate  H35.00 Adult Eye  Referral      2. Combined forms of age-related cataract of both eyes  H25.813 Adult Eye  Referral      3. Glaucoma suspect of left eye  H40.002 Adult Eye  Referral      4. Type 1 diabetes mellitus with moderate nonproliferative retinopathy of both eyes, macular edema presence unspecified (H)  E10.3393          PLAN:     Patient Instructions   Fill glasses prescription  Work on improving blood sugar control  Return in 1 year for diabetic eye exam    Use Optifree pure moist to clean and store contacts        Blood sugar and blood pressure control are very important in the prevention of ocular complications from diabetes.       Maura Trevino, OD  [phone]                                  Again, thank you for allowing me to participate in the care of your patient.        Sincerely,        Maura Trevino, OD

## 2024-07-17 NOTE — PATIENT INSTRUCTIONS
Fill glasses prescription  Work on improving blood sugar control  Return in 1 year for diabetic eye exam      Contact trials on order  We will contact you to schedule a contact dispense appointment to get the trial lenses once they arrive      Maura Trevino O.D.  [facility]  [address] [place] [address]  [phone]                       Use Optifree pure moist to clean and store contacts        Blood sugar and blood pressure control are very important in the prevention of ocular complications from diabetes.       Maura Trevino, CHRISSY  [phone]

## 2024-07-18 ENCOUNTER — TRANSFERRED RECORDS (OUTPATIENT)
Dept: HEALTH INFORMATION MANAGEMENT | Facility: CLINIC | Age: 54
End: 2024-07-18
Payer: COMMERCIAL

## 2024-07-31 ENCOUNTER — OFFICE VISIT (OUTPATIENT)
Dept: OPTOMETRY | Facility: CLINIC | Age: 54
End: 2024-07-31
Payer: COMMERCIAL

## 2024-07-31 DIAGNOSIS — H52.13 MYOPIA OF BOTH EYES: Primary | ICD-10-CM

## 2024-07-31 DIAGNOSIS — H52.223 REGULAR ASTIGMATISM OF BOTH EYES: ICD-10-CM

## 2024-07-31 PROCEDURE — 99207 PR NO CHARGE LOS: CPT | Performed by: OPTOMETRIST

## 2024-07-31 ASSESSMENT — VISUAL ACUITY
VA_OR_OD_CURRENT_RX: 40-1
VA_OR_OS_CURRENT_RX: 20/50

## 2024-07-31 ASSESSMENT — REFRACTION_CURRENTRX
OS_CYLINDER: -0.75
OS_BRAND: J&J ACUVUE OASYS FOR ASTIGMATISM WITH HYDRACLEAR BC 8.6 D 14.5
OD_SPHERE: -7.00
OS_SPHERE: -7.00
OD_AXIS: 040
OD_BRAND: J&J ACUVUE OASYS FOR ASTIGMATISM WITH HYDRACLEAR BC 8.6 D 14.5
OS_AXIS: 140
OD_CYLINDER: -1.25

## 2024-07-31 NOTE — PATIENT INSTRUCTIONS
Please call [phone] to schedule an appointment with Dr Clifton for glaucoma suspect evaluation.  Glaucoma can cause vision loss which can be prevented with medicated eye drops.        Return to clinic for check appointment in 1-2 weeks     Maura Trevino, OD  [phone]

## 2024-07-31 NOTE — PROGRESS NOTES
Chief Complaint   Patient presents with    Contact Lens Follow Up     Contact lenses dispensed    Reports seeing better  with trials  than with old contact lenses     LORELEI Tovar          OBJECTIVE: See Ophthalmology exam     ASSESSMENT:    ICD-10-CM    1. Myopia of both eyes  H52.13       2. Regular astigmatism of both eyes  H52.223          PLAN:  Patient Instructions   Please call [phone] to schedule an appointment with Dr Clifton for glaucoma suspect evaluation.  Glaucoma can cause vision loss which can be prevented with medicated eye drops.        Return to clinic for check appointment in 1-2 weeks     Maura Trevino, OD  [phone]

## 2024-07-31 NOTE — LETTER
7/31/2024      Rudolph Tao  [address] [address] [id number]      Dear Colleague,    Thank you for referring your patient, Rudolph Tao, to the [facility]. Please see a copy of my visit note below.    Chief Complaint   Patient presents with     Contact Lens Follow Up     Contact lenses dispensed    Reports seeing better  with trials  than with old contact lenses     LORELEI Tovar          OBJECTIVE: See Ophthalmology exam     ASSESSMENT:    ICD-10-CM    1. Myopia of both eyes  H52.13       2. Regular astigmatism of both eyes  H52.223          PLAN:  Patient Instructions   Please call [phone] to schedule an appointment with Dr Clifton for glaucoma suspect evaluation.  Glaucoma can cause vision loss which can be prevented with medicated eye drops.        Return to clinic for check appointment in 1-2 weeks     Maura Trevino, OD  [phone]                           Again, thank you for allowing me to participate in the care of your patient.        Sincerely,        Maura Trevino, OD

## 2024-08-01 ENCOUNTER — OFFICE VISIT (OUTPATIENT)
Dept: FAMILY MEDICINE | Facility: CLINIC | Age: 54
End: 2024-08-01
Attending: FAMILY MEDICINE
Payer: COMMERCIAL

## 2024-08-01 VITALS
HEIGHT: 67 IN | WEIGHT: 169.2 LBS | RESPIRATION RATE: 16 BRPM | DIASTOLIC BLOOD PRESSURE: 84 MMHG | TEMPERATURE: 97.4 F | HEART RATE: 114 BPM | BODY MASS INDEX: 26.56 KG/M2 | SYSTOLIC BLOOD PRESSURE: 124 MMHG | OXYGEN SATURATION: 96 %

## 2024-08-01 DIAGNOSIS — F41.1 GENERALIZED ANXIETY DISORDER: ICD-10-CM

## 2024-08-01 DIAGNOSIS — E87.5 HYPERKALEMIA: ICD-10-CM

## 2024-08-01 DIAGNOSIS — E10.311 TYPE 1 DIABETES MELLITUS WITH RETINOPATHY AND MACULAR EDEMA, UNSPECIFIED LATERALITY, UNSPECIFIED RETINOPATHY SEVERITY (H): ICD-10-CM

## 2024-08-01 DIAGNOSIS — E11.3299 BACKGROUND DIABETIC RETINOPATHY (H): ICD-10-CM

## 2024-08-01 DIAGNOSIS — R79.89 ELEVATED TROPONIN: Primary | ICD-10-CM

## 2024-08-01 LAB
ANION GAP SERPL CALCULATED.3IONS-SCNC: 12 MMOL/L (ref 7–15)
BUN SERPL-MCNC: 14.8 MG/DL (ref 6–20)
CALCIUM SERPL-MCNC: 10 MG/DL (ref 8.8–10.4)
CHLORIDE SERPL-SCNC: 98 MMOL/L (ref 98–107)
CREAT SERPL-MCNC: 0.81 MG/DL (ref 0.67–1.17)
EGFRCR SERPLBLD CKD-EPI 2021: >90 ML/MIN/1.73M2
GLUCOSE SERPL-MCNC: 226 MG/DL (ref 70–99)
HBA1C MFR BLD: 7.2 % (ref 0–5.6)
HCO3 SERPL-SCNC: 30 MMOL/L (ref 22–29)
POTASSIUM SERPL-SCNC: 4.9 MMOL/L (ref 3.4–5.3)
SODIUM SERPL-SCNC: 140 MMOL/L (ref 135–145)

## 2024-08-01 PROCEDURE — 80048 BASIC METABOLIC PNL TOTAL CA: CPT | Performed by: FAMILY MEDICINE

## 2024-08-01 PROCEDURE — 36415 COLL VENOUS BLD VENIPUNCTURE: CPT | Performed by: FAMILY MEDICINE

## 2024-08-01 PROCEDURE — 83036 HEMOGLOBIN GLYCOSYLATED A1C: CPT | Performed by: FAMILY MEDICINE

## 2024-08-01 PROCEDURE — G2211 COMPLEX E/M VISIT ADD ON: HCPCS | Performed by: FAMILY MEDICINE

## 2024-08-01 PROCEDURE — 99214 OFFICE O/P EST MOD 30 MIN: CPT | Performed by: FAMILY MEDICINE

## 2024-08-01 ASSESSMENT — ENCOUNTER SYMPTOMS
DECREASED CONCENTRATION: 0
ENDOCRINE NEGATIVE: 1
ACTIVITY CHANGE: 0
EYE DISCHARGE: 0
SEIZURES: 0
HEADACHES: 0
CONFUSION: 0
COLOR CHANGE: 0
DIZZINESS: 0
ALLERGIC/IMMUNOLOGIC NEGATIVE: 1
EYE PAIN: 0
FATIGUE: 0
SHORTNESS OF BREATH: 0
PALPITATIONS: 0
COUGH: 0
HALLUCINATIONS: 0
WHEEZING: 0
AGITATION: 0
GASTROINTESTINAL NEGATIVE: 1
HEMATOLOGIC/LYMPHATIC NEGATIVE: 1
NERVOUS/ANXIOUS: 0
FEVER: 0
APPETITE CHANGE: 0

## 2024-08-01 NOTE — PROGRESS NOTES
1. Elevated troponin  Most recent ER records reviewed.   - Exercise Stress Test - Adult; Future    2. Hyperkalemia  - Basic metabolic panel  (Ca, Cl, CO2, Creat, Gluc, K, Na, BUN); Future  - Basic metabolic panel  (Ca, Cl, CO2, Creat, Gluc, K, Na, BUN)    3. Background diabetic retinopathy (H)  - HEMOGLOBIN A1C; Future  - HEMOGLOBIN A1C    4. GENERALIZED ANXIETY  Advised to take xanax sparingly.  Going through a lot of stressors.   - FLUoxetine (PROZAC) 20 MG capsule; Take 1 capsule (20 mg) by mouth daily  Dispense: 90 capsule; Refill: 1    5. 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 May take Lyrica 1 tablet in the morning and 2 tablets in the evening.   - HEMOGLOBIN A1C; Future  - HEMOGLOBIN A1C    I spent 30 minutes with patient of which 50% was spent counseling and coordinating patient's care as well as discussing patient's plan of care.      Ai Ponce is a 54 year old, presenting for the following health issues:  Diabetes and Hospital F/U      8/1/2024     9:06 AM   Additional Questions   Roomed by Richelle   Accompanied by Wife     History of Present Illness       Diabetes:   He presents for follow up of diabetes.  He is checking home blood glucose four or more times daily.   He checks blood glucose before and after meals.  Blood glucose is sometimes over 200 and sometimes under 70. He is aware of hypoglycemia symptoms including shakiness, weakness and other.   He is concerned about other.   He is having numbness in feet, burning in feet and blurry vision.                  Diabetes Follow-up    How often are you checking your blood sugar? Continuous glucose monitor  What time of day are you checking your blood sugars (select all that apply)?  Before and after meals  Have you had any blood sugars above 200?  Yes   Have you had any blood sugars below 70?  Yes   What symptoms do you  notice when your blood sugar is low?  Shaky, Dizzy, and Weak  What concerns do you have today about your diabetes? Blood sugar is often over 200 and Low blood sugar   Do you have any of these symptoms? (Select all that apply)  Numbness in feet, Burning in feet, and Blurry vision      BP Readings from Last 2 Encounters:   08/01/24 124/84   05/31/24 118/75     Hemoglobin A1C (%)   Date Value   04/09/2024 7.4 (H)   11/10/2022 7.3 (H)   06/25/2021 7.3 (H)   01/14/2021 6.9 (H)     LDL Cholesterol Calculated (mg/dL)   Date Value   04/09/2024 97   11/10/2022 70   06/25/2021 96   02/01/2020 98             Hospital Follow-up Visit:    Hospital/Nursing Home/IP Rehab Facility:  [facility]  Date of Admission: 7/27/24  Date of Discharge: 7/27/24  Reason(s) for Admission: Dehydration, Hyperkalemia, Stress, Lightheadedness   Was the patient in the ICU or did the patient experience delirium during hospitalization?  No  Do you have any other stressors you would like to discuss with your provider? Relationship Concerns, Job Concerns, Financial Concerns, and Health Concerns    Problems taking medications regularly:  None  Medication changes since discharge: None  Problems adhering to non-medication therapy:  None      Plan of care communicated with patient and wife           ER follow-up 7/27/24: Felt lightheadness during an interview.  Noticed high heart rate.  Broke out in sweat.  Noticed low blood pressure.   No LOC.  States of dehydration and stress.  States of drinking 4-5 drinks per night at night.  Drinking in 20s.  Patient is currently unemployed, dealing with mother who is going through hospice and sister who has liver failure and poor prognosis. Troponins was slightly elevated.  As of today, denies any lightheadness or sweating episodes.  Symptoms have resolved.     Review of Systems   Constitutional:  Negative for activity change, appetite change, fatigue and fever.   HENT: Negative.     Eyes:  Negative for pain,  "discharge and visual disturbance.   Respiratory:  Negative for cough, shortness of breath and wheezing.    Cardiovascular:  Negative for chest pain, palpitations and leg swelling.   Gastrointestinal: Negative.    Endocrine: Negative.    Genitourinary: Negative.    Skin:  Negative for color change and rash.   Allergic/Immunologic: Negative.    Neurological:  Negative for dizziness, seizures and headaches.   Hematological: Negative.    Psychiatric/Behavioral:  Negative for agitation, confusion, decreased concentration and hallucinations. The patient is not nervous/anxious.            Objective    /84   Pulse 114   Temp 97.4  F (36.3  C) (Temporal)   Resp 16   Ht 1.705 m (5' 7.13\")   Wt 76.7 kg (169 lb 3.2 oz)   SpO2 96%   BMI 26.40 kg/m    Body mass index is 26.4 kg/m .  Physical Exam  Constitutional:       General: He is not in acute distress.     Appearance: He is well-developed.   HENT:      Head: Normocephalic and atraumatic.      Nose: Nose normal.   Eyes:      Conjunctiva/sclera: Conjunctivae normal.   Neck:      Trachea: No tracheal deviation.   Cardiovascular:      Rate and Rhythm: Normal rate and regular rhythm.      Heart sounds: Normal heart sounds.   Pulmonary:      Effort: Pulmonary effort is normal.      Breath sounds: No wheezing.   Musculoskeletal:         General: Normal range of motion.      Cervical back: Normal range of motion.   Skin:     Findings: No erythema or rash.   Neurological:      Mental Status: He is alert and oriented to person, place, and time.   Psychiatric:         Behavior: Behavior normal.                  Component      Latest Ref Rn 6/25/2021  11:52 AM 2/25/2022  4:12 PM 11/10/2022  9:45 AM 4/9/2024  11:37 AM   Cholesterol      <200 mg/dL 199  205 (H)  197  186    Triglycerides      <150 mg/dL 66  112  77  67    HDL Cholesterol      >=40 mg/dL 90  70  112  76    LDL Cholesterol Calculated      <=100 mg/dL 96  113 (H)  70  97    VLDL-Cholesterol      0 - 30 mg/dL     "   Cholesterol/HDL Ratio      0.0 - 5.0        Non HDL Cholesterol      <130 mg/dL 109  135 (H)  85  110    Patient Fasting?  No  No  No    Hemoglobin A1C      0.0 - 5.6 % 7.3 (H)  8.1 (H)  7.3 (H)  7.4 (H)       Legend:  (H) High    Signed Electronically by: Chuy Choudhury DO

## 2024-08-01 NOTE — PATIENT INSTRUCTIONS
Damian Ponce,    Thank you for allowing [facility] to manage your care.    Please abstain from alcohol.     I ordered some blood work, please go to the laboratory to get your laboratory studies.    I sent your prescriptions to your pharmacy.    I am starting you prozac 20 mg daily.     I ordered a stress test, please call diagnostic imaging [phone] to schedule your test.    For your convenience, test results are released as soon as they are available  Please allow 1-2 business days for me to send you a comment about your results.  If not done so, I encourage you to login into [facility] ([email or web address] to review your results in real time.     If you have any questions or concerns, please feel free to call us at [phone].    Sincerely,    Dr. Choudhury    Did you know?      You can schedule a video visit for follow-up appointments as well as future appointments for certain conditions.  Please see the below link.     [email or web address]    If you have not already done so,  I encourage you to sign up for [facility] ([email or web address]).  This will allow you to review your results, securely communicate with a provider, and schedule virtual visits as well.

## 2024-08-05 ENCOUNTER — OFFICE VISIT (OUTPATIENT)
Dept: EDUCATION SERVICES | Facility: CLINIC | Age: 54
End: 2024-08-05
Attending: FAMILY MEDICINE
Payer: COMMERCIAL

## 2024-08-05 DIAGNOSIS — E10.8 DIABETES MELLITUS TYPE 1 WITH COMPLICATIONS (H): ICD-10-CM

## 2024-08-05 PROCEDURE — G0108 DIAB MANAGE TRN  PER INDIV: HCPCS | Performed by: DIETITIAN, REGISTERED

## 2024-08-05 NOTE — PROGRESS NOTES
Diabetes Self-Management Education & Support    Presents for: Insulin Pump Pre-Start    Type of Service: In Person Visit         Insulin Pump Concepts:  Balancing glucose and insulin  Carbohydrate counting    Opportunities for ongoing education and support in diabetes-self management were discussed.    Pt verbalized understanding of concepts discussed and recommendations provided today.       Continue education with the following diabetes management concepts: Healthy Eating, Monitoring, and Taking Medication    Provided informational packets for Omnipod, Tandem, and betabionics  insulin pumps    Thinking About Using an Insulin Pump? Handout     ASSESSMENT:  Kris is here today to discuss getting an insulin pump.  He would like to regulate his blood sugars to make them less variable.  He reports he does not carbohydrate count and does not know how.  He states he has followed endos insulin dosing recommendations, though on further disucssion he actually never takes his novolog pre meal AND is taking a more corrective scale than advised.  Together this is causing large drops.  He is very interested in the omni pod pump, but would still need to be in the habbit of entering in carbohydrates pre meal for adequate blood sugar improvement.  Omnipod system does not provide an aggressive algothrym that can make up missed boluses. Reports he usually eats 1-2 meals per day and has a limited appetite.      PLAN    Recommend running benefits check on omni pod, tandem/mobi, and ilet.  Use Endocrinology recommended correction scale- details in AVS.  Take insulin pre meals- provided examples of 1:15g carbohydrate ratio meals.  Follow up in 2 weeks.     Topics to cover at upcoming visits: Insulin Pump Concepts Balancing glucose and insulin  Carbohydrate counting  Calculating boluses  Problem solving with insulin pump therapy (BG monitoring; hypoglycemia signs/symptoms, treatment (glucagon) and prevention; hyperglycemia signs/symptoms,  "treatment and prevention; ketones, DKA signs/symptoms and prevention)  Hands on practice with basic pump button use    Follow-up: 8/26/24    See Care Plan for co-developed, patient-state behavior change goals.  AVS provided for patient today.    Education Materials Provided:  [place] Program pump comparison sheet.      SUBJECTIVE/OBJECTIVE:  Presents for: Insulin Pump Pre-Start  Accompanied by: Self, Spouse  Diabetes education in the past 24mo: No  Focus of Visit: Insulin Pump, CGM  Type of Pump visit: Pre-pump  Type of CGM visit: Personal CGM Follow-up  Diabetes type: Type 1  Disease course: Other (see Comments)  How confident are you filling out medical forms by yourself:: Extremely  Diabetes management related comments/concerns: I would like to discuss getting a insulin pump and what i need to do.  Transportation concerns: No  Difficulty affording diabetes medication?: No  Difficulty affording diabetes testing supplies?: No  Other concerns:: None  Cultural Influences/Ethnic Background:  Not  or     Diabetes Symptoms & Complications:  Diabetes Related Symptoms: None  Weight trend: Stable  Symptom course: Stable  Disease course: Other (see Comments)       Patient Problem List and Family Medical History reviewed for relevant medical history, current medical status, and diabetes risk factors.    Vitals:  There were no vitals taken for this visit.  Estimated body mass index is 26.4 kg/m  as calculated from the following:    Height as of 8/1/24: 1.705 m (5' 7.13\").    Weight as of 8/1/24: 76.7 kg (169 lb 3.2 oz).   Last 3 BP:   BP Readings from Last 3 Encounters:   08/01/24 124/84   05/31/24 118/75   04/09/24 (!) 148/88       History   Smoking Status    Former    Types: Cigarettes   Smokeless Tobacco    Never       Labs:  Lab Results   Component Value Date    A1C 7.2 08/01/2024    A1C 7.3 06/25/2021     Lab Results   Component Value Date     08/01/2024     02/25/2022     06/25/2021 " "    Lab Results   Component Value Date    LDL 97 04/09/2024    LDL 96 06/25/2021     HDL Cholesterol   Date Value Ref Range Status   06/25/2021 90 >39 mg/dL Final     Direct Measure HDL   Date Value Ref Range Status   04/09/2024 76 >=40 mg/dL Final   ]  GFR Estimate   Date Value Ref Range Status   08/01/2024 >90 >60 mL/min/1.73m2 Final     Comment:     eGFR calculated using 2021 CKD-EPI equation.   06/25/2021 >90 >60 mL/min/[1.73_m2] Final     Comment:     Non  GFR Calc  Starting 12/18/2018, serum creatinine based estimated GFR (eGFR) will be   calculated using the Chronic Kidney Disease Epidemiology Collaboration   (CKD-EPI) equation.       GFR Estimate If Black   Date Value Ref Range Status   06/25/2021 >90 >60 mL/min/[1.73_m2] Final     Comment:      GFR Calc  Starting 12/18/2018, serum creatinine based estimated GFR (eGFR) will be   calculated using the Chronic Kidney Disease Epidemiology Collaboration   (CKD-EPI) equation.       Lab Results   Component Value Date    CR 0.81 08/01/2024    CR 0.72 06/25/2021     No results found for: \"MICROALBUMIN\"    Healthy Eating:  Healthy Eating Assessed Today: No  Cultural/Judaism diet restrictions?: No  Do you have any food allergies or intolerances?: No  Meal planning/habits: None, Avoiding sweets  Who cooks/prepares meals for you?: Self, Spouse  Who purchases food in  your home?: Self, Spouse  How many times a week on average do you eat food made away from home (restaurant/take-out)?: 2  Meals include: Lunch, Dinner, Afternoon Snack  Beverages: Water, Coffee, Diet soda, Sports drinks, Alcohol  Has patient met with a dietitian in the past?: Yes      Lastnight- 3 oz steak, 1 corn on the cob, baked potatoes (1 cup)    Being Active:  Being Active Assessed Today: Yes  Exercise:: Currently not exercising  Barrier to exercise: None    Monitoring:  Monitoring Assessed Today: Yes  Did patient bring glucose meter to appointment? : Yes  Blood " Glucose Meter: FreeStyle, CGM  Times checking blood sugar at home (number): Other (see Comments)  Please elaborate:: I have a sensor  Times checking blood sugar at home (per): Day  Blood glucose trend: Fluctuating    Available in libreview    Taking Medications:  Diabetes Medication(s)       Insulin       LANTUS SOLOSTAR 100 UNIT/ML soln INJECT 32 UNITS SUBCUTANEOUSLY ONCE DAILY     NOVOLOG PENFILL 100 UNIT/ML soln INJECT 1 UNIT PER 30 GRAMS OF CARBOHYDRATES PLUS 1 UNIT PER 50MG/DL ABOVE 200MG/DL. MAX DOSE 20 UNITS PER DAY            Taking Medication Assessed Today: Yes  Current Treatments: Insulin Injections  Problems taking diabetes medications regularly?: Yes (always takes insulin after the meal)  Diabetes medication side effects?: Yes    Problem Solving:  Problem Solving Assessed Today: Yes  Is the patient at risk for hypoglycemia?: Yes  Hypoglycemia Frequency: Weekly  Hypoglycemia Treatment: Juice  Is the patient at risk for DKA?: Yes              Reducing Risks:  Reducing Risks Assessed Today: No  Has dilated eye exam at least once a year?: Yes  Sees dentist every 6 months?: No  Feet checked by healthcare provider in the last year?: Yes    Healthy Coping:  Healthy Coping Assessed Today: Yes  Emotional response to diabetes: Ready to learn, Guilt/Self-blame  Informal Support system:: Family, Spouse  Stage of change: PREPARATION (Decided to change - considering how)  Support resources: Websites  Patient Activation Measure Survey Score:      9/19/2011     9:00 AM   LAUREL Score (Last Two)   LAUREL Raw Score 51   Activation Score 91.6   LAUREL Level 4         Care Plan and Education Provided:  automated insulin delivery functions/features, importance of accurate carbohydrate counting, and differences between pumps.    Radha Armendariz MS, RD, LD, CDE      Time Spent: 60 minutes  Encounter Type: Individual    Any diabetes medication dose changes were made via the CDE Protocol per the patient's primary care provider. A copy of  this encounter was shared with the provider.

## 2024-08-05 NOTE — LETTER
8/5/2024         RE: Rudolph Tao  [address] [facility] [id number]        Dear Colleague,    Thank you for referring your patient, Rudolph Tao, to the [facility]. Please see a copy of my visit note below.    Diabetes Self-Management Education & Support    Presents for: Insulin Pump Pre-Start    Type of Service: In Person Visit         Insulin Pump Concepts:  Balancing glucose and insulin  Carbohydrate counting    Opportunities for ongoing education and support in diabetes-self management were discussed.    Pt verbalized understanding of concepts discussed and recommendations provided today.       Continue education with the following diabetes management concepts: Healthy Eating, Monitoring, and Taking Medication    Provided informational packets for Omnipod, Tandem, and betabionics  insulin pumps    Thinking About Using an Insulin Pump? Handout     ASSESSMENT:  Kris is here today to discuss getting an insulin pump.  He would like to regulate his blood sugars to make them less variable.  He reports he does not carbohydrate count and does not know how.  He states he has followed endos insulin dosing recommendations, though on further disucssion he actually never takes his novolog pre meal AND is taking a more corrective scale than advised.  Together this is causing large drops.  He is very interested in the omni pod pump, but would still need to be in the habbit of entering in carbohydrates pre meal for adequate blood sugar improvement.  Omnipod system does not provide an aggressive algothrym that can make up missed boluses. Reports he usually eats 1-2 meals per day and has a limited appetite.      PLAN    Recommend running benefits check on omni pod, tandem/mobi, and ilet.  Use Endocrinology recommended correction scale- details in AVS.  Take insulin pre meals- provided examples of 1:15g carbohydrate ratio meals.  Follow up in 2 weeks.     Topics to cover at upcoming  "visits: Insulin Pump Concepts Balancing glucose and insulin  Carbohydrate counting  Calculating boluses  Problem solving with insulin pump therapy (BG monitoring; hypoglycemia signs/symptoms, treatment (glucagon) and prevention; hyperglycemia signs/symptoms, treatment and prevention; ketones, DKA signs/symptoms and prevention)  Hands on practice with basic pump button use    Follow-up: 8/26/24    See Care Plan for co-developed, patient-state behavior change goals.  AVS provided for patient today.    Education Materials Provided:  [employer] Program pump comparison sheet.      SUBJECTIVE/OBJECTIVE:  Presents for: Insulin Pump Pre-Start  Accompanied by: Self, Spouse  Diabetes education in the past 24mo: No  Focus of Visit: Insulin Pump, CGM  Type of Pump visit: Pre-pump  Type of CGM visit: Personal CGM Follow-up  Diabetes type: Type 1  Disease course: Other (see Comments)  How confident are you filling out medical forms by yourself:: Extremely  Diabetes management related comments/concerns: I would like to discuss getting a insulin pump and what i need to do.  Transportation concerns: No  Difficulty affording diabetes medication?: No  Difficulty affording diabetes testing supplies?: No  Other concerns:: None  Cultural Influences/Ethnic Background:  Not  or     Diabetes Symptoms & Complications:  Diabetes Related Symptoms: None  Weight trend: Stable  Symptom course: Stable  Disease course: Other (see Comments)       Patient Problem List and Family Medical History reviewed for relevant medical history, current medical status, and diabetes risk factors.    Vitals:  There were no vitals taken for this visit.  Estimated body mass index is 26.4 kg/m  as calculated from the following:    Height as of 8/1/24: 1.705 m (5' 7.13\").    Weight as of 8/1/24: 76.7 kg (169 lb 3.2 oz).   Last 3 BP:   BP Readings from Last 3 Encounters:   08/01/24 124/84   05/31/24 118/75   04/09/24 (!) 148/88       History   Smoking Status " "    Former     Types: Cigarettes   Smokeless Tobacco     Never       Labs:  Lab Results   Component Value Date    A1C 7.2 08/01/2024    A1C 7.3 06/25/2021     Lab Results   Component Value Date     08/01/2024     02/25/2022     06/25/2021     Lab Results   Component Value Date    LDL 97 04/09/2024    LDL 96 06/25/2021     HDL Cholesterol   Date Value Ref Range Status   06/25/2021 90 >39 mg/dL Final     Direct Measure HDL   Date Value Ref Range Status   04/09/2024 76 >=40 mg/dL Final   ]  GFR Estimate   Date Value Ref Range Status   08/01/2024 >90 >60 mL/min/1.73m2 Final     Comment:     eGFR calculated using 2021 CKD-EPI equation.   06/25/2021 >90 >60 mL/min/[1.73_m2] Final     Comment:     Non  GFR Calc  Starting 12/18/2018, serum creatinine based estimated GFR (eGFR) will be   calculated using the Chronic Kidney Disease Epidemiology Collaboration   (CKD-EPI) equation.       GFR Estimate If Black   Date Value Ref Range Status   06/25/2021 >90 >60 mL/min/[1.73_m2] Final     Comment:      GFR Calc  Starting 12/18/2018, serum creatinine based estimated GFR (eGFR) will be   calculated using the Chronic Kidney Disease Epidemiology Collaboration   (CKD-EPI) equation.       Lab Results   Component Value Date    CR 0.81 08/01/2024    CR 0.72 06/25/2021     No results found for: \"MICROALBUMIN\"    Healthy Eating:  Healthy Eating Assessed Today: No  Cultural/Roman Catholic diet restrictions?: No  Do you have any food allergies or intolerances?: No  Meal planning/habits: None, Avoiding sweets  Who cooks/prepares meals for you?: Self, Spouse  Who purchases food in  your home?: Self, Spouse  How many times a week on average do you eat food made away from home (restaurant/take-out)?: 2  Meals include: Lunch, Dinner, Afternoon Snack  Beverages: Water, Coffee, Diet soda, Sports drinks, Alcohol  Has patient met with a dietitian in the past?: Yes      Lastnight- 3 oz steak, 1 corn on " the cob, baked potatoes (1 cup)    Being Active:  Being Active Assessed Today: Yes  Exercise:: Currently not exercising  Barrier to exercise: None    Monitoring:  Monitoring Assessed Today: Yes  Did patient bring glucose meter to appointment? : Yes  Blood Glucose Meter: FreeStyle, CGM  Times checking blood sugar at home (number): Other (see Comments)  Please elaborate:: I have a sensor  Times checking blood sugar at home (per): Day  Blood glucose trend: Fluctuating    Available in libreview    Taking Medications:  Diabetes Medication(s)       Insulin       LANTUS SOLOSTAR 100 UNIT/ML soln INJECT 32 UNITS SUBCUTANEOUSLY ONCE DAILY     NOVOLOG PENFILL 100 UNIT/ML soln INJECT 1 UNIT PER 30 GRAMS OF CARBOHYDRATES PLUS 1 UNIT PER 50MG/DL ABOVE 200MG/DL. MAX DOSE 20 UNITS PER DAY            Taking Medication Assessed Today: Yes  Current Treatments: Insulin Injections  Problems taking diabetes medications regularly?: Yes (always takes insulin after the meal)  Diabetes medication side effects?: Yes    Problem Solving:  Problem Solving Assessed Today: Yes  Is the patient at risk for hypoglycemia?: Yes  Hypoglycemia Frequency: Weekly  Hypoglycemia Treatment: Juice  Is the patient at risk for DKA?: Yes              Reducing Risks:  Reducing Risks Assessed Today: No  Has dilated eye exam at least once a year?: Yes  Sees dentist every 6 months?: No  Feet checked by healthcare provider in the last year?: Yes    Healthy Coping:  Healthy Coping Assessed Today: Yes  Emotional response to diabetes: Ready to learn, Guilt/Self-blame  Informal Support system:: Family, Spouse  Stage of change: PREPARATION (Decided to change - considering how)  Support resources: Websites  Patient Activation Measure Survey Score:      9/19/2011     9:00 AM   LAUREL Score (Last Two)   LAUREL Raw Score 51   Activation Score 91.6   LAUREL Level 4         Care Plan and Education Provided:  automated insulin delivery functions/features, importance of accurate  carbohydrate counting, and differences between pumps.    Radha Armendariz MS, RD, LD, CDE      Time Spent: 60 minutes  Encounter Type: Individual    Any diabetes medication dose changes were made via the CDE Protocol per the patient's primary care provider. A copy of this encounter was shared with the provider.

## 2024-08-05 NOTE — PATIENT INSTRUCTIONS
Goal: take Novolog before you eat    Small meal like 2 pc bread OR a sandwich (about 30g carbohydrates)- take 2 units  Medium meal (45g carbohydrates)- 1 sandwich OR 1 cup pasta= 3 units  Large meal (60g) steak, 1 cup potato, 1 cob of corn= 4 units    Take a picture of any meals you aren't sure how to count.    Continue Lantus 32 units daily.  Novolog insulin with meals and snacks: 1 unit for every 15 grams of carbohydrates with meals and snacks. Take mealtime insulin -Novolog 10 minutes before eat.   Please follow the following correction scale with meals three times per day.   For Pre-Meal  - 189 give 1 unit.   For Pre-Meal  - 239 give 2 units.   For Pre-Meal  - 289 give 3 units.   For Pre-Meal  - 339 give 4 units.   For Pre-Meal BG = or > 340 give 5 units.   Please call if any low BG below 70.      [facility] for the [facility]:  For Your Diabetes Education and Nutrition Appointments Call:  [phone]   For Diabetes Education or Nutrition Related Questions:   Phone: [phone]  Send OTOY Message   If you need a medication refill please contact your pharmacy. Please allow 3 business days for your refills to be completed.

## 2024-08-07 ENCOUNTER — OFFICE VISIT (OUTPATIENT)
Dept: OPTOMETRY | Facility: CLINIC | Age: 54
End: 2024-08-07
Payer: COMMERCIAL

## 2024-08-07 DIAGNOSIS — H40.002 GLAUCOMA SUSPECT OF LEFT EYE: ICD-10-CM

## 2024-08-07 DIAGNOSIS — H52.223 REGULAR ASTIGMATISM OF BOTH EYES: ICD-10-CM

## 2024-08-07 DIAGNOSIS — H52.13 MYOPIA OF BOTH EYES: Primary | ICD-10-CM

## 2024-08-07 DIAGNOSIS — H25.813 COMBINED FORMS OF AGE-RELATED CATARACT OF BOTH EYES: ICD-10-CM

## 2024-08-07 DIAGNOSIS — E11.3299 BACKGROUND DIABETIC RETINOPATHY (H): ICD-10-CM

## 2024-08-07 PROCEDURE — 99207 PR NO CHARGE LOS: CPT | Performed by: OPTOMETRIST

## 2024-08-07 ASSESSMENT — VISUAL ACUITY
OD_CC: 20/40
OD_CC: 20/200
METHOD: SNELLEN - LINEAR
OS_CC: 20/70
OD_CC: 20/40
CORRECTION_TYPE: GLASSES, CONTACTS
METHOD: SNELLEN - LINEAR
OD_CC+: -3
CORRECTION_TYPE: CONTACTS
OS_CC: 20/60
OS_CC: 20/40

## 2024-08-07 ASSESSMENT — CUP TO DISC RATIO
OS_RATIO: 0.8
OD_RATIO: 0.3

## 2024-08-07 ASSESSMENT — SLIT LAMP EXAM - LIDS
COMMENTS: 1+ PTOSIS
COMMENTS: 1+ PTOSIS

## 2024-08-07 ASSESSMENT — REFRACTION_CURRENTRX
OS_AXIS: 140
OS_SPHERE: -7.00
OD_SPHERE: -7.00
OD_BRAND: J&J ACUVUE OASYS FOR ASTIGMATISM WITH HYDRACLEAR BC 8.6 D 14.5
OD_CYLINDER: -1.25
OD_AXIS: 040
OS_CYLINDER: -0.75
OS_BRAND: J&J ACUVUE OASYS FOR ASTIGMATISM WITH HYDRACLEAR BC 8.6 D 14.5

## 2024-08-07 NOTE — PROGRESS NOTES
Chief Complaint   Patient presents with    Contact Lens Check     Satisfied with contacts:  Not exactly, Not sure if it's lens or eye issues     Good comfort:  Yes  Clear vision:     fine print issues. Uses OTC readers over the lenses     Lesvia Fraire Optometric Assistant     Has not glucose  pump for diabetes yet      Medical, surgical and family histories reviewed and updated 8/7/2024.       OBJECTIVE: See Ophthalmology exam    ASSESSMENT:    ICD-10-CM    1. Myopia of both eyes  H52.13       2. Regular astigmatism of both eyes  H52.223       3. Combined forms of age-related cataract of both eyes  H25.813       4. Glaucoma suspect of left eye  H40.002       5. Background diabetic retinopathy, mild, ou  E11.3299          PLAN:    Patient Instructions   Mild cataracts causing mild blurry vision.  Make appointment to see Dr Clifton for glaucoma evaluation.  Contact lenses prescription released to patient today.    Maura Trevino O.D.  [facility]  [address] [place], [place] [id number] [phone]

## 2024-08-07 NOTE — LETTER
8/7/2024      Rudolph Tao  [address] [address] [facility] [address]      Dear Colleague,    Thank you for referring your patient, Rudolph Tao, to the [facility]. Please see a copy of my visit note below.    Chief Complaint   Patient presents with     Contact Lens Check     Satisfied with contacts:  Not exactly, Not sure if it's lens or eye issues     Good comfort:  Yes  Clear vision:     fine print issues. Uses OTC readers over the lenses     Lesvia Apple Optometric Assistant     Has not glucose  pump for diabetes yet      Medical, surgical and family histories reviewed and updated 8/7/2024.       OBJECTIVE: See Ophthalmology exam    ASSESSMENT:    ICD-10-CM    1. Myopia of both eyes  H52.13       2. Regular astigmatism of both eyes  H52.223       3. Combined forms of age-related cataract of both eyes  H25.813       4. Glaucoma suspect of left eye  H40.002       5. Background diabetic retinopathy, mild, ou  E11.3299          PLAN:    Patient Instructions   Mild cataracts causing mild blurry vision.  Make appointment to see Dr Clifton for glaucoma evaluation.  Contact lenses prescription released to patient today.    Maura Trevino O.D.  [facility]  [address] [place] [address] [phone]                       Again, thank you for allowing me to participate in the care of your patient.        Sincerely,        Maura Trevino, OD

## 2024-08-07 NOTE — PATIENT INSTRUCTIONS
Mild cataracts causing mild blurry vision.  Make appointment to see Dr Clifton for glaucoma evaluation.  Contact lenses prescription released to patient today.    Maura Trevino O.D.  [facility]  [address] [place] [address] [phone]

## 2024-08-12 DIAGNOSIS — E10.9 TYPE 1 DIABETES MELLITUS WITHOUT COMPLICATION (H): ICD-10-CM

## 2024-08-14 RX ORDER — FLURBIPROFEN SODIUM 0.3 MG/ML
SOLUTION/ DROPS OPHTHALMIC
Qty: 100 EACH | Refills: 2 | Status: SHIPPED | OUTPATIENT
Start: 2024-08-14

## 2024-08-15 ENCOUNTER — OFFICE VISIT (OUTPATIENT)
Dept: OPHTHALMOLOGY | Facility: CLINIC | Age: 54
End: 2024-08-15
Attending: OPTOMETRIST
Payer: COMMERCIAL

## 2024-08-15 DIAGNOSIS — H40.1132 PRIMARY OPEN ANGLE GLAUCOMA (POAG) OF BOTH EYES, MODERATE STAGE: Primary | ICD-10-CM

## 2024-08-15 DIAGNOSIS — H25.813 COMBINED FORMS OF AGE-RELATED CATARACT OF BOTH EYES: ICD-10-CM

## 2024-08-15 PROCEDURE — 92133 CPTRZD OPH DX IMG PST SGM ON: CPT | Performed by: OPHTHALMOLOGY

## 2024-08-15 PROCEDURE — 92012 INTRM OPH EXAM EST PATIENT: CPT | Performed by: OPHTHALMOLOGY

## 2024-08-15 PROCEDURE — 92083 EXTENDED VISUAL FIELD XM: CPT | Performed by: OPHTHALMOLOGY

## 2024-08-15 RX ORDER — LATANOPROST 50 UG/ML
1 SOLUTION/ DROPS OPHTHALMIC EVERY EVENING
Qty: 2.5 ML | Refills: 11 | Status: SHIPPED | OUTPATIENT
Start: 2024-08-15

## 2024-08-15 ASSESSMENT — PACHYMETRY
OD_CT(UM): .567
OS_CT(UM): .558

## 2024-08-15 ASSESSMENT — TONOMETRY
IOP_METHOD: APPLANATION
OS_IOP_MMHG: 19
OD_IOP_MMHG: 18

## 2024-08-15 ASSESSMENT — EXTERNAL EXAM - LEFT EYE: OS_EXAM: NORMAL

## 2024-08-15 ASSESSMENT — SLIT LAMP EXAM - LIDS
COMMENTS: 1+ PTOSIS, 1+ SCLERAL SHOW
COMMENTS: 1+ PTOSIS, 1+ SCLERAL SHOW

## 2024-08-15 ASSESSMENT — VISUAL ACUITY
OD_PH_CC: 20/40
METHOD: SNELLEN - LINEAR
OS_CC+: -2
OS_CC: 20/50
OD_CC: 20/50
OS_PH_CC: 20/40
OD_CC+: -1
CORRECTION_TYPE: CONTACTS

## 2024-08-15 ASSESSMENT — EXTERNAL EXAM - RIGHT EYE: OD_EXAM: NORMAL

## 2024-08-15 NOTE — LETTER
8/15/2024      Rudolph Tao  [address] [address] [place] [address]      Dear Colleague,    Thank you for referring your patient, Rudolph Tao, to the [facility]. Please see a copy of my visit note below.     Current Eye Medications:  none.      Subjective:  Dr. Trevino recommended a glaucoma evaluation by Dr. Clifton.  The patient is here for a pressure check, OCT, visual field, and pachymetry.  He has new contact lenses with a new prescription, but is having difficulty seeing to read even with over-the-counter readers.  He is unsure of any improvement in distance vision.    *tech note:  difficulty with OCT images and visual field secondary to uncontrollable patient movement.     Objective:  See Ophthalmology Exam.       Assessment:  Glaucoma OCT and Torres Visual Field (though decreased reliability) suggest open angle glaucoma left eye > right eye.  Mild-moderate cataract left eye approaching visual significance.  Intermediate corneal thickness.      Plan:  Begin:   Latanoprost (green top) one drop in both eyes at bedtime     Return visit in 3 weeks for an intraocular pressure check.  Would like intraocular pressure 15 or less; may need additional meds.    Ciaran Clifton M.D.  [phone]       Again, thank you for allowing me to participate in the care of your patient.        Sincerely,        Ciaran Clifton MD

## 2024-08-15 NOTE — PATIENT INSTRUCTIONS
Begin:   Latanoprost (green top) one drop in both eyes at bedtime     Return visit in 3 weeks for an intraocular pressure check.     Ciaran Clifton M.D.  [phone]

## 2024-08-15 NOTE — PROGRESS NOTES
Current Eye Medications:  none.      Subjective:  Dr. Trevino recommended a glaucoma evaluation by Dr. Clifton.  The patient is here for a pressure check, OCT, visual field, and pachymetry.  He has new contact lenses with a new prescription, but is having difficulty seeing to read even with over-the-counter readers.  He is unsure of any improvement in distance vision.    *tech note:  difficulty with OCT images and visual field secondary to uncontrollable patient movement.     Objective:  See Ophthalmology Exam.       Assessment:  Glaucoma OCT and Torres Visual Field (though decreased reliability) suggest open angle glaucoma left eye > right eye.  Mild-moderate cataract left eye approaching visual significance.  Intermediate corneal thickness.      Plan:  Begin:   Latanoprost (green top) one drop in both eyes at bedtime     Return visit in 3 weeks for an intraocular pressure check.  Would like intraocular pressure 15 or less; may need additional meds.    Ciaran Clifton M.D.  [phone]

## 2024-08-16 PROBLEM — H40.1132 PRIMARY OPEN ANGLE GLAUCOMA (POAG) OF BOTH EYES, MODERATE STAGE: Status: ACTIVE | Noted: 2024-08-16

## 2024-08-26 ENCOUNTER — OFFICE VISIT (OUTPATIENT)
Dept: EDUCATION SERVICES | Facility: CLINIC | Age: 54
End: 2024-08-26
Payer: COMMERCIAL

## 2024-08-26 DIAGNOSIS — E10.311 TYPE 1 DIABETES MELLITUS WITH RETINOPATHY AND MACULAR EDEMA, UNSPECIFIED LATERALITY, UNSPECIFIED RETINOPATHY SEVERITY (H): Primary | ICD-10-CM

## 2024-08-26 PROCEDURE — G0108 DIAB MANAGE TRN  PER INDIV: HCPCS | Performed by: DIETITIAN, REGISTERED

## 2024-08-26 NOTE — PATIENT INSTRUCTIONS
Getting a pump:  -I will get orders to Dr. Choudhury once approved I will send the pdm starter kit and pods to [facility] pharmacy  -We need to complete 1 more pre-pump education.  -Then I will let the omni pod pump trainers know you are ready to start and connect it to your body  -Within 1-2 weeks of starting we should follow up to review pump rates    https://www.omnipod.com/current-podders/resources/omnipod-5/device-compatibility  -keep your self posted on apple updates consider upgrading once full christian compatibility is available  -current android is compatible which lets you ditch the pdm    Download- omni pod simulator- do some practices with it    Download calorie louise CHRISTIAN- try looking up some common foods you eat and make mental notes of what that value it    Consider getting a food scale to weigh out foods like rice, fruit, potatoes etc.    Keep working on taking insulin prior to eating    Goal: take Novolog before you eat  Small meal like 2 pc bread OR a sandwich (about 30g carbohydrates)- take 2 units  Medium meal (45g carbohydrates)- 1 sandwich OR 1 cup pasta= 3 units  Large meal (60g) steak, 1 cup potato, 1 cob of corn= 4 units    Take a picture of any meals you aren't sure how to count.    Continue Lantus 32 units daily.    Novolog insulin with meals and snacks: 1 unit for every 15 grams of carbohydrates with meals and snacks. Take  mealtime insulin -Novolog 10 minutes before eat.    Please follow the following correction scale with meals three times per day. You would add this to your meal estimation pre meal OR outside of a meal if you need correction  For Pre-Meal  - 189 give 1 unit.  For Pre-Meal  - 239 give 2 units.  For Pre-Meal  - 289 give 3 units.  For Pre-Meal  - 339 give 4 units.  For Pre-Meal BG = or > 340 give 5 units.  Please call if any low BG below 70.

## 2024-08-26 NOTE — LETTER
8/26/2024         RE: Rudolph Tao  [address] [address] [place] [address]        Dear Colleague,    Thank you for referring your patient, Rudolph Tao, to the [facility]. Please see a copy of my visit note below.    Diabetes Self-Management Education & Support    Presents for: Insulin Pump Pre-Start    Type of Service: In Person Visit         Insulin Pump Concepts:  Balancing glucose and insulin  Carbohydrate counting    Opportunities for ongoing education and support in diabetes-self management were discussed.    Pt verbalized understanding of concepts discussed and recommendations provided today.       Continue education with the following diabetes management concepts: Healthy Eating, Monitoring, Taking Medication, and Problem Solving    Provided informational packets for Omnipod insulin pumps    Thinking About Using an Insulin Pump? Handout     ASSESSMENT:  Blood sugars remain elevated.  Kris is wondering about the efficacy of a neuropathy treatment he recently found.  He additionally asks again how he can more accurately correct blood sugars, but has not followed the correction scale provided by endo OR myself the last 2 visits.  He reports some improvement in learning carbohydrates, but does endorse difficulty with accurate counts.  Discussed that this will pose limitations to the amount of correction/bolus improvement the omni pod can provide.  Reviewed again where he can locate carbohydrate counting information and used example meals for practice counting.  Recommend moving forward with ordering omni pod pump to offer improvement to day-to-day blood sugar regulation.  Recommend 1 further pre pump visit focused on omni pod and setting up an MDI back up plan.         PLAN    Order omni pod pdm and starter kit to [facility] pharmacy to ensure patient gets the Dexcom G7 compatible pods.  Order Dexcom g7 which will be compatible with auto features of pump.   Complete pump therapy  "order form at pre pump for starting rate approval.     Topics to cover at upcoming visits: Insulin Pump Concepts Carbohydrate counting  Problem solving with insulin pump therapy (BG monitoring; hypoglycemia signs/symptoms, treatment (glucagon) and prevention; hyperglycemia signs/symptoms, treatment and prevention; ketones, DKA signs/symptoms and prevention)  Hands on practice with basic pump button use  MDI back up plan    Follow-up: 9/10/24    See Care Plan for co-developed, patient-state behavior change goals.  AVS provided for patient today.    Education Materials Provided:  No new materials provided today      SUBJECTIVE/OBJECTIVE:  Presents for: Insulin Pump Pre-Start  Accompanied by: Self, Spouse  Diabetes education in the past 24mo: Yes  Focus of Visit: Insulin Pump, CGM  Type of Pump visit: Pre-pump  Type of CGM visit: Personal CGM Follow-up  Diabetes type: Type 1  Disease course: Other (see Comments)  How confident are you filling out medical forms by yourself:: Extremely  Diabetes management related comments/concerns: I would like to discuss getting a insulin pump and what i need to do.  Transportation concerns: No  Difficulty affording diabetes medication?: No  Difficulty affording diabetes testing supplies?: No  Other concerns:: None  Cultural Influences/Ethnic Background:  Not  or     Diabetes Symptoms & Complications:  Diabetes Related Symptoms: None  Weight trend: Stable  Symptom course: Stable  Disease course: Other (see Comments)  Peripheral neuropathy: Yes    Patient Problem List and Family Medical History reviewed for relevant medical history, current medical status, and diabetes risk factors.    Vitals:  There were no vitals taken for this visit.  Estimated body mass index is 26.4 kg/m  as calculated from the following:    Height as of 8/1/24: 1.705 m (5' 7.13\").    Weight as of 8/1/24: 76.7 kg (169 lb 3.2 oz).   Last 3 BP:   BP Readings from Last 3 Encounters:   08/01/24 124/84 " "  05/31/24 118/75   04/09/24 (!) 148/88       History   Smoking Status     Former     Types: Cigarettes   Smokeless Tobacco     Never       Labs:  Lab Results   Component Value Date    A1C 7.2 08/01/2024    A1C 7.3 06/25/2021     Lab Results   Component Value Date     08/01/2024     02/25/2022     06/25/2021     Lab Results   Component Value Date    LDL 97 04/09/2024    LDL 96 06/25/2021     HDL Cholesterol   Date Value Ref Range Status   06/25/2021 90 >39 mg/dL Final     Direct Measure HDL   Date Value Ref Range Status   04/09/2024 76 >=40 mg/dL Final   ]  GFR Estimate   Date Value Ref Range Status   08/01/2024 >90 >60 mL/min/1.73m2 Final     Comment:     eGFR calculated using 2021 CKD-EPI equation.   06/25/2021 >90 >60 mL/min/[1.73_m2] Final     Comment:     Non  GFR Calc  Starting 12/18/2018, serum creatinine based estimated GFR (eGFR) will be   calculated using the Chronic Kidney Disease Epidemiology Collaboration   (CKD-EPI) equation.       GFR Estimate If Black   Date Value Ref Range Status   06/25/2021 >90 >60 mL/min/[1.73_m2] Final     Comment:      GFR Calc  Starting 12/18/2018, serum creatinine based estimated GFR (eGFR) will be   calculated using the Chronic Kidney Disease Epidemiology Collaboration   (CKD-EPI) equation.       Lab Results   Component Value Date    CR 0.81 08/01/2024    CR 0.72 06/25/2021     No results found for: \"MICROALBUMIN\"    Healthy Eating:  Healthy Eating Assessed Today: No  Cultural/Rastafarian diet restrictions?: No  Do you have any food allergies or intolerances?: No  Meal planning/habits: None, Avoiding sweets  Who cooks/prepares meals for you?: Self, Spouse  Who purchases food in  your home?: Self, Spouse  How many times a week on average do you eat food made away from home (restaurant/take-out)?: 2  Meals include: Lunch, Dinner, Afternoon Snack  Beverages: Water, Coffee, Diet soda, Sports drinks, Alcohol  Has patient met with " a dietitian in the past?: Yes    Being Active:  Being Active Assessed Today: Yes  Exercise:: Currently not exercising  Barrier to exercise: None    Monitoring:  Monitoring Assessed Today: Yes  Did patient bring glucose meter to appointment? : Yes  Blood Glucose Meter: FreeStyle, CGM  Times checking blood sugar at home (number): Other (see Comments)  Please elaborate:: I have a sensor  Times checking blood sugar at home (per): Day  Blood glucose trend: Fluctuating    Using loida    Taking Medications:  Diabetes Medication(s)       Insulin       LANTUS SOLOSTAR 100 UNIT/ML soln INJECT 32 UNITS SUBCUTANEOUSLY ONCE DAILY     NOVOLOG PENFILL 100 UNIT/ML soln INJECT 1 UNIT PER 30 GRAMS OF CARBOHYDRATES PLUS 1 UNIT PER 50MG/DL ABOVE 200MG/DL. MAX DOSE 20 UNITS PER DAY            Taking Medication Assessed Today: Yes  Current Treatments: Insulin Injections  Problems taking diabetes medications regularly?: Yes (always takes insulin after the meal)  Diabetes medication side effects?: Yes    Problem Solving:  Problem Solving Assessed Today: Yes  Is the patient at risk for hypoglycemia?: Yes  Hypoglycemia Frequency: Weekly  Hypoglycemia Treatment: Juice  Is the patient at risk for DKA?: Yes              Reducing Risks:  Reducing Risks Assessed Today: No  Has dilated eye exam at least once a year?: Yes  Sees dentist every 6 months?: No  Feet checked by healthcare provider in the last year?: Yes    Healthy Coping:  Healthy Coping Assessed Today: Yes  Emotional response to diabetes: Ready to learn, Guilt/Self-blame  Informal Support system:: Family, Spouse  Stage of change: PREPARATION (Decided to change - considering how)  Support resources: Websites  Patient Activation Measure Survey Score:      9/19/2011     9:00 AM   LAUREL Score (Last Two)   LAUREL Raw Score 51   Activation Score 91.6   LAUREL Level 4         Care Plan and Education Provided:  Care Plan: Diabetes   Updates made by Radha Armendariz RD since 9/3/2024 12:00 AM         Problem: Diabetes Self-Management Education Needed to Optimize Self-Care Behaviors         Goal: Taking Medication - patient is consistently taking medications as directed    This Visit's Progress: 50%   Recent Progress: 0%   Note:    I will take my insulin before the meal.         Radha Armendariz MS, RD, LD, CDE    Time Spent: 60 minutes  Encounter Type: Individual    Any diabetes medication dose changes were made via the CDE Protocol per the patient's primary care provider. A copy of this encounter was shared with the provider.

## 2024-08-26 NOTE — PROGRESS NOTES
Diabetes Self-Management Education & Support    Presents for: Insulin Pump Pre-Start    Type of Service: In Person Visit         Insulin Pump Concepts:  Balancing glucose and insulin  Carbohydrate counting    Opportunities for ongoing education and support in diabetes-self management were discussed.    Pt verbalized understanding of concepts discussed and recommendations provided today.       Continue education with the following diabetes management concepts: Healthy Eating, Monitoring, Taking Medication, and Problem Solving    Provided informational packets for Omnipod insulin pumps    Thinking About Using an Insulin Pump? Handout     ASSESSMENT:  Blood sugars remain elevated.  Kris is wondering about the efficacy of a neuropathy treatment he recently found.  He additionally asks again how he can more accurately correct blood sugars, but has not followed the correction scale provided by endo OR myself the last 2 visits.  He reports some improvement in learning carbohydrates, but does endorse difficulty with accurate counts.  Discussed that this will pose limitations to the amount of correction/bolus improvement the omni pod can provide.  Reviewed again where he can locate carbohydrate counting information and used example meals for practice counting.  Recommend moving forward with ordering omni pod pump to offer improvement to day-to-day blood sugar regulation.  Recommend 1 further pre pump visit focused on omni pod and setting up an MDI back up plan.         PLAN    Order omni pod pdm and starter kit to [facility] pharmacy to ensure patient gets the Dexcom G7 compatible pods.  Order Dexcom g7 which will be compatible with auto features of pump.   Complete pump therapy order form at pre pump for starting rate approval.     Topics to cover at upcoming visits: Insulin Pump Concepts Carbohydrate counting  Problem solving with insulin pump therapy (BG monitoring; hypoglycemia signs/symptoms, treatment (glucagon) and  "prevention; hyperglycemia signs/symptoms, treatment and prevention; ketones, DKA signs/symptoms and prevention)  Hands on practice with basic pump button use  MDI back up plan    Follow-up: 9/10/24    See Care Plan for co-developed, patient-state behavior change goals.  AVS provided for patient today.    Education Materials Provided:  No new materials provided today      SUBJECTIVE/OBJECTIVE:  Presents for: Insulin Pump Pre-Start  Accompanied by: Self, Spouse  Diabetes education in the past 24mo: Yes  Focus of Visit: Insulin Pump, CGM  Type of Pump visit: Pre-pump  Type of CGM visit: Personal CGM Follow-up  Diabetes type: Type 1  Disease course: Other (see Comments)  How confident are you filling out medical forms by yourself:: Extremely  Diabetes management related comments/concerns: I would like to discuss getting a insulin pump and what i need to do.  Transportation concerns: No  Difficulty affording diabetes medication?: No  Difficulty affording diabetes testing supplies?: No  Other concerns:: None  Cultural Influences/Ethnic Background:  Not  or     Diabetes Symptoms & Complications:  Diabetes Related Symptoms: None  Weight trend: Stable  Symptom course: Stable  Disease course: Other (see Comments)  Peripheral neuropathy: Yes    Patient Problem List and Family Medical History reviewed for relevant medical history, current medical status, and diabetes risk factors.    Vitals:  There were no vitals taken for this visit.  Estimated body mass index is 26.4 kg/m  as calculated from the following:    Height as of 8/1/24: 1.705 m (5' 7.13\").    Weight as of 8/1/24: 76.7 kg (169 lb 3.2 oz).   Last 3 BP:   BP Readings from Last 3 Encounters:   08/01/24 124/84   05/31/24 118/75   04/09/24 (!) 148/88       History   Smoking Status    Former    Types: Cigarettes   Smokeless Tobacco    Never       Labs:  Lab Results   Component Value Date    A1C 7.2 08/01/2024    A1C 7.3 06/25/2021     Lab Results   Component " "Value Date     08/01/2024     02/25/2022     06/25/2021     Lab Results   Component Value Date    LDL 97 04/09/2024    LDL 96 06/25/2021     HDL Cholesterol   Date Value Ref Range Status   06/25/2021 90 >39 mg/dL Final     Direct Measure HDL   Date Value Ref Range Status   04/09/2024 76 >=40 mg/dL Final   ]  GFR Estimate   Date Value Ref Range Status   08/01/2024 >90 >60 mL/min/1.73m2 Final     Comment:     eGFR calculated using 2021 CKD-EPI equation.   06/25/2021 >90 >60 mL/min/[1.73_m2] Final     Comment:     Non  GFR Calc  Starting 12/18/2018, serum creatinine based estimated GFR (eGFR) will be   calculated using the Chronic Kidney Disease Epidemiology Collaboration   (CKD-EPI) equation.       GFR Estimate If Black   Date Value Ref Range Status   06/25/2021 >90 >60 mL/min/[1.73_m2] Final     Comment:      GFR Calc  Starting 12/18/2018, serum creatinine based estimated GFR (eGFR) will be   calculated using the Chronic Kidney Disease Epidemiology Collaboration   (CKD-EPI) equation.       Lab Results   Component Value Date    CR 0.81 08/01/2024    CR 0.72 06/25/2021     No results found for: \"MICROALBUMIN\"    Healthy Eating:  Healthy Eating Assessed Today: No  Cultural/Jain diet restrictions?: No  Do you have any food allergies or intolerances?: No  Meal planning/habits: None, Avoiding sweets  Who cooks/prepares meals for you?: Self, Spouse  Who purchases food in  your home?: Self, Spouse  How many times a week on average do you eat food made away from home (restaurant/take-out)?: 2  Meals include: Lunch, Dinner, Afternoon Snack  Beverages: Water, Coffee, Diet soda, Sports drinks, Alcohol  Has patient met with a dietitian in the past?: Yes    Being Active:  Being Active Assessed Today: Yes  Exercise:: Currently not exercising  Barrier to exercise: None    Monitoring:  Monitoring Assessed Today: Yes  Did patient bring glucose meter to appointment? : Yes  Blood " Glucose Meter: FreeStyle, CGM  Times checking blood sugar at home (number): Other (see Comments)  Please elaborate:: I have a sensor  Times checking blood sugar at home (per): Day  Blood glucose trend: Fluctuating    Using loida    Taking Medications:  Diabetes Medication(s)       Insulin       LANTUS SOLOSTAR 100 UNIT/ML soln INJECT 32 UNITS SUBCUTANEOUSLY ONCE DAILY     NOVOLOG PENFILL 100 UNIT/ML soln INJECT 1 UNIT PER 30 GRAMS OF CARBOHYDRATES PLUS 1 UNIT PER 50MG/DL ABOVE 200MG/DL. MAX DOSE 20 UNITS PER DAY            Taking Medication Assessed Today: Yes  Current Treatments: Insulin Injections  Problems taking diabetes medications regularly?: Yes (always takes insulin after the meal)  Diabetes medication side effects?: Yes    Problem Solving:  Problem Solving Assessed Today: Yes  Is the patient at risk for hypoglycemia?: Yes  Hypoglycemia Frequency: Weekly  Hypoglycemia Treatment: Juice  Is the patient at risk for DKA?: Yes              Reducing Risks:  Reducing Risks Assessed Today: No  Has dilated eye exam at least once a year?: Yes  Sees dentist every 6 months?: No  Feet checked by healthcare provider in the last year?: Yes    Healthy Coping:  Healthy Coping Assessed Today: Yes  Emotional response to diabetes: Ready to learn, Guilt/Self-blame  Informal Support system:: Family, Spouse  Stage of change: PREPARATION (Decided to change - considering how)  Support resources: Websites  Patient Activation Measure Survey Score:      9/19/2011     9:00 AM   LAUREL Score (Last Two)   LAUREL Raw Score 51   Activation Score 91.6   LAUREL Level 4         Care Plan and Education Provided:  Care Plan: Diabetes   Updates made by Radha Armendariz RD since 9/3/2024 12:00 AM        Problem: Diabetes Self-Management Education Needed to Optimize Self-Care Behaviors         Goal: Taking Medication - patient is consistently taking medications as directed    This Visit's Progress: 50%   Recent Progress: 0%   Note:    I will take my insulin  before the meal.         Radha Armendariz MS, RD, LD, CDE    Time Spent: 60 minutes  Encounter Type: Individual    Any diabetes medication dose changes were made via the CDE Protocol per the patient's primary care provider. A copy of this encounter was shared with the provider.

## 2024-08-27 DIAGNOSIS — E10.8 TYPE 1 DIABETES MELLITUS WITH COMPLICATIONS (H): ICD-10-CM

## 2024-08-28 RX ORDER — INSULIN GLARGINE 100 [IU]/ML
INJECTION, SOLUTION SUBCUTANEOUS
Qty: 15 ML | Refills: 0 | Status: SHIPPED | OUTPATIENT
Start: 2024-08-28 | End: 2024-10-02

## 2024-09-03 DIAGNOSIS — E10.311 TYPE 1 DIABETES MELLITUS WITH RETINOPATHY AND MACULAR EDEMA, UNSPECIFIED LATERALITY, UNSPECIFIED RETINOPATHY SEVERITY (H): Primary | ICD-10-CM

## 2024-09-03 RX ORDER — ACYCLOVIR 400 MG/1
TABLET ORAL
Qty: 3 EACH | Refills: 5 | Status: SHIPPED | OUTPATIENT
Start: 2024-09-03

## 2024-09-03 RX ORDER — INSULIN PMP CART,AUT,G6/7,CNTR
1 EACH SUBCUTANEOUS CONTINUOUS
Qty: 1 KIT | Refills: 0 | Status: SHIPPED | OUTPATIENT
Start: 2024-09-03

## 2024-09-03 RX ORDER — INSULIN PMP CART,AUT,G6/7,CNTR
1 EACH SUBCUTANEOUS
Qty: 2 EACH | Refills: 11 | Status: SHIPPED | OUTPATIENT
Start: 2024-09-03

## 2024-09-03 NOTE — TELEPHONE ENCOUNTER
NOTE TO PROVIDER REQUESTING MEDICATION ORDERS:    Rudolph Tao glucose are fluctuating and remain above target.     Current diabetes medications:  yes:     Diabetes Medication(s)       Insulin       LANTUS SOLOSTAR 100 UNIT/ML soln INJECT 32 UNITS SUBCUTANEOUSLY ONCE DAILY     NOVOLOG PENFILL 100 UNIT/ML soln INJECT 1 UNIT PER 30 GRAMS OF CARBOHYDRATES PLUS 1 UNIT PER 50MG/DL ABOVE 200MG/DL. MAX DOSE 20 UNITS PER DAY            Recommend Switching from MDI to omnipod 5 pump with Dexcom g7.  New omni pod orders are recommended to go through [facility] pharmacies until local supply of G7 compatible pods are available.       Patient follow up plan pre-pump education and initial rate assessment on 9/10/24.  Pump start with insulet , then CDE follow up 2 weeks after start.      Orders pended. If in agreement, please note approval and sign pended orders, otherwise please indicate an alternate plan. Route back to writer to notify the patient.       Radha Armendariz MS, RD, LD, CDE

## 2024-09-04 ENCOUNTER — ANCILLARY PROCEDURE (OUTPATIENT)
Dept: CARDIOLOGY | Facility: CLINIC | Age: 54
End: 2024-09-04
Attending: FAMILY MEDICINE
Payer: COMMERCIAL

## 2024-09-04 DIAGNOSIS — R79.89 ELEVATED TROPONIN: ICD-10-CM

## 2024-09-04 PROCEDURE — 93017 CV STRESS TEST TRACING ONLY: CPT | Performed by: INTERNAL MEDICINE

## 2024-09-04 PROCEDURE — 93016 CV STRESS TEST SUPVJ ONLY: CPT | Performed by: INTERNAL MEDICINE

## 2024-09-04 PROCEDURE — 93018 CV STRESS TEST I&R ONLY: CPT | Performed by: INTERNAL MEDICINE

## 2024-09-05 DIAGNOSIS — E10.42 TYPE 1 DIABETES MELLITUS WITH DIABETIC POLYNEUROPATHY (H): ICD-10-CM

## 2024-09-06 ENCOUNTER — OFFICE VISIT (OUTPATIENT)
Dept: OPHTHALMOLOGY | Facility: CLINIC | Age: 54
End: 2024-09-06
Payer: COMMERCIAL

## 2024-09-06 DIAGNOSIS — H40.1132 PRIMARY OPEN ANGLE GLAUCOMA (POAG) OF BOTH EYES, MODERATE STAGE: Primary | ICD-10-CM

## 2024-09-06 PROCEDURE — 92012 INTRM OPH EXAM EST PATIENT: CPT | Performed by: OPHTHALMOLOGY

## 2024-09-06 RX ORDER — DORZOLAMIDE HYDROCHLORIDE AND TIMOLOL MALEATE 20; 5 MG/ML; MG/ML
1 SOLUTION/ DROPS OPHTHALMIC 2 TIMES DAILY
Qty: 10 ML | Refills: 4 | Status: SHIPPED | OUTPATIENT
Start: 2024-09-06

## 2024-09-06 RX ORDER — PRAVASTATIN SODIUM 20 MG
20 TABLET ORAL DAILY
Qty: 90 TABLET | Refills: 0 | Status: SHIPPED | OUTPATIENT
Start: 2024-09-06

## 2024-09-06 ASSESSMENT — VISUAL ACUITY
OS_CC+: -1
OD_CC: 20/70
CORRECTION_TYPE: CONTACTS
OS_CC: 20/50
OD_CC+: -1
METHOD: SNELLEN - LINEAR

## 2024-09-06 ASSESSMENT — TONOMETRY
OS_IOP_MMHG: 19
IOP_METHOD: ICARE
OD_IOP_MMHG: 18
IOP_METHOD: APPLANATION
OS_IOP_MMHG: 19
OD_IOP_MMHG: 17

## 2024-09-06 NOTE — PROGRESS NOTES
Current Eye Medications:  Latanoprost at bedtime both eyes, last took at midnight.     Subjective:  3 week IOP check. Vision is doing OK both eyes in the distance. Having trouble reading small print up close. No eye pain or discomfort in either eye. Had episode during interview 3 weeks ago, patients blood pressure dropped and may have lost consciousness. Had stress test on Tuesday, waiting for results.      Objective:  See Ophthalmology Exam.       Assessment:  Possible nonresponder to Latanoprost.  Intraocular pressure too high both eyes.      Plan:  Stop:   Latanoprost (green top)    Begin:   Dorzolamide/Timolol (Cosopt - dark blue top) one drop in both eyes twice daily. About 12 hours apart.     Put the drop in in the morning, wait at least 5 minutes, then put your contacts in. Then in the evening, take your contacts out, then use the drop     Return visit in 3 weeks for an intraocular pressure check.     Ciaran Clifton M.D.  [phone]

## 2024-09-06 NOTE — LETTER
9/6/2024      Rudolph Tao  [address] [address] [facility] [id number]      Dear Colleague,    Thank you for referring your patient, Rudolph Tao, to the [facility]. Please see a copy of my visit note below.     Current Eye Medications:  Latanoprost at bedtime both eyes, last took at midnight.     Subjective:  3 week IOP check. Vision is doing OK both eyes in the distance. Having trouble reading small print up close. No eye pain or discomfort in either eye. Had episode during interview 3 weeks ago, patients blood pressure dropped and may have lost consciousness. Had stress test on Tuesday, waiting for results.      Objective:  See Ophthalmology Exam.       Assessment:  Possible nonresponder to Latanoprost.  Intraocular pressure too high both eyes.      Plan:  Stop:   Latanoprost (green top)    Begin:   Dorzolamide/Timolol (Cosopt - dark blue top) one drop in both eyes twice daily. About 12 hours apart.     Put the drop in in the morning, wait at least 5 minutes, then put your contacts in. Then in the evening, take your contacts out, then use the drop     Return visit in 3 weeks for an intraocular pressure check.     Ciaran Clifton M.D.  [phone]       Again, thank you for allowing me to participate in the care of your patient.        Sincerely,        Ciaran Clifton MD

## 2024-09-06 NOTE — PATIENT INSTRUCTIONS
Stop:   Latanoprost (green top)    Begin:   Dorzolamide/Timolol (Cosopt - dark blue top) one drop in both eyes twice daily. About 12 hours apart.     Put the drop in in the morning, wait at least 5 minutes, then put your contacts in. Then in the evening, take your contacts out, then use the drop     Return visit in 3 weeks for an intraocular pressure check.     Ciaran Clifton M.D.  [phone]

## 2024-09-06 NOTE — H&P (VIEW-ONLY)
Current Eye Medications:  Latanoprost at bedtime both eyes, last took at midnight.     Subjective:  3 week IOP check. Vision is doing OK both eyes in the distance. Having trouble reading small print up close. No eye pain or discomfort in either eye. Had episode during interview 3 weeks ago, patients blood pressure dropped and may have lost consciousness. Had stress test on Tuesday, waiting for results.      Objective:  See Ophthalmology Exam.       Assessment:  Possible nonresponder to Latanoprost.  Intraocular pressure too high both eyes.      Plan:  Stop:   Latanoprost (green top)    Begin:   Dorzolamide/Timolol (Cosopt - dark blue top) one drop in both eyes twice daily. About 12 hours apart.     Put the drop in in the morning, wait at least 5 minutes, then put your contacts in. Then in the evening, take your contacts out, then use the drop     Return visit in 3 weeks for an intraocular pressure check.     Ciaran Clifton M.D.  [phone]

## 2024-09-07 ASSESSMENT — SLIT LAMP EXAM - LIDS
COMMENTS: 1+ PTOSIS, 1+ SCLERAL SHOW
COMMENTS: 1+ PTOSIS, 1+ SCLERAL SHOW

## 2024-09-07 ASSESSMENT — EXTERNAL EXAM - LEFT EYE: OS_EXAM: NORMAL

## 2024-09-07 ASSESSMENT — EXTERNAL EXAM - RIGHT EYE: OD_EXAM: NORMAL

## 2024-09-12 ENCOUNTER — TELEPHONE (OUTPATIENT)
Dept: EDUCATION SERVICES | Facility: CLINIC | Age: 54
End: 2024-09-12
Payer: COMMERCIAL

## 2024-09-12 NOTE — TELEPHONE ENCOUNTER
Patient is required to have a PA for coverage for the OmniPod kit and dispodable pods (Gen5, compatible with dexcom g7).  These are from [facility] pharmacy.  Please help facilitate the PA.    Radha Armendariz MS, RD, LD, CDE

## 2024-09-13 NOTE — TELEPHONE ENCOUNTER
Forms received from: [facility] [employer]   Phone number listed: [phone]   Fax listed: [phone]  Date received: 9/12/24  Form description: PA request form   Once forms are completed, please return to [facility] via fax.  Is patient requesting to be contacted when forms are completed: na  Phone: na  Form placed: Dr. Choudhury's in box

## 2024-09-17 NOTE — TELEPHONE ENCOUNTER
Central Prior Authorization Team  Phone: [phone]    PA Initiation    Medication: OMNIPOD 5 G6 INTRO (GEN 5) KIT  Insurance Company: [employer] - Phone [phone] Fax [phone]  Pharmacy Filling the Rx: [facility] (NEW ADDRESS) - [address] AT PREVI[place]SLY: [address] [place]  Filling Pharmacy Phone: [phone]  Filling Pharmacy Fax: [phone]  Start Date: 9/17/2024

## 2024-09-17 NOTE — TELEPHONE ENCOUNTER
Prior Authorization Approval    Medication: OMNIPOD 5 G6 INTRO (GEN 5) KIT  Authorization Effective Date: 7/1/2024  Authorization Expiration Date: 9/17/2025  Approved Dose/Quantity:   Reference #:     Insurance Company: [employer] Review - Phone [phone] Fax [phone]  Expected CoPay: $    CoPay Card Available:      Financial Assistance Needed:   Which Pharmacy is filling the prescription: [facility] (NEW ADDRESS) - [address] AT [place]: [address] [place]  Pharmacy Notified: Yes    Patient Notified: No

## 2024-09-23 ENCOUNTER — HOSPITAL ENCOUNTER (EMERGENCY)
Facility: CLINIC | Age: 54
Discharge: HOME | End: 2024-09-23
Attending: SURGERY | Admitting: SURGERY
Payer: COMMERCIAL

## 2024-09-23 VITALS
DIASTOLIC BLOOD PRESSURE: 79 MMHG | SYSTOLIC BLOOD PRESSURE: 100 MMHG | TEMPERATURE: 98.6 F | OXYGEN SATURATION: 97 % | RESPIRATION RATE: 19 BRPM | HEART RATE: 95 BPM

## 2024-09-23 DIAGNOSIS — E88.89 KETOSIS (H): ICD-10-CM

## 2024-09-23 DIAGNOSIS — R11.2 NAUSEA VOMITING AND DIARRHEA: ICD-10-CM

## 2024-09-23 DIAGNOSIS — E11.10: ICD-10-CM

## 2024-09-23 DIAGNOSIS — Z12.11 ENCOUNTER FOR SCREENING COLONOSCOPY: Primary | ICD-10-CM

## 2024-09-23 DIAGNOSIS — R19.7 NAUSEA VOMITING AND DIARRHEA: ICD-10-CM

## 2024-09-23 LAB
ALBUMIN SERPL BCG-MCNC: 3 G/DL (ref 3.5–5.2)
ALP SERPL-CCNC: 62 U/L (ref 40–150)
ALT SERPL W P-5'-P-CCNC: 35 U/L (ref 0–70)
ANION GAP SERPL CALCULATED.3IONS-SCNC: 11 MMOL/L (ref 7–15)
AST SERPL W P-5'-P-CCNC: 38 U/L (ref 0–45)
B-OH-BUTYR SERPL-SCNC: 3.04 MMOL/L
BASE EXCESS BLDV CALC-SCNC: 1.5 MMOL/L (ref -3–3)
BASOPHILS # BLD AUTO: 0 10E3/UL (ref 0–0.2)
BASOPHILS NFR BLD AUTO: 0 %
BILIRUB SERPL-MCNC: 1.6 MG/DL
BUN SERPL-MCNC: 15.5 MG/DL (ref 6–20)
CALCIUM SERPL-MCNC: 6.9 MG/DL (ref 8.8–10.4)
CHLORIDE SERPL-SCNC: 104 MMOL/L (ref 98–107)
CREAT SERPL-MCNC: 0.8 MG/DL (ref 0.67–1.17)
EGFRCR SERPLBLD CKD-EPI 2021: >90 ML/MIN/1.73M2
EOSINOPHIL # BLD AUTO: 0 10E3/UL (ref 0–0.7)
EOSINOPHIL NFR BLD AUTO: 0 %
ERYTHROCYTE [DISTWIDTH] IN BLOOD BY AUTOMATED COUNT: 11.4 % (ref 10–15)
GLUCOSE BLDC GLUCOMTR-MCNC: 244 MG/DL (ref 70–99)
GLUCOSE BLDC GLUCOMTR-MCNC: 334 MG/DL (ref 70–99)
GLUCOSE SERPL-MCNC: 208 MG/DL (ref 70–99)
HCO3 BLDV-SCNC: 27 MMOL/L (ref 21–28)
HCO3 SERPL-SCNC: 22 MMOL/L (ref 22–29)
HCT VFR BLD AUTO: 33.6 % (ref 40–53)
HGB BLD-MCNC: 11.7 G/DL (ref 13.3–17.7)
IMM GRANULOCYTES # BLD: 0 10E3/UL
IMM GRANULOCYTES NFR BLD: 0 %
LYMPHOCYTES # BLD AUTO: 0.7 10E3/UL (ref 0.8–5.3)
LYMPHOCYTES NFR BLD AUTO: 9 %
MCH RBC QN AUTO: 34.6 PG (ref 26.5–33)
MCHC RBC AUTO-ENTMCNC: 34.8 G/DL (ref 31.5–36.5)
MCV RBC AUTO: 99 FL (ref 78–100)
MONOCYTES # BLD AUTO: 0.6 10E3/UL (ref 0–1.3)
MONOCYTES NFR BLD AUTO: 7 %
NEUTROPHILS # BLD AUTO: 6.2 10E3/UL (ref 1.6–8.3)
NEUTROPHILS NFR BLD AUTO: 83 %
NRBC # BLD AUTO: 0 10E3/UL
NRBC BLD AUTO-RTO: 0 /100
O2/TOTAL GAS SETTING VFR VENT: 21 %
OXYHGB MFR BLDV: 26 % (ref 70–75)
PCO2 BLDV: 44 MM HG (ref 40–50)
PH BLDV: 7.4 [PH] (ref 7.32–7.43)
PLATELET # BLD AUTO: 160 10E3/UL (ref 150–450)
PO2 BLDV: 19 MM HG (ref 25–47)
POTASSIUM SERPL-SCNC: 3.3 MMOL/L (ref 3.4–5.3)
PROT SERPL-MCNC: 5.1 G/DL (ref 6.4–8.3)
RBC # BLD AUTO: 3.38 10E6/UL (ref 4.4–5.9)
SAO2 % BLDV: 26.2 % (ref 70–75)
SODIUM SERPL-SCNC: 137 MMOL/L (ref 135–145)
WBC # BLD AUTO: 7.5 10E3/UL (ref 4–11)

## 2024-09-23 PROCEDURE — 36415 COLL VENOUS BLD VENIPUNCTURE: CPT | Performed by: EMERGENCY MEDICINE

## 2024-09-23 PROCEDURE — 82805 BLOOD GASES W/O2 SATURATION: CPT | Performed by: EMERGENCY MEDICINE

## 2024-09-23 PROCEDURE — 99284 EMERGENCY DEPT VISIT MOD MDM: CPT | Performed by: EMERGENCY MEDICINE

## 2024-09-23 PROCEDURE — 82947 ASSAY GLUCOSE BLOOD QUANT: CPT | Performed by: EMERGENCY MEDICINE

## 2024-09-23 PROCEDURE — 250N000012 HC RX MED GY IP 250 OP 636 PS 637: Performed by: NURSE ANESTHETIST, CERTIFIED REGISTERED

## 2024-09-23 PROCEDURE — 250N000011 HC RX IP 250 OP 636: Performed by: NURSE ANESTHETIST, CERTIFIED REGISTERED

## 2024-09-23 PROCEDURE — 96374 THER/PROPH/DIAG INJ IV PUSH: CPT | Performed by: EMERGENCY MEDICINE

## 2024-09-23 PROCEDURE — 96361 HYDRATE IV INFUSION ADD-ON: CPT | Performed by: EMERGENCY MEDICINE

## 2024-09-23 PROCEDURE — 85004 AUTOMATED DIFF WBC COUNT: CPT | Performed by: EMERGENCY MEDICINE

## 2024-09-23 PROCEDURE — 99283 EMERGENCY DEPT VISIT LOW MDM: CPT | Mod: 25 | Performed by: EMERGENCY MEDICINE

## 2024-09-23 PROCEDURE — 250N000013 HC RX MED GY IP 250 OP 250 PS 637: Performed by: EMERGENCY MEDICINE

## 2024-09-23 PROCEDURE — 82962 GLUCOSE BLOOD TEST: CPT

## 2024-09-23 PROCEDURE — 258N000003 HC RX IP 258 OP 636: Performed by: EMERGENCY MEDICINE

## 2024-09-23 PROCEDURE — 999N000141 HC STATISTIC PRE-PROCEDURE NURSING ASSESSMENT: Performed by: SURGERY

## 2024-09-23 PROCEDURE — 258N000003 HC RX IP 258 OP 636: Performed by: PHYSICIAN ASSISTANT

## 2024-09-23 PROCEDURE — 82010 KETONE BODYS QUAN: CPT | Performed by: EMERGENCY MEDICINE

## 2024-09-23 RX ORDER — SODIUM CHLORIDE, SODIUM LACTATE, POTASSIUM CHLORIDE, CALCIUM CHLORIDE 600; 310; 30; 20 MG/100ML; MG/100ML; MG/100ML; MG/100ML
INJECTION, SOLUTION INTRAVENOUS CONTINUOUS
Status: CANCELLED | OUTPATIENT
Start: 2024-09-23

## 2024-09-23 RX ORDER — LIDOCAINE 40 MG/G
CREAM TOPICAL
Status: CANCELLED | OUTPATIENT
Start: 2024-09-23

## 2024-09-23 RX ORDER — LIDOCAINE 40 MG/G
CREAM TOPICAL
Status: DISCONTINUED | OUTPATIENT
Start: 2024-09-23 | End: 2024-09-23 | Stop reason: HOSPADM

## 2024-09-23 RX ORDER — ONDANSETRON 4 MG/1
8 TABLET, ORALLY DISINTEGRATING ORAL EVERY 8 HOURS PRN
Qty: 10 TABLET | Refills: 0 | Status: SHIPPED | OUTPATIENT
Start: 2024-09-23

## 2024-09-23 RX ORDER — NICOTINE POLACRILEX 4 MG
15-30 LOZENGE BUCCAL
Status: DISCONTINUED | OUTPATIENT
Start: 2024-09-23 | End: 2024-09-23 | Stop reason: HOSPADM

## 2024-09-23 RX ORDER — ONDANSETRON 2 MG/ML
8 INJECTION INTRAMUSCULAR; INTRAVENOUS EVERY 6 HOURS PRN
Status: DISCONTINUED | OUTPATIENT
Start: 2024-09-23 | End: 2024-09-23 | Stop reason: HOSPADM

## 2024-09-23 RX ORDER — SODIUM CHLORIDE, SODIUM LACTATE, POTASSIUM CHLORIDE, CALCIUM CHLORIDE 600; 310; 30; 20 MG/100ML; MG/100ML; MG/100ML; MG/100ML
INJECTION, SOLUTION INTRAVENOUS CONTINUOUS
Status: DISCONTINUED | OUTPATIENT
Start: 2024-09-23 | End: 2024-09-23 | Stop reason: HOSPADM

## 2024-09-23 RX ORDER — DEXTROSE MONOHYDRATE 25 G/50ML
25-50 INJECTION, SOLUTION INTRAVENOUS
Status: DISCONTINUED | OUTPATIENT
Start: 2024-09-23 | End: 2024-09-23 | Stop reason: HOSPADM

## 2024-09-23 RX ORDER — MAGNESIUM HYDROXIDE/ALUMINUM HYDROXICE/SIMETHICONE 120; 1200; 1200 MG/30ML; MG/30ML; MG/30ML
15 SUSPENSION ORAL ONCE
Status: COMPLETED | OUTPATIENT
Start: 2024-09-23 | End: 2024-09-23

## 2024-09-23 RX ADMIN — ONDANSETRON 8 MG: 2 INJECTION INTRAMUSCULAR; INTRAVENOUS at 13:10

## 2024-09-23 RX ADMIN — SODIUM CHLORIDE, POTASSIUM CHLORIDE, SODIUM LACTATE AND CALCIUM CHLORIDE: 600; 310; 30; 20 INJECTION, SOLUTION INTRAVENOUS at 12:35

## 2024-09-23 RX ADMIN — SODIUM CHLORIDE 1000 ML: 9 INJECTION, SOLUTION INTRAVENOUS at 15:04

## 2024-09-23 RX ADMIN — INSULIN ASPART 4 UNITS: 100 INJECTION, SOLUTION INTRAVENOUS; SUBCUTANEOUS at 13:01

## 2024-09-23 RX ADMIN — ALUMINUM HYDROXIDE, MAGNESIUM HYDROXIDE, AND SIMETHICONE 15 ML: 200; 200; 20 SUSPENSION ORAL at 15:04

## 2024-09-23 ASSESSMENT — COLUMBIA-SUICIDE SEVERITY RATING SCALE - C-SSRS
1. IN THE PAST MONTH, HAVE YOU WISHED YOU WERE DEAD OR WISHED YOU COULD GO TO SLEEP AND NOT WAKE UP?: NO
2. HAVE YOU ACTUALLY HAD ANY THOUGHTS OF KILLING YOURSELF IN THE PAST MONTH?: NO
6. HAVE YOU EVER DONE ANYTHING, STARTED TO DO ANYTHING, OR PREPARED TO DO ANYTHING TO END YOUR LIFE?: NO

## 2024-09-23 ASSESSMENT — ACTIVITIES OF DAILY LIVING (ADL)
ADLS_ACUITY_SCORE: 35

## 2024-09-23 NOTE — DISCHARGE INSTRUCTIONS
Return if symptoms worsen or new symptoms develop.  Follow-up with primary care physician next available.  Follow-up with MD surgery for colonoscopy tomorrow 9 AM.  Dr. Cartwright will be doing the procedure.  Please take clear liquids until midnight and push fluids.  You feel comfortable going home at this time and you understand that there is a ketosis present but within normal anion gap I feel this is more likely starvation related and you do not feel repeat ketone is warranted.  Please keep your insulin level to your diet and recheck ketones after fully hydrating.  Advised to stay to monitor the ketone level but he did not feel it was necessary.

## 2024-09-23 NOTE — ED PROVIDER NOTES
History     Chief Complaint   Patient presents with    Hyperglycemia     HPI  Rudolph Tao is a 54 year old male with past medical history significant for diabetes who presents emergency department complaining of nausea vomiting diarrhea.  Patient states he was scheduled to have a colonoscopy and was over at the area when they checked his sugar and it was 334 and he was looking ill they decided to send the patient over for further evaluation and care.  Patient is only taking 4 units of NovoLog earlier today any given 8 mg of Zofran complaining of epigastric pain feeling a little shaky he began vomiting around 1030 last night and has been having vomiting and diarrhea while doing his prep.  Denies any fevers or chills has not had no headache or visual changes denies chest pain or shortness of breath feels he has some burning.  Denies any abdominal weakness any extremity denies any bowel or bladder dysfunction has not had any leg swelling or calf pain.    Allergies:  Allergies   Allergen Reactions    Avapro [Irbesartan]      Profuse sweating    Crestor [Rosuvastatin Calcium]      cough    Sulfa Antibiotics Swelling and Hives    Simvastatin      Elevated BS readings       Problem List:    Patient Active Problem List    Diagnosis Date Noted    Primary open angle glaucoma (POAG) of both eyes, moderate stage 08/16/2024     Priority: Medium    Background diabetic retinopathy, mild, ou 10/01/2022     Priority: Medium    Posterior vitreous detachment of left eye 10/01/2022     Priority: Medium    Combined forms of age-related cataract of both eyes 10/01/2022     Priority: Medium    Glaucoma suspect of left eye 10/01/2022     Priority: Medium    Polyneuropathy associated with underlying disease (H24) 02/25/2022     Priority: Medium    DDD (degenerative disc disease), lumbar 03/08/2018     Priority: Medium    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) 09/18/2017     Priority: Medium    GAIL (generalized anxiety disorder) 09/11/2017      Priority: Medium    Psychosocial stressors 09/11/2017     Priority: Medium    Hypoglycemia 11/26/2015     Priority: Medium    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, unspecified laterality, unspecified retinopathy severity (H) 10/17/2015     Priority: Medium    Right-sided low back pain without sciatica 07/06/2015     Priority: Medium    Hypertension goal BP (blood pressure) < 140/90 05/12/2015     Priority: Medium    Spasms of the hands or feet 05/11/2015     Priority: Medium    Persistent disorder of initiating or maintaining sleep 05/19/2014     Priority: Medium    Cervicalgia 10/01/2013     Priority: Medium    Paresthesias 10/01/2013     Priority: Medium    Restless legs 02/18/2013     Priority: Medium    Libido, decreased 12/18/2012     Priority: Medium    Chronic low back pain 01/02/2012     Priority: Medium    Encounter for long-term (current) use of insulin (H) 09/19/2011     Priority: Medium    Syncopal episodes 12/19/2009     Priority: Medium    Hyperlipidemia LDL goal <100 09/03/2009     Priority: Medium        Past Medical History:    Past Medical History:   Diagnosis Date    Adopted     Anxiety     Anxiety     Chronic low back pain     DDD (degenerative disc disease), lumbar 03/08/2018    Diabetes mellitus (H)     Hallux abducto valgus     Hyperlipaemia     Hyperlipidemia     Hypertension     Lip lesion 08/02/2010    Nonsenile cataract     Pain in toe of left foot     RLS (restless legs syndrome)     Snoring     Swelling of foot joint 07/13/2012       Past Surgical History:    Past Surgical History:   Procedure Laterality Date    APPENDECTOMY  1990    OSTEOTOMY FOOT  1/8/2013    Procedure: OSTEOTOMY FOOT;  Left Foot Distal First Metarsal Osteotomy, Second Toe Hammer Toe Correction, Second Metatarsal Weil Osteotomy;  Surgeon: Robert Augustine DPM;  Location[facility]    SURGICAL HISTORY OF -   1992    [facility]       Family History:    Family History   Adopted: Yes   Problem Relation  "Age of Onset    Unknown/Adopted Mother     Cancer Mother     Unknown/Adopted Father     Unknown/Adopted Maternal Grandmother     Unknown/Adopted Maternal Grandfather     Unknown/Adopted Paternal Grandmother     Unknown/Adopted Paternal Grandfather        Social History:  Marital Status:   [2]  Social History     Tobacco Use    Smoking status: Former     Current packs/day: 0.00     Types: Cigarettes     Quit date: 10/1/1995     Years since quittin.0     Passive exposure: Past    Smokeless tobacco: Never    Tobacco comments:     2-4  cigarettes daily   Vaping Use    Vaping status: Never Used   Substance Use Topics    Alcohol use: Yes     Comment: 2 drinks daily    Drug use: No        Medications:    ALPRAZolam (XANAX) 0.5 MG tablet  bisacodyl (DULCOLAX) 5 MG EC tablet  Continuous Glucose Sensor (DEXCOM G7 SENSOR) MISC  Continuous Glucose Sensor (FREESTYLE JUSTIN 2 SENSOR) MISC  dorzolamide-timolol (COSOPT) 2-0.5 % ophthalmic solution  FLUoxetine (PROZAC) 20 MG capsule  LANTUS SOLOSTAR 100 UNIT/ML soln  latanoprost (XALATAN) 0.005 % ophthalmic solution  lisinopril-hydrochlorothiazide (ZESTORETIC) 20-25 MG tablet  metoclopramide (REGLAN) 10 MG tablet  NOVOLOG PENFILL 100 UNIT/ML soln  ondansetron (ZOFRAN-ODT) 4 MG ODT tab  pravastatin (PRAVACHOL) 20 MG tablet  pregabalin (LYRICA) 75 MG capsule  rOPINIRole (REQUIP) 0.25 MG tablet  triamcinolone (KENALOG) 0.1 % external ointment  Insulin Disposable Pump (OMNIPOD 5 G7 PODS, GEN 5,) MISC  Insulin Disposable Pump (OMNIPOD 5 INTRO, GEN 5,) KIT  insulin pen needle (B-D U/F) 31G X 5 MM miscellaneous  polyethylene glycol (GOLYTELY) 236 g suspension          Review of Systems    Physical Exam   BP: (P) 116/85  Pulse: (P) 107  Temp: (P) 97.6  F (36.4  C)  Resp: (P) 22  Height: (P) 170.2 cm (5' 7\")  Weight: (P) 76.7 kg (169 lb)  SpO2: (P) 100 %      Physical Exam  Vitals and nursing note reviewed.   Constitutional:       General: He is not in acute distress.     " Appearance: Normal appearance. He is not ill-appearing.   HENT:      Head: Normocephalic and atraumatic.      Nose: Nose normal.      Mouth/Throat:      Mouth: Mucous membranes are moist.      Pharynx: Oropharynx is clear.   Eyes:      Conjunctiva/sclera: Conjunctivae normal.   Cardiovascular:      Rate and Rhythm: Normal rate and regular rhythm.      Pulses: Normal pulses.      Heart sounds: Normal heart sounds. No murmur heard.  Pulmonary:      Effort: Pulmonary effort is normal.      Breath sounds: Normal breath sounds. No stridor. No wheezing or rhonchi.   Abdominal:      General: Abdomen is flat. Bowel sounds are normal. There is no distension.      Palpations: Abdomen is soft.      Tenderness: There is no right CVA tenderness or left CVA tenderness.   Musculoskeletal:         General: Normal range of motion.      Cervical back: Normal range of motion and neck supple.      Right lower leg: No edema.      Left lower leg: No edema.   Skin:     General: Skin is warm and dry.      Findings: No rash.   Neurological:      General: No focal deficit present.      Mental Status: He is alert and oriented to person, place, and time.      Sensory: No sensory deficit.      Motor: No weakness.      Coordination: Coordination normal.   Psychiatric:         Mood and Affect: Mood normal.       As per HPI  ED Course        Procedures              Critical Care time:  none               Results for orders placed or performed during the hospital encounter of 09/23/24 (from the past 24 hour(s))   Glucose by meter   Result Value Ref Range    GLUCOSE BY METER POCT 334 (H) 70 - 99 mg/dL   Glucose by meter   Result Value Ref Range    GLUCOSE BY METER POCT 244 (H) 70 - 99 mg/dL       Medications   glucose gel 15-30 g (has no administration in time range)     Or   dextrose 50 % injection 25-50 mL (has no administration in time range)     Or   glucagon injection 1 mg (has no administration in time range)   ondansetron (ZOFRAN) injection 8  mg (8 mg Intravenous $Given 9/23/24 1310)   sodium chloride 0.9% BOLUS 1,000 mL (has no administration in time range)   alum & mag hydroxide-simethicone (MAALOX) suspension 15 mL (has no administration in time range)   insulin aspart (NovoLOG) injection (RAPID ACTING) (4 Units Subcutaneous $Given 9/23/24 1301)       Assessments & Plan (with Medical Decision Making) records reviewed including past medical history medications and allergies.  Events from earlier today at the same-day surgery clinic reviewed.  Office visit from 8/26/2024 was reviewed.  Labs were obtained.  Patient had been given NovoLog 4 units.  IV fluids were continued.  I independently reviewed and interpreted labs.  Patient CBC without significant abnormality.  Initial glucose was 244.  Comprehensive metabolic panel significant for a potassium of 3.3 calcium was low at 6.9 glucose was decreased at 208 total bili was slightly elevated at 1.6.  Patient has been vomiting.  His ketone level was elevated at 3.04.  Venous blood gas without acidosis or anion gap with a pH of 7.40 CO2 44 O2 19 bicarb 27 base excess 105.  Anion gap was 11.  Patient received 1 L lactated ringer and 1 L normal saline.  Patient was feeling much better at this time.  I wanted to give the patient some longer acting insulin and recheck his ketones at that time.  Patient monitors his sugars closely and feels comfortable going home at this time.  He would rather not wait around and will monitor his ketones and adjust his longer acting insulin accordingly.  I did discuss the case with Dr. Morel who is arranged for Dr. Cartwright to proceed with the colonoscopy tomorrow morning.  Patient will drink clear liquid diet along with Jell-O for the next several hours until being n.p.o. at midnight should continue to drink fluids in the morning and take 1 dose of mag citrate at 7 AM in the morning following up at surgery clinic at 9 AM.  Patient and wife are in agreement this plan he understands  his ketones are elevated and I would like to recheck it although I feel this is more likely due to starvation.  Patient understands this and wants to be discharged at this time.     I have reviewed the nursing notes.    I have reviewed the findings, diagnosis, plan and need for follow up with the patient.           New Prescriptions    No medications on file       Final diagnoses:   Nausea vomiting and diarrhea - colon prep for colonoscopy   Ketosis (H)       4/30/2024   [facility] EMERGENCY DEPT       Chi Wells MD  09/24/24 [id number]

## 2024-09-23 NOTE — INTERVAL H&P NOTE
I have reviewed the surgical (or preoperative) H&P that is linked to this encounter, and examined the patient. There are no significant changes    1st colon; no famhx of colon ca; no blood thinner    Clinical Conditions Present on Arrival:  Clinically Significant Risk Factors Present on Admission

## 2024-09-23 NOTE — ED TRIAGE NOTES
Patient is here from same day surgery where he was supposed to have a colonoscopy. He presented for his appointment today shaky, diaphoretic, and BG of 334. He reports profuse diarrhea with the colonoscopy prep and vomiting. No concerns with BG while completing his prep.      Triage Assessment (Adult)       Row Name 09/23/24 8913          Triage Assessment    Airway WDL WDL        Respiratory WDL    Respiratory WDL WDL        Skin Circulation/Temperature WDL    Skin Circulation/Temperature WDL WDL        Cardiac WDL    Cardiac WDL WDL        Peripheral/Neurovascular WDL    Peripheral Neurovascular WDL WDL        Cognitive/Neuro/Behavioral WDL    Cognitive/Neuro/Behavioral WDL WDL     Mood/Behavior anxious

## 2024-09-23 NOTE — OR NURSING
"Pt arrived and was roomed by CNA. RN to overhear patient complaints of feeling weak, new onset chills, (afebrile), pt is diabetic and reports hyperglycemia. RN to ask that BG be checked. Pt BG >300. RN to assess patient. Pt is tremulous, reports chills, appears pale, diaphoretic. Pt reports he vomited many times after prep. Pt states he didn't take antiemetics due to fear of \"messing up my procedure\".  RN to communicate elevated BG reading and concern for s/s of DKA to CRNA TF. CRNA ordered BG management and asked that subcutaneous insulin 4 units to be given to treat glucose. RN to place 18 G IV in RAC. Pt given fluids (no labs ordered by CRNA). RN to contact pharmacy several times to get medication sent to [facility]. CRNA aware of patient symptoms. Ongoing vitals assessed. Report given to Bhavana PEPPER RN who took over patient care at 1245.  "

## 2024-09-23 NOTE — ED NOTES
Patient has had 8 mg of zofran and 4 units of novolog prior to arrival in ED. Currently running LR fluids at controlled rate.

## 2024-09-24 ENCOUNTER — ANESTHESIA (OUTPATIENT)
Dept: GASTROENTEROLOGY | Facility: CLINIC | Age: 54
End: 2024-09-24
Payer: COMMERCIAL

## 2024-09-24 ENCOUNTER — ANESTHESIA EVENT (OUTPATIENT)
Dept: GASTROENTEROLOGY | Facility: CLINIC | Age: 54
End: 2024-09-24
Payer: COMMERCIAL

## 2024-09-24 ENCOUNTER — HOSPITAL ENCOUNTER (OUTPATIENT)
Facility: CLINIC | Age: 54
Discharge: HOME | End: 2024-09-24
Attending: STUDENT IN AN ORGANIZED HEALTH CARE EDUCATION/TRAINING PROGRAM | Admitting: STUDENT IN AN ORGANIZED HEALTH CARE EDUCATION/TRAINING PROGRAM
Payer: COMMERCIAL

## 2024-09-24 VITALS
HEIGHT: 67 IN | DIASTOLIC BLOOD PRESSURE: 75 MMHG | HEART RATE: 59 BPM | OXYGEN SATURATION: 96 % | BODY MASS INDEX: 26.53 KG/M2 | SYSTOLIC BLOOD PRESSURE: 123 MMHG | WEIGHT: 169 LBS | RESPIRATION RATE: 16 BRPM | TEMPERATURE: 98.2 F

## 2024-09-24 DIAGNOSIS — E11.10: ICD-10-CM

## 2024-09-24 DIAGNOSIS — Z12.11 ENCOUNTER FOR SCREENING COLONOSCOPY: ICD-10-CM

## 2024-09-24 LAB
COLONOSCOPY: NORMAL
GLUCOSE BLDC GLUCOMTR-MCNC: 122 MG/DL (ref 70–99)
GLUCOSE BLDC GLUCOMTR-MCNC: 127 MG/DL (ref 70–99)
GLUCOSE BLDC GLUCOMTR-MCNC: 148 MG/DL (ref 70–99)

## 2024-09-24 PROCEDURE — 370N000017 HC ANESTHESIA TECHNICAL FEE, PER MIN: Performed by: STUDENT IN AN ORGANIZED HEALTH CARE EDUCATION/TRAINING PROGRAM

## 2024-09-24 PROCEDURE — G0121 COLON CA SCRN NOT HI RSK IND: HCPCS | Performed by: STUDENT IN AN ORGANIZED HEALTH CARE EDUCATION/TRAINING PROGRAM

## 2024-09-24 PROCEDURE — 258N000003 HC RX IP 258 OP 636: Performed by: NURSE ANESTHETIST, CERTIFIED REGISTERED

## 2024-09-24 PROCEDURE — 258N000003 HC RX IP 258 OP 636

## 2024-09-24 PROCEDURE — 250N000009 HC RX 250: Performed by: NURSE ANESTHETIST, CERTIFIED REGISTERED

## 2024-09-24 PROCEDURE — 45378 DIAGNOSTIC COLONOSCOPY: CPT | Performed by: STUDENT IN AN ORGANIZED HEALTH CARE EDUCATION/TRAINING PROGRAM

## 2024-09-24 PROCEDURE — 82962 GLUCOSE BLOOD TEST: CPT

## 2024-09-24 PROCEDURE — 250N000011 HC RX IP 250 OP 636

## 2024-09-24 PROCEDURE — 250N000009 HC RX 250

## 2024-09-24 RX ORDER — LIDOCAINE HYDROCHLORIDE 20 MG/ML
INJECTION, SOLUTION INFILTRATION; PERINEURAL PRN
Status: DISCONTINUED | OUTPATIENT
Start: 2024-09-24 | End: 2024-09-24

## 2024-09-24 RX ORDER — FLUMAZENIL 0.1 MG/ML
0.2 INJECTION, SOLUTION INTRAVENOUS
Status: DISCONTINUED | OUTPATIENT
Start: 2024-09-24 | End: 2024-09-24 | Stop reason: HOSPADM

## 2024-09-24 RX ORDER — LIDOCAINE 40 MG/G
CREAM TOPICAL
Status: DISCONTINUED | OUTPATIENT
Start: 2024-09-24 | End: 2024-09-24 | Stop reason: HOSPADM

## 2024-09-24 RX ORDER — PROPOFOL 10 MG/ML
INJECTION, EMULSION INTRAVENOUS CONTINUOUS PRN
Status: DISCONTINUED | OUTPATIENT
Start: 2024-09-24 | End: 2024-09-24

## 2024-09-24 RX ORDER — SODIUM CHLORIDE, SODIUM LACTATE, POTASSIUM CHLORIDE, CALCIUM CHLORIDE 600; 310; 30; 20 MG/100ML; MG/100ML; MG/100ML; MG/100ML
INJECTION, SOLUTION INTRAVENOUS CONTINUOUS
Status: DISCONTINUED | OUTPATIENT
Start: 2024-09-24 | End: 2024-09-24 | Stop reason: HOSPADM

## 2024-09-24 RX ORDER — GLYCOPYRROLATE 0.2 MG/ML
INJECTION, SOLUTION INTRAMUSCULAR; INTRAVENOUS PRN
Status: DISCONTINUED | OUTPATIENT
Start: 2024-09-24 | End: 2024-09-24

## 2024-09-24 RX ORDER — NALOXONE HYDROCHLORIDE 0.4 MG/ML
0.4 INJECTION, SOLUTION INTRAMUSCULAR; INTRAVENOUS; SUBCUTANEOUS
Status: DISCONTINUED | OUTPATIENT
Start: 2024-09-24 | End: 2024-09-24 | Stop reason: HOSPADM

## 2024-09-24 RX ORDER — SODIUM CHLORIDE, SODIUM LACTATE, POTASSIUM CHLORIDE, CALCIUM CHLORIDE 600; 310; 30; 20 MG/100ML; MG/100ML; MG/100ML; MG/100ML
INJECTION, SOLUTION INTRAVENOUS CONTINUOUS PRN
Status: DISCONTINUED | OUTPATIENT
Start: 2024-09-24 | End: 2024-09-24

## 2024-09-24 RX ORDER — NALOXONE HYDROCHLORIDE 0.4 MG/ML
0.2 INJECTION, SOLUTION INTRAMUSCULAR; INTRAVENOUS; SUBCUTANEOUS
Status: DISCONTINUED | OUTPATIENT
Start: 2024-09-24 | End: 2024-09-24 | Stop reason: HOSPADM

## 2024-09-24 RX ORDER — PROPOFOL 10 MG/ML
INJECTION, EMULSION INTRAVENOUS PRN
Status: DISCONTINUED | OUTPATIENT
Start: 2024-09-24 | End: 2024-09-24

## 2024-09-24 RX ADMIN — LIDOCAINE HYDROCHLORIDE 0.1 ML: 10 INJECTION, SOLUTION EPIDURAL; INFILTRATION; INTRACAUDAL; PERINEURAL at 09:53

## 2024-09-24 RX ADMIN — SODIUM CHLORIDE, POTASSIUM CHLORIDE, SODIUM LACTATE AND CALCIUM CHLORIDE: 600; 310; 30; 20 INJECTION, SOLUTION INTRAVENOUS at 10:09

## 2024-09-24 RX ADMIN — LIDOCAINE HYDROCHLORIDE 100 MG: 20 INJECTION, SOLUTION INFILTRATION; PERINEURAL at 10:10

## 2024-09-24 RX ADMIN — GLYCOPYRROLATE 0.1 MG: 0.2 INJECTION, SOLUTION INTRAMUSCULAR; INTRAVENOUS at 10:10

## 2024-09-24 RX ADMIN — PROPOFOL 100 MG: 10 INJECTION, EMULSION INTRAVENOUS at 10:10

## 2024-09-24 RX ADMIN — PHENYLEPHRINE HYDROCHLORIDE 50 MCG: 10 INJECTION INTRAVENOUS at 10:27

## 2024-09-24 RX ADMIN — SODIUM CHLORIDE, POTASSIUM CHLORIDE, SODIUM LACTATE AND CALCIUM CHLORIDE: 600; 310; 30; 20 INJECTION, SOLUTION INTRAVENOUS at 09:53

## 2024-09-24 RX ADMIN — PROPOFOL 200 MCG/KG/MIN: 10 INJECTION, EMULSION INTRAVENOUS at 10:10

## 2024-09-24 ASSESSMENT — ACTIVITIES OF DAILY LIVING (ADL)
ADLS_ACUITY_SCORE: 35
ADLS_ACUITY_SCORE: 35
ADLS_ACUITY_SCORE: 33

## 2024-09-24 ASSESSMENT — LIFESTYLE VARIABLES: TOBACCO_USE: 1

## 2024-09-24 NOTE — ANESTHESIA POSTPROCEDURE EVALUATION
Patient: Rudolph Tao    Procedure: Procedure(s):  COLONOSCOPY       Anesthesia Type:  General    Note:  Disposition: Outpatient   Postop Pain Control: Uneventful            Sign Out: Well controlled pain   PONV: No   Neuro/Psych: Uneventful            Sign Out: Acceptable/Baseline neuro status   Airway/Respiratory: Uneventful            Sign Out: Acceptable/Baseline resp. status   CV/Hemodynamics: Uneventful            Sign Out: Acceptable CV status; No obvious hypovolemia; No obvious fluid overload   Other NRE: NONE   DID A NON-ROUTINE EVENT OCCUR? No           Last vitals:  Vitals Value Taken Time   /73 09/24/24 1100   Temp     Pulse 64 09/24/24 1100   Resp 16 09/24/24 1030   SpO2 98 % 09/24/24 1102   Vitals shown include unfiled device data.    Electronically Signed By: YUE Willams CRNA  September 24, 2024  11:03 AM

## 2024-09-24 NOTE — H&P
[facility]    Pre-Endoscopy History and Physical     Rudolph Tao MRN# [id number]   YOB: 1970 Age: 54 year old     Date of Procedure: 9/24/2024  Primary care provider: Chuy Choudhury  Type of Endoscopy: Colonoscopy with possible biopsy, possible polypectomy  Reason for Procedure: screening  Type of Anesthesia Anticipated: Conscious Sedation    HPI:    Rudolph is a 54 year old male who will be undergoing the above procedure.      A history and physical has been performed. The patient's medications and allergies have been reviewed. The risks and benefits of the procedure and the sedation options and risks were discussed with the patient.  All questions were answered and informed consent was obtained.      He denies a personal or family history of anesthesia complications or bleeding disorders.     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, unspecified laterality, unspecified retinopathy severity (H)    GAIL (generalized anxiety disorder)    Psychosocial stressors    DDD (degenerative disc disease), lumbar    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)    Hypoglycemia    Syncopal episodes    Polyneuropathy associated with underlying disease (H24)    Background diabetic retinopathy, mild, ou    Posterior vitreous detachment of left eye    Combined forms of age-related cataract of both eyes    Glaucoma suspect of left eye    Primary open angle glaucoma (POAG) of both eyes, moderate stage        Past Medical History:    Diagnosis Date    Adopted     Anxiety     Anxiety     Chronic low back pain     DDD (degenerative disc disease), lumbar 2018    Diabetes mellitus (H)     TYPE 1    Hallux abducto valgus     Hyperlipaemia     Hyperlipidemia     Hypertension     Lip lesion 2010    Nonsenile cataract     Pain in toe of left foot     2 ND TOE    RLS (restless legs syndrome)     Snoring     MODERATE SEVERITY    Swelling of foot joint 2012        Past Surgical History:   Procedure Laterality Date    APPENDECTOMY  1990    OSTEOTOMY FOOT  2013    Procedure: OSTEOTOMY FOOT;  Left Foot Distal First Metarsal Osteotomy, Second Toe Hammer Toe Correction, Second Metatarsal Weil Osteotomy;  Surgeon: Robert Augustine DPM;  Location[facility]    SURGICAL HISTORY OF -       [facility]       Social History     Tobacco Use    Smoking status: Former     Current packs/day: 0.00     Types: Cigarettes     Quit date: 10/1/1995     Years since quittin.0     Passive exposure: Past    Smokeless tobacco: Never    Tobacco comments:     2-4  cigarettes daily   Substance Use Topics    Alcohol use: Yes     Comment: 2 drinks daily       Family History   Adopted: Yes   Problem Relation Age of Onset    Unknown/Adopted Mother     Cancer Mother     Unknown/Adopted Father     Unknown/Adopted Maternal Grandmother     Unknown/Adopted Maternal Grandfather     Unknown/Adopted Paternal Grandmother     Unknown/Adopted Paternal Grandfather        Prior to Admission medications    Medication Sig Start Date End Date Taking? Authorizing Provider   ALPRAZolam (XANAX) 0.5 MG tablet TAKE 1 TABLET BY MOUTH THREE TIMES DAILY AS NEEDED FOR ANXIETY *ONE  MONTH  SUPPLY* 24   Hayley Casillas NP   bisacodyl (DULCOLAX) 5 MG EC tablet Take 2 tablets at 3 pm the day before your procedure. If your procedure is before 11 am, take 2 additional tablets at 11 pm. If your procedure is after 11 am, take 2 additional tablets at 6 am. For additional instructions  refer to your colonoscopy prep instructions. 3/11/24   Rudolph García, DO   Continuous Glucose Sensor (DEXCOM G7 SENSOR) MISC Change every 10 days. Use with omni pod. 9/3/24   Chuy Choudhury DO   Continuous Glucose Sensor (FREESTYLE JUSTIN 2 SENSOR) MISC CHANGE SENSOR EVERY 14 DAYS 6/2/24   Chuy Choudhury, DO   dorzolamide-timolol (COSOPT) 2-0.5 % ophthalmic solution Place 1 drop into both eyes 2 times daily. Wait at least 5 minutes between drops if using more than one at a time. 9/6/24   Ciaran Clifton MD   FLUoxetine (PROZAC) 20 MG capsule Take 1 capsule (20 mg) by mouth daily 8/1/24   Chuy Choudhury, DO   Insulin Disposable Pump (OMNIPOD 5 G7 PODS, GEN 5,) MISC 1 each every 72 hours. 9/3/24   Chuy Choudhury DO   Insulin Disposable Pump (OMNIPOD 5 INTRO, GEN 5,) KIT 1 kit continuously. Use with omni pods every 72 hours as directed. 9/3/24   Chuy Choudhury DO   insulin pen needle (B-D U/F) 31G X 5 MM miscellaneous USE A NEW PEN NEEDLE 6 TIMES DAILY. LAST REFILL UNTIL VISIT 8/14/24   Chuy Choudhury DO   LANTUS SOLOSTAR 100 UNIT/ML soln INJECT 32 UNITS SUBCUTANEOUSLY ONCE DAILY 8/28/24   Chuy Choudhury DO   latanoprost (XALATAN) 0.005 % ophthalmic solution Place 1 drop into both eyes every evening Wait at least 5 minutes between drops if using more than one at a time. 8/15/24   Ciaran Clifton MD   lisinopril-hydrochlorothiazide (ZESTORETIC) 20-25 MG tablet Take 1 tablet by mouth daily 4/9/24   Chuy Choudhury DO   metoclopramide (REGLAN) 10 MG tablet Take 1 tablet (10 mg) by mouth 4 times daily as needed 2/25/22   Haim Bustos MD   NOVOLOG PENFILL 100 UNIT/ML soln INJECT 1 UNIT PER 30 GRAMS OF CARBOHYDRATES PLUS 1 UNIT PER 50MG/DL ABOVE 200MG/DL. MAX DOSE 20 UNITS PER DAY 3/4/24   Chuy Choudhury DO   ondansetron (ZOFRAN ODT) 4 MG ODT tab Take 2 tablets (8 mg) by mouth every 8 hours as needed for nausea or vomiting. 9/23/24   Chi Wells MD  "  ondansetron (ZOFRAN-ODT) 4 MG ODT tab DISSOLVE 1 TABLET IN MOUTH EVERY 8 HOURS AS NEEDED FOR NAUSEA 2/25/22   Haim Bustos MD   polyethylene glycol (GOLYTELY) 236 g suspension The night before the exam at 6 pm drink an 8-ounce glass every 15 minutes until the jug is half empty. If you arrive before 11 AM: Drink the other half of the Golytely jug at 11 PM night before procedure. If you arrive after 11 AM: Drink the other half of the Golytely jug at 6 AM day of procedure. For additional instructions refer to your colonoscopy prep instructions. 3/11/24   Rudolph García, DO   pravastatin (PRAVACHOL) 20 MG tablet Take 1 tablet by mouth once daily 9/6/24   Chuy Choudhury DO   pregabalin (LYRICA) 75 MG capsule Take 1 capsule (75 mg) by mouth 3 times daily 4/9/24   Chuy Choudhury DO   rOPINIRole (REQUIP) 0.25 MG tablet TAKE 1-2 TABLETS BY MOUTH AT BEDTIME, no further refills until appointment 3/8/24   Chuy Choudhury DO   triamcinolone (KENALOG) 0.1 % external ointment Apply topically 2 times daily To patches of eczema as needed when flared only 4/9/24   Miriam Carreon APRN CNP       Allergies   Allergen Reactions    Avapro [Irbesartan]      Profuse sweating    Crestor [Rosuvastatin Calcium]      cough    Sulfa Antibiotics Swelling and Hives    Simvastatin      Elevated BS readings        REVIEW OF SYSTEMS:   5 point ROS negative except as noted above in HPI, including Gen., Resp., CV, GI &  system review.    PHYSICAL EXAM:   /69   Temp 98  F (36.7  C) (Oral)   Resp 18   Ht 1.702 m (5' 7\")   Wt 76.7 kg (169 lb)   SpO2 96%   BMI 26.47 kg/m   Estimated body mass index is 26.47 kg/m  as calculated from the following:    Height as of this encounter: 1.702 m (5' 7\").    Weight as of this encounter: 76.7 kg (169 lb).   Constitutional: Awake, alert, no acute distress.  Eyes: No scleral icterus.  Conjunctiva are without injection.  ENMT: Mucous membranes moist, dentition and gums are " intact.   Neck: Soft, supple, trachea midline.    Endocrine: n/a   Lymphatic: There is no cervical, submandibularadenopathy.  Respiratory: normal efforts on room air  Cardiovascular: extremities warm and well perfused  Abdomen: Non-distended, non-tender,  No masses,  Musculoskeletal: Full range of motion in the upper and lower extremities.    Skin: No skin rashes or lesions to inspection.  No petechia.    Neurologic: alerted and oriented 3x  Psychiatric: The patient's affect is not blunted and mood is appropriate.  DIAGNOSTICS:    Not indicated    IMPRESSION   ASA Class 2 - Mild systemic disease    PLAN:   Plan for Colonoscopy with possible biopsy, possible polypectomy. We discussed the risks, benefits and alternatives and the patient wished to proceed.  Patient is cleared for the above procedure.    The above has been forwarded to the consulting provider.    Juancho Cartwright MD on 9/24/2024 at 9:20 AM  [facility]

## 2024-09-24 NOTE — ANESTHESIA PREPROCEDURE EVALUATION
Anesthesia Pre-Procedure Evaluation    Patient: Rudolph Tao   MRN: [id number] : 1970        Procedure : Procedure(s):  COLONOSCOPY          Past Medical History:   Diagnosis Date    Adopted     Anxiety     Anxiety     Chronic low back pain     DDD (degenerative disc disease), lumbar 2018    Diabetes mellitus (H)     TYPE 1    Hallux abducto valgus     Hyperlipaemia     Hyperlipidemia     Hypertension     Lip lesion 2010    Nonsenile cataract     Pain in toe of left foot     2 ND TOE    RLS (restless legs syndrome)     Snoring     MODERATE SEVERITY    Swelling of foot joint 2012      Past Surgical History:   Procedure Laterality Date    APPENDECTOMY      OSTEOTOMY FOOT  2013    Procedure: OSTEOTOMY FOOT;  Left Foot Distal First Metarsal Osteotomy, Second Toe Hammer Toe Correction, Second Metatarsal Weil Osteotomy;  Surgeon: Robert Augustine DPM;  Location:  OR    SURGICAL HISTORY OF -       Appy      Allergies   Allergen Reactions    Avapro [Irbesartan]      Profuse sweating    Crestor [Rosuvastatin Calcium]      cough    Sulfa Antibiotics Swelling and Hives    Simvastatin      Elevated BS readings      Social History     Tobacco Use    Smoking status: Former     Current packs/day: 0.00     Types: Cigarettes     Quit date: 10/1/1995     Years since quittin.0     Passive exposure: Past    Smokeless tobacco: Never    Tobacco comments:     2-4  cigarettes daily   Substance Use Topics    Alcohol use: Yes     Comment: 2 drinks daily      Wt Readings from Last 1 Encounters:   24 (P) 76.7 kg (169 lb)        Anesthesia Evaluation   Pt has had prior anesthetic. Type: General.        ROS/MED HX  ENT/Pulmonary:     (+)                tobacco use, Past use,                       Neurologic:       Cardiovascular:     (+) Dyslipidemia hypertension- -   -  - -                                 Previous cardiac testing   Echo: Date: Results:    Stress Test:  Date:   Results:  Procedure  Treadmill stress test.  ______________________________________________________________________________  Interpretation Summary     Normal treadmill ECG without evidence of ischemia at a diagnostic workload.  The target HR was achieved. Exercise was terminated after 6:44 on a Tony  protocol at a heart rate of 155 (91% MPHR) and a workload of 9.2 METS due to  leg fatigue. No angina was elicited.  Baseline ECG shows normal sinus rhythm. No ECG evidence of ischemia.  Normal HR and BP response to exercise.  ______________________________________________________________________________  Baseline  Sinus rhythm, no pathologic Q waves or resting ST segment abnormalities.  Normal baseline electrocardiogram.     Stress  This was a normal stress EKG with no evidence of stress-induced ischemia.  Limiting Symptom: fatigue.  Peak MVO2 22.2 ml/kg/min .  Percent predicted MVO2 69 %.  RPP 06077.  The patient did not exhibit any symptoms during exercise.  Normal blood pressure response with stress.  Normal heart rate response to exercise.     Stress Results        Protocol:  Tony Protocol        Maximum Predicted HR:   169 bpm *        Target HR: 144 bpm               % Maximum Predicted HR: 91 %                             Stage       DurationHeart Rate  BP                                       (mm:ss)   (bpm)                    Baseline - supine             74    132/86                   Baseline - standing            100    98/70                      Peak Exercise     6:44      153   147/52                      Stress Duration:   6:44 mm:ss                   Maximum Stress HR: 153 bpm           METS: 6  ______________________________________________________________________________        ECG Reviewed:  Date: 1/20 Results:  Sinus Rhythm with Sinus Arthymmia  Cath:  Date: Results:      METS/Exercise Tolerance:     Hematologic:       Musculoskeletal:       GI/Hepatic:       Renal/Genitourinary:       Endo:    "  (+)  type II DM, Last HgA1c: 7.2, date: 8/24,                  Psychiatric/Substance Use:     (+) psychiatric history anxiety       Infectious Disease:       Malignancy:       Other:      (+)  , H/O Chronic Pain,         Physical Exam    Airway        Mallampati: II   TM distance: > 3 FB   Neck ROM: full   Mouth opening: > 3 cm    Respiratory Devices and Support         Dental       (+) Minor Abnormalities - some fillings, tiny chips      Cardiovascular   cardiovascular exam normal       Rhythm and rate: regular and normal     Pulmonary   pulmonary exam normal        breath sounds clear to auscultation           OUTSIDE LABS:  CBC:   Lab Results   Component Value Date    WBC 7.5 09/23/2024    WBC 5.1 09/19/2016    HGB 11.7 (L) 09/23/2024    HGB 15.3 09/19/2016    HCT 33.6 (L) 09/23/2024    HCT 43.2 09/19/2016     09/23/2024     09/19/2016     BMP:   Lab Results   Component Value Date     09/23/2024     08/01/2024    POTASSIUM 3.3 (L) 09/23/2024    POTASSIUM 4.9 08/01/2024    CHLORIDE 104 09/23/2024    CHLORIDE 98 08/01/2024    CO2 22 09/23/2024    CO2 30 (H) 08/01/2024    BUN 15.5 09/23/2024    BUN 14.8 08/01/2024    CR 0.80 09/23/2024    CR 0.81 08/01/2024     (H) 09/23/2024     (H) 09/23/2024     COAGS: No results found for: \"PTT\", \"INR\", \"FIBR\"  POC:   Lab Results   Component Value Date     (H) 01/08/2013     HEPATIC:   Lab Results   Component Value Date    ALBUMIN 3.0 (L) 09/23/2024    PROTTOTAL 5.1 (L) 09/23/2024    ALT 35 09/23/2024    AST 38 09/23/2024    ALKPHOS 62 09/23/2024    BILITOTAL 1.6 (H) 09/23/2024     OTHER:   Lab Results   Component Value Date    A1C 7.2 (H) 08/01/2024    DANA 6.9 (L) 09/23/2024    PHOS 4.3 08/01/2004    MAG 1.5 (L) 11/08/2013    TSH 2.58 11/10/2022    CRP <5.0 11/08/2013    SED 4 09/19/2016       Anesthesia Plan    ASA Status:  3    NPO Status:  NPO Appropriate    Anesthesia Type: General.     - Airway: Native airway   Induction: " "Intravenous, Propofol.   Maintenance: TIVA.        Consents    Anesthesia Plan(s) and associated risks, benefits, and realistic alternatives discussed. Questions answered and patient/representative(s) expressed understanding.     - Discussed: Risks, Benefits and Alternatives for BOTH SEDATION and the PROCEDURE were discussed     - Discussed with:  Patient            Postoperative Care       PONV prophylaxis: Background Propofol Infusion     Comments:               YUE Robbins CRNA    I have reviewed the pertinent notes and labs in the chart from the past 30 days and (re)examined the patient.  Any updates or changes from those notes are reflected in this note.    # Hypokalemia: Lowest K = 3.3 mmol/L in last 2 days, will replace as needed       # Hypoalbuminemia: Lowest albumin = 3 g/dL in the past 30 days , will monitor as appropriate     # DMII: A1C = 7.2 % (Ref range: 0.0 - 5.6 %) within past 6 months  # Overweight: Estimated body mass index is 26.47 kg/m  (pended) as calculated from the following:    Height as of 9/23/24: (P) 1.702 m (5' 7\").    Weight as of 9/23/24: (P) 76.7 kg (169 lb).      "

## 2024-09-24 NOTE — OR NURSING
Fingerstick glucose after previous glucose range. 127 fingerstick. Pt iv infusing at a faster rate just to hydrate pt. As pt was weak and sl pale preop.

## 2024-09-24 NOTE — ANESTHESIA CARE TRANSFER NOTE
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    Anesthesia Type:   General     Note:    Oropharynx: oropharynx clear of all foreign objects and spontaneously breathing  Level of Consciousness: drowsy  Oxygen Supplementation: room air    Independent Airway: airway patency satisfactory and stable  Dentition: dentition unchanged  Vital Signs Stable: post-procedure vital signs reviewed and stable  Report to RN Given: handoff report given  Patient transferred to: Phase II    Handoff Report: Identifed the Patient, Identified the Reponsible Provider, Reviewed the pertinent medical history, Discussed the surgical course, Reviewed Intra-OP anesthesia mangement and issues during anesthesia, Set expectations for post-procedure period and Allowed opportunity for questions and acknowledgement of understanding      Vitals:  Vitals Value Taken Time   BP 82/57 09/24/24 1030   Temp     Pulse 61 09/24/24 1030   Resp     SpO2 97 % 09/24/24 1032   Vitals shown include unfiled device data.    Electronically Signed By: YUE Willams CRNA  September 24, 2024  10:33 AM

## 2024-09-24 NOTE — OR NURSING
Pt returns after ed visit yesterday and cont clear liquids and mag citrate at 0730 this am. Was delayed results and now after 3 times in restroom finally lighter yellow and brown sedament in bottom of toilet bowl. Pt is weak and tired. Vss. Glucose 174 with his dexcom. Will reassess preop. And start iv asap.

## 2024-09-24 NOTE — OR NURSING
Glucose 122. Dexcom 182. Marta at bedside. Pt weak and tired. In bathroom freq and definitely clearer and yellow now. Iv patent. Will cont to reassess.

## 2024-09-26 ENCOUNTER — OFFICE VISIT (OUTPATIENT)
Dept: OPHTHALMOLOGY | Facility: CLINIC | Age: 54
End: 2024-09-26
Payer: COMMERCIAL

## 2024-09-26 DIAGNOSIS — H40.1132 PRIMARY OPEN ANGLE GLAUCOMA (POAG) OF BOTH EYES, MODERATE STAGE: Primary | ICD-10-CM

## 2024-09-26 PROCEDURE — 92012 INTRM OPH EXAM EST PATIENT: CPT | Performed by: OPHTHALMOLOGY

## 2024-09-26 ASSESSMENT — SLIT LAMP EXAM - LIDS
COMMENTS: 1+ PTOSIS, 1+ SCLERAL SHOW
COMMENTS: 1+ PTOSIS, 1+ SCLERAL SHOW

## 2024-09-26 ASSESSMENT — VISUAL ACUITY
OD_CC: 20/70
OS_CC: 20/50
METHOD: SNELLEN - LINEAR
OS_CC+: -1
CORRECTION_TYPE: CONTACTS
OD_CC+: +2

## 2024-09-26 ASSESSMENT — EXTERNAL EXAM - RIGHT EYE: OD_EXAM: NORMAL

## 2024-09-26 ASSESSMENT — TONOMETRY
OD_IOP_MMHG: 18
OS_IOP_MMHG: 13
IOP_METHOD: APPLANATION

## 2024-09-26 ASSESSMENT — EXTERNAL EXAM - LEFT EYE: OS_EXAM: NORMAL

## 2024-09-26 NOTE — PROGRESS NOTES
Current Eye Medications:  Cosopt both eyes twice per day      Subjective:  Patient is here today for a pressure check. He has only missed drops about 3 times in the last couple weeks. No visual changes, maybe a little better at near.      Objective:  See Ophthalmology Exam.       Assessment:  Intraocular pressures remain +/- both eyes.      Plan:  Continue:   Cosopt (dorzolamide-timolol -- dark blue top) twice daily both eyes. About 12 hours apart.     Wait at least 5 minutes between drops if using more than one at a time.     Return visit in 2 months for an intraocular pressure check and gonio; consider adding Brimonidine or laser.    Ciaran Clifton M.D.  [phone]

## 2024-09-26 NOTE — LETTER
9/26/2024      Rudolph Tao  [address] [address] [facility] [id number]      Dear Colleague,    Thank you for referring your patient, Rudolph Tao, to the [facility]. Please see a copy of my visit note below.     Current Eye Medications:  Cosopt both eyes twice per day      Subjective:  Patient is here today for a pressure check. He has only missed drops about 3 times in the last couple weeks. No visual changes, maybe a little better at near.      Objective:  See Ophthalmology Exam.       Assessment:  Intraocular pressures remain +/- both eyes.      Plan:  Continue:   Cosopt (dorzolamide-timolol -- dark blue top) twice daily both eyes. About 12 hours apart.     Wait at least 5 minutes between drops if using more than one at a time.     Return visit in 2 months for an intraocular pressure check and gonio; consider adding Brimonidine or laser.    Ciaran Clifton M.D.  [phone]         Again, thank you for allowing me to participate in the care of your patient.        Sincerely,        Ciaran Clifton MD

## 2024-09-26 NOTE — PATIENT INSTRUCTIONS
Continue:   Cosopt (dorzolamide-timolol -- dark blue top) twice daily both eyes. About 12 hours apart.     Wait at least 5 minutes between drops if using more than one at a time.     Return visit in 2 months for an intraocular pressure check     Ciaran Clifton M.D.  [phone]

## 2024-10-02 DIAGNOSIS — E10.8 TYPE 1 DIABETES MELLITUS WITH COMPLICATIONS (H): ICD-10-CM

## 2024-10-02 RX ORDER — INSULIN GLARGINE 100 [IU]/ML
INJECTION, SOLUTION SUBCUTANEOUS
Qty: 45 ML | Refills: 0 | Status: SHIPPED | OUTPATIENT
Start: 2024-10-02

## 2024-10-15 ENCOUNTER — MEDICAL CORRESPONDENCE (OUTPATIENT)
Dept: HEALTH INFORMATION MANAGEMENT | Facility: CLINIC | Age: 54
End: 2024-10-15
Payer: COMMERCIAL

## 2024-10-15 DIAGNOSIS — E10.311 TYPE 1 DIABETES MELLITUS WITH RETINOPATHY AND MACULAR EDEMA, UNSPECIFIED LATERALITY, UNSPECIFIED RETINOPATHY SEVERITY (H): Primary | ICD-10-CM

## 2024-10-15 RX ORDER — INSULIN ASPART 100 [IU]/ML
INJECTION, SOLUTION INTRAVENOUS; SUBCUTANEOUS
Qty: 20 ML | Refills: 5 | Status: SHIPPED | OUTPATIENT
Start: 2024-10-15

## 2024-10-15 NOTE — TELEPHONE ENCOUNTER
Initial Insulin Pump Settings for Pump Start    Pump Brand:  Omnipod [id number]    Pre-pump TDD: 35 units/day   Method 1: Pre-Pump TDD 42 u/d x 0.75 = 31.5 u/day pump TDD  Method 2: Weight-based 76.7 lbs x 0.23 = 38.3 u/day pump TDD    Recommended starting with Average of Method 1 + Method 2 = 35 TDD    450/pump TDD 35 = 12 grams/unit   1700/pump TDD 35  = 48 mg/dL/unit    Basal Settings (units/hour) = TDD*0.5/24 hrs  12:00 am- 12p am: 0.3   12p am - 12:00 am: 0.3     Max Basal  2.0 units/hr      Bolus Settings:  Carb Ratio (ICR grams/unit)  12:00 am - 12p am: 12   12p am - 12:00 am: 12     Insulin Sensitivity Factor (ISF)  12:00 am- 12p : 48mg/dL/unit  12p - 12:00 am: 48mg/dL/unit    Correction Factor:  Correct above 130mg/dL    Max Bolus  10 units    Target Glucose (mg/dL)  12:00 am-12p : 120   12p - 12:00 am: 120    Active Insulin Time:  3hours     Recommendations for adjustments for other diabetes medications:  none    Prescriptions pended for MD signature:  2 vials, glucagon, urine ketone strips, insulin syringes and CGM supplies      MD has approved initial pump settings/instructions listed above in paper Pump Start Order Form and this has been provided to appropriate pump  via fax communication.     Radha Armendariz, ALKA 10/15/2024 4:15 PM

## 2024-10-22 ENCOUNTER — MYC REFILL (OUTPATIENT)
Dept: FAMILY MEDICINE | Facility: CLINIC | Age: 54
End: 2024-10-22

## 2024-10-22 DIAGNOSIS — G63 POLYNEUROPATHY ASSOCIATED WITH UNDERLYING DISEASE (H): ICD-10-CM

## 2024-10-23 RX ORDER — PREGABALIN 75 MG/1
75 CAPSULE ORAL 3 TIMES DAILY
Qty: 270 CAPSULE | Refills: 1 | Status: SHIPPED | OUTPATIENT
Start: 2024-10-23

## 2024-11-01 DIAGNOSIS — E10.311 TYPE 1 DIABETES MELLITUS WITH RETINOPATHY AND MACULAR EDEMA, UNSPECIFIED LATERALITY, UNSPECIFIED RETINOPATHY SEVERITY (H): Primary | ICD-10-CM

## 2024-11-01 RX ORDER — BLOOD SUGAR DIAGNOSTIC
STRIP MISCELLANEOUS
Qty: 100 EACH | Refills: 0 | Status: SHIPPED | OUTPATIENT
Start: 2024-11-01

## 2024-11-03 ENCOUNTER — HEALTH MAINTENANCE LETTER (OUTPATIENT)
Age: 54
End: 2024-11-03

## 2024-11-19 ENCOUNTER — ALLIED HEALTH/NURSE VISIT (OUTPATIENT)
Dept: FAMILY MEDICINE | Facility: CLINIC | Age: 54
End: 2024-11-19
Payer: COMMERCIAL

## 2024-11-19 VITALS — TEMPERATURE: 97.4 F

## 2024-11-19 DIAGNOSIS — Z23 NEED FOR VACCINATION: ICD-10-CM

## 2024-11-19 DIAGNOSIS — Z23 ENCOUNTER FOR IMMUNIZATION: Primary | ICD-10-CM

## 2024-11-19 PROCEDURE — 90471 IMMUNIZATION ADMIN: CPT

## 2024-11-19 PROCEDURE — 90472 IMMUNIZATION ADMIN EACH ADD: CPT

## 2024-11-19 PROCEDURE — 90750 HZV VACC RECOMBINANT IM: CPT

## 2024-11-19 PROCEDURE — 90746 HEPB VACCINE 3 DOSE ADULT IM: CPT

## 2024-11-19 PROCEDURE — 90673 RIV3 VACCINE NO PRESERV IM: CPT

## 2024-11-19 PROCEDURE — 90480 ADMN SARSCOV2 VAC 1/ONLY CMP: CPT

## 2024-11-19 PROCEDURE — 91320 SARSCV2 VAC 30MCG TRS-SUC IM: CPT

## 2024-11-19 NOTE — PROGRESS NOTES
Prior to immunization administration, verified patients identity using patient s name and date of birth. Please see Immunization Activity for additional information.     Is the patient's temperature normal (100.5 or less)? Yes     Patient MEETS CRITERIA. PROCEED with vaccine administration.          11/19/2024   COVID   Have you had myocarditis or pericarditis (inflammation of or around the heart muscle) after getting a COVID-19 vaccine? No   Have you had a serious reaction to a COVID vaccine or something in a COVID vaccine, like polyethylene glycol (PEG) or polysorbate? No   Have you had multisystem inflammatory syndrome from COVID-19 in the past 90 days? No   Have you received a bone marrow transplant within the previous 3 months? No            Patient MEETS CRITERIA. PROCEED with vaccine administration.            11/19/2024   Hepatitis B   Have you had a serious reaction to a hepatitis B vaccine or to something in a hepatitis B vaccine, including yeast)? No   Are you getting kidney dialysis (either peritoneal or hemodialysis)? No   Do you have an allergy to latex? No            Patient MEETS CRITERIA. PROCEED with vaccine administration.            11/19/2024   INFLUENZA   Would you like to receive the flu shot or the nasal flu vaccine today? Flu Shot   Have you had a serious reaction to a flu vaccine or something in a flu vaccine? No   Have you had Guillain-[place] syndrome within 6 weeks of getting a vaccine? No   Have you received a bone marrow transplant within the previous 6 months? No            Patient MEETS CRITERIA. PROCEED with vaccine administration.            11/19/2024   Zoster   Have you had a serious reaction to the shingles vaccine or something in the shingles vaccine? No   Do you have shingles now? No   Are you getting treatment for cancer, organ transplant, or bone marrow transplant? No   Do you have an autoimmune or inflammatory condition? !YES   Is the patient immunocompromised and wanting to  complete the Shingles vaccine series in less than 2 months? !YES   Have you had Guillain-[place] syndrome within 6 weeks of getting a vaccine? No            DO NOT VACCINATE. Patient is NOT eligible for vaccination. Discuss with provider at upcoming appointment if they have questions.      Patient instructed to remain in clinic for 15 minutes afterwards, and to report any adverse reactions.      Link to Ancillary Visit Immunization Standing Orders SmartSet     Screening performed by Hayley Pal MA on 11/19/2024 at 1:43 PM.

## 2024-11-25 ENCOUNTER — ALLIED HEALTH/NURSE VISIT (OUTPATIENT)
Dept: EDUCATION SERVICES | Facility: CLINIC | Age: 54
End: 2024-11-25
Payer: COMMERCIAL

## 2024-11-25 DIAGNOSIS — E10.311 TYPE 1 DIABETES MELLITUS WITH RETINOPATHY AND MACULAR EDEMA, UNSPECIFIED LATERALITY, UNSPECIFIED RETINOPATHY SEVERITY (H): Primary | ICD-10-CM

## 2024-11-25 PROCEDURE — G0108 DIAB MANAGE TRN  PER INDIV: HCPCS | Performed by: DIETITIAN, REGISTERED

## 2024-11-25 NOTE — LETTER
11/25/2024         RE: Rudolph Tao  [address] [facility] [address]        Dear Colleague,    Thank you for referring your patient, Rudolph Tao, to the [facility]. Please see a copy of my visit note below.    Diabetes Self-Management Education & Support    Presents for: Insulin Pump and CGM Review    Type of Service: In Person Visit      REPORTS:          Insulin Pump Information  Insulin Pump Brand: OmniPod  Does patient have an insulin multiple daily injection back-up plan?: Yes    Education specific to insulin pump provided today on:   navigating insulin pump screen/functions/features, automated insulin delivery functions/features, how to use the bolus calculator, importance of bolusing before meals, multiple daily injection back-up plan in case of pump failure, and 2 in doubt change it out.  Treating with 8g carbohydrates for a low with an automated system.    Opportunities for ongoing education and support in diabetes-self management were discussed.    Pt verbalized understanding of concepts discussed and recommendations provided today.       Continue education with the following diabetes management concepts: Problem Solving    Pump Education Materials: DKA prevention    ASSESSMENT  Kris presents to appointment with need to change out pod.  Current pod on body does have folded sticker, though did not affect function.  He does have pod placed on belly, which could be into scar tissue.  Recommend avoiding belly for now and trying some alternate sites.  Blood sugar during appointment was 207, due to missed basal insulin.  When asked what he should do, he starts reviewing old papers regarding correction vs. Trying to enter blood sugar into pump for a correction.  He reports not thinking he'd need to do corrections with a pump and instead let auto mode bring him down.  He still appears to be missing some basics understanding on blood sugar and insulin balance.  Due to  DKA episode, reviewed 2 in doubt change it out and home prevention when ketones and missed insulin are suspected.     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  Continue to monitor if this is a barrier to pod use.     Glucose Patterns & Trends:  No clear patterns identified    Patient would benefit from Turn off reverse correction, increase max basal rate to 3u/hr.    Changes made to pump settings:  Turned off reverse correction  Max basal 2u/hr --> 3u    Changes made to sensor settings:   No changes to sensor settings recommended at this time.    PLAN    Patient to enter in blood sugars to receive correction.  Call team within 3 days-1 week if blood sugars are remaining high.  Follow 2 in doubt change it out.  Fill next pod with 100 units.     Topics to cover at upcoming visits: Insulin Pump Concepts Balancing glucose and insulin    Follow-up: quick call in 2 weeks.    See Care Plan for co-developed, patient-state behavior change goals.  AVS provided for patient today.    Education Materials Provided:  DKA      SUBJECTIVE/OBJECTIVE:  Presents for: Insulin Pump and CGM Review  Accompanied by: Self, Spouse  Diabetes education in the past 24mo: Yes  Focus of Visit: Insulin Pump, CGM  Type of Pump visit: Pre-pump  Type of CGM visit: Personal CGM Follow-up  Diabetes type: Type 1  Disease course: Other (see Comments)  Please elaborate:: Recent DKA  How confident are you filling out medical forms by yourself:: Extremely  Diabetes management related comments/concerns: When the pump was on it worked great, but I wouldn't wish DKA on anyone.  Transportation concerns: No  Difficulty affording diabetes medication?: No  Difficulty affording diabetes testing supplies?: No  Other concerns:: None  Cultural Influences/Ethnic Background:  Not  or     Diabetes Symptoms & Complications:  Diabetes Related Symptoms: None  Weight trend: Stable  Symptom  "course: Stable  Disease course: Other (see Comments)  Please elaborate:: Recent DKA  Complications assessed today?: Yes  Peripheral neuropathy: Yes    Patient Problem List and Family Medical History reviewed for relevant medical history, current medical status, and diabetes risk factors.    Vitals:  There were no vitals taken for this visit.  Estimated body mass index is 26.47 kg/m  as calculated from the following:    Height as of 9/24/24: 1.702 m (5' 7\").    Weight as of 9/24/24: 76.7 kg (169 lb).   Last 3 BP:   BP Readings from Last 3 Encounters:   09/24/24 123/75   09/23/24 100/79   08/01/24 124/84       History   Smoking Status     Former     Types: Cigarettes   Smokeless Tobacco     Never       Labs:  Lab Results   Component Value Date    A1C 7.2 08/01/2024    A1C 7.3 06/25/2021     Lab Results   Component Value Date     09/24/2024     02/25/2022     06/25/2021     Lab Results   Component Value Date    LDL 97 04/09/2024    LDL 96 06/25/2021     HDL Cholesterol   Date Value Ref Range Status   06/25/2021 90 >39 mg/dL Final     Direct Measure HDL   Date Value Ref Range Status   04/09/2024 76 >=40 mg/dL Final   ]  GFR Estimate   Date Value Ref Range Status   09/23/2024 >90 >60 mL/min/1.73m2 Final     Comment:     eGFR calculated using 2021 CKD-EPI equation.   06/25/2021 >90 >60 mL/min/[1.73_m2] Final     Comment:     Non  GFR Calc  Starting 12/18/2018, serum creatinine based estimated GFR (eGFR) will be   calculated using the Chronic Kidney Disease Epidemiology Collaboration   (CKD-EPI) equation.       GFR Estimate If Black   Date Value Ref Range Status   06/25/2021 >90 >60 mL/min/[1.73_m2] Final     Comment:      GFR Calc  Starting 12/18/2018, serum creatinine based estimated GFR (eGFR) will be   calculated using the Chronic Kidney Disease Epidemiology Collaboration   (CKD-EPI) equation.       Lab Results   Component Value Date    CR 0.80 09/23/2024    CR 0.72 " "06/25/2021     No results found for: \"MICROALBUMIN\"    Healthy Eating:  Healthy Eating Assessed Today: No  Cultural/[place] diet restrictions?: No  Do you have any food allergies or intolerances?: No  Meal planning/habits: None, Avoiding sweets  Who cooks/prepares meals for you?: Self, Spouse  Who purchases food in  your home?: Self, Spouse  How many times a week on average do you eat food made away from home (restaurant/take-out)?: 2  Meals include: Lunch, Dinner, Afternoon Snack  Beverages: Water, Coffee, Diet soda, Sports drinks, Alcohol  Has patient met with a dietitian in the past?: Yes    Being Active:  Being Active Assessed Today: Yes  Exercise:: Currently not exercising  Barrier to exercise: None    Monitoring:  Monitoring Assessed Today: Yes  Did patient bring glucose meter to appointment? : Yes  Blood Glucose Meter: FreeStyle, CGM  Times checking blood sugar at home (number): Other (see Comments)  Please elaborate:: I have a sensor  Times checking blood sugar at home (per): Day  Blood glucose trend: Fluctuating      Taking Medications:  Diabetes Medication(s)       Diabetic Other       Glucagon (GVOKE HYPOPEN) 1 MG/0.2ML pen Inject the contents of 1 device under the skin into lower abdomen, outer thigh, or outer upper arm as needed for hypoglycemia. If no response after 15 minutes, additional 1 mg dose from a new device may be injected while waiting for emergency assistance.       Insulin       insulin aspart (NOVOLOG VIAL) 100 UNITS/ML vial Used 50 units per day via insulin pump     LANTUS SOLOSTAR 100 UNIT/ML soln INJECT 32 UNITS SUBCUTANEOUSLY ONCE DAILY     NOVOLOG PENFILL 100 UNIT/ML soln INJECT 1 UNIT PER 30 GRAMS OF CARBOHYDRATES PLUS 1 UNIT PER 50MG/DL ABOVE 200MG/DL. MAX DOSE 20 UNITS PER DAY            Taking Medication Assessed Today: Yes  Current Treatments: Insulin Pump  Given by: Patient  Injection/Infusion sites: Abdomen  Problems taking diabetes medications regularly?: Yes (always takes " insulin after the meal)  Diabetes medication side effects?: Yes    Problem Solving:  Problem Solving Assessed Today: Yes  Is the patient at risk for hypoglycemia?: Yes  Hypoglycemia Frequency: Weekly  Hypoglycemia Treatment: Juice  Is the patient at risk for DKA?: Yes  Does patient have ketone test strips?: Yes  Does patient have DKA prevention plan?: Yes              Reducing Risks:  Reducing Risks Assessed Today: No  Has dilated eye exam at least once a year?: Yes  Sees dentist every 6 months?: No  Feet checked by healthcare provider in the last year?: Yes    Healthy Coping:  Healthy Coping Assessed Today: Yes  Emotional response to diabetes: Ready to learn, Guilt/Self-blame  Informal Support system:: Family, Spouse  Stage of change: ACTION (Actively working towards change)  Support resources: Websites  Patient Activation Measure Survey Score:      9/19/2011     9:00 AM   LAUREL Score (Last Two)   LAUREL Raw Score 51   Activation Score 91.6   LAUREL Level 4         Care Plan and Education Provided:  navigating insulin pump screen/functions/features, automated insulin delivery functions/features, how to use the bolus calculator, importance of bolusing before meals, steps to take when glucose is above 240 mg/dL, and multiple daily injection back-up plan in case of pump failure    Radha Armendariz MS, RD, LD, CDE    Time Spent: 65 minutes  Encounter Type: Individual    Any diabetes medication dose changes were made via the CDE Protocol per the patient's primary care provider. A copy of this encounter was shared with the provider.

## 2024-11-25 NOTE — PROGRESS NOTES
Diabetes Self-Management Education & Support    Presents for: Insulin Pump and CGM Review    Type of Service: In Person Visit      REPORTS:          Insulin Pump Information  Insulin Pump Brand: OmniPod  Does patient have an insulin multiple daily injection back-up plan?: Yes    Education specific to insulin pump provided today on:   navigating insulin pump screen/functions/features, automated insulin delivery functions/features, how to use the bolus calculator, importance of bolusing before meals, multiple daily injection back-up plan in case of pump failure, and 2 in doubt change it out.  Treating with 8g carbohydrates for a low with an automated system.    Opportunities for ongoing education and support in diabetes-self management were discussed.    Pt verbalized understanding of concepts discussed and recommendations provided today.       Continue education with the following diabetes management concepts: Problem Solving    Pump Education Materials: DKA prevention    ASSESSMENT  Kris presents to appointment with need to change out pod.  Current pod on body does have folded sticker, though did not affect function.  He does have pod placed on belly, which could be into scar tissue.  Recommend avoiding belly for now and trying some alternate sites.  Blood sugar during appointment was 207, due to missed basal insulin.  When asked what he should do, he starts reviewing old papers regarding correction vs. Trying to enter blood sugar into pump for a correction.  He reports not thinking he'd need to do corrections with a pump and instead let auto mode bring him down.  He still appears to be missing some basics understanding on blood sugar and insulin balance.  Due to DKA episode, reviewed 2 in doubt change it out and home prevention when ketones and missed insulin are suspected.     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  Continue to monitor if this is  a barrier to pod use.     Glucose Patterns & Trends:  No clear patterns identified    Patient would benefit from Turn off reverse correction, increase max basal rate to 3u/hr.    Changes made to pump settings:  Turned off reverse correction  Max basal 2u/hr --> 3u    Changes made to sensor settings:   No changes to sensor settings recommended at this time.    PLAN    Patient to enter in blood sugars to receive correction.  Call team within 3 days-1 week if blood sugars are remaining high.  Follow 2 in doubt change it out.  Fill next pod with 100 units.     Topics to cover at upcoming visits: Insulin Pump Concepts Balancing glucose and insulin    Follow-up: quick call in 2 weeks.    See Care Plan for co-developed, patient-state behavior change goals.  AVS provided for patient today.    Education Materials Provided:  DKA      SUBJECTIVE/OBJECTIVE:  Presents for: Insulin Pump and CGM Review  Accompanied by: Self, Spouse  Diabetes education in the past 24mo: Yes  Focus of Visit: Insulin Pump, CGM  Type of Pump visit: Pre-pump  Type of CGM visit: Personal CGM Follow-up  Diabetes type: Type 1  Disease course: Other (see Comments)  Please elaborate:: Recent DKA  How confident are you filling out medical forms by yourself:: Extremely  Diabetes management related comments/concerns: When the pump was on it worked great, but I wouldn't wish DKA on anyone.  Transportation concerns: No  Difficulty affording diabetes medication?: No  Difficulty affording diabetes testing supplies?: No  Other concerns:: None  Cultural Influences/Ethnic Background:  Not  or     Diabetes Symptoms & Complications:  Diabetes Related Symptoms: None  Weight trend: Stable  Symptom course: Stable  Disease course: Other (see Comments)  Please elaborate:: Recent DKA  Complications assessed today?: Yes  Peripheral neuropathy: Yes    Patient Problem List and Family Medical History reviewed for relevant medical history, current medical status,  "and diabetes risk factors.    Vitals:  There were no vitals taken for this visit.  Estimated body mass index is 26.47 kg/m  as calculated from the following:    Height as of 9/24/24: 1.702 m (5' 7\").    Weight as of 9/24/24: 76.7 kg (169 lb).   Last 3 BP:   BP Readings from Last 3 Encounters:   09/24/24 123/75   09/23/24 100/79   08/01/24 124/84       History   Smoking Status    Former    Types: Cigarettes   Smokeless Tobacco    Never       Labs:  Lab Results   Component Value Date    A1C 7.2 08/01/2024    A1C 7.3 06/25/2021     Lab Results   Component Value Date     09/24/2024     02/25/2022     06/25/2021     Lab Results   Component Value Date    LDL 97 04/09/2024    LDL 96 06/25/2021     HDL Cholesterol   Date Value Ref Range Status   06/25/2021 90 >39 mg/dL Final     Direct Measure HDL   Date Value Ref Range Status   04/09/2024 76 >=40 mg/dL Final   ]  GFR Estimate   Date Value Ref Range Status   09/23/2024 >90 >60 mL/min/1.73m2 Final     Comment:     eGFR calculated using 2021 CKD-EPI equation.   06/25/2021 >90 >60 mL/min/[1.73_m2] Final     Comment:     Non  GFR Calc  Starting 12/18/2018, serum creatinine based estimated GFR (eGFR) will be   calculated using the Chronic Kidney Disease Epidemiology Collaboration   (CKD-EPI) equation.       GFR Estimate If Black   Date Value Ref Range Status   06/25/2021 >90 >60 mL/min/[1.73_m2] Final     Comment:      GFR Calc  Starting 12/18/2018, serum creatinine based estimated GFR (eGFR) will be   calculated using the Chronic Kidney Disease Epidemiology Collaboration   (CKD-EPI) equation.       Lab Results   Component Value Date    CR 0.80 09/23/2024    CR 0.72 06/25/2021     No results found for: \"MICROALBUMIN\"    Healthy Eating:  Healthy Eating Assessed Today: No  Cultural/[place]ars[place] diet restrictions?: No  Do you have any food allergies or intolerances?: No  Meal planning/habits: None, Avoiding sweets  Who " cooks/prepares meals for you?: Self, Spouse  Who purchases food in  your home?: Self, Spouse  How many times a week on average do you eat food made away from home (restaurant/take-out)?: 2  Meals include: Lunch, Dinner, Afternoon Snack  Beverages: Water, Coffee, Diet soda, Sports drinks, Alcohol  Has patient met with a dietitian in the past?: Yes    Being Active:  Being Active Assessed Today: Yes  Exercise:: Currently not exercising  Barrier to exercise: None    Monitoring:  Monitoring Assessed Today: Yes  Did patient bring glucose meter to appointment? : Yes  Blood Glucose Meter: FreeStyle, CGM  Times checking blood sugar at home (number): Other (see Comments)  Please elaborate:: I have a sensor  Times checking blood sugar at home (per): Day  Blood glucose trend: Fluctuating      Taking Medications:  Diabetes Medication(s)       Diabetic Other       Glucagon (GVOKE HYPOPEN) 1 MG/0.2ML pen Inject the contents of 1 device under the skin into lower abdomen, outer thigh, or outer upper arm as needed for hypoglycemia. If no response after 15 minutes, additional 1 mg dose from a new device may be injected while waiting for emergency assistance.       Insulin       insulin aspart (NOVOLOG VIAL) 100 UNITS/ML vial Used 50 units per day via insulin pump     LANTUS SOLOSTAR 100 UNIT/ML soln INJECT 32 UNITS SUBCUTANEOUSLY ONCE DAILY     NOVOLOG PENFILL 100 UNIT/ML soln INJECT 1 UNIT PER 30 GRAMS OF CARBOHYDRATES PLUS 1 UNIT PER 50MG/DL ABOVE 200MG/DL. MAX DOSE 20 UNITS PER DAY            Taking Medication Assessed Today: Yes  Current Treatments: Insulin Pump  Given by: Patient  Injection/Infusion sites: Abdomen  Problems taking diabetes medications regularly?: Yes (always takes insulin after the meal)  Diabetes medication side effects?: Yes    Problem Solving:  Problem Solving Assessed Today: Yes  Is the patient at risk for hypoglycemia?: Yes  Hypoglycemia Frequency: Weekly  Hypoglycemia Treatment: Juice  Is the patient at  risk for DKA?: Yes  Does patient have ketone test strips?: Yes  Does patient have DKA prevention plan?: Yes              Reducing Risks:  Reducing Risks Assessed Today: No  Has dilated eye exam at least once a year?: Yes  Sees dentist every 6 months?: No  Feet checked by healthcare provider in the last year?: Yes    Healthy Coping:  Healthy Coping Assessed Today: Yes  Emotional response to diabetes: Ready to learn, Guilt/Self-blame  Informal Support system:: Family, Spouse  Stage of change: ACTION (Actively working towards change)  Support resources: Websites  Patient Activation Measure Survey Score:      9/19/2011     9:00 AM   LAUREL Score (Last Two)   LAUREL Raw Score 51   Activation Score 91.6   LAUREL Level 4         Care Plan and Education Provided:  navigating insulin pump screen/functions/features, automated insulin delivery functions/features, how to use the bolus calculator, importance of bolusing before meals, steps to take when glucose is above 240 mg/dL, and multiple daily injection back-up plan in case of pump failure    Radha Armendariz MS, RD, LD, CDE    Time Spent: 65 minutes  Encounter Type: Individual    Any diabetes medication dose changes were made via the CDE Protocol per the patient's primary care provider. A copy of this encounter was shared with the provider.

## 2024-11-25 NOTE — PATIENT INSTRUCTIONS
[email or web address]  Expression med  Not just a patch      Get a sleep study if you have been recommended to get one    Follow up with Melonie Leon until I have my schedule established if you want to follow me to virtual visits OR at the [facility].    Consider scheduling and Endocrinology visit.         High Blood Sugar (>250 mg/dl) Protocol for Pump Users:  If a single blood sugar reading is above 250 mg/dl, follow these steps:   1. Immediately take a correction bolus with the pump using your bolus wizard (like normal).    2. Recheck your blood sugar in 1 hour.  If your blood sugar has gone down, your pump is working properly and continue to monitor your blood sugars as usual. If your blood sugar is not coming down in an hour, follow these steps closely and entirely:    A. You need to take an injection of rapid acting insulin using an insulin syringe or insulin pen (not using your pump).  Give this dose 2 to 3 hours to work.  Do not give any correction doses with your pump until this time is up or you will stack insulin and likely have a low blood sugar later.    B. Change out your entire infusion set, tubing and reservoir, being sure to use a fresh unopened, unused vial of insulin if you have it.  Changing your site is a MUST!  Do it right away.  IF IN DOUBT, CHANGE IT OUT!!    C. Check your blood or urine for ketones.  If you have moderate or large urine ketones or if your blood ketones are over 0.6 mmol/L- Call your provider!!  You will need to take additional insulin and they can provide instruction.  Do not wait for them to get back to you before doing the following steps: Drink at least a cup of water every hour and limit physical activity.  1.  Recheck your blood glucose and ketones in 60 minutes.  If your blood sugar is still not coming down and ketones are still present- REPORT TO YOUR NEAREST EMERGENCY ROOM!      **If at any time, you have symptoms of extreme headache, severe nausea, any  vomiting or trouble breathing in conjunction with elevated blood sugars- immediately report to your nearest emergency room!

## 2024-11-26 ENCOUNTER — OFFICE VISIT (OUTPATIENT)
Dept: OPHTHALMOLOGY | Facility: CLINIC | Age: 54
End: 2024-11-26
Payer: COMMERCIAL

## 2024-11-26 DIAGNOSIS — H40.1132 PRIMARY OPEN ANGLE GLAUCOMA (POAG) OF BOTH EYES, MODERATE STAGE: Primary | ICD-10-CM

## 2024-11-26 PROCEDURE — 92012 INTRM OPH EXAM EST PATIENT: CPT | Performed by: OPHTHALMOLOGY

## 2024-11-26 PROCEDURE — 92020 GONIOSCOPY: CPT | Performed by: OPHTHALMOLOGY

## 2024-11-26 RX ORDER — BRIMONIDINE TARTRATE 2 MG/ML
1 SOLUTION/ DROPS OPHTHALMIC 2 TIMES DAILY
Qty: 15 ML | Refills: 4 | Status: SHIPPED | OUTPATIENT
Start: 2024-11-26

## 2024-11-26 ASSESSMENT — VISUAL ACUITY
METHOD: SNELLEN - LINEAR
OS_CC: 20/50
OD_CC+: +2
OD_CC: 20/40
CORRECTION_TYPE: CONTACTS

## 2024-11-26 ASSESSMENT — TONOMETRY
OD_IOP_MMHG: 16
IOP_METHOD: APPLANATION
OS_IOP_MMHG: 15

## 2024-11-26 ASSESSMENT — GONIOSCOPY
OS_TEMPORAL: GRADE 3-4
OD_TEMPORAL: GRADE 3-4
OD_SUPERIOR: GRADE 3-4
METHOD: ZEISS, FOUR MIRROR
OS_SUPERIOR: GRADE 3-4
OD_INFERIOR: GRADE 3-4
OS_INFERIOR: GRADE 3-4
OD_NASAL: GRADE 3-4
OS_NASAL: GRADE 3-4

## 2024-11-26 ASSESSMENT — EXTERNAL EXAM - RIGHT EYE: OD_EXAM: NORMAL

## 2024-11-26 ASSESSMENT — SLIT LAMP EXAM - LIDS
COMMENTS: 1+ PTOSIS, 1+ SCLERAL SHOW
COMMENTS: 1+ PTOSIS, 1+ SCLERAL SHOW

## 2024-11-26 ASSESSMENT — EXTERNAL EXAM - LEFT EYE: OS_EXAM: NORMAL

## 2024-11-26 NOTE — PROGRESS NOTES
" Current Eye Medications:  Cosopt (dorzolamide-timolol -- dark blue top) twice daily both eyes - used this morning around 10:30 AM     Subjective:  Here for intraocular pressure check and gonio. Vision in left eye has been bothersome. Patient states that he is having issues being able to  distances of things. No eye pain or discomfort today. Wears contacts most days.      Objective:  See Ophthalmology Exam.       Assessment:  Intraocular pressures remain +/- both eyes.       Plan:  Continue:   Cosopt (dorzolamide-timolol -- dark blue top) twice daily both eyes. About 12 hours apart.     Begin:  Brimonidine (purple top) twice daily both eyes. About 12 hours apart.     May use artificial tears up to four times a day (like Refresh Optive, Systane Balance, or TheraTears. Avoid \"get the red out\" drops and generic artifical tears).     Wait at least 5 minutes between drops if using more than one at a time.     Return visit in 2 months for an intraocular pressure check.  Angles amenable to selective laser trabeculoplasty.    Ciaran Clifton M.D.  [phone]       "

## 2024-11-26 NOTE — LETTER
"11/26/2024      Rudolph Tao  [address] [address] [place] [address]      Dear Colleague,    Thank you for referring your patient, Rudolph Tao, to the [facility]. Please see a copy of my visit note below.     Current Eye Medications:  Cosopt (dorzolamide-timolol -- dark blue top) twice daily both eyes - used this morning around 10:30 AM     Subjective:  Here for intraocular pressure check and gonio. Vision in left eye has been bothersome. Patient states that he is having issues being able to  distances of things. No eye pain or discomfort today. Wears contacts most days.      Objective:  See Ophthalmology Exam.       Assessment:  Intraocular pressures remain +/- both eyes.       Plan:  Continue:   Cosopt (dorzolamide-timolol -- dark blue top) twice daily both eyes. About 12 hours apart.     Begin:  Brimonidine (purple top) twice daily both eyes. About 12 hours apart.     May use artificial tears up to four times a day (like Refresh Optive, Systane Balance, or TheraTears. Avoid \"get the red out\" drops and generic artifical tears).     Wait at least 5 minutes between drops if using more than one at a time.     Return visit in 2 months for an intraocular pressure check.  Angles amenable to selective laser trabeculoplasty.    Ciaran Clifton M.D.  [phone]         Again, thank you for allowing me to participate in the care of your patient.        Sincerely,        Ciaran Clifton MD  "

## 2024-11-26 NOTE — PATIENT INSTRUCTIONS
"Continue:   Cosopt (dorzolamide-timolol -- dark blue top) twice daily both eyes. About 12 hours apart.     Begin:  Brimonidine (purple top) twice daily both eyes. About 12 hours apart.     May use artificial tears up to four times a day (like Refresh Optive, Systane Balance, or TheraTears. Avoid \"get the red out\" drops and generic artifical tears).     Wait at least 5 minutes between drops if using more than one at a time.     Return visit in 2 months for an intraocular pressure check.     Ciaran Clifton M.D.  [phone]    "

## 2024-12-09 DIAGNOSIS — E10.42 TYPE 1 DIABETES MELLITUS WITH DIABETIC POLYNEUROPATHY (H): ICD-10-CM

## 2024-12-09 RX ORDER — PRAVASTATIN SODIUM 20 MG
20 TABLET ORAL DAILY
Qty: 90 TABLET | Refills: 0 | Status: SHIPPED | OUTPATIENT
Start: 2024-12-09

## 2024-12-11 ENCOUNTER — VIRTUAL VISIT (OUTPATIENT)
Dept: EDUCATION SERVICES | Facility: OTHER | Age: 54
End: 2024-12-11
Payer: COMMERCIAL

## 2024-12-11 ENCOUNTER — TELEPHONE (OUTPATIENT)
Dept: EDUCATION SERVICES | Facility: CLINIC | Age: 54
End: 2024-12-11

## 2024-12-11 DIAGNOSIS — E10.311 TYPE 1 DIABETES MELLITUS WITH RETINOPATHY AND MACULAR EDEMA, UNSPECIFIED LATERALITY, UNSPECIFIED RETINOPATHY SEVERITY (H): Primary | ICD-10-CM

## 2024-12-11 PROCEDURE — G0108 DIAB MANAGE TRN  PER INDIV: HCPCS | Mod: 93 | Performed by: PHARMACIST

## 2024-12-11 NOTE — PROGRESS NOTES
Diabetes Self-Management Education & Support    Presents for: Insulin Pump and CGM Review for DM1    Type of Service: Telephone Visit    Originating Location (Patient Location): Home  Distant Location (Provider Location): [facility]  Mode of Communication:  Telephone    Telephone Visit Start Time:  258pm  Telephone Visit End Time (telephone visit stop time): 335pm    How would patient like to obtain AVS? Miguel      REPORTS:      Insulin Pump Information  Insulin Pump Brand: OmniPod  Does patient have an insulin multiple daily injection back-up plan?: Yes    Education specific to insulin pump provided today on:   navigating insulin pump screen/functions/features, automated insulin delivery functions/features, importance of accurate carbohydrate counting, how to use the bolus calculator, importance of bolusing before meals, steps to take when glucose is above 240 mg/dL, and multiple daily injection back-up plan in case of pump failure    Opportunities for ongoing education and support in diabetes-self management were discussed.    Pt verbalized understanding of concepts discussed and recommendations provided today.         ASSESSMENT  Rkis was called today for diabetes education follow up.  He has been on the Omnipod 5 for a short time now.  Was on MDI prior with variable blood sugars.  He went into DKA shortly after starting the pump.  He has been working with my partner Radha and currently is not seeing endo.     Diabetes Medication(s)       Diabetic Other       Glucagon (GVOKE HYPOPEN) 1 MG/0.2ML pen Inject the contents of 1 device under the skin into lower abdomen, outer thigh, or outer upper arm as needed for hypoglycemia. If no response after 15 minutes, additional 1 mg dose from a new device may be injected while waiting for emergency assistance.       Insulin       insulin aspart (NOVOLOG VIAL) 100 UNITS/ML vial Used 50 units per day via insulin pump     LANTUS SOLOSTAR 100 UNIT/ML  "soln INJECT 32 UNITS SUBCUTANEOUSLY ONCE DAILY     NOVOLOG PENFILL 100 UNIT/ML soln INJECT 1 UNIT PER 30 GRAMS OF CARBOHYDRATES PLUS 1 UNIT PER 50MG/DL ABOVE 200MG/DL. MAX DOSE 20 UNITS PER DAY          Reviewed high BS protocol due to DKA on pump once already.  Reviewed in detail multiple times and will also put protocol in AVS.    Kris had questions about \"use cgm\" button.  He is using it correctly (every time except when having seconds).    Had blood last night after removing pod- explained this happens sometimes when you hit a vessel.  Can happen with pump or CGM on insertion or removal.    Omnipod Insulin Pump Statistics:  Total Daily Dose (TDD): 17.2 units, with 28% bolus and 72% basal  Bolus overrides per day: 0% or 0 boluses  Automated mode basal of 12.3 units vs manual mode basal total of 16.8 units  Average number of carbs entered daily: 52 grams   Site changes every 3 days  Time spent in automated mode 95 %  Time CGM active 92.3%    CGM summary, last 2 weeks:  Avg  mg/dL  GMI 7.2%  Time in Range (TIR)  mg/dL is 74%  0% low under 70 mg/dL (0% less than 54)  22% high 181-250 mg/dL  4% very high over 250 mg/dL  Glucose trends show: highs averaging 180 post supper (he only eats 1 meal per day), good fasting control.      Daily pump/CGM data shows When in manual mode, basal too strong.  Overtreatment of low.  When missed boluses or extended highs, is taking corrections appropriately.    We discussed his fear of lows.  He admits he is likely going a bit high after supper because he is still a bit nervous to cover all his carbs.  He also does a lot of guessing on carb counts.  Discussed use of labels vs downloading calorie louise christian.  Continue to work on bolus confidence.      Will not give more with carbs at this time, he will work on the above.  Did assist him with decreasing manual mode basal from 0.7 to 0.5 units/hr for safety when slips into manual mode.  Follow up scheduled for 1 month.   "     PLAN     Calorie louise christian to help with carb counting.  Work on getting closer to accurate carb counts as you gain confidence in the system.  Manual mode basal decreased for safety (when you slip out of automation).  Try hard to only treat low blood sugars with 8  to 10 grams of rapid acting carb (ex: 1/4 cup fruit juice or 2 glucose tablets)  Follow high blood sugar protocol below as needed.  See updated back up plan below for when your pump malfunctions.    Omnipod insulin pump settings, including changes made today:    Basal rates:  0000 - 0000    0.5    Insulin to carb ratios (g/unit):  0000 - 0000    12      Insulin sensitivity/correction (mg/dL/unit):  0000 - 0000      48      Active insulin time: 3 hours    Blood glucose target range/correction threshold:  0000 - 0000    120 - 130      Insulin Pump Back Up Plan:    If your insulin pump fails or has an issue, the first thing you should do is call the pump company and have them troubleshoot.   *[employer] 24-hour Customer Support [phone]   *[employer] 24-hour Customer Support [phone]   *[employer] (Omnipod) 24-hour Customer Support [phone]   *[employer] (ilet) 24-hour Customer Support [phone]    If they determine you need a new pump, you will have to go back to using insulin injections until your new pump arrives.  You will need either insulin pen(s) and pen needles and/or insulin syringes to do injections if your pump is not working.  You can purchase pen needles and insulin syringes at any pharmacy without a prescription.  Just ask at the pharmacy counter.    With your long acting insulin Lantus, take 12 units once daily   -these insulins could also be called Lantus, Basaglar, Semglee, Rezvoglar (insulin glargine), Levemir (insulin detemir), Toujeo (insulin glargine U300), Tresiba (insulin degludec).  -If you do not have a current long acting prescription and you are unable to reach your doctor to have one sent to the pharmacy, then  you can buy a similar long acting insulin called NPH over the counter at around $25 a vial from any pharmacy.  If you choose this option, you need to take 1/2 the dose above every 12 hours.  Your dose would be 6 units twice a day.    With your rapid acting insulin Novolog, take 1 unit for every 12 grams of carbohydrate and to correct high blood sugars, take 1 unit for every 50 above 150.  -these insulins could be called Novolog or Fiasp (insulin aspart), Humalog, Admelog, Lyumjev (insulin lispro U100 or U200), Apidra (insulin glulisine)    If you don't have long acting, you must take corrections every 3 -4 hours with your rapid acting insulin to keep your blood sugars under control.  This includes overnight.    **If you have any questions regarding this information or need clarification, please call Dr. Choudhury's office.        Topics to cover at upcoming visits: Healthy Eating, Being Active, Monitoring, Taking Medication, Problem Solving, Reducing Risks, and Healthy Coping    Follow-up: 1 month- scheduled    See Care Plan for co-developed, patient-state behavior change goals.  AVS provided for patient today.    Education Materials Provided:  No new materials provided today      SUBJECTIVE/OBJECTIVE:  Presents for: Insulin Pump and CGM Review  Accompanied by: Self  Diabetes education in the past 24mo: Yes  Focus of Visit: Insulin Pump, CGM  Type of Pump visit: Pump Review  Type of CGM visit: Personal CGM Follow-up  Diabetes type: Type 1  Date of diagnosis: age 34  Disease course: Improving  How confident are you filling out medical forms by yourself:: Extremely  Transportation concerns: No  Difficulty affording diabetes medication?: No  Difficulty affording diabetes testing supplies?: No  Other concerns:: None  Cultural Influences/Ethnic Background:  Not  or       Diabetes Symptoms & Complications:  Diabetes Related Symptoms: None  Weight trend: Stable  Symptom course: Stable  Disease course:  "Improving  Complications assessed today?: Yes  Peripheral neuropathy: Yes    Patient Problem List and Family Medical History reviewed for relevant medical history, current medical status, and diabetes risk factors.    Vitals:  There were no vitals taken for this visit.  Estimated body mass index is 26.47 kg/m  as calculated from the following:    Height as of 9/24/24: 1.702 m (5' 7\").    Weight as of 9/24/24: 76.7 kg (169 lb).   Last 3 BP:   BP Readings from Last 3 Encounters:   09/24/24 123/75   09/23/24 100/79   08/01/24 124/84       History   Smoking Status    Former    Types: Cigarettes   Smokeless Tobacco    Never       Labs:  Lab Results   Component Value Date    A1C 7.2 08/01/2024    A1C 7.3 06/25/2021     Lab Results   Component Value Date     09/24/2024     02/25/2022     06/25/2021     Lab Results   Component Value Date    LDL 97 04/09/2024    LDL 96 06/25/2021     HDL Cholesterol   Date Value Ref Range Status   06/25/2021 90 >39 mg/dL Final     Direct Measure HDL   Date Value Ref Range Status   04/09/2024 76 >=40 mg/dL Final   ]  GFR Estimate   Date Value Ref Range Status   09/23/2024 >90 >60 mL/min/1.73m2 Final     Comment:     eGFR calculated using 2021 CKD-EPI equation.   06/25/2021 >90 >60 mL/min/[1.73_m2] Final     Comment:     Non  GFR Calc  Starting 12/18/2018, serum creatinine based estimated GFR (eGFR) will be   calculated using the Chronic Kidney Disease Epidemiology Collaboration   (CKD-EPI) equation.       GFR Estimate If Black   Date Value Ref Range Status   06/25/2021 >90 >60 mL/min/[1.73_m2] Final     Comment:      GFR Calc  Starting 12/18/2018, serum creatinine based estimated GFR (eGFR) will be   calculated using the Chronic Kidney Disease Epidemiology Collaboration   (CKD-EPI) equation.       Lab Results   Component Value Date    CR 0.80 09/23/2024    CR 0.72 06/25/2021     No results found for: \"MICROALBUMIN\"    Healthy " Eating:  Healthy Eating Assessed Today: No  Cultural/Orthodoxy diet restrictions?: No  Do you have any food allergies or intolerances?: No  Meal planning/habits: None, Avoiding sweets  Who cooks/prepares meals for you?: Self, Spouse  Who purchases food in  your home?: Self, Spouse  How many times a week on average do you eat food made away from home (restaurant/take-out)?: 2  Meals include: Lunch, Dinner, Afternoon Snack  Beverages: Water, Coffee, Diet soda, Sports drinks, Alcohol  Has patient met with a dietitian in the past?: Yes    Being Active:  Being Active Assessed Today: No  Exercise:: Currently not exercising  Barrier to exercise: None    Monitoring:  Monitoring Assessed Today: Yes  Did patient bring glucose meter to appointment? : Yes  Blood Glucose Meter: FreeStyle, CGM  Times checking blood sugar at home (number): Other (see Comments)  Please elaborate:: Dexcom G7  Blood glucose trend: Decreasing        Taking Medications:  Diabetes Medication(s)       Diabetic Other       Glucagon (GVOKE HYPOPEN) 1 MG/0.2ML pen Inject the contents of 1 device under the skin into lower abdomen, outer thigh, or outer upper arm as needed for hypoglycemia. If no response after 15 minutes, additional 1 mg dose from a new device may be injected while waiting for emergency assistance.       Insulin       insulin aspart (NOVOLOG VIAL) 100 UNITS/ML vial Used 50 units per day via insulin pump     LANTUS SOLOSTAR 100 UNIT/ML soln INJECT 32 UNITS SUBCUTANEOUSLY ONCE DAILY     NOVOLOG PENFILL 100 UNIT/ML soln INJECT 1 UNIT PER 30 GRAMS OF CARBOHYDRATES PLUS 1 UNIT PER 50MG/DL ABOVE 200MG/DL. MAX DOSE 20 UNITS PER DAY            Taking Medication Assessed Today: Yes  Current Treatments: Insulin Pump  Given by: Patient  Injection/Infusion sites: Abdomen  Problems taking diabetes medications regularly?: Yes (always takes insulin after the meal)  Diabetes medication side effects?: Yes    Problem Solving:  Problem Solving Assessed Today:  Yes  Is the patient at risk for hypoglycemia?: Yes  Hypoglycemia Frequency: Weekly  Hypoglycemia Treatment: Juice  Does patient have glucagon emergency kit?: Yes  Is the patient at risk for DKA?: Yes  Does patient have ketone test strips?: Yes  Does patient have DKA prevention plan?: Yes  Does patient have severe weather/disaster plan for diabetes management?: Not Needed              Reducing Risks:  Reducing Risks Assessed Today: No  Has dilated eye exam at least once a year?: Yes  Sees dentist every 6 months?: No  Feet checked by healthcare provider in the last year?: Yes    Healthy Coping:  Healthy Coping Assessed Today: No  Emotional response to diabetes: Ready to learn, Guilt/Self-blame  Informal Support system:: Family, Spouse  Stage of change: ACTION (Actively working towards change)  Support resources: [place]  Patient Activation Measure Survey Score:      9/19/2011     9:00 AM   LAUREL Score (Last Two)   LAUREL Raw Score 51   Activation Score 91.6   LAUREL Level 4         Care Plan and Education Provided:  See above    Melonie Leon, PharmD, [facility], [facility] and [place] [facility]      Time Spent: 37 minutes  Encounter Type: Individual    Any diabetes medication dose changes were made via the CDE Protocol per the patient's referring provider. A copy of this encounter was shared with the provider.

## 2024-12-11 NOTE — PROGRESS NOTES
Diabetes Self-Management Education & Support    Presents for: Insulin Pump and CGM Review for DM1    Type of Service: Telephone Visit    Originating Location (Patient Location): Home  Distant Location (Provider Location): [facility]  Mode of Communication:  Telephone    Telephone Visit Start Time:  258pm  Telephone Visit End Time (telephone visit stop time): 335pm    How would patient like to obtain AVS? Miguel      REPORTS:      Insulin Pump Information  Insulin Pump Brand: OmniPod  Does patient have an insulin multiple daily injection back-up plan?: Yes    Education specific to insulin pump provided today on:   navigating insulin pump screen/functions/features, automated insulin delivery functions/features, importance of accurate carbohydrate counting, how to use the bolus calculator, importance of bolusing before meals, steps to take when glucose is above 240 mg/dL, and multiple daily injection back-up plan in case of pump failure    Opportunities for ongoing education and support in diabetes-self management were discussed.    Pt verbalized understanding of concepts discussed and recommendations provided today.         ASSESSMENT  Kris was called today for diabetes education follow up.  He has been on the Omnipod 5 for a short time now.  Was on MDI prior with variable blood sugars.  He went into DKA shortly after starting the pump.  He has been working with my partner Radha and currently is not seeing endo.     Diabetes Medication(s)       Diabetic Other       Glucagon (GVOKE HYPOPEN) 1 MG/0.2ML pen Inject the contents of 1 device under the skin into lower abdomen, outer thigh, or outer upper arm as needed for hypoglycemia. If no response after 15 minutes, additional 1 mg dose from a new device may be injected while waiting for emergency assistance.       Insulin       insulin aspart (NOVOLOG VIAL) 100 UNITS/ML vial Used 50 units per day via insulin pump     LANTUS SOLOSTAR 100 UNIT/ML  "soln INJECT 32 UNITS SUBCUTANEOUSLY ONCE DAILY     NOVOLOG PENFILL 100 UNIT/ML soln INJECT 1 UNIT PER 30 GRAMS OF CARBOHYDRATES PLUS 1 UNIT PER 50MG/DL ABOVE 200MG/DL. MAX DOSE 20 UNITS PER DAY          Reviewed high BS protocol due to DKA on pump once already.  Reviewed in detail multiple times and will also put protocol in AVS.    Kris had questions about \"use cgm\" button.  He is using it correctly (every time except when having seconds).    Had blood last night after removing pod- explained this happens sometimes when you hit a vessel.  Can happen with pump or CGM on insertion or removal.    Omnipod Insulin Pump Statistics:  Total Daily Dose (TDD): 17.2 units, with 28% bolus and 72% basal  Bolus overrides per day: 0% or 0 boluses  Automated mode basal of 12.3 units vs manual mode basal total of 16.8 units  Average number of carbs entered daily: 52 grams   Site changes every 3 days  Time spent in automated mode 95 %  Time CGM active 92.3%    CGM summary, last 2 weeks:  Avg  mg/dL  GMI 7.2%  Time in Range (TIR)  mg/dL is 74%  0% low under 70 mg/dL (0% less than 54)  22% high 181-250 mg/dL  4% very high over 250 mg/dL  Glucose trends show: highs averaging 180 post supper (he only eats 1 meal per day), good fasting control.      Daily pump/CGM data shows When in manual mode, basal too strong.  Overtreatment of low.  When missed boluses or extended highs, is taking corrections appropriately.    We discussed his fear of lows.  He admits he is likely going a bit high after supper because he is still a bit nervous to cover all his carbs.  He also does a lot of guessing on carb counts.  Discussed use of labels vs downloading calorie louise christian.  Continue to work on bolus confidence.      Will not give more with carbs at this time, he will work on the above.  Did assist him with decreasing manual mode basal from 0.7 to 0.5 units/hr for safety when slips into manual mode.  Follow up scheduled for 1 month.   "     PLAN     Calorie louise christian to help with carb counting.  Work on getting closer to accurate carb counts as you gain confidence in the system.  Manual mode basal decreased for safety (when you slip out of automation).  Try hard to only treat low blood sugars with 8  to 10 grams of rapid acting carb (ex: 1/4 cup fruit juice or 2 glucose tablets)  Follow high blood sugar protocol below as needed.  See updated back up plan below for when your pump malfunctions.    Omnipod insulin pump settings, including changes made today:    Basal rates:  0000 - 0000    0.5    Insulin to carb ratios (g/unit):  0000 - 0000    12      Insulin sensitivity/correction (mg/dL/unit):  0000 - 0000      48      Active insulin time: 3 hours    Blood glucose target range/correction threshold:  0000 - 0000    120 - 130      Insulin Pump Back Up Plan:    If your insulin pump fails or has an issue, the first thing you should do is call the pump company and have them troubleshoot.   *[employer] 24-hour Customer Support [phone]   *[employer] 24-hour Customer Support [phone]   *[employer] (Omnipod) 24-hour Customer Support [phone]   *[employer]ile[employer]) 24-hour Customer Support [phone]    If they determine you need a new pump, you will have to go back to using insulin injections until your new pump arrives.  You will need either insulin pen(s) and pen needles and/or insulin syringes to do injections if your pump is not working.  You can purchase pen needles and insulin syringes at any pharmacy without a prescription.  Just ask at the pharmacy counter.    With your long acting insulin Lantus, take 12 units once daily   -these insulins could also be called Lantus, Basaglar, Semglee, Rezvoglar (insulin glargine), Levemir (insulin detemir), Toujeo (insulin glargine U300), Tresiba (insulin degludec).  -If you do not have a current long acting prescription and you are unable to reach your doctor to have one sent to the pharmacy, then  you can buy a similar long acting insulin called NPH over the counter at around $25 a vial from any pharmacy.  If you choose this option, you need to take 1/2 the dose above every 12 hours.  Your dose would be 6 units twice a day.    With your rapid acting insulin Novolog, take 1 unit for every 12 grams of carbohydrate and to correct high blood sugars, take 1 unit for every 50 above 150.  -these insulins could be called Novolog or Fiasp (insulin aspart), Humalog, Admelog, Lyumjev (insulin lispro U100 or U200), Apidra (insulin glulisine)    If you don't have long acting, you must take corrections every 3 -4 hours with your rapid acting insulin to keep your blood sugars under control.  This includes overnight.    **If you have any questions regarding this information or need clarification, please call Dr. Choudhury's office.        Topics to cover at upcoming visits: Healthy Eating, Being Active, Monitoring, Taking Medication, Problem Solving, Reducing Risks, and Healthy Coping    Follow-up: 1 month- scheduled    See Care Plan for co-developed, patient-state behavior change goals.  AVS provided for patient today.    Education Materials Provided:  No new materials provided today      SUBJECTIVE/OBJECTIVE:  Presents for: Insulin Pump and CGM Review  Accompanied by: Self  Diabetes education in the past 24mo: Yes  Focus of Visit: Insulin Pump, CGM  Type of Pump visit: Pump Review  Type of CGM visit: Personal CGM Follow-up  Diabetes type: Type 1  Date of diagnosis: age 34  Disease course: Improving  How confident are you filling out medical forms by yourself:: Extremely  Transportation concerns: No  Difficulty affording diabetes medication?: No  Difficulty affording diabetes testing supplies?: No  Other concerns:: None  Cultural Influences/Ethnic Background:  Not  or       Diabetes Symptoms & Complications:  Diabetes Related Symptoms: None  Weight trend: Stable  Symptom course: Stable  Disease course:  "Improving  Complications assessed today?: Yes  Peripheral neuropathy: Yes    Patient Problem List and Family Medical History reviewed for relevant medical history, current medical status, and diabetes risk factors.    Vitals:  There were no vitals taken for this visit.  Estimated body mass index is 26.47 kg/m  as calculated from the following:    Height as of 9/24/24: 1.702 m (5' 7\").    Weight as of 9/24/24: 76.7 kg (169 lb).   Last 3 BP:   BP Readings from Last 3 Encounters:   09/24/24 123/75   09/23/24 100/79   08/01/24 124/84       History   Smoking Status    Former    Types: Cigarettes   Smokeless Tobacco    Never       Labs:  Lab Results   Component Value Date    A1C 7.2 08/01/2024    A1C 7.3 06/25/2021     Lab Results   Component Value Date     09/24/2024     02/25/2022     06/25/2021     Lab Results   Component Value Date    LDL 97 04/09/2024    LDL 96 06/25/2021     HDL Cholesterol   Date Value Ref Range Status   06/25/2021 90 >39 mg/dL Final     Direct Measure HDL   Date Value Ref Range Status   04/09/2024 76 >=40 mg/dL Final   ]  GFR Estimate   Date Value Ref Range Status   09/23/2024 >90 >60 mL/min/1.73m2 Final     Comment:     eGFR calculated using 2021 CKD-EPI equation.   06/25/2021 >90 >60 mL/min/[1.73_m2] Final     Comment:     Non  GFR Calc  Starting 12/18/2018, serum creatinine based estimated GFR (eGFR) will be   calculated using the Chronic Kidney Disease Epidemiology Collaboration   (CKD-EPI) equation.       GFR Estimate If Black   Date Value Ref Range Status   06/25/2021 >90 >60 mL/min/[1.73_m2] Final     Comment:      GFR Calc  Starting 12/18/2018, serum creatinine based estimated GFR (eGFR) will be   calculated using the Chronic Kidney Disease Epidemiology Collaboration   (CKD-EPI) equation.       Lab Results   Component Value Date    CR 0.80 09/23/2024    CR 0.72 06/25/2021     No results found for: \"MICROALBUMIN\"    Healthy " Eating:  Healthy Eating Assessed Today: No  Cultural/Anabaptist diet restrictions?: No  Do you have any food allergies or intolerances?: No  Meal planning/habits: None, Avoiding sweets  Who cooks/prepares meals for you?: Self, Spouse  Who purchases food in  your home?: Self, Spouse  How many times a week on average do you eat food made away from home (restaurant/take-out)?: 2  Meals include: Lunch, Dinner, Afternoon Snack  Beverages: Water, Coffee, Diet soda, Sports drinks, Alcohol  Has patient met with a dietitian in the past?: Yes    Being Active:  Being Active Assessed Today: No  Exercise:: Currently not exercising  Barrier to exercise: None    Monitoring:  Monitoring Assessed Today: Yes  Did patient bring glucose meter to appointment? : Yes  Blood Glucose Meter: FreeStyle, CGM  Times checking blood sugar at home (number): Other (see Comments)  Please elaborate:: Dexcom G7  Blood glucose trend: Decreasing        Taking Medications:  Diabetes Medication(s)       Diabetic Other       Glucagon (GVOKE HYPOPEN) 1 MG/0.2ML pen Inject the contents of 1 device under the skin into lower abdomen, outer thigh, or outer upper arm as needed for hypoglycemia. If no response after 15 minutes, additional 1 mg dose from a new device may be injected while waiting for emergency assistance.       Insulin       insulin aspart (NOVOLOG VIAL) 100 UNITS/ML vial Used 50 units per day via insulin pump     LANTUS SOLOSTAR 100 UNIT/ML soln INJECT 32 UNITS SUBCUTANEOUSLY ONCE DAILY     NOVOLOG PENFILL 100 UNIT/ML soln INJECT 1 UNIT PER 30 GRAMS OF CARBOHYDRATES PLUS 1 UNIT PER 50MG/DL ABOVE 200MG/DL. MAX DOSE 20 UNITS PER DAY            Taking Medication Assessed Today: Yes  Current Treatments: Insulin Pump  Given by: Patient  Injection/Infusion sites: Abdomen  Problems taking diabetes medications regularly?: Yes (always takes insulin after the meal)  Diabetes medication side effects?: Yes    Problem Solving:  Problem Solving Assessed Today:  Yes  Is the patient at risk for hypoglycemia?: Yes  Hypoglycemia Frequency: Weekly  Hypoglycemia Treatment: Juice  Does patient have glucagon emergency kit?: Yes  Is the patient at risk for DKA?: Yes  Does patient have ketone test strips?: Yes  Does patient have DKA prevention plan?: Yes  Does patient have severe weather/disaster plan for diabetes management?: Not Needed              Reducing Risks:  Reducing Risks Assessed Today: No  Has dilated eye exam at least once a year?: Yes  Sees dentist every 6 months?: No  Feet checked by healthcare provider in the last year?: Yes    Healthy Coping:  Healthy Coping Assessed Today: No  Emotional response to diabetes: Ready to learn, Guilt/Self-blame  Informal Support system:: Family, Spouse  Stage of change: ACTION (Actively working towards change)  Support resources: [employer]  Patient Activation Measure Survey Score:      9/19/2011     9:00 AM   LAUREL Score (Last Two)   LAUREL Raw Score 51   Activation Score 91.6   LAUREL Level 4         Care Plan and Education Provided:  See above    Melonie Leon, PharmD, [facility] [place][note 2], [facility] and [place] [facility] Education      Time Spent: 37 minutes  Encounter Type: Individual    Any diabetes medication dose changes were made via the CDE Protocol per the patient's referring provider. A copy of this encounter was shared with the provider.

## 2024-12-11 NOTE — LETTER
12/11/2024         RE: Rudolph Tao  [id number] [facility] [id number]        Dear Colleague,    Thank you for referring your patient, Rudolph Tao, to the [facility]. Please see a copy of my visit note below.    Diabetes Self-Management Education & Support    Presents for: Insulin Pump and CGM Review for DM1    Type of Service: Telephone Visit    Originating Location (Patient Location): Home  Distant Location (Provider Location): [facility]  Mode of Communication:  Telephone    Telephone Visit Start Time: 258pm  Telephone Visit End Time (telephone visit stop time): 335pm    How would patient like to obtain AVS? MyChart      REPORTS:      Insulin Pump Information  Insulin Pump Brand: OmniPod  Does patient have an insulin multiple daily injection back-up plan?: Yes    Education specific to insulin pump provided today on:   navigating insulin pump screen/functions/features, automated insulin delivery functions/features, importance of accurate carbohydrate counting, how to use the bolus calculator, importance of bolusing before meals, steps to take when glucose is above 240 mg/dL, and multiple daily injection back-up plan in case of pump failure    Opportunities for ongoing education and support in diabetes-self management were discussed.    Pt verbalized understanding of concepts discussed and recommendations provided today.         ASSESSMENT  Kris was called today for diabetes education follow up.  He has been on the Omnipod 5 for a short time now.  Was on MDI prior with variable blood sugars.  He went into DKA shortly after starting the pump.  He has been working with my partner Radha and currently is not seeing endo.     Diabetes Medication(s)       Diabetic Other       Glucagon (GVOKE HYPOPEN) 1 MG/0.2ML pen Inject the contents of 1 device under the skin into lower abdomen, outer thigh, or outer upper arm as needed for hypoglycemia. If no  "response after 15 minutes, additional 1 mg dose from a new device may be injected while waiting for emergency assistance.       Insulin       insulin aspart (NOVOLOG VIAL) 100 UNITS/ML vial Used 50 units per day via insulin pump     LANTUS SOLOSTAR 100 UNIT/ML soln INJECT 32 UNITS SUBCUTANEOUSLY ONCE DAILY     NOVOLOG PENFILL 100 UNIT/ML soln INJECT 1 UNIT PER 30 GRAMS OF CARBOHYDRATES PLUS 1 UNIT PER 50MG/DL ABOVE 200MG/DL. MAX DOSE 20 UNITS PER DAY          Reviewed high BS protocol due to DKA on pump once already.  Reviewed in detail multiple times and will also put protocol in AVS.    Kris had questions about \"use cgm\" button.  He is using it correctly (every time except when having seconds).    Had blood last night after removing pod- explained this happens sometimes when you hit a vessel.  Can happen with pump or CGM on insertion or removal.    Omnipod Insulin Pump Statistics:  Total Daily Dose (TDD): 17.2 units, with 28% bolus and 72% basal  Bolus overrides per day: 0% or 0 boluses  Automated mode basal of 12.3 units vs manual mode basal total of 16.8 units  Average number of carbs entered daily: 52 grams   Site changes every 3 days  Time spent in automated mode 95 %  Time CGM active 92.3%    CGM summary, last 2 weeks:  Avg  mg/dL  GMI 7.2%  Time in Range (TIR)  mg/dL is 74%  0% low under 70 mg/dL (0% less than 54)  22% high 181-250 mg/dL  4% very high over 250 mg/dL  Glucose trends show: highs averaging 180 post supper (he only eats 1 meal per day), good fasting control.      Daily pump/CGM data shows When in manual mode, basal too strong.  Overtreatment of low.  When missed boluses or extended highs, is taking corrections appropriately.    We discussed his fear of lows.  He admits he is likely going a bit high after supper because he is still a bit nervous to cover all his carbs.  He also does a lot of guessing on carb counts.  Discussed use of labels vs downloading calorie louise christian.  Continue to " work on bolus confidence.      Will not give more with carbs at this time, he will work on the above.  Did assist him with decreasing manual mode basal from 0.7 to 0.5 units/hr for safety when slips into manual mode.  Follow up scheduled for 1 month.       PLAN     [employer] christian to help with carb counting.  Work on getting closer to accurate carb counts as you gain confidence in the system.  Manual mode basal decreased for safety (when you slip out of automation).  Try hard to only treat low blood sugars with 8  to 10 grams of rapid acting carb (ex: 1/4 cup fruit juice or 2 glucose tablets)  Follow high blood sugar protocol below as needed.  See updated back up plan below for when your pump malfunctions.    Omnipod insulin pump settings, including changes made today:    Basal rates:  0000 - 0000    0.5    Insulin to carb ratios (g/unit):  0000 - 0000    12      Insulin sensitivity/correction (mg/dL/unit):  0000 - 0000      48      Active insulin time: 3 hours    Blood glucose target range/correction threshold:  0000 - 0000    120 - 130      Insulin Pump Back Up Plan:    If your insulin pump fails or has an issue, the first thing you should do is call the pump company and have them troubleshoot.   *[employer] 24-hour Customer Support [phone]   *[employer] 24-hour Customer Support [phone]   *[employer] (Omnipod) 24-hour Customer Support [phone]   *[employer] (ilet) 24-hour Customer Support [phone]    If they determine you need a new pump, you will have to go back to using insulin injections until your new pump arrives.  You will need either insulin pen(s) and pen needles and/or insulin syringes to do injections if your pump is not working.  You can purchase pen needles and insulin syringes at any pharmacy without a prescription.  Just ask at the pharmacy counter.    With your long acting insulin Lantus, take 12 units once daily   -these insulins could also be called Lantus, Basaglar, Semglee,  Rezvoglar (insulin glargine), Levemir (insulin detemir), Toujeo (insulin glargine U300), Tresiba (insulin degludec).  -If you do not have a current long acting prescription and you are unable to reach your doctor to have one sent to the pharmacy, then you can buy a similar long acting insulin called NPH over the counter at around $25 a vial from any pharmacy.  If you choose this option, you need to take 1/2 the dose above every 12 hours.  Your dose would be 6 units twice a day.    With your rapid acting insulin Novolog, take 1 unit for every 12 grams of carbohydrate and to correct high blood sugars, take 1 unit for every 50 above 150.  -these insulins could be called Novolog or Fiasp (insulin aspart), Humalog, Admelog, Lyumjev (insulin lispro U100 or U200), Apidra (insulin glulisine)    If you don't have long acting, you must take corrections every 3 -4 hours with your rapid acting insulin to keep your blood sugars under control.  This includes overnight.    **If you have any questions regarding this information or need clarification, please call Dr. Choudhury's office.        Topics to cover at upcoming visits: Healthy Eating, Being Active, Monitoring, Taking Medication, Problem Solving, Reducing Risks, and Healthy Coping    Follow-up: 1 month- scheduled    See Care Plan for co-developed, patient-state behavior change goals.  AVS provided for patient today.    Education Materials Provided:  No new materials provided today      SUBJECTIVE/OBJECTIVE:  Presents for: Insulin Pump and CGM Review  Accompanied by: Self  Diabetes education in the past 24mo: Yes  Focus of Visit: Insulin Pump, CGM  Type of Pump visit: Pump Review  Type of CGM visit: Personal CGM Follow-up  Diabetes type: Type 1  Date of diagnosis: age 34  Disease course: Improving  How confident are you filling out medical forms by yourself:: Extremely  Transportation concerns: No  Difficulty affording diabetes medication?: No  Difficulty affording diabetes  "testing supplies?: No  Other concerns:: None  Cultural Influences/Ethnic Background:  Not  or       Diabetes Symptoms & Complications:  Diabetes Related Symptoms: None  Weight trend: Stable  Symptom course: Stable  Disease course: Improving  Complications assessed today?: Yes  Peripheral neuropathy: Yes    Patient Problem List and Family Medical History reviewed for relevant medical history, current medical status, and diabetes risk factors.    Vitals:  There were no vitals taken for this visit.  Estimated body mass index is 26.47 kg/m  as calculated from the following:    Height as of 9/24/24: 1.702 m (5' 7\").    Weight as of 9/24/24: 76.7 kg (169 lb).   Last 3 BP:   BP Readings from Last 3 Encounters:   09/24/24 123/75   09/23/24 100/79   08/01/24 124/84       History   Smoking Status    Former    Types: Cigarettes   Smokeless Tobacco    Never       Labs:  Lab Results   Component Value Date    A1C 7.2 08/01/2024    A1C 7.3 06/25/2021     Lab Results   Component Value Date     09/24/2024     02/25/2022     06/25/2021     Lab Results   Component Value Date    LDL 97 04/09/2024    LDL 96 06/25/2021     HDL Cholesterol   Date Value Ref Range Status   06/25/2021 90 >39 mg/dL Final     Direct Measure HDL   Date Value Ref Range Status   04/09/2024 76 >=40 mg/dL Final   ]  GFR Estimate   Date Value Ref Range Status   09/23/2024 >90 >60 mL/min/1.73m2 Final     Comment:     eGFR calculated using 2021 CKD-EPI equation.   06/25/2021 >90 >60 mL/min/[1.73_m2] Final     Comment:     Non  GFR Calc  Starting 12/18/2018, serum creatinine based estimated GFR (eGFR) will be   calculated using the Chronic Kidney Disease Epidemiology Collaboration   (CKD-EPI) equation.       GFR Estimate If Black   Date Value Ref Range Status   06/25/2021 >90 >60 mL/min/[1.73_m2] Final     Comment:      GFR Calc  Starting 12/18/2018, serum creatinine based estimated GFR (eGFR) will be " "  calculated using the Chronic Kidney Disease Epidemiology Collaboration   (CKD-EPI) equation.       Lab Results   Component Value Date    CR 0.80 09/23/2024    CR 0.72 06/25/2021     No results found for: \"MICROALBUMIN\"    Healthy Eating:  Healthy Eating Assessed Today: No  Cultural/Mandaeism diet restrictions?: No  Do you have any food allergies or intolerances?: No  Meal planning/habits: None, Avoiding sweets  Who cooks/prepares meals for you?: Self, Spouse  Who purchases food in  your home?: Self, Spouse  How many times a week on average do you eat food made away from home (restaurant/take-out)?: 2  Meals include: Lunch, Dinner, Afternoon Snack  Beverages: Water, Coffee, Diet soda, Sports drinks, Alcohol  Has patient met with a dietitian in the past?: Yes    Being Active:  Being Active Assessed Today: No  Exercise:: Currently not exercising  Barrier to exercise: None    Monitoring:  Monitoring Assessed Today: Yes  Did patient bring glucose meter to appointment? : Yes  Blood Glucose Meter: FreeStyle, CGM  Times checking blood sugar at home (number): Other (see Comments)  Please elaborate:: Dexcom G7  Blood glucose trend: Decreasing        Taking Medications:  Diabetes Medication(s)       Diabetic Other       Glucagon (GVOKE HYPOPEN) 1 MG/0.2ML pen Inject the contents of 1 device under the skin into lower abdomen, outer thigh, or outer upper arm as needed for hypoglycemia. If no response after 15 minutes, additional 1 mg dose from a new device may be injected while waiting for emergency assistance.       Insulin       insulin aspart (NOVOLOG VIAL) 100 UNITS/ML vial Used 50 units per day via insulin pump     LANTUS SOLOSTAR 100 UNIT/ML soln INJECT 32 UNITS SUBCUTANEOUSLY ONCE DAILY     NOVOLOG PENFILL 100 UNIT/ML soln INJECT 1 UNIT PER 30 GRAMS OF CARBOHYDRATES PLUS 1 UNIT PER 50MG/DL ABOVE 200MG/DL. MAX DOSE 20 UNITS PER DAY            Taking Medication Assessed Today: Yes  Current Treatments: Insulin Pump  Given " by: Patient  Injection/Infusion sites: Abdomen  Problems taking diabetes medications regularly?: Yes (always takes insulin after the meal)  Diabetes medication side effects?: Yes    Problem Solving:  Problem Solving Assessed Today: Yes  Is the patient at risk for hypoglycemia?: Yes  Hypoglycemia Frequency: Weekly  Hypoglycemia Treatment: Juice  Does patient have glucagon emergency kit?: Yes  Is the patient at risk for DKA?: Yes  Does patient have ketone test strips?: Yes  Does patient have DKA prevention plan?: Yes  Does patient have severe weather/disaster plan for diabetes management?: Not Needed              Reducing Risks:  Reducing Risks Assessed Today: No  Has dilated eye exam at least once a year?: Yes  Sees dentist every 6 months?: No  Feet checked by healthcare provider in the last year?: Yes    Healthy Coping:  Healthy Coping Assessed Today: No  Emotional response to diabetes: Ready to learn, Guilt/Self-blame  Informal Support system:: Family, Spouse  Stage of change: ACTION (Actively working towards change)  Support resources: TCM Bertha  Patient Activation Measure Survey Score:      9/19/2011     9:00 AM   LAUREL Score (Last Two)   LAUREL Raw Score 51   Activation Score 91.6   LAUREL Level 4         Care Plan and Education Provided:  See above    Melonie Leon, PharmD, [facility], [facility]      Time Spent: 37 minutes  Encounter Type: Individual    Any diabetes medication dose changes were made via the CDE Protocol per the patient's referring provider. A copy of this encounter was shared with the provider.

## 2024-12-11 NOTE — TELEPHONE ENCOUNTER
I returned Kris's call. He shares he just had an appointment with Melonie Leon this afternoon. BG was 226 at start of appt and did a bolus at that time. Now he is 192. He wonders what to do. I reviewed Melonie's note.  I told Kris that since it has come down I would expect that the pump is functioning well.  It can take up to 3 hours to see it come down to his target range.  If it was staying the same or increasing then I would recommend what Ninoska said about changing the pod and giving a bolus with a pen.  I reviewed insulin action time with him and had him use the sensor glucose option again on his OmniPod 5.  This time it recommended a dose of 0.8 units.  I recommended he go ahead and give this bolus and wait 2 hours to be sure the blood sugar continues to decrease.  He was recently in DKA and is new to the pump, so is nervous about that happening again.  He did check his ketones before calling and he said they were trace/small.  I recommended he check his ketones again in 2 hours just to be sure they are headed in the right direction.  We reviewed what to do if the blood sugars are not coming down.  I reminded him that he can give a correction dose if the pump recommends that when he puts in his sensor reading.  If it predicts he will go low with that correction dose then it will not recommend anything.    I told Kris I would update Melonie on the situation.  I did update Ninoska this morning and she had already looked at his glucose reports and he did well overnight with the pod.    Nakita Roca RN, [facility]

## 2024-12-11 NOTE — TELEPHONE ENCOUNTER
Health Call Center    Phone Message    May a detailed message be left on voicemail: yes     Reason for Call: Other: patient calling stating he doesn't know what he did is correct or what they talked about on the phone. His BS is higher than he thought it would be. Please call him and advise.

## 2024-12-11 NOTE — PATIENT INSTRUCTIONS
[employer] louise christian to help with carb counting.  Work on getting closer to accurate carb counts as you gain confidence in the system.  Manual mode basal decreased for safety (when you slip out of automation).  Try hard to only treat low blood sugars with 8  to 10 grams of rapid acting carb (ex: 1/4 cup fruit juice or 2 glucose tablets)  Follow high blood sugar protocol below as needed.  See updated back up plan below for when your pump malfunctions.    Melonie Leon, PharmD, [facility], [facility] and [place] [facility]    [place] [facility] for the [facility]:  For Your Diabetes Education and Nutrition Appointments Call:  [phone]   For Diabetes Education or Nutrition Related Questions:   Phone: [phone]  Send [employer] Message   If you need a medication refill please contact your pharmacy. Please allow 3 business days for your refills to be completed.    High Blood Sugar (>250 mg/dl) Protocol for Pump Users:  If a single blood sugar reading is above 250 mg/dl, follow these steps:   1. Immediately take a correction bolus with the pump using your bolus wizard (like normal).    2. Recheck your blood sugar in 1 hour.  If your blood sugar has gone down, your pump is working properly and continue to monitor your blood sugars as usual. If your blood sugar is not coming down in an hour, follow these steps closely and entirely:    A. You need to take an injection of rapid acting insulin using an insulin syringe or insulin pen (not using your pump).  Give this dose 2 to 3 hours to work.  Do not give any correction doses with your pump until this time is up or you will stack insulin and likely have a low blood sugar later.    B. Change out your entire infusion set, tubing and reservoir, being sure to use a fresh unopened, unused vial of insulin if you have it.  Changing your site is a MUST!  Do it right away.  IF IN DOUBT, CHANGE IT OUT!!    C. Check your blood or  urine for ketones.  If you have moderate or large urine ketones or if your blood ketones are over 0.6 mmol/L- Call your provider!!  You will need to take additional insulin and they can provide instruction.  Do not wait for them to get back to you before doing the following steps: Drink at least a cup of water every hour and limit physical activity.  1.  Recheck your blood glucose and ketones in 60 minutes.  If your blood sugar is still not coming down and ketones are still present- REPORT TO YOUR NEAREST EMERGENCY ROOM!      **If at any time, you have symptoms of extreme headache, severe nausea, any vomiting or trouble breathing in conjunction with elevated blood sugars- immediately report to your nearest emergency room!      Insulin Pump Back Up Plan:    If your insulin pump fails or has an issue, the first thing you should do is call the pump company and have them troubleshoot.   *[employer] 24-hour Customer Support [phone]   *[employer] 24-hour Customer Support [phone]   *[employer] (Omnipod) 24-hour Customer Support [phone]   *[employer] (ile[employer]) 24-hour Customer Support [phone]    If they determine you need a new pump, you will have to go back to using insulin injections until your new pump arrives.  You will need either insulin pen(s) and pen needles and/or insulin syringes to do injections if your pump is not working.  You can purchase pen needles and insulin syringes at any pharmacy without a prescription.  Just ask at the pharmacy counter.    With your long acting insulin Lantus, take 12 units once daily   -these insulins could also be called Lantus, Basaglar, Semglee, Rezvoglar (insulin glargine), Levemir (insulin detemir), Toujeo (insulin glargine U300), Tresiba (insulin degludec).  -If you do not have a current long acting prescription and you are unable to reach your doctor to have one sent to the pharmacy, then you can buy a similar long acting insulin called NPH over the counter  at around $25 a vial from any pharmacy.  If you choose this option, you need to take 1/2 the dose above every 12 hours.  Your dose would be 6 units twice a day.    With your rapid acting insulin Novolog, take 1 unit for every 12 grams of carbohydrate and to correct high blood sugars, take 1 unit for every 50 above 150.  -these insulins could be called Novolog or Fiasp (insulin aspart), Humalog, Admelog, Lyumjev (insulin lispro U100 or U200), Apidra (insulin glulisine)    If you don't have long acting, you must take corrections every 3 -4 hours with your rapid acting insulin to keep your blood sugars under control.  This includes overnight.    **If you have any questions regarding this information or need clarification, please call Dr. Choudhury's office.

## 2024-12-12 ENCOUNTER — VIRTUAL VISIT (OUTPATIENT)
Dept: PHARMACY | Facility: CLINIC | Age: 54
End: 2024-12-12
Payer: COMMERCIAL

## 2024-12-12 DIAGNOSIS — E10.311 TYPE 1 DIABETES MELLITUS WITH RETINOPATHY AND MACULAR EDEMA, UNSPECIFIED LATERALITY, UNSPECIFIED RETINOPATHY SEVERITY (H): Primary | ICD-10-CM

## 2024-12-12 DIAGNOSIS — M54.50 CHRONIC LOW BACK PAIN WITHOUT SCIATICA, UNSPECIFIED BACK PAIN LATERALITY: ICD-10-CM

## 2024-12-12 DIAGNOSIS — H40.002 GLAUCOMA SUSPECT OF LEFT EYE: ICD-10-CM

## 2024-12-12 DIAGNOSIS — M54.50 CHRONIC RIGHT-SIDED LOW BACK PAIN WITHOUT SCIATICA: ICD-10-CM

## 2024-12-12 DIAGNOSIS — G89.29 CHRONIC LOW BACK PAIN WITHOUT SCIATICA, UNSPECIFIED BACK PAIN LATERALITY: ICD-10-CM

## 2024-12-12 DIAGNOSIS — G25.81 RESTLESS LEGS: ICD-10-CM

## 2024-12-12 DIAGNOSIS — F41.1 GAD (GENERALIZED ANXIETY DISORDER): ICD-10-CM

## 2024-12-12 DIAGNOSIS — I10 HYPERTENSION GOAL BP (BLOOD PRESSURE) < 140/90: ICD-10-CM

## 2024-12-12 DIAGNOSIS — R11.2 NAUSEA AND VOMITING, UNSPECIFIED VOMITING TYPE: ICD-10-CM

## 2024-12-12 DIAGNOSIS — E78.5 HYPERLIPIDEMIA LDL GOAL <100: ICD-10-CM

## 2024-12-12 DIAGNOSIS — G89.29 CHRONIC RIGHT-SIDED LOW BACK PAIN WITHOUT SCIATICA: ICD-10-CM

## 2024-12-12 DIAGNOSIS — G25.81 RESTLESS LEGS: Primary | ICD-10-CM

## 2024-12-12 PROCEDURE — 99607 MTMS BY PHARM ADDL 15 MIN: CPT | Mod: 93

## 2024-12-12 PROCEDURE — 99605 MTMS BY PHARM NP 15 MIN: CPT | Mod: 93

## 2024-12-12 RX ORDER — OMEPRAZOLE 20 MG/1
20 TABLET, DELAYED RELEASE ORAL DAILY PRN
COMMUNITY

## 2024-12-12 RX ORDER — ROPINIROLE 1 MG/1
.5-1 TABLET, FILM COATED ORAL AT BEDTIME
Qty: 90 TABLET | Refills: 1 | Status: SHIPPED | OUTPATIENT
Start: 2024-12-12

## 2024-12-12 RX ORDER — CALCIUM CARBONATE 500 MG/1
500 TABLET, CHEWABLE ORAL DAILY PRN
COMMUNITY

## 2024-12-12 NOTE — PROGRESS NOTES
Medication Therapy Management (MTM) Encounter    ASSESSMENT:                            Medication Adherence/Access: Pillbox (separate boxed for AM and PM) and fills it every week; once in a while he forgets to take the PM medication.     Diabetes Type I: A1C above goal of < 7%. No CGM readings today due not connected with patient account. However, patient is following with diabetes educator. Patient is using insulin pump.     Hypertension: In clinic and home BP relatively within goal of < 130/80. Patient is experiencing lightheadedness and dizziness likely due to orthostatic hypotension. Advised patient to slowly raise up and then stand firm before started walking. Taking Lyrica, and not been active is also contributing to this side effect.     Hyperlipidemia    Pravastatin 20 mg daily  Patient reports no significant myalgias or other side effects.  The 10-year ASCVD risk score (Akilah MUÑOZ, et al., 2019) is: 8.2%    Values used to calculate the score:      Age: 54 years      Sex: Male      Is Non- : No      Diabetic: Yes      Tobacco smoker: No      Systolic Blood Pressure: 123 mmHg      Is BP treated: Yes      HDL Cholesterol: 76 mg/dL      Total Cholesterol: 186 mg/dL       Muscle spasm:Nerve Pain:    Pregabalin 75 mg 3 times daily   Patient noted the pain is not controlled sometimes and he would have hard time walking.    Nausea and vomiting:   Ondansetron 4 mg tablet every 8 hours as needed for nausea   Metoclopramide 10 mg 4 times daily as needed   Patient noted this medications are used as needed and effective   Glaucoma:   Dorzolamide-timolol (Cosopt) 2-0.5% ophthalmic solution 1 drop 2 times daily   Brimonidine 0.2% ophthalmic solution 1 drop 2 times daily   Latanoprost 0.005% ophthalmic solution 1 drop every evening     RLS:   Ropinirole 0.25 mg and takes 1 to 2 tablets by mouth at bedtime  He does not take it every night;noted this medication is not working at it should.     Mental  Health    Anxiety, Depression, and Insomnia   Fluoxetine 20 mg daily   Alprazolam 0.5 mg and take 1 tablet 3 times daily as needed for anxiety. Patient takes it very rarely like once in a week. He takes it when his stress goes up; it makes him sleep in that timeframe.    Pregabalin 75 mg 3 times daily   Patient reports no current medication side effects.  Patient is not seeing a therapist.   Patient is not seeing a psychiatrist.  Patient noted these medications are helping. His sister has liver transplant      PLAN:                            Increase ropinirole dose to 0.5 mg to 1 mg at bedtime     Follow-up: Due on when needed     SUBJECTIVE/OBJECTIVE:                          Kris Tao is a 54 year old male seen for a transitions of care visit. He was discharged from [facility] on 11/16/2024 for Diabetic Ketoacidosis.      Reason for visit: BARBARA    Allergies/ADRs: Reviewed in chart  Past Medical History: Reviewed in chart  Tobacco: He reports that he quit smoking about 29 years ago. His smoking use included cigarettes. He has been exposed to tobacco smoke. He has never used smokeless tobacco.  Alcohol: 4-6 beverages / week  Caffeine: One cup of coffee three times a week  Medication Adherence/Access: Pillbox (separate boxed for AM and PM) and fills it every week; once in a while he forgets to take the PM medication.     Diabetes Type I:   NovoLog about 15 units/day via insulin pump   Lantus 32 units subcutaneously once daily   NovoLog 100 units/mL solution inject 1 unit per 30 g of carbohydrates plus 1 unit per 50 mg./dl above 200 mg/dL.Maximum dose 20 units/day  Patient is experiencing the following side effects: Dizziness; light headedness when standing. He noted he has some low sugar reading over night, but DE  has been adjusting the pump.   Current diabetes symptoms:  he has started insulin pump about 2 months; noted he is happy with the insulin pump   Diet/Exercise: Did not discussed       Blood sugar monitoring: CGM; not connect with patient   Eye exam is up to date  Foot exam: due  Lab Results   Component Value Date    A1C 7.2 08/01/2024    A1C 7.4 04/09/2024    A1C 7.3 11/10/2022    A1C 8.1 02/25/2022     Hypertension    Lisinopril-hydrochlorothiazide 20-25 mg 1 tablet daily  Patient reports the following medication side effects: Dizzy when standing up.   Patient self monitors blood pressure.  Home BP monitoring 133/9, 71; 134/87, 85, 122/89,106;96/67;109  .    BP Readings from Last 3 Encounters:   09/24/24 123/75   09/23/24 100/79   08/01/24 124/84        Hyperlipidemia    Pravastatin 20 mg daily  Patient reports no significant myalgias or other side effects.  The 10-year ASCVD risk score (Akilah MUÑOZ, et al., 2019) is: 8.2%    Values used to calculate the score:      Age: 54 years      Sex: Male      Is Non- : No      Diabetic: Yes      Tobacco smoker: No      Systolic Blood Pressure: 123 mmHg      Is BP treated: Yes      HDL Cholesterol: 76 mg/dL      Total Cholesterol: 186 mg/dL       Muscle spasm:Nerve Pain:    Pregabalin 75 mg 3 times daily   Patient noted the pain is not controlled sometimes and he would have hard time walking.    Nausea and vomiting:   Ondansetron 4 mg tablet every 8 hours as needed for nausea   Metoclopramide 10 mg 4 times daily as needed   Patient noted this medications are used as needed and effective   Glaucoma:   Dorzolamide-timolol (Cosopt) 2-0.5% ophthalmic solution 1 drop 2 times daily   Brimonidine 0.2% ophthalmic solution 1 drop 2 times daily   Latanoprost 0.005% ophthalmic solution 1 drop every evening     RLS:   Ropinirole 0.25 mg and takes 1 to 2 tablets by mouth at bedtime    He does not take it every night;noted this medication is not working at it should.        Mental Health    Anxiety, Depression, and Insomnia   Fluoxetine 20 mg daily   Alprazolam 0.5 mg and take 1 tablet 3 times daily as needed for anxiety. Patient takes it very  rarely like once in a week. He takes it when his stress goes up; it makes him sleep in that timeframe.    Pregabalin 75 mg 3 times daily   Patient reports no current medication side effects.  Patient is not seeing a therapist.   Patient is not seeing a psychiatrist.  Patient noted these medications are helping. His sister has liver transplant        Today's Vitals: There were no vitals taken for this visit.  ----------------  Post Discharge Medication Reconciliation Status: discharge medications reconciled, continue medications without change.    I spent 40 minutes with this patient today. All changes were made via collaborative practice agreement with Chuy Choudhury DO.     A summary of these recommendations was sent via [employer].      Telemedicine Visit Details  The patient's medications can be safely assessed via a telemedicine encounter.  Type of service:  Telephone visit  Originating Location (pt. Location): Home  {PROVIDER LOCATION On-site should be selected for visits conducted from your clinic location or adjoining [facility] hospital, academic office, or other nearby [facility] building. Off-site should be selected for all other provider locations, including home:[id number]}  Distant Location (provider location):  On-site  Start Time:  2:00 PM  End Time: 2:40 PM     Medication Therapy Recommendations  No medication therapy recommendations to display     Jey Rosario, PharmD     Medication Therapy Management (MTM) Pharmacist     [phone]     [email or web address]

## 2024-12-12 NOTE — PATIENT INSTRUCTIONS
"Recommendations from today's MTM visit:                                                      MTM (medication therapy management) is a service provided by a clinical pharmacist designed to help you get the most of out of your medicines.   Today we reviewed what your medicines are for, how to know if they are working, that your medicines are safe and how to make your medicine regimen as easy as possible.      Increase ropinirole dose to 0.5 mg to 1 mg at bedtime     Follow-up: Due on when needed     It was great speaking with you today.  I value your experience and would be very thankful for your time in providing feedback in our clinic survey. In the next few days, you may receive an email or text message from [employer] with a link to a survey related to your  clinical pharmacist.\"     To schedule another MTM appointment, please call the clinic directly or you may call the MTM scheduling line at {[email or web address]:[id number]}    My Clinical Pharmacist's contact information:                                                      Please feel free to contact me with any questions or concerns you have.      ***  "

## 2024-12-16 ENCOUNTER — OFFICE VISIT (OUTPATIENT)
Dept: FAMILY MEDICINE | Facility: CLINIC | Age: 54
End: 2024-12-16
Payer: COMMERCIAL

## 2024-12-16 VITALS
HEART RATE: 84 BPM | OXYGEN SATURATION: 97 % | TEMPERATURE: 97.4 F | HEIGHT: 67 IN | BODY MASS INDEX: 25.9 KG/M2 | WEIGHT: 165 LBS | DIASTOLIC BLOOD PRESSURE: 88 MMHG | SYSTOLIC BLOOD PRESSURE: 138 MMHG | RESPIRATION RATE: 16 BRPM

## 2024-12-16 DIAGNOSIS — G63 POLYNEUROPATHY ASSOCIATED WITH UNDERLYING DISEASE (H): Primary | ICD-10-CM

## 2024-12-16 DIAGNOSIS — E10.311 TYPE 1 DIABETES MELLITUS WITH RETINOPATHY AND MACULAR EDEMA, UNSPECIFIED LATERALITY, UNSPECIFIED RETINOPATHY SEVERITY (H): ICD-10-CM

## 2024-12-16 DIAGNOSIS — I10 HYPERTENSION GOAL BP (BLOOD PRESSURE) < 140/90: ICD-10-CM

## 2024-12-16 PROCEDURE — 99214 OFFICE O/P EST MOD 30 MIN: CPT | Mod: 25 | Performed by: INTERNAL MEDICINE

## 2024-12-16 PROCEDURE — 90471 IMMUNIZATION ADMIN: CPT | Performed by: INTERNAL MEDICINE

## 2024-12-16 PROCEDURE — 90746 HEPB VACCINE 3 DOSE ADULT IM: CPT | Performed by: INTERNAL MEDICINE

## 2024-12-16 RX ORDER — LISINOPRIL 20 MG/1
20 TABLET ORAL DAILY
Qty: 30 TABLET | Refills: 1 | Status: SHIPPED | OUTPATIENT
Start: 2024-12-16

## 2024-12-16 ASSESSMENT — PAIN SCALES - GENERAL: PAINLEVEL_OUTOF10: NO PAIN (0)

## 2024-12-16 NOTE — PROGRESS NOTES
"  Assessment & Plan     (G63) Polyneuropathy associated with underlying disease (H)  (primary encounter diagnosis)  Comment: He has a history of neuropathy with his diabetes.  He has peripheral neuropathy primarily.  There is also question of autonomic neuropathy with diabetic gastroparesis.  It is possible that he has autonomic dysfunction with low blood pressures.  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  He has been diabetic for over 20 years  Plan: Will check albumin Random Urine Quantitative with Creat         Ratio.  He is on lisinopril to help with renal protection             (I10) Hypertension goal BP (blood pressure) < 140/90  Comment: His blood pressure somewhat variable.  His blood pressure in the clinic was initially 152/93.  Repeat blood pressure was 138/88.  His blood pressures that he been getting at home are quite low.  I am not surprised that it made him lightheaded and dizzy.  He is on lisinopril with hydrochlorothiazide 20/25  Plan: Will stop hydrochlorothiazide and just continue him on lisinopril (ZESTRIL) 20 MG tablet.  Will have him watch his blood pressures.                   BMI  Estimated body mass index is 25.84 kg/m  as calculated from the following:    Height as of this encounter: 1.702 m (5' 7\").    Weight as of this encounter: 74.8 kg (165 lb).       He should follow-up in a couple of weeks after seeing cardiology    Subjective   Kirs is a 54 year old, presenting for the following health issues:  Hypertension        12/16/2024     4:44 PM   Additional Questions   Roomed by Felecia CAREY   Accompanied by Wife     History of Present Illness       Reason for visit:  Blood pressure issues. He is missing 1 dose(s) of medications per week.  He is not taking prescribed medications " "regularly due to other.     54-year-old male presents with a history of low blood pressures.  He has a history of type 1 diabetes with neuropathy.  He was evaluated previously for gastroparesis.  He had a gastric emptying time in 2021 that was normal.  He was treated with medications.  He will occasionally take these medications when he gets nauseated.  He also has peripheral neuropathy and is on Requip and Lyrica.  He has been noting that his blood pressures have been quite low.  He brings in his blood pressure readings and they were quite low.  They were also quite variable.  He had additional heart rate readings that were not that variable.  His hemoglobin A1c was 7.2 several months ago.  He has not had a urine for micro globin for quite a while.        Objective    BP (!) 152/93   Pulse 84   Temp 97.4  F (36.3  C) (Tympanic)   Resp 16   Ht 1.702 m (5' 7\")   Wt 74.8 kg (165 lb)   SpO2 97%   BMI 25.84 kg/m    Body mass index is 25.84 kg/m .  Physical Exam   GENERAL: alert and no distress  EYES: Eyes grossly normal to inspection, PERRL and conjunctivae and sclerae normal  HENT: ear canals and TM's normal, nose and mouth without ulcers or lesions  NECK: no adenopathy, no asymmetry, masses, or scars  RESP: lungs clear to auscultation - no rales, rhonchi or wheezes  CV: regular rate and rhythm, normal S1 S2, no S3 or S4, no murmur, click or rub, no peripheral edema  ABDOMEN: soft, nontender, no hepatosplenomegaly, no masses and bowel sounds normal  MS: no gross musculoskeletal defects noted, no edema  SKIN: no suspicious lesions or rashes  NEURO: Normal strength and tone, mentation intact and speech normal  PSYCH: mentation appears normal, affect normal/bright            Signed Electronically by: Victoriano Fry MD    "

## 2024-12-17 ENCOUNTER — TELEPHONE (OUTPATIENT)
Dept: CARDIOLOGY | Facility: CLINIC | Age: 54
End: 2024-12-17
Payer: COMMERCIAL

## 2024-12-17 NOTE — TELEPHONE ENCOUNTER
[facility] Call Center    Phone Message    May a detailed message be left on voicemail: yes     Reason for Call: Appointment Intake    Referring Provider Name:     Victoriano Fry MD in [facility]     Diagnosis and/or Symptoms: Polyneuropathy associated with underlying disease (H) [G63]     possible autonomic dysfunction secondary to diabetes     Please call pt to schedule Priority Referral. Availability with [facility] but [facility] pt.    Action Taken: Message routed to:  Other: cardio    Travel Screening: Not Applicable     Date of Service:

## 2024-12-31 ENCOUNTER — OFFICE VISIT (OUTPATIENT)
Dept: CARDIOLOGY | Facility: CLINIC | Age: 54
End: 2024-12-31
Payer: COMMERCIAL

## 2024-12-31 VITALS
SYSTOLIC BLOOD PRESSURE: 143 MMHG | BODY MASS INDEX: 25.37 KG/M2 | OXYGEN SATURATION: 100 % | DIASTOLIC BLOOD PRESSURE: 91 MMHG | WEIGHT: 162 LBS | HEART RATE: 62 BPM

## 2024-12-31 DIAGNOSIS — G63 POLYNEUROPATHY ASSOCIATED WITH UNDERLYING DISEASE (H): ICD-10-CM

## 2024-12-31 DIAGNOSIS — I10 HYPERTENSION GOAL BP (BLOOD PRESSURE) < 140/90: Primary | ICD-10-CM

## 2024-12-31 NOTE — PATIENT INSTRUCTIONS
Thank you for coming to the [facility] [place]; please note the following instructions:    1. Echocardiogram and follow up as needed if normal result        If you have any questions regarding your visit please contact your care team:     Cardiology  Telephone Number   Calli H., RN  Kristen POLANCO, RN  Cindy SANCHEZ, RN  Rina ANTHONY, RMMANDO MARTINES, LATISHA MENDOZA, Clinic Facilitator  Aga POLANCO, Clinic Facilitator [phone] (option 1)   For scheduling appts:     [phone] (select option 1)       For the Device Clinic (Pacemakers and ICD's)  RN's :  Lakisha Barraza   During business hours: [phone]    *After business hours:  [phone] (select option 4)      Normal test result notifications will be released via [employer] or mailed within 7 business days.  All other test results, will be communicated via telephone once reviewed by your cardiologist.    If you need a medication refill please contact your pharmacy.  Please allow 3 business days for your refill to be completed.    As always, thank you for trusting us with your health care needs!

## 2024-12-31 NOTE — NURSING NOTE
"Chief Complaint   Patient presents with    polyneuropathy       Initial BP (!) 155/100 (BP Location: Right arm, Patient Position: Chair, Cuff Size: Adult Regular)   Pulse 62   Wt 73.5 kg (162 lb)   SpO2 100%   BMI 25.37 kg/m   Estimated body mass index is 25.37 kg/m  as calculated from the following:    Height as of 12/16/24: 1.702 m (5' 7\").    Weight as of this encounter: 73.5 kg (162 lb)..  BP completed using cuff size: regular    Aga Bhatti, Visit Facilitator    "

## 2024-12-31 NOTE — LETTER
12/31/2024      RE: Rudolph Tao  [id number] [address] [facility] [address] [id number]       Dear Colleague,    Thank you for the opportunity to participate in the care of your patient, Rudolph Tao, at the [facility]. Please see a copy of my visit note below.                                                                                                                                                                                                   [facility]      REFERRING PROVIDER:  Chuy Choudhury DO    DATE OF VISIT:  December 31, 2024    REASON FOR VISIT:  Evaluation of fluctuating blood pressure    HISTORY OF PRESENT ILLNESS:   Mr. Rudolph Tao is a 54 year old  male with past medical history significant for atypical chest pain, hypertension with occasional hypotension, dyslipoproteinemia, diabetes mellitus, remote history of probable vasovagal syncope, peripheral neuropathy, diabetic gastroparesis, gastroesophageal reflux disease, chronic alcohol consumption, generalized anxiety disorder, restless leg syndrome, degenerative joint disease of the lumbar spine, glaucoma.    When he was recently seen by his primary care provider on 12/16/2024 for health maintenance, it was noted that he did have fluctuations in his blood pressure with occasional big swings especially with postural and orthostatic changes.  Question was raised as to whether or not this was a manifestation of autonomic dysfunction.  He was referred to the cardiology clinic for evaluation regarding his candidacy for medical therapy to mitigate his autonomic dysfunction.    The patient does endorse mostly well-controlled hypertension with occasional dips in his blood pressure especially with postural changes and or after some standing but this is not consistent.  He does recall 1 similar episode in 2009 of postural and orthostatic  lightheadedness associated with low blood pressure culminating in transient loss of consciousness.  He has learned since then to sit down to mitigate his positional related symptoms of lightheadedness and dizziness.  He has had no recurrent syncope since the episode in 2009.    He does endorse subtle progressive easy fatigability, reduced physical stamina, reduced activity endurance over the last few years.  He was previously evaluated for atypical chest pain in March 2017 but has had no recent recurrence.  He also otherwise denies other recent significant symptoms referable to the cardiovascular system.  In particular, no recent history of chest pain, shortness of breath, or dyspnea on activities of daily living.  No recent history of significant ankle edema, orthopnea, or paroxysmal nocturnal dyspnea.  No recent subjective awareness of irregular heart rhythm or rapid heart pulsation.  No recent history of severe lightheadedness, syncope or near syncope.    Medications, personal, family, and social history reviewed.    PAST MEDICAL HISTORY:  Past Medical History:   Diagnosis Date     Adopted      Anxiety      Anxiety      Chronic low back pain      DDD (degenerative disc disease), lumbar 03/08/2018     Diabetes mellitus (H)     TYPE 1     Hallux abducto valgus      Hyperlipaemia      Hyperlipidemia      Hypertension      Lip lesion 08/02/2010     Nonsenile cataract      Pain in toe of left foot     2 ND TOE     RLS (restless legs syndrome)      Snoring     MODERATE SEVERITY     Swelling of foot joint 07/13/2012     Atypical chest pain-  A. Normal stress echocardiography study on 3/30/2017  B. Normal treadmill exercise stress testing on 9/4/2024  Hypertension-  A. Mostly well-controlled with occasional dips  B. Intermittent postural and orthostatic hypotension (has learned to mitigate symptom by sitting down)  Dyslipoproteinemia.  Diabetes mellitus.  History of probable vasovagal syncope in 2009 (precipitated by  orthostatic hypotension per clinical history)  Peripheral neuropathy.  Diabetic gastroparesis.  Gastroesophageal reflux disease.  Chronic alcohol consumption  Generalized anxiety disorder.  Restless leg syndrome.  Lumbar degenerative disc disease.  Glaucoma    PAST SURGICAL HISTORY:  Past Surgical History:   Procedure Laterality Date     APPENDECTOMY  1990     COLONOSCOPY N/A 9/24/2024    Procedure: COLONOSCOPY;  Surgeon: Juancho Cartwright MD;  Location: [facility]     OSTEOTOMY FOOT  1/8/2013    Procedure: OSTEOTOMY FOOT;  Left Foot Distal First Metarsal Osteotomy, Second Toe Hammer Toe Correction, Second Metatarsal Weil Osteotomy;  Surgeon: Robert Augustine DPM;  Location:  OR     SURGICAL HISTORY OF -   1992    Appy     CURRENT MEDICATIONS:  Current Outpatient Medications   Medication Sig Dispense Refill     acetone urine (KETOSTIX) test strip 1 strip as needed (with unexplained blood glucose over 300 mg/dL). Use one strip, as needed, with unexplained blood glucose over 300 mg/dL. 50 strip 6     ALPRAZolam (XANAX) 0.5 MG tablet TAKE 1 TABLET BY MOUTH THREE TIMES DAILY AS NEEDED FOR ANXIETY *ONE  MONTH  SUPPLY* 30 tablet 0     brimonidine (ALPHAGAN) 0.2 % ophthalmic solution Place 1 drop into both eyes 2 times daily. Wait at least 5 minutes between drops if using more than one at a time. 15 mL 4     calcium carbonate (ANTACID CALCIUM) 500 MG chewable tablet Take 500 mg by mouth daily as needed for heartburn.       Continuous Glucose Sensor (DEXCOM G7 SENSOR) MISC Change every 10 days. Use with omni pod. 3 each 5     dorzolamide-timolol (COSOPT) 2-0.5 % ophthalmic solution Place 1 drop into both eyes 2 times daily. Wait at least 5 minutes between drops if using more than one at a time. 10 mL 4     FLUoxetine (PROZAC) 20 MG capsule Take 1 capsule (20 mg) by mouth daily 90 capsule 1     Glucagon (GVOKE HYPOPEN) 1 MG/0.2ML pen Inject the contents of 1 device under the skin into lower abdomen, outer thigh, or outer  "upper arm as needed for hypoglycemia. If no response after 15 minutes, additional 1 mg dose from a new device may be injected while waiting for emergency assistance. 0.4 mL 1     insulin aspart (NOVOLOG VIAL) 100 UNITS/ML vial Used 50 units per day via insulin pump 20 mL 5     Insulin Disposable Pump (OMNIPOD 5 G7 PODS, GEN 5,) MISC 1 each every 72 hours. 2 each 11     insulin pen needle (B-D U/F) 31G X 5 MM miscellaneous USE A NEW PEN NEEDLE 6 TIMES DAILY. LAST REFILL UNTIL VISIT 100 each 2     insulin syringe-needle U-100 (31G X 5/16\" 0.3 ML) 31G X 5/16\" 0.3 ML miscellaneous Use 5 syringes daily in the event of insulin pump failure. 100 each 0     LANTUS SOLOSTAR 100 UNIT/ML soln INJECT 32 UNITS SUBCUTANEOUSLY ONCE DAILY 45 mL 0     latanoprost (XALATAN) 0.005 % ophthalmic solution Place 1 drop into both eyes every evening Wait at least 5 minutes between drops if using more than one at a time. 2.5 mL 11     lisinopril (ZESTRIL) 20 MG tablet Take 1 tablet (20 mg) by mouth daily. 30 tablet 1     metoclopramide (REGLAN) 10 MG tablet Take 1 tablet (10 mg) by mouth 4 times daily as needed 120 tablet 3     NOVOLOG PENFILL 100 UNIT/ML soln INJECT 1 UNIT PER 30 GRAMS OF CARBOHYDRATES PLUS 1 UNIT PER 50MG/DL ABOVE 200MG/DL. MAX DOSE 20 UNITS PER DAY 15 mL 0     omeprazole (PRILOSEC OTC) 20 MG EC tablet Take 20 mg by mouth daily as needed.       ondansetron (ZOFRAN ODT) 4 MG ODT tab Take 2 tablets (8 mg) by mouth every 8 hours as needed for nausea or vomiting. 10 tablet 0     pravastatin (PRAVACHOL) 20 MG tablet Take 1 tablet by mouth once daily 90 tablet 0     pregabalin (LYRICA) 75 MG capsule Take 1 capsule (75 mg) by mouth 3 times daily. 270 capsule 1     rOPINIRole (REQUIP) 1 MG tablet Take 0.5-1 tablets (0.5-1 mg) by mouth at bedtime. (Patient taking differently: Take 0.5-1 mg by mouth as needed (Per patient taking as needed at bedtime).) 90 tablet 1     ALLERGIES:  Allergies   Allergen Reactions     Avapro " [Irbesartan]      Profuse sweating     Crestor [Rosuvastatin Calcium]      cough     Sulfa Antibiotics Swelling and Hives     Simvastatin      Elevated BS readings     FAMILY HISTORY:  Family History   Adopted: Yes   Problem Relation Age of Onset     Unknown/Adopted Mother      Cancer Mother      Unknown/Adopted Father      Unknown/Adopted Maternal Grandmother      Unknown/Adopted Maternal Grandfather      Unknown/Adopted Paternal Grandmother      Unknown/Adopted Paternal Grandfather      SOCIAL HISTORY:  Tobacco Use     Smoking status: Former     Current packs/day: 0.00     Types: Cigarettes     Quit date: 10/1/1995     Years since quittin.2     Passive exposure: Past     Smokeless tobacco: Never     Tobacco comments:     2-4  cigarettes daily   Vaping Use     Vaping status: Never Used   Substance Use Topics     Alcohol use: Yes     Comment: 2 drinks daily     Drug use: No     REVIEW OF SYSTEMS:  Other than as stated under history of present illness and/or past medical history, comprehensive review of other systems was performed and was unremarkable.    PHYSICAL EXAMINATION:  BP (!) 143/91   Pulse 62   Wt 73.5 kg (162 lb)   SpO2 100%   BMI 25.37 kg/m    GENERAL APPEARANCE: alert and in no acute distress  HEENT: no icterus, no central cyanosis  LYMPH/NECK: no adenopathy, no asymmetry, no appreciable neck mass.  RESPIRATORY: lungs clear to auscultation - no rales, rhonchi or wheezes, no use of accessory muscles, no retractions, respirations are unlabored, normal respiratory rate  CARDIOVASCULAR: Auscultation reveals normal heart sounds.  There is a grade 1/6 systolic murmur at the apex.  There is no diastolic murmur.  GI: soft, non tender.  No hepatosplenomegaly.  EXTREMITIES: No significant ankle edema.  NEURO: alert, normal speech,and affect  SKIN: no ecchymoses, no rashes    I have reviewed the labs and imaging results below and made my comment in the assessment and plan.    DIAGNOSTIC DATA:  CBC RESULTS:    Lab Results   Component Value Date    WBC 7.5 09/23/2024    WBC 5.1 09/19/2016    RBC 3.38 (L) 09/23/2024    RBC 4.49 09/19/2016    HGB 11.7 (L) 09/23/2024    HGB 15.3 09/19/2016    HCT 33.6 (L) 09/23/2024    HCT 43.2 09/19/2016    MCV 99 09/23/2024    MCV 96 09/19/2016    MCH 34.6 (H) 09/23/2024    MCH 34.1 (H) 09/19/2016    MCHC 34.8 09/23/2024    MCHC 35.4 09/19/2016    RDW 11.4 09/23/2024    RDW 11.7 09/19/2016     09/23/2024     09/19/2016     BMP RESULTS:  Lab Results   Component Value Date     09/23/2024     06/25/2021    POTASSIUM 3.3 (L) 09/23/2024    POTASSIUM 4.6 02/25/2022    POTASSIUM 4.0 06/25/2021    CHLORIDE 104 09/23/2024    CHLORIDE 99 02/25/2022    CHLORIDE 104 06/25/2021    CO2 22 09/23/2024    CO2 28 02/25/2022    CO2 32 06/25/2021    ANIONGAP 11 09/23/2024    ANIONGAP 8 02/25/2022    ANIONGAP 5 06/25/2021     (H) 09/24/2024     (H) 02/25/2022     (H) 06/25/2021    BUN 15.5 09/23/2024    BUN 11 02/25/2022    BUN 9 06/25/2021    CR 0.80 09/23/2024    CR 0.72 06/25/2021    GFRESTIMATED >90 09/23/2024    GFRESTIMATED >90 06/25/2021    GFRESTBLACK >90 06/25/2021    DANA 6.9 (L) 09/23/2024    DANA 9.0 06/25/2021      Lipid profile obtained on 4/9/2024 documented total cholesterol 186, HDL 76, LDL 97, triglycerides 67.    Hemoglobin A1c obtained on 8/1/2024 was reported at 7.2%.    Electrocardiogram  Twelve-lead electrocardiogram obtained in the clinic on 12/31/2024 documented normal sinus rhythm at a rate of 60 bpm.  There was normal CA interval of 146 ms.  There was normal QRS duration of 74 ms.  QT and QTc intervals were both slightly prolonged and both measuring 498 ms.  There was left axis deviation consistent left anterior hemifascicular block.    Echocardiogram  He underwent a stress echocardiography study on 3/30/2017.  It was reported to document normal left ventricular size and systolic function at rest with appropriate decrease in size and  "increase in ejection fraction with exercise.  The test was reported to be normal with no evidence of exercise-induced ischemia or prior myocardial infarction.    ASSESSMENT AND PLAN:   Although he does have fluctuations in his blood pressure, he only has had rare episodes of symptomatic hypotension related to the autonomic fluctuations.  Furthermore, he has learned over the years to sit down quickly at the onset of a more than mild lightheadedness with postural and orthostatic changes-  A. I reviewed and discussed with him the complex interplay among the various body systems i.e. the cardiovascular neurological, endocrine, gastrointestinal, neuropsychiatric etc feeding into the autonomic system.  And in the overall spectrum of severity of autonomic dysregulation, he probably has moderate and manageable autonomic dysregulation which presumably is also exacerbated by his underlying diabetes mellitus with peripheral polyneuropathy.  B. In the context of physiologic autonomic regulation, fluctuations in blood pressure throughout the day is part of the physiologic autonomic regulation as long as the fluctuation is not excessive as to cause symptoms in terms of postural and or orthostatic hypotension.  I encouraged him to continue with what he has been doing in terms of sitting down instead of trying to \"fight\" to remain standing at the onset of more than mild symptoms with postural and or orthostatic changes.  In addition, I also discussed with him regarding avoidance of known precipitating factors i.e. avoid dehydration, abrupt postural changes, prolonged standing, overexertion, emotional upset etc.  Most importantly, he is to heed the prodromes of neurally mediated symptom complex in terms of feeling of unwell with nausea, diaphoresis, lightheadedness etc. i in which case he should try to assume a supine posture so as to prevent progression of symptoms to syncope or near syncope.  I also reviewed and discussed with him " the benefit of support stocking to mitigate orthostatic hypotension from venous pooling.  Similarly, I encouraged him to engage in training exercise beginning with biking supine to train to augment muscular tone in the lower extremity thus also mitigating the effect of venous pooling of blood to the lower extremities which in turn would precipitate orthostatic hypotension.  I also encouraged him to avoid excessive alcohol consumption and pay attention to the benefit of lifestyle modification i.e. healthy diet, regular physical activities, healthy weight, adequate control of hypertension, cholesterol, and diabetes mellitus etc.  At the conclusion of the clinic encounter, the patient appeared to have a reasonable understanding of our discussion and stated no questions.  He appeared quite comfortable with the reassurance.  Follow-up as below-  A. He will be scheduled for an echocardiogram in the near future at his convenience to ensure stable cardiac size and function.  B. He will return to the cardiology clinic for follow-up as needed and or depending on the results of echocardiogram.    Total time spent today for this visit is 30 minutes including precharting, face-to-face clinic visit, review of labs/imaging and medical documentation.    Tracy Cardoza MD, FACC, FAHA, FHRS, CCDS  Cardiologist    (This medical documentation was transcribed using voice recognition technology and therefore will be susceptible to minor unintentional transcribing errors and/or omissions)        Please do not hesitate to contact me if you have any questions/concerns.     Sincerely,     Tracy Cardoza MD

## 2025-01-01 LAB
ATRIAL RATE - MUSE: 60 BPM
DIASTOLIC BLOOD PRESSURE - MUSE: NORMAL MMHG
INTERPRETATION ECG - MUSE: NORMAL
P AXIS - MUSE: 15 DEGREES
PR INTERVAL - MUSE: 146 MS
QRS DURATION - MUSE: 74 MS
QT - MUSE: 498 MS
QTC - MUSE: 498 MS
R AXIS - MUSE: -34 DEGREES
SYSTOLIC BLOOD PRESSURE - MUSE: NORMAL MMHG
T AXIS - MUSE: 75 DEGREES
VENTRICULAR RATE- MUSE: 60 BPM

## 2025-01-03 NOTE — PROGRESS NOTES
[facility]      REFERRING PROVIDER:  Chuy Choudhury DO    DATE OF VISIT:  December 31, 2024    REASON FOR VISIT:  Evaluation of fluctuating blood pressure    HISTORY OF PRESENT ILLNESS:   Mr. Rudolph Tao is a 54 year old  male with past medical history significant for atypical chest pain, hypertension with occasional hypotension, dyslipoproteinemia, diabetes mellitus, remote history of probable vasovagal syncope, peripheral neuropathy, diabetic gastroparesis, gastroesophageal reflux disease, chronic alcohol consumption, generalized anxiety disorder, restless leg syndrome, degenerative joint disease of the lumbar spine, glaucoma.    When he was recently seen by his primary care provider on 12/16/2024 for health maintenance, it was noted that he did have fluctuations in his blood pressure with occasional big swings especially with postural and orthostatic changes.  Question was raised as to whether or not this was a manifestation of autonomic dysfunction.  He was referred to the cardiology clinic for evaluation regarding his candidacy for medical therapy to mitigate his autonomic dysfunction.    The patient does endorse mostly well-controlled hypertension with occasional dips in his blood pressure especially with postural changes and or after some standing but this is not consistent.  He does recall 1 similar episode in 2009 of postural and orthostatic lightheadedness associated with low blood pressure culminating in transient loss of consciousness.  He has learned since then to sit down to mitigate his positional related symptoms of lightheadedness and dizziness.  He has had no recurrent syncope since the episode in 2009.    He does endorse subtle progressive easy fatigability, reduced physical  stamina, reduced activity endurance over the last few years.  He was previously evaluated for atypical chest pain in March 2017 but has had no recent recurrence.  He also otherwise denies other recent significant symptoms referable to the cardiovascular system.  In particular, no recent history of chest pain, shortness of breath, or dyspnea on activities of daily living.  No recent history of significant ankle edema, orthopnea, or paroxysmal nocturnal dyspnea.  No recent subjective awareness of irregular heart rhythm or rapid heart pulsation.  No recent history of severe lightheadedness, syncope or near syncope.    Medications, personal, family, and social history reviewed.    PAST MEDICAL HISTORY:  Past Medical History:   Diagnosis Date    Adopted     Anxiety     Anxiety     Chronic low back pain     DDD (degenerative disc disease), lumbar 03/08/2018    Diabetes mellitus (H)     TYPE 1    Hallux abducto valgus     Hyperlipaemia     Hyperlipidemia     Hypertension     Lip lesion 08/02/2010    Nonsenile cataract     Pain in toe of left foot     2 ND TOE    RLS (restless legs syndrome)     Snoring     MODERATE SEVERITY    Swelling of foot joint 07/13/2012     Atypical chest pain-  A. Normal stress echocardiography study on 3/30/2017  B. Normal treadmill exercise stress testing on 9/4/2024  Hypertension-  A. Mostly well-controlled with occasional dips  B. Intermittent postural and orthostatic hypotension (has learned to mitigate symptom by sitting down)  Dyslipoproteinemia.  Diabetes mellitus.  History of probable vasovagal syncope in 2009 (precipitated by orthostatic hypotension per clinical history)  Peripheral neuropathy.  Diabetic gastroparesis.  Gastroesophageal reflux disease.  Chronic alcohol consumption  Generalized anxiety disorder.  Restless leg syndrome.  Lumbar degenerative disc disease.  Glaucoma    PAST SURGICAL HISTORY:  Past Surgical History:   Procedure Laterality Date    APPENDECTOMY  1990     COLONOSCOPY N/A 9/24/2024    Procedure: COLONOSCOPY;  Surgeon: Juancho Cartwright MD;  Location: [facility]    OSTEOTOMY FOOT  1/8/2013    Procedure: OSTEOTOMY FOOT;  Left Foot Distal First Metarsal Osteotomy, Second Toe Hammer Toe Correction, Second Metatarsal Weil Osteotomy;  Surgeon: Robert Augustine DPM;  Location: [facility]    SURGICAL HISTORY OF -   1992    Appy     CURRENT MEDICATIONS:  Current Outpatient Medications   Medication Sig Dispense Refill    acetone urine (KETOSTIX) test strip 1 strip as needed (with unexplained blood glucose over 300 mg/dL). Use one strip, as needed, with unexplained blood glucose over 300 mg/dL. 50 strip 6    ALPRAZolam (XANAX) 0.5 MG tablet TAKE 1 TABLET BY MOUTH THREE TIMES DAILY AS NEEDED FOR ANXIETY *ONE  MONTH  SUPPLY* 30 tablet 0    brimonidine (ALPHAGAN) 0.2 % ophthalmic solution Place 1 drop into both eyes 2 times daily. Wait at least 5 minutes between drops if using more than one at a time. 15 mL 4    calcium carbonate (ANTACID CALCIUM) 500 MG chewable tablet Take 500 mg by mouth daily as needed for heartburn.      Continuous Glucose Sensor (DEXCOM G7 SENSOR) MISC Change every 10 days. Use with omni pod. 3 each 5    dorzolamide-timolol (COSOPT) 2-0.5 % ophthalmic solution Place 1 drop into both eyes 2 times daily. Wait at least 5 minutes between drops if using more than one at a time. 10 mL 4    FLUoxetine (PROZAC) 20 MG capsule Take 1 capsule (20 mg) by mouth daily 90 capsule 1    Glucagon (GVOKE HYPOPEN) 1 MG/0.2ML pen Inject the contents of 1 device under the skin into lower abdomen, outer thigh, or outer upper arm as needed for hypoglycemia. If no response after 15 minutes, additional 1 mg dose from a new device may be injected while waiting for emergency assistance. 0.4 mL 1    insulin aspart (NOVOLOG VIAL) 100 UNITS/ML vial Used 50 units per day via insulin pump 20 mL 5    Insulin Disposable Pump (OMNIPOD 5 G7 PODS, GEN 5,) MISC 1 each every 72 hours. 2 each 11    insulin  "pen needle (B-D U/F) 31G X 5 MM miscellaneous USE A NEW PEN NEEDLE 6 TIMES DAILY. LAST REFILL UNTIL VISIT 100 each 2    insulin syringe-needle U-100 (31G X 5/16\" 0.3 ML) 31G X 5/16\" 0.3 ML miscellaneous Use 5 syringes daily in the event of insulin pump failure. 100 each 0    LANTUS SOLOSTAR 100 UNIT/ML soln INJECT 32 UNITS SUBCUTANEOUSLY ONCE DAILY 45 mL 0    latanoprost (XALATAN) 0.005 % ophthalmic solution Place 1 drop into both eyes every evening Wait at least 5 minutes between drops if using more than one at a time. 2.5 mL 11    lisinopril (ZESTRIL) 20 MG tablet Take 1 tablet (20 mg) by mouth daily. 30 tablet 1    metoclopramide (REGLAN) 10 MG tablet Take 1 tablet (10 mg) by mouth 4 times daily as needed 120 tablet 3    NOVOLOG PENFILL 100 UNIT/ML soln INJECT 1 UNIT PER 30 GRAMS OF CARBOHYDRATES PLUS 1 UNIT PER 50MG/DL ABOVE 200MG/DL. MAX DOSE 20 UNITS PER DAY 15 mL 0    omeprazole (PRILOSEC OTC) 20 MG EC tablet Take 20 mg by mouth daily as needed.      ondansetron (ZOFRAN ODT) 4 MG ODT tab Take 2 tablets (8 mg) by mouth every 8 hours as needed for nausea or vomiting. 10 tablet 0    pravastatin (PRAVACHOL) 20 MG tablet Take 1 tablet by mouth once daily 90 tablet 0    pregabalin (LYRICA) 75 MG capsule Take 1 capsule (75 mg) by mouth 3 times daily. 270 capsule 1    rOPINIRole (REQUIP) 1 MG tablet Take 0.5-1 tablets (0.5-1 mg) by mouth at bedtime. (Patient taking differently: Take 0.5-1 mg by mouth as needed (Per patient taking as needed at bedtime).) 90 tablet 1     ALLERGIES:  Allergies   Allergen Reactions    Avapro [Irbesartan]      Profuse sweating    Crestor [Rosuvastatin Calcium]      cough    Sulfa Antibiotics Swelling and Hives    Simvastatin      Elevated BS readings     FAMILY HISTORY:  Family History   Adopted: Yes   Problem Relation Age of Onset    Unknown/Adopted Mother     Cancer Mother     Unknown/Adopted Father     Unknown/Adopted Maternal Grandmother     Unknown/Adopted Maternal Grandfather     " Unknown/Adopted Paternal Grandmother     Unknown/Adopted Paternal Grandfather      SOCIAL HISTORY:  Tobacco Use    Smoking status: Former     Current packs/day: 0.00     Types: Cigarettes     Quit date: 10/1/1995     Years since quittin.2     Passive exposure: Past    Smokeless tobacco: Never    Tobacco comments:     2-4  cigarettes daily   Vaping Use    Vaping status: Never Used   Substance Use Topics    Alcohol use: Yes     Comment: 2 drinks daily    Drug use: No     REVIEW OF SYSTEMS:  Other than as stated under history of present illness and/or past medical history, comprehensive review of other systems was performed and was unremarkable.    PHYSICAL EXAMINATION:  BP (!) 143/91   Pulse 62   Wt 73.5 kg (162 lb)   SpO2 100%   BMI 25.37 kg/m    GENERAL APPEARANCE: alert and in no acute distress  HEENT: no icterus, no central cyanosis  LYMPH/NECK: no adenopathy, no asymmetry, no appreciable neck mass.  RESPIRATORY: lungs clear to auscultation - no rales, rhonchi or wheezes, no use of accessory muscles, no retractions, respirations are unlabored, normal respiratory rate  CARDIOVASCULAR: Auscultation reveals normal heart sounds.  There is a grade 1/6 systolic murmur at the apex.  There is no diastolic murmur.  GI: soft, non tender.  No hepatosplenomegaly.  EXTREMITIES: No significant ankle edema.  NEURO: alert, normal speech,and affect  SKIN: no ecchymoses, no rashes    I have reviewed the labs and imaging results below and made my comment in the assessment and plan.    DIAGNOSTIC DATA:  CBC RESULTS:   Lab Results   Component Value Date    WBC 7.5 2024    WBC 5.1 2016    RBC 3.38 (L) 2024    RBC 4.49 2016    HGB 11.7 (L) 2024    HGB 15.3 2016    HCT 33.6 (L) 2024    HCT 43.2 2016    MCV 99 2024    MCV 96 2016    MCH 34.6 (H) 2024    MCH 34.1 (H) 2016    MCHC 34.8 2024    MCHC 35.4 2016    RDW 11.4 2024    RDW 11.7  09/19/2016     09/23/2024     09/19/2016     BMP RESULTS:  Lab Results   Component Value Date     09/23/2024     06/25/2021    POTASSIUM 3.3 (L) 09/23/2024    POTASSIUM 4.6 02/25/2022    POTASSIUM 4.0 06/25/2021    CHLORIDE 104 09/23/2024    CHLORIDE 99 02/25/2022    CHLORIDE 104 06/25/2021    CO2 22 09/23/2024    CO2 28 02/25/2022    CO2 32 06/25/2021    ANIONGAP 11 09/23/2024    ANIONGAP 8 02/25/2022    ANIONGAP 5 06/25/2021     (H) 09/24/2024     (H) 02/25/2022     (H) 06/25/2021    BUN 15.5 09/23/2024    BUN 11 02/25/2022    BUN 9 06/25/2021    CR 0.80 09/23/2024    CR 0.72 06/25/2021    GFRESTIMATED >90 09/23/2024    GFRESTIMATED >90 06/25/2021    GFRESTBLACK >90 06/25/2021    DANA 6.9 (L) 09/23/2024    DANA 9.0 06/25/2021      Lipid profile obtained on 4/9/2024 documented total cholesterol 186, HDL 76, LDL 97, triglycerides 67.    Hemoglobin A1c obtained on 8/1/2024 was reported at 7.2%.    Electrocardiogram  Twelve-lead electrocardiogram obtained in the clinic on 12/31/2024 documented normal sinus rhythm at a rate of 60 bpm.  There was normal CO interval of 146 ms.  There was normal QRS duration of 74 ms.  QT and QTc intervals were both slightly prolonged and both measuring 498 ms.  There was left axis deviation consistent left anterior hemifascicular block.    Echocardiogram  He underwent a stress echocardiography study on 3/30/2017.  It was reported to document normal left ventricular size and systolic function at rest with appropriate decrease in size and increase in ejection fraction with exercise.  The test was reported to be normal with no evidence of exercise-induced ischemia or prior myocardial infarction.    ASSESSMENT AND PLAN:   Although he does have fluctuations in his blood pressure, he only has had rare episodes of symptomatic hypotension related to the autonomic fluctuations.  Furthermore, he has learned over the years to sit down quickly at the  "onset of a more than mild lightheadedness with postural and orthostatic changes-  A. I reviewed and discussed with him the complex interplay among the various body systems i.e. the cardiovascular neurological, endocrine, gastrointestinal, neuropsychiatric etc feeding into the autonomic system.  And in the overall spectrum of severity of autonomic dysregulation, he probably has moderate and manageable autonomic dysregulation which presumably is also exacerbated by his underlying diabetes mellitus with peripheral polyneuropathy.  B. In the context of physiologic autonomic regulation, fluctuations in blood pressure throughout the day is part of the physiologic autonomic regulation as long as the fluctuation is not excessive as to cause symptoms in terms of postural and or orthostatic hypotension.  I encouraged him to continue with what he has been doing in terms of sitting down instead of trying to \"fight\" to remain standing at the onset of more than mild symptoms with postural and or orthostatic changes.  In addition, I also discussed with him regarding avoidance of known precipitating factors i.e. avoid dehydration, abrupt postural changes, prolonged standing, overexertion, emotional upset etc.  Most importantly, he is to heed the prodromes of neurally mediated symptom complex in terms of feeling of unwell with nausea, diaphoresis, lightheadedness etc. i in which case he should try to assume a supine posture so as to prevent progression of symptoms to syncope or near syncope.  I also reviewed and discussed with him the benefit of support stocking to mitigate orthostatic hypotension from venous pooling.  Similarly, I encouraged him to engage in training exercise beginning with biking supine to train to augment muscular tone in the lower extremity thus also mitigating the effect of venous pooling of blood to the lower extremities which in turn would precipitate orthostatic hypotension.  I also encouraged him to avoid " excessive alcohol consumption and pay attention to the benefit of lifestyle modification i.e. healthy diet, regular physical activities, healthy weight, adequate control of hypertension, cholesterol, and diabetes mellitus etc.  At the conclusion of the clinic encounter, the patient appeared to have a reasonable understanding of our discussion and stated no questions.  He appeared quite comfortable with the reassurance.  Follow-up as below-  A. He will be scheduled for an echocardiogram in the near future at his convenience to ensure stable cardiac size and function.  B. He will return to the cardiology clinic for follow-up as needed and or depending on the results of echocardiogram.    Total time spent today for this visit is 30 minutes including precharting, face-to-face clinic visit, review of labs/imaging and medical documentation.    Tracy Cardoza MD, FACC, FAHA, FHRS, CCDS  Cardiologist    (This medical documentation was transcribed using voice recognition technology and therefore will be susceptible to minor unintentional transcribing errors and/or omissions)

## 2025-01-09 ENCOUNTER — PATIENT OUTREACH (OUTPATIENT)
Dept: CARE COORDINATION | Facility: CLINIC | Age: 55
End: 2025-01-09
Payer: COMMERCIAL

## 2025-01-10 ENCOUNTER — ANCILLARY PROCEDURE (OUTPATIENT)
Dept: CARDIOLOGY | Facility: CLINIC | Age: 55
End: 2025-01-10
Attending: INTERNAL MEDICINE
Payer: COMMERCIAL

## 2025-01-10 DIAGNOSIS — G63 POLYNEUROPATHY ASSOCIATED WITH UNDERLYING DISEASE: ICD-10-CM

## 2025-01-10 DIAGNOSIS — I10 HYPERTENSION GOAL BP (BLOOD PRESSURE) < 140/90: ICD-10-CM

## 2025-01-10 LAB — LVEF ECHO: NORMAL

## 2025-01-10 PROCEDURE — 93306 TTE W/DOPPLER COMPLETE: CPT | Performed by: INTERNAL MEDICINE

## 2025-01-14 ENCOUNTER — TELEPHONE (OUTPATIENT)
Dept: EDUCATION SERVICES | Facility: CLINIC | Age: 55
End: 2025-01-14
Payer: COMMERCIAL

## 2025-01-14 ENCOUNTER — TELEPHONE (OUTPATIENT)
Dept: ENDOCRINOLOGY | Facility: CLINIC | Age: 55
End: 2025-01-14
Payer: COMMERCIAL

## 2025-01-14 DIAGNOSIS — I77.819 DILATION OF AORTA: Primary | ICD-10-CM

## 2025-01-14 NOTE — TELEPHONE ENCOUNTER
Appt made with Leida for 1/22.  Sent Avvo message telling him this.    Melonie Leon, PharmD, [facility], [facility] and [place] [facility]

## 2025-01-14 NOTE — TELEPHONE ENCOUNTER
Left Voicemail (1st Attempt) for the patient to call back and schedule the following:    Appointment type: New Diabetes (no referral in place)   Provider: Any provider that sees new diabetes pt's   Return date: Next avail/new DEXTER  Specialty phone number: [phone]   Additional appointment(s) needed:   Additonal Notes: LVM, MyC x1   Per Jayda RN/Dr. Hui:  Pt needs to be seen as a New due to not being seen since 2022. DEXTER okay with any provider that sees New Diabetes pt's. Thank you.     Examples:  3/21 Ahmed in person csc  3/21 [facility] in person csc  3/25 [facility] virtual [facility]  3/25 [facility] virtual [facility]   3/28 [facility] virtual [facility]    Please note that the above appointment(s) will require manual scheduling as they are marked as DEXTER and will not appear using auto search. Do not schedule the patient if another patient has already been scheduled in the requested appointment slot.     Miriam Serna on 1/14/2025 at 1:57 PM

## 2025-01-14 NOTE — TELEPHONE ENCOUNTER
[facility] Call Center    Phone Message    May a detailed message be left on voicemail: yes     Reason for Call: Other: Patient was in the hospital:    From 1/13-1/15 for Diabetic Ketoacidosis at [facility], scheduled follow up  Not sure if he needed a sooner appt with you, please call if so.    Soonest appt was 2/6/25, thanks!           Action Taken: Message routed to:  [facility] Diab Ed    Travel Screening: Not Applicable     Date of Service:

## 2025-01-15 ENCOUNTER — TELEPHONE (OUTPATIENT)
Dept: ENDOCRINOLOGY | Facility: CLINIC | Age: 55
End: 2025-01-15

## 2025-01-15 NOTE — TELEPHONE ENCOUNTER
" RE: Appointment, Call Back  Received: Yesterday  Radha Armendariz RD Miller, Deanne M, RN; Rosie Daniel [facility] Endocrinology Adult [place]; [facility] Mcdfbkhujrxj-Efdc-Mi; Kimo Hui MD  Caller: Unspecified (Yesterday, 12:17 PM)  Hi!    Looks like he's scheduled with Negar Lobo, she's a same day PA at his PCP clinic.  If we can get him in with Endo sooner that would be helpful.  He has been resistant to return to endocrinology management but with the frequency of DKAs since he started the pump \"which was going to solve everything\" he needs more aggressive help.  He is scheduled for a phone call with education on 1/22/24 with Leida.  I am happy to see him, BUT I had recommended he work with Melonie Leon for in person visits for hands on help likely in the future.  I can touch base with Melonie since I have been out of his more recent loop since starting the pump.      Radha Armendariz MS, RD, LD, CDE          Previous Messages    Appointment, Call Back  (Newest Message First)  View All Conversations on this Encounter   You  Radha Armendariz RD; Clinic Koxagwctzpio-Kgqi-TnEwbg now (7:34 AM)     CHIDI Garcia, Our provider has requested he be seen as a new patient. The wait for a new visit is quite long, even as triaged. As he would not be considered our patient, we cannot advise or comment on his care in anyway until one of our providers sees him. Please have his CDE direct him back to his current provider for diabetes follow up until then.  Thank you.  Sammie Mitchell, RN on 1/15/2025 at 7:33 AM      RE    Appointment, Call Back  (Newest Message First)  View All Conversations on this Encounter   Kimo Hui MD  Clinic [id number] hours ago (5:31 PM)    KZ  Patient followed up with this writer ONLY once in 2022. Please schedule with anyone available as a new patient      Kimo Hui MD  Clinic Gjnlfpaiazkq-Nkbq-[id number] hours ago (5:31 PM)     KMARIAMA  Patient followed up with this writer ONLY " "once in 2022. Please schedule with anyone available as a new patient.      Radha Armendariz RD  You; Dragan Daniel; [facility] Endocrinology Adult [place]; Clinic Kwegaxsrydbj-Vadj-Xb; Kimo Hui MD15 hours ago (3:55 PM)       Hi!    Looks like he's scheduled with Negar Lobo, she's a same day PA at his PCP clinic.  If we can get him in with Endo sooner that would be helpful.  He has been resistant to return to endocrinology management but with the frequency of DKAs since he started the pump \"which was going to solve everything\" he needs more aggressive help.  He is scheduled for a phone call with education on 1/22/24 with Leida.  I am happy to see him, BUT I had recommended he work with Melonie Leon for in person visits for hands on help likely in the future.  I can touch base with Melonie since I have been out of his more recent loop since starting the pump.      Radha Armendariz MS, RD, LD, CDE      You  Dragan Daniel; [facility] Endocrinology Adult [place]; Clinic Srupbqgqwrfn-Ksdi-De; Radha Armendariz RD; Kimo Hui MD17 hours ago (1:37 PM)     DM  Dr Hui: Chuy Willis DO is managing pt's insulin.  CCs: Last seen JUNE 2022. Meeting with Pharm D JAN 22. Pt should see CDE within 2 weeks (established with Radha Armendariz) and soonest available DEXTER with any PA any location after or he can follow up with current provider if he  needs to be seen sooner.  Thank you.  Sammie Mitchell, RN on 1/14/2025 at 1:35 PM     "

## 2025-01-16 NOTE — TELEPHONE ENCOUNTER
Left Voicemail (2nd Attempt) for the patient to call back and schedule the following:    Appointment type: New Diabetes (no referral in place)   Provider: Any provider that sees new diabetes pt's   Return date: Next avail/new DEXTER  Specialty phone number: [phone]   Additional appointment(s) needed:   Additonal Notes: LVM x2, MyC x1    Please help schedule first available diabetes provider, any location, ok to use DEXTER. Dr Hui has advised he be scheduled as new. Thank you. (Appt on 4/23/25 w Ez now canceled due to provider requesting pt be seen as new & 2nd attempt, MyC sent w info)     Examples:  2/14 Derick in person mg   3/21 Ahmed in person csc  3/21 Tasma in person csc  3/25 Panfilo virtual csc  3/25 [place] virtual csc   3/28 [facility]asma virtual csc     Please note that the above appointment(s) will require manual scheduling as they are marked as DEXTER and will not appear using auto search. Do not schedule the patient if another patient has already been scheduled in the requested appointment slot.    Miriam Serna on 1/16/2025 at 8:16 AM

## 2025-01-28 DIAGNOSIS — F41.1 GENERALIZED ANXIETY DISORDER: ICD-10-CM

## 2025-02-24 DIAGNOSIS — E10.311 TYPE 1 DIABETES MELLITUS WITH RETINOPATHY AND MACULAR EDEMA, UNSPECIFIED LATERALITY, UNSPECIFIED RETINOPATHY SEVERITY (H): ICD-10-CM

## 2025-02-25 RX ORDER — ACYCLOVIR 400 MG/1
TABLET ORAL
Qty: 1 EACH | Refills: 0 | Status: SHIPPED | OUTPATIENT
Start: 2025-02-25

## 2025-03-01 ENCOUNTER — HEALTH MAINTENANCE LETTER (OUTPATIENT)
Age: 55
End: 2025-03-01

## 2025-03-02 ENCOUNTER — NURSE TRIAGE (OUTPATIENT)
Dept: NURSING | Facility: CLINIC | Age: 55
End: 2025-03-02
Payer: COMMERCIAL

## 2025-03-02 NOTE — TELEPHONE ENCOUNTER
"Pt reports high blood sugar at 4:30 am, 400-414 using glucometer. Pt states that his Dexcom sensor and pump failed, and asks for his pump failure/back up plan.    Pt \"not feeling good'\" No vomiting, no fever, no rapid breathing. Hx of DKA.    Pt is in [place]. He does not have his ketone strips with him. FNA advised that we do not triage for out of state patients, but given this situation, FNA paged on call for endocrinology. FNA also advised that he can refer to 1/30/25 [facility] for Humalog/Novolog and Lantus dosing.    Provider consult indicated.     Reason for page: consult (endocrinology)    Page sent to Dr. Sanon by Answering Service at 5:09 am. FNA advised that if he does not hear back from on call call [phone] and ask for endocrinology on call.    Pt verbalized understanding.    Mary Jo RN             Reason for Disposition   [1] Caller has URGENT medicine question about med that PCP or specialist prescribed AND [2] triager unable to answer question    Protocols used: Medication Question Call-A-    "

## 2025-03-03 ENCOUNTER — MYC MEDICAL ADVICE (OUTPATIENT)
Dept: EDUCATION SERVICES | Facility: CLINIC | Age: 55
End: 2025-03-03
Payer: COMMERCIAL

## 2025-03-03 DIAGNOSIS — E10.311 TYPE 1 DIABETES MELLITUS WITH RETINOPATHY AND MACULAR EDEMA, UNSPECIFIED LATERALITY, UNSPECIFIED RETINOPATHY SEVERITY (H): ICD-10-CM

## 2025-03-03 RX ORDER — INSULIN PMP CART,AUT,G6/7,CNTR
1 EACH SUBCUTANEOUS SEE ADMIN INSTRUCTIONS
Qty: 45 EACH | Refills: 4 | Status: SHIPPED | OUTPATIENT
Start: 2025-03-03 | End: 2025-03-04

## 2025-03-03 NOTE — TELEPHONE ENCOUNTER
Gave Kris a call. His controller and Omnipod 5 android phone christian have malfunctioned. Not sure if his sensor is communicating with pod. Advised him to remove pod and restart Lantus 14 units with Novolog 1 unit for 12 grams of CHO and 1/50/140 correction scale.    Sent Backup plan via [employer]. Updated pods order for changing every 2-3 days so he can have extras since some pods are not lasting 3 days.    Has follow-up with Radha Armendariz RD [facility] on 3/6/2025.    Yue Pittman, RD, LD [facility]  Diabetes Educator

## 2025-03-03 NOTE — TELEPHONE ENCOUNTER
Pharmacy comment: Has pt gotten the Omnipod intro pack prior to this prescription? We have no record of them receiving this.

## 2025-03-04 RX ORDER — INSULIN PMP CART,AUT,G6/7,CNTR
1 EACH SUBCUTANEOUS SEE ADMIN INSTRUCTIONS
Qty: 1 KIT | Refills: 0 | Status: SHIPPED | OUTPATIENT
Start: 2025-03-04

## 2025-03-04 RX ORDER — INSULIN PMP CART,AUT,G6/7,CNTR
EACH SUBCUTANEOUS
Qty: 1 EACH | Refills: 4 | Status: SHIPPED | OUTPATIENT
Start: 2025-03-04

## 2025-03-06 ENCOUNTER — OFFICE VISIT (OUTPATIENT)
Dept: EDUCATION SERVICES | Facility: CLINIC | Age: 55
End: 2025-03-06
Payer: COMMERCIAL

## 2025-03-06 ENCOUNTER — TELEPHONE (OUTPATIENT)
Dept: ENDOCRINOLOGY | Facility: CLINIC | Age: 55
End: 2025-03-06

## 2025-03-06 DIAGNOSIS — E10.311 TYPE 1 DIABETES MELLITUS WITH RETINOPATHY AND MACULAR EDEMA, UNSPECIFIED LATERALITY, UNSPECIFIED RETINOPATHY SEVERITY (H): ICD-10-CM

## 2025-03-06 RX ORDER — ACYCLOVIR 400 MG/1
TABLET ORAL
Qty: 9 EACH | Refills: 1 | Status: SHIPPED | OUTPATIENT
Start: 2025-03-06

## 2025-03-06 NOTE — PROGRESS NOTES
Sat pm software not working on phone    25-45u left       Put pod on thigh    Diabetes Self-Management Education & Support    Presents for: Individual review    Type of Service: In Person Visit      REPORTS:          Insulin Pump Information  Insulin Pump Brand: OmniPod  Does patient have an insulin multiple daily injection back-up plan?: Yes    Education specific to insulin pump provided today on:   Pump Hardware & Software:   -- Navigating insulin pump screen/functions/features  -- Automated insulin delivery functions/features    Opportunities for ongoing education and support in diabetes-self management were discussed.    Pt verbalized understanding of concepts discussed and recommendations provided today.       Pump Education Materials: no new    ASSESSMENT  Kris needs his controller programmed to his current settings until the christian functionality returns.  His settings do appear to work well.  He has some erratic jaimee phenomon at times, which could require a more aggressive correction factor.  Also appears like he may have some bad sites which led to elevated blood sugars.    He did take 16 units of lantus insulin last night, but did not take any corrective novolog today and was at 226 during the appointment today.    Continue education with the following diabetes management concepts: Taking Medication and Insulin Pump Concepts -- Pump hardware - tubed vs pod/patch, infusion sets, cannulas, reservoirs & cartridges   -- Pump software - automated insulin delivery systems, features of different AID algorithms   -- Balancing glucose and insulin - carbohydrate counting, bolus calculator, ICR, ISF, timing of insulin delivery, troubleshooting missed boluses   -- Problem solving with insulin pump therapy - risk of pump failure, MDI back up plan, risk of DKA, keeping adequate supplies on hand, risk of hypoglycemia, exiting and re-entering automated delivery modes     Patient would benefit from No pump change settings  today.  Recommend 0-0-0-20 Lantus in the event of a pump/pod/technology fail.    Changes made to pump settings:  No changes today    Changes made to sensor settings:   No changes to sensor settings recommended at this time.  .    Care Plan and Education Provided:  Set up omni pod 5 PDM controller.  Patient filled and placed pod.  Patient took 2.7 units of corrective insulin.    Patient verbalized understanding of diabetes self-management education concepts discussed, opportunities for ongoing education and support, and recommendations provided today.    Plan     dexcom g7 sensor refills.  Schedule PCP and endo visits.  In the event of pump fail take 0-0-0-20 Lantus plus correction.    Topics to cover at upcoming visits: review pump settings and back up plan.    Follow-up:  Upcoming Diabetes Ed Appointments     Visit Type Date Time Department    INSULIN PUMP FOLLOW-UP 3/6/2025  1:30 PM [facility] DIABETES EDU    INSULIN PUMP FOLLOW-UP 4/3/2025  2:30 PM [facility] DIABETES EDU        See Care Plan for co-developed, patient-state behavior change goals.    Education Materials Provided:  No new materials provided today      Subjective/Objective  Kris is an 55 year old year old, presenting for the following diabetes education related to: Presents for: Individual review  Accompanied by: Self, Spouse  Diabetes education in the past 24mo: (Patient-Rptd) Yes  Focus of Visit: Taking Medication, Insulin Pump  Type of Pump visit: Other  Diabetes type: (Patient-Rptd) Type 1  Disease course: (Patient-Rptd) Improving  How confident are you filling out medical forms by yourself:: (Patient-Rptd) Extremely  Diabetes management related comments/concerns: (Patient-Rptd) Omnipod issues  Transportation concerns: No  Difficulty affording diabetes medication?: No  Difficulty affording diabetes testing supplies?: No  Cultural Influences/Ethnic Background:  Not  or     Diabetes Symptoms & Complications:  Weight trend: (Patient-Rptd)  "Stable  Symptom course: (Patient-Rptd) Stable  Disease course: (Patient-Rptd) Improving       Patient Problem List and Family Medical History reviewed for relevant medical history, current medical status, and diabetes risk factors.    Vitals:  There were no vitals taken for this visit.  Estimated body mass index is 25.37 kg/m  as calculated from the following:    Height as of 12/16/24: 1.702 m (5' 7\").    Weight as of 12/31/24: 73.5 kg (162 lb).   Last 3 BP:   BP Readings from Last 3 Encounters:   12/31/24 (!) 143/91   12/16/24 138/88   09/24/24 123/75       History   Smoking Status    Former    Types: Cigarettes   Smokeless Tobacco    Never       Labs:  Lab Results   Component Value Date    A1C 7.2 08/01/2024    A1C 7.3 06/25/2021     Lab Results   Component Value Date     09/24/2024     02/25/2022     06/25/2021     Lab Results   Component Value Date    LDL 97 04/09/2024    LDL 96 06/25/2021     HDL Cholesterol   Date Value Ref Range Status   06/25/2021 90 >39 mg/dL Final     Direct Measure HDL   Date Value Ref Range Status   04/09/2024 76 >=40 mg/dL Final   ]  GFR Estimate   Date Value Ref Range Status   09/23/2024 >90 >60 mL/min/1.73m2 Final     Comment:     eGFR calculated using 2021 CKD-EPI equation.   06/25/2021 >90 >60 mL/min/[1.73_m2] Final     Comment:     Non  GFR Calc  Starting 12/18/2018, serum creatinine based estimated GFR (eGFR) will be   calculated using the Chronic Kidney Disease Epidemiology Collaboration   (CKD-EPI) equation.       GFR Estimate If Black   Date Value Ref Range Status   06/25/2021 >90 >60 mL/min/[1.73_m2] Final     Comment:      GFR Calc  Starting 12/18/2018, serum creatinine based estimated GFR (eGFR) will be   calculated using the Chronic Kidney Disease Epidemiology Collaboration   (CKD-EPI) equation.       Lab Results   Component Value Date    CR 0.80 09/23/2024    CR 0.72 06/25/2021     No results found for: " "\"MICROALBUMIN\"      Monitoring:  Monitoring Assessed Today: Yes  Did patient bring glucose meter to appointment? : Yes  Blood Glucose Meter: (Patient-Rptd) Reli-On, CGM  Times checking blood sugar at home (number): 5+  Please elaborate:: (Patient-Rptd) Dexcom G7  Times checking blood sugar at home (per): Day  Blood glucose trend: Fluctuating      Taking Medications:  Diabetes Medication(s)       Diabetic Other       Glucagon (GVOKE HYPOPEN) 1 MG/0.2ML pen Inject the contents of 1 device under the skin into lower abdomen, outer thigh, or outer upper arm as needed for hypoglycemia. If no response after 15 minutes, additional 1 mg dose from a new device may be injected while waiting for emergency assistance.       Insulin       insulin aspart (NOVOLOG VIAL) 100 UNITS/ML vial Used 50 units per day via insulin pump     LANTUS SOLOSTAR 100 UNIT/ML soln INJECT 32 UNITS SUBCUTANEOUSLY ONCE DAILY     NOVOLOG PENFILL 100 UNIT/ML soln INJECT 1 UNIT PER 30 GRAMS OF CARBOHYDRATES PLUS 1 UNIT PER 50MG/DL ABOVE 200MG/DL. MAX DOSE 20 UNITS PER DAY          Taking Medication Assessed Today: Yes  Current Treatments: (Patient-Rptd) Insulin Injections, Insulin Pump  Problems taking diabetes medications regularly?: No  Diabetes medication side effects?: No    Problem Solving:  Problem Solving Assessed Today: Yes  Is the patient at risk for hypoglycemia?: Yes  Hypoglycemia Frequency: Rarely  Is the patient at risk for DKA?: Yes  Does patient have ketone test strips?: Yes  Does patient have DKA prevention plan?: Yes          Radha Armendariz RD  Time Spent: 60 minutes  Encounter Type: Individual    Any diabetes medication dose changes were made via the CDCES Standing Orders under the patient's referring provider.    Answers submitted by the patient for this visit:  Questionnaire about: Diabetes problem (Submitted on 3/6/2025)  Chief Complaint: Diabetes problem    "

## 2025-03-06 NOTE — TELEPHONE ENCOUNTER
Patient Contacted for the patient to call back and schedule the following:    Appointment type: NEW DIABETES  Provider: Tamiko Amaya PA-C  Return date: Canceled appt on 6/6   Specialty phone number: [phone]  Additional appointment(s) needed:   Additonal Notes:  Spoke to pt to freedom appt on 6/6 due to changes in the providers randy.  Pt stated he wanted to cancel this appt and would call us back to rfeedom it.  Sent my Chart with our contact information.  Can reschedule patient in to new DEXTER per previous triage.    Examples:  5/12 Ahmed  In Person  5/13 Ahmed  Virtual  5/20 Ahmed  Virtual    Please note that the above appointment(s) will require manual scheduling as they are marked as DEXTER and will not appear using auto search. Do not schedule the patient if another patient has already been scheduled in the requested appointment slot.       Christian Ferro on 3/6/2025 at 10:43 AM

## 2025-03-06 NOTE — PATIENT INSTRUCTIONS
Can put a pod on when you get it functioning can start in activity mode.    PUMP FAILURE/BACKUP PLAN    Cover meals with 1 unit per 12 grams of carbohydrate and correct with 1 unit for every 40 mg/dl greater than 140 mg/dl using Novolog/Aspart or Humalog/Lispro.    Wait four hours before dosing for high blood sugars to avoid stacking. Can cover for food only before the four hour window.    Set an alarm to check blood sugars every 4 hours during the night and cover as needed for blood sugars over 180 mg/dl.    If there is delay in getting your replacement insulin pump, use 20 units of basal insulin, Insulin Glargine U-100  (Lantus, Semglee, Basaglar), once daily until a replacement pump is obtained.

## 2025-03-10 ENCOUNTER — TELEPHONE (OUTPATIENT)
Dept: FAMILY MEDICINE | Facility: CLINIC | Age: 55
End: 2025-03-10
Payer: COMMERCIAL

## 2025-03-10 NOTE — TELEPHONE ENCOUNTER
Called kris back.  Reports he did test ketones and was at moderate. Took pump off yesterday and went to hospital.                Appears that manual settings were set to 0.05u/hr=1.2u/24 hours vs. 0.5u/hr=12 units.    Manual rate settings of 0.5/hr match automation basal rate though he would not be receiving auto corrections and typically less corrections throughout the day.  Recommend higher manual setting at 0.8u/hr (19.2u/24 hours), when Kris was on MDI he typically took 32 units Lantus due to many missed corrections OR would not take as much rapid acting.  Talked wife through adjusting this as well as looking at total daily insulin for basal rate to confirm the number is always over 12 units.     Will E-mail pump report to patient.  Requested he schedule endocrinology visit asap post discharge.  Past visits he had canceled.     Wife received back up pdm charging cord today and will attempt to charge pdm.    Radha Armendariz MS, RD, LD, CDE

## 2025-03-10 NOTE — TELEPHONE ENCOUNTER
[facility] Call Center    Phone Message    May a detailed message be left on voicemail: yes     Reason for Call: Other: patient is in ICU and wants to talk to [facility] about where he's at and he states he was supposed to talk to [facility] today anyway. By Sunday he was in DKA. He's in [facility]. Please call her back to discuss.

## 2025-03-14 DIAGNOSIS — I10 HYPERTENSION GOAL BP (BLOOD PRESSURE) < 140/90: ICD-10-CM

## 2025-03-16 RX ORDER — LISINOPRIL 20 MG/1
20 TABLET ORAL DAILY
Qty: 90 TABLET | Refills: 1 | Status: SHIPPED | OUTPATIENT
Start: 2025-03-16

## 2025-04-03 ENCOUNTER — OFFICE VISIT (OUTPATIENT)
Dept: EDUCATION SERVICES | Facility: CLINIC | Age: 55
End: 2025-04-03
Payer: COMMERCIAL

## 2025-04-03 DIAGNOSIS — Z53.9 NO SHOW: Primary | ICD-10-CM

## 2025-04-03 NOTE — PROGRESS NOTES
Patient was a no show for today's appointment.     Patient was contacted to reschedule.     Action taken: follow-up orders placed.    Radha Armendariz MS, RD, LD, CDE

## 2025-04-04 ENCOUNTER — TELEPHONE (OUTPATIENT)
Dept: FAMILY MEDICINE | Facility: CLINIC | Age: 55
End: 2025-04-04

## 2025-04-04 NOTE — TELEPHONE ENCOUNTER
Patient Quality Outreach    Patient is due for the following:   Diabetes -  A1C, eGFR, uACR, and Diabetic Follow-Up Visit  Hypertension -  Hypertension follow-up visit      Topic Date Due    Zoster (Shingles) Vaccine (2 of 2) 01/14/2025       Action(s) Taken:   Patient has upcoming appointment, these items will be addressed at that time.  Appointment is on 04/16/2025 for chronic health issues.    Type of outreach:    Chart review performed, no outreach needed.    Questions for provider review:    None         Kylie Bundy CMA  Chart routed to Care Team.

## 2025-04-07 ENCOUNTER — TELEPHONE (OUTPATIENT)
Dept: EDUCATION SERVICES | Facility: CLINIC | Age: 55
End: 2025-04-07
Payer: COMMERCIAL

## 2025-04-07 NOTE — TELEPHONE ENCOUNTER
Patient confirmed scheduled appointment:  Date: 04/24/25  Time: 9:30  Visit type: Insulin Pre-Pump  Provider: Radha Armendariz RD  Location: [facility] DIABETES  Testing/imaging: N/A  Additional notes: Scheduled per pt, pt apologized for missing appt b/c he and his wife were sick.    Follow-Up with Whom?   Me            Follow-Up for What?   Diabetes            Service:   60 Minutes            How?   In-Person            Can this be self-scheduled online?   Yes        Cece Jenkins on 4/7/2025 at 10:56 AM

## 2025-04-16 ENCOUNTER — OFFICE VISIT (OUTPATIENT)
Dept: FAMILY MEDICINE | Facility: CLINIC | Age: 55
End: 2025-04-16
Payer: COMMERCIAL

## 2025-04-16 VITALS
RESPIRATION RATE: 16 BRPM | DIASTOLIC BLOOD PRESSURE: 90 MMHG | HEART RATE: 107 BPM | BODY MASS INDEX: 24.64 KG/M2 | WEIGHT: 157 LBS | TEMPERATURE: 97.2 F | OXYGEN SATURATION: 97 % | HEIGHT: 67 IN | SYSTOLIC BLOOD PRESSURE: 132 MMHG

## 2025-04-16 DIAGNOSIS — E78.5 HYPERLIPIDEMIA LDL GOAL <100: ICD-10-CM

## 2025-04-16 DIAGNOSIS — E10.311 TYPE 1 DIABETES MELLITUS WITH RETINOPATHY AND MACULAR EDEMA, UNSPECIFIED LATERALITY, UNSPECIFIED RETINOPATHY SEVERITY (H): Primary | ICD-10-CM

## 2025-04-16 DIAGNOSIS — F41.1 GENERALIZED ANXIETY DISORDER: ICD-10-CM

## 2025-04-16 LAB
CHOLEST SERPL-MCNC: 183 MG/DL
EST. AVERAGE GLUCOSE BLD GHB EST-MCNC: 137 MG/DL
FASTING STATUS PATIENT QL REPORTED: ABNORMAL
HBA1C MFR BLD: 6.4 % (ref 0–5.6)
HDLC SERPL-MCNC: 63 MG/DL
LDLC SERPL CALC-MCNC: 104 MG/DL
NONHDLC SERPL-MCNC: 120 MG/DL
TRIGL SERPL-MCNC: 79 MG/DL

## 2025-04-16 PROCEDURE — 83036 HEMOGLOBIN GLYCOSYLATED A1C: CPT | Performed by: FAMILY MEDICINE

## 2025-04-16 PROCEDURE — 36415 COLL VENOUS BLD VENIPUNCTURE: CPT | Performed by: FAMILY MEDICINE

## 2025-04-16 PROCEDURE — 99214 OFFICE O/P EST MOD 30 MIN: CPT | Performed by: FAMILY MEDICINE

## 2025-04-16 PROCEDURE — 80061 LIPID PANEL: CPT | Performed by: FAMILY MEDICINE

## 2025-04-16 RX ORDER — LOSARTAN POTASSIUM 25 MG/1
25 TABLET ORAL DAILY
Qty: 90 TABLET | Refills: 3 | Status: SHIPPED | OUTPATIENT
Start: 2025-04-16

## 2025-04-16 RX ORDER — ALPRAZOLAM 0.5 MG
0.5 TABLET ORAL DAILY PRN
Qty: 20 TABLET | Refills: 0 | Status: SHIPPED | OUTPATIENT
Start: 2025-04-16

## 2025-04-16 RX ORDER — PRAVASTATIN SODIUM 20 MG
20 TABLET ORAL DAILY
Qty: 90 TABLET | Refills: 3 | Status: SHIPPED | OUTPATIENT
Start: 2025-04-16

## 2025-04-16 ASSESSMENT — ENCOUNTER SYMPTOMS
SEIZURES: 0
COUGH: 0
COLOR CHANGE: 0
APPETITE CHANGE: 0
FATIGUE: 0
ALLERGIC/IMMUNOLOGIC NEGATIVE: 1
ENDOCRINE NEGATIVE: 1
WHEEZING: 0
HALLUCINATIONS: 0
DECREASED CONCENTRATION: 0
EYE PAIN: 0
DIZZINESS: 0
CONFUSION: 0
PALPITATIONS: 0
SHORTNESS OF BREATH: 0
AGITATION: 0
HEMATOLOGIC/LYMPHATIC NEGATIVE: 1
HEADACHES: 0
EYE DISCHARGE: 0
GASTROINTESTINAL NEGATIVE: 1
ACTIVITY CHANGE: 0
NERVOUS/ANXIOUS: 0
FEVER: 0

## 2025-04-16 NOTE — PATIENT INSTRUCTIONS
Damian Ponce,    Thank you for allowing [facility] to manage your care.    I ordered some blood work, please go to the laboratory to get your laboratory studies.    I sent your prescriptions to your pharmacy.    For your diabetes, I am starting you on losartan 25 mg daily.     I am stopping prozac.    Keep your upcoming appointment with the diabetic educator.     For your convenience, test results are released as soon as they are available  Please allow 1-2 business days for me to send you a comment about your results.  If not done so, I encourage you to login into [email or web address] ([email or web address] to review your results in real time.     If you have any questions or concerns, please feel free to call us at [phone].    Sincerely,    Dr. Choudhury    Did you know?      You can schedule a video visit for follow-up appointments as well as future appointments for certain conditions.  Please see the below link.     [email or web address]    If you have not already done so,  I encourage you to sign up for [email or web address] ([email or web address]).  This will allow you to review your results, securely communicate with a provider, and schedule virtual visits as well.

## 2025-04-16 NOTE — PROGRESS NOTES
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  Currently on Novolog pump.  Advised to avoid excessive alcohol intake.  Keep upcoming appointment with endocrinology (patient needs to reschedule) and diabetic educator.   - Hemoglobin A1c; Future  - losartan (COZAAR) 25 MG tablet; Take 1 tablet (25 mg) by mouth daily.  - Hemoglobin A1c    GENERALIZED ANXIETY  Will discontinue prozac.   - ALPRAZolam (XANAX) 0.5 MG tablet; Take 1 tablet (0.5 mg) by mouth daily as needed for anxiety.    Hyperlipidemia LDL goal <100  Continue with pravastatin  - Lipid panel reflex to direct LDL Fasting; Future  - Lipid panel reflex to direct LDL Fasting        MED REC REQUIRED  Post Medication Reconciliation Status: discharge medications reconciled and changed, per note/orders    Follow-up    Follow-up Visit   Expected date:  Jul 16, 2025 (Approximate)      Follow Up Appointment Details:     Follow-up with whom?: Me    Follow-Up for what?: Adult Preventive    How?: In Person                 Subjective   Kris is a 55 year old, presenting for the following health issues:  3/9/25 Hospital F/U, Medication Refill, and  Follow Up (Wants to discontinue fluoxetine )        4/16/2025    12:52 PM   Additional Questions   Roomed by Nikki ALLEN     History of Present Illness       Diabetes:   He presents for follow up of diabetes.   He is checking home blood glucose with a continuous glucose monitor.   He checks blood glucose before meals.  Blood glucose is sometimes over 200 and sometimes under 70. He is aware of hypoglycemia symptoms including dizziness and weakness.    He has no concerns regarding his diabetes at this time.  He is having numbness in feet and burning in feet.            Reason for visit:  Meds update and follow up to DKA as well as other tests.    He eats 2-3 servings of fruits and  vegetables daily.He consumes 2 sweetened beverage(s) daily.He exercises with enough effort to increase his heart rate 30 to 60 minutes per day.  He exercises with enough effort to increase his heart rate 3 or less days per week.   He is taking medications regularly.            Hospital Follow-up Visit:    Hospital/Nursing Home/IP Rehab Facility:  [facility] [place]  Date of Admission: 3/9/25  Date of Discharge: 3/11/25  Reason(s) for Admission: DKA  Was the patient in the ICU or did the patient experience delirium during hospitalization?  No  Do you have any other stressors you would like to discuss with your provider? Health Concerns    Problems taking medications regularly:  None  Medication changes since discharge: None  Problems adhering to non-medication therapy:  None    Summary of hospitalization:  CareEverywhere information obtained and reviewed  Diagnostic Tests/Treatments reviewed.  Follow up needed: none  Other Healthcare Providers Involved in Patient s Care:          Diabetic Educator and Endocrinology  Update since discharge: stable.         Plan of care communicated with patient           Hospital follow-up: Had nausea and vomiting.  Patient's pump was not functioning.  Patient was hospitalized for DKA.  Patient was treated with Lantus and now has stopped that since pump is now functioning.  Currently on aspart pump.  Patient stopped lisinopril in December due to dizziness which is now resolved.  As of today, patient states that blood sugars are in 120-150s.  Patient has an upcoming appointment with diabetic educator.  In addition, patient would like to stop prozac.  Patient continues to drink 1-2 hard liquor per day.     Review of Systems   Constitutional:  Negative for activity change, appetite change, fatigue and fever.   HENT: Negative.     Eyes:  Negative for pain, discharge and visual disturbance.   Respiratory:  Negative for cough, shortness of breath and wheezing.    Cardiovascular:   "Negative for chest pain, palpitations and leg swelling.   Gastrointestinal: Negative.    Endocrine: Negative.    Genitourinary: Negative.    Skin:  Negative for color change and rash.   Allergic/Immunologic: Negative.    Neurological:  Negative for dizziness, seizures and headaches.   Hematological: Negative.    Psychiatric/Behavioral:  Negative for agitation, confusion, decreased concentration and hallucinations. The patient is not nervous/anxious.            Objective    /90   Pulse 107   Temp 97.2  F (36.2  C) (Temporal)   Resp 16   Ht 1.702 m (5' 7\")   Wt 71.2 kg (157 lb)   SpO2 97%   BMI 24.59 kg/m    Body mass index is 24.59 kg/m .  Physical Exam  Constitutional:       General: He is not in acute distress.     Appearance: He is well-developed.   HENT:      Head: Normocephalic and atraumatic.      Nose: Nose normal.   Eyes:      Conjunctiva/sclera: Conjunctivae normal.   Neck:      Trachea: No tracheal deviation.   Cardiovascular:      Rate and Rhythm: Normal rate and regular rhythm.      Heart sounds: Normal heart sounds.   Pulmonary:      Effort: Pulmonary effort is normal.      Breath sounds: No wheezing.   Musculoskeletal:         General: Normal range of motion.      Cervical back: Normal range of motion.   Skin:     Findings: No erythema or rash.   Neurological:      Mental Status: He is alert and oriented to person, place, and time.   Psychiatric:         Behavior: Behavior normal.                Signed Electronically by: Chuy Choudhury DO    "

## 2025-04-28 ENCOUNTER — TELEPHONE (OUTPATIENT)
Dept: NUTRITION | Facility: CLINIC | Age: 55
End: 2025-04-28

## 2025-04-28 NOTE — TELEPHONE ENCOUNTER
"Patient confirmed scheduled appointment:  Date: 5/6   Time: 1:30 pm   Visit type: Diabetes ed   Provider: Radha   Location: [facility] virtual   Testing/imaging:   Additional notes: Spoke to pt and re-randy canceled appt on 4/24 to the next avail visit that worked for pt.   Radha Armendariz RD  [id number]  Yes virtual is totally fine.    Radha Armendariz MS, RD, LD, CDE     ----- Message -----  From: Cece Jenkins  Sent: 4/25/2025   3:36 PM CDT  To: Radha Armendariz RD  Subject: Pt Cancel                                        Damian Garcia,    This patient cancelled his appointment on 04/24 and left this note:    \"Sorry, I can't make it tomorrow. Is there a up coming time I could have a phone conversation or virtual. Things are going good but have questions. Thank you in advance!\"    Your appointment request specifically asks for an in person visit, are you willing to accommodate a video visit?    Thanks for your help.    Miriam Serna on 4/28/2025 at 12:03 PM    "

## 2025-05-06 ENCOUNTER — VIRTUAL VISIT (OUTPATIENT)
Dept: EDUCATION SERVICES | Facility: CLINIC | Age: 55
End: 2025-05-06
Payer: COMMERCIAL

## 2025-05-06 DIAGNOSIS — E10.311 TYPE 1 DIABETES MELLITUS WITH RETINOPATHY AND MACULAR EDEMA, UNSPECIFIED LATERALITY, UNSPECIFIED RETINOPATHY SEVERITY (H): Primary | ICD-10-CM

## 2025-05-06 PROCEDURE — G0108 DIAB MANAGE TRN  PER INDIV: HCPCS | Mod: 95 | Performed by: DIETITIAN, REGISTERED

## 2025-05-06 NOTE — PROGRESS NOTES
Virtual Visit Details    Type of service:  Video Visit   Video Start Time:  1:22pm  Video End Time: 2:26pm    Originating Location (pt. Location): Home  Distant Location (provider location):  Off-site  Platform used for Video Visit: Hasmukh

## 2025-05-06 NOTE — NURSING NOTE
Current patient location: Patient declined to provide     Is the patient currently in the state of [place]? YES    Visit mode: VIDEO    If the visit is dropped, the patient can be reconnected by:VIDEO VISIT: Text to cell phone:   Telephone Information:   Mobile [phone]       Will anyone else be joining the visit? NO  (If patient encounters technical issues they should call [phone]    Are changes needed to the allergy or medication list? No    Are refills needed on medications prescribed by this physician? NO    Rooming Documentation:  Questionnaire(s) not done per department protocol    Reason for visit: Diabetes Education    Shelby Kocher VVF

## 2025-05-06 NOTE — PROGRESS NOTES
Diabetes Self-Management Education & Support    Presents for: Insulin Pump and CGM Review    Assessment  Blood sugars close to target.  Recent days have had lows due to not switching from manual mode back to automated mode. He has sucessfully maintained blood sugars and has not had a pod/pump failure in over 1 month.  He is apprehensive to seek endocrinology advice after a previous bad experience.  He also has questions about neuropathy.  He did have some connective issues with pods across the body from sensor.  Reports show inconsistent elevations overnight and pump does not seem to go into max delivery which is set high at 3u/hour.  His settings are set aggressively and would leave adjusting correction factor overnight which may cause lows as some nights he is fully pausing for long durations of time.      At this time I think he is having connective issues and/or scar tissue or bad sites that are causing highs.     Insulin Pump Information  Insulin Pump Brand: OmniPod  Does patient have an insulin multiple daily injection back-up plan?: Yes    Reports                    Patient would benefit from keeping pod and pump on same side of body.    Care Plan and Education Provided:  Pump Hardware & Software:   -- Automated insulin delivery functions/features  -- Pump Alerts & Alarms     Problem Solving:  -- DKA prevention & steps to take when glucose is above 240 mg/dL   -- Taking manual corrective insulin to treat elevation.    Continuous Glucose Monitoring:  -- CGM Alerts & Alarms     Patient verbalized understanding of diabetes self-management education concepts discussed, opportunities for ongoing education and support, and recommendations provided today.    Plan  6/5/25- in person follow up  Wear pods and dexcom on same side of body.  Make endocrinology appointment and consider podiatry.    Radha Armendariz MS, RD, LD, CDE  Topics to cover at upcoming visits: Insulin Pump Concepts -- Problem solving with insulin pump  "therapy - risk of pump failure, MDI back up plan, risk of DKA, keeping adequate supplies on hand, risk of hypoglycemia, exiting and re-entering automated delivery modes     See Care Plan for co-developed, patient-state behavior change goals.    Education Materials Provided:  No new materials provided today      Subjective/Objective  Kris is an 55 year old, presenting for the following diabetes education related to: Insulin Pump and CGM Review  Accompanied by: Self, Spouse  Diabetes education in the past 24mo: (Patient-Rptd) Yes  Focus of Visit: Taking Medication, Problem Solving, Insulin Pump  Type of Pump visit: Pump Review  Diabetes type: (Patient-Rptd) Type 1  Disease course: (Patient-Rptd) Stable  How confident are you filling out medical forms by yourself:: (Patient-Rptd) Extremely  Diabetes management related comments/concerns: My blood sugars are going high overnight and I don't get it.  I am really excited about my a1c it's the first time it's been in the 6s for a long time.  Transportation concerns: No  Difficulty affording diabetes medication?: No  Difficulty affording diabetes testing supplies?: No  Other concerns: None  Cultural Influences/Ethnic Background:  Not  or     Diabetes Symptoms & Complications:  Diabetes Related Symptoms: (Patient-Rptd) Neuropathy, Visual change  Weight trend: (Patient-Rptd) Stable  Symptom course: (Patient-Rptd) Stable  Disease course: (Patient-Rptd) Stable  Complications assessed today?: No    Patient Problem List and Family Medical History reviewed for relevant medical history, current medical status, and diabetes risk factors.    Vitals:  There were no vitals taken for this visit.  Estimated body mass index is 24.59 kg/m  as calculated from the following:    Height as of 4/16/25: 1.702 m (5' 7\").    Weight as of 4/16/25: 71.2 kg (157 lb).   Last 3 BP:   BP Readings from Last 3 Encounters:   04/16/25 132/90   12/31/24 (!) 143/91   12/16/24 138/88       History " "  Smoking Status    Former    Types: Cigarettes   Smokeless Tobacco    Never       Labs:  Lab Results   Component Value Date    A1C 6.4 04/16/2025    A1C 7.3 06/25/2021     Lab Results   Component Value Date     09/24/2024     02/25/2022     06/25/2021     Lab Results   Component Value Date     04/16/2025    LDL 96 06/25/2021     HDL Cholesterol   Date Value Ref Range Status   06/25/2021 90 >39 mg/dL Final     Direct Measure HDL   Date Value Ref Range Status   04/16/2025 63 >=40 mg/dL Final     GFR Estimate   Date Value Ref Range Status   09/23/2024 >90 >60 mL/min/1.73m2 Final     Comment:     eGFR calculated using 2021 CKD-EPI equation.   06/25/2021 >90 >60 mL/min/[1.73_m2] Final     Comment:     Non  GFR Calc  Starting 12/18/2018, serum creatinine based estimated GFR (eGFR) will be   calculated using the Chronic Kidney Disease Epidemiology Collaboration   (CKD-EPI) equation.       GFR Estimate If Black   Date Value Ref Range Status   06/25/2021 >90 >60 mL/min/[1.73_m2] Final     Comment:      GFR Calc  Starting 12/18/2018, serum creatinine based estimated GFR (eGFR) will be   calculated using the Chronic Kidney Disease Epidemiology Collaboration   (CKD-EPI) equation.       Lab Results   Component Value Date    CR 0.80 09/23/2024    CR 0.72 06/25/2021     No results found for: \"MICROL\", \"UMALCR\", \"UCRR\"    Diabetes Medication(s)       Diabetic Other       Glucagon (GVOKE HYPOPEN) 1 MG/0.2ML pen Inject the contents of 1 device under the skin into lower abdomen, outer thigh, or outer upper arm as needed for hypoglycemia. If no response after 15 minutes, additional 1 mg dose from a new device may be injected while waiting for emergency assistance.       Insulin       insulin aspart (NOVOLOG VIAL) 100 UNITS/ML vial Used 50 units per day via insulin pump     NOVOLOG PENFILL 100 UNIT/ML soln INJECT 1 UNIT PER 30 GRAMS OF CARBOHYDRATES PLUS 1 UNIT PER 50MG/DL " ABOVE 200MG/DL. MAX DOSE 20 UNITS PER DAY              5/6/2025   Taking Medications   Taking Medication Assessed Today Yes   Current Treatments Insulin Pump   Problems taking diabetes medications regularly? No   Diabetes medication side effects? Yes       lows with manual mode         5/6/2025   Problem Solving   Is the patient at risk for hypoglycemia? Yes   Hypoglycemia Frequency Weekly   Is the patient at risk for DKA? Yes   Does patient have ketone test strips? Yes   Does patient have DKA prevention plan? Yes       Radha Armendariz MS, RD, LD, CDE  Time Spent: 64 minutes  Encounter Type: Individual    Any diabetes medication dose changes were [facility] under the patient's referring provider.    Discussed why Endo and specialists are recommended vs exclusive pcp adressing diabetes and long acting  Back up plan- in event of pump failure take 15 units Lantus/long acting insulin  Answers submitted by the patient for this visit:  Questionnaire about: Diabetes problem (Submitted on 5/6/2025)  Chief Complaint: Diabetes problem

## 2025-05-07 DIAGNOSIS — G63 POLYNEUROPATHY ASSOCIATED WITH UNDERLYING DISEASE: ICD-10-CM

## 2025-05-08 RX ORDER — PREGABALIN 75 MG/1
75 CAPSULE ORAL 3 TIMES DAILY
Qty: 90 CAPSULE | Refills: 0 | Status: SHIPPED | OUTPATIENT
Start: 2025-05-08

## 2025-05-24 ENCOUNTER — NURSE TRIAGE (OUTPATIENT)
Dept: NURSING | Facility: CLINIC | Age: 55
End: 2025-05-24
Payer: COMMERCIAL

## 2025-05-24 NOTE — TELEPHONE ENCOUNTER
Nurse Triage SBAR    Is this a 2nd Level Triage? YES, LICENSED PRACTITIONER REVIEW IS REQUIRED    Situation: Elevated blood sugar and trouble connecting insulin pump and sensor.     Background: Writer received this patient via verbal report from the off-going [facility] nurse advisor and the background information includes, the patient is a type I diabetic.     Assessment: His blood sugar is currently 279 and he is unable to connect his insulin pump and is wondering if it is appropriate to use his insulin pens he has at home from prior use before having pump.     Protocol Recommended Disposition:   Call PCP Now    Recommendation: Writer provided the patient with the recommendation from the on-call provider regarding using his previously used insulin pens for appropriate treatment of his hyperglycemia. Patient verbalized understanding and writer verified using his previous visit from a diabetic educator on his plan for insulin pump failure and he agreed with this plan of care. We also discussed possibly being seen in the emergency department for an evaluation and he is going to consider this for now. Caller received care advice and call back information.     Paged to provider    Does the patient meet one of the following criteria for ADS visit consideration? 16+ years old, with an [facility] PCP     TIP  Providers, please consider if this condition is appropriate for management at one of our Acute and Diagnostic Services sites.     If patient is a good candidate, please use dotphrase <dot>triageresponse and select Refer to ADS to document.      [facility] Provider consult indicated.    Reason for page: Type I Diabetic, Elevated Blood sugar and insulin pump/sensor not connecting properly.    Specialty Group: FM-Family Medicine    Initial call made to Dr. Bustos by Answering Service at 0125.     [facility] Primary Care Provider, Dr. Bustos, returning page to Nurse Advisors at 0130.    Provider recommended plan of care:  For patient to use insulin pens that he has at home for treatment of elevated blood sugar.     Provider Recommendation Follow Up:   Reached patient/caregiver. Informed of provider's recommendations. Patient verbalized understanding and agrees with the plan.         Ninoska Saxena RN

## 2025-05-24 NOTE — TELEPHONE ENCOUNTER
Patient states that he is having connecting between the pump and the sensor and has changed them out.  Patient has lantus and novolog and does not know what to go back to use.  Currently blood sugar is 301 sensor 279 was blood stick.  Patient does not know what amount to give.  FNA passed telephone encounter over to RN Ninoska Saxena for paging.              Reason for Disposition   [1] Caller has URGENT medication or insulin pump question AND [2] triager unable to answer question    Additional Information   Negative: Unconscious or difficult to awaken   Negative: Acting confused (e.g., disoriented, slurred speech)   Negative: Very weak (e.g., can't stand)   Negative: Sounds like a life-threatening emergency to the triager   Negative: [1] Vomiting AND [2] signs of dehydration (e.g., very dry mouth, lightheaded, dark urine)   Negative: [1] Blood glucose > 240 mg/dL (13.3 mmol/L) AND [2] rapid breathing   Negative: Blood glucose > 500 mg/dL (27.8 mmol/L)   Negative: [1] Blood glucose > 240 mg/dL (13.3 mmol/L) AND [2] urine ketones moderate-large (or more than 1+)   Negative: [1] Blood glucose > 240 mg/dL (13.3 mmol/L) AND [2] blood ketones > 1.4 mmol/L   Negative: [1] Blood glucose > 240 mg/dL (13.3 mmol/L) AND [2] vomiting AND [3] unable to check for ketones (in blood or urine)   Negative: [1] New-onset diabetes suspected (e.g., frequent urination, weak, weight loss) AND [2] vomiting or rapid breathing   Negative: Vomiting lasts > 4 hours   Negative: Patient sounds very sick or weak to the triager   Negative: Fever > 100.4 F (38.0 C)   Negative: Blood glucose > 400 mg/dL (22.2 mmol/L)   Negative: [1] Blood glucose > 300 mg/dL (16.7 mmol/L) AND [2] two or more times in a row   Negative: Urine ketones moderate - large (or blood ketones > 1.4 mmol/L)   Negative: [1] Symptoms of high blood sugar (e.g., abnormally thirsty, frequent urination, weight loss) AND [2] not able to test blood glucose AND [3] pregnant    Protocols  used: Diabetes - High Blood Sugar-A-AH

## 2025-06-21 ENCOUNTER — HEALTH MAINTENANCE LETTER (OUTPATIENT)
Age: 55
End: 2025-06-21

## 2025-07-16 ENCOUNTER — TELEPHONE (OUTPATIENT)
Dept: FAMILY MEDICINE | Facility: CLINIC | Age: 55
End: 2025-07-16
Payer: COMMERCIAL

## 2025-07-16 NOTE — TELEPHONE ENCOUNTER
Patient Quality Outreach    Patient is due for the following:   Diabetes -  A1C, LDL (Fasting), Microalbumin, and Foot Exam  Physical Preventive Adult Physical      Topic Date Due    Hepatitis B Vaccine (3 of 3 - 19+ 3-dose series) 05/19/2025    Zoster (Shingles) Vaccine (2 of 2) 01/14/2025       Action(s) Taken:   Schedule a Adult Preventative    Type of outreach:    Sent [email or web address] message.    Questions for provider review:    At visit on  4/16/2025 Dr. Choudhury had noted that patient follow up on fasting cholesterol levels in 12 months and next A1C be completed in 6 months. Care Gaps state that these two tests are due at intervals of every 3 months. Please update Care Gaps.         Gisela De La Rosa MA  Chart routed to Provider, Dr. Choudhury.

## 2025-07-18 PROBLEM — E11.3299 BACKGROUND DIABETIC RETINOPATHY (H): Status: ACTIVE | Noted: 2022-10-01

## 2025-07-18 PROBLEM — H25.812 COMBINED FORMS OF AGE-RELATED CATARACT OF LEFT EYE: Status: ACTIVE | Noted: 2025-07-18

## 2025-07-18 PROBLEM — H25.811 COMBINED FORMS OF AGE-RELATED CATARACT OF RIGHT EYE: Status: ACTIVE | Noted: 2022-10-01

## 2025-07-23 ENCOUNTER — MYC MEDICAL ADVICE (OUTPATIENT)
Dept: OPHTHALMOLOGY | Facility: CLINIC | Age: 55
End: 2025-07-23
Payer: COMMERCIAL

## 2025-07-23 DIAGNOSIS — H25.811 COMBINED FORMS OF AGE-RELATED CATARACT OF RIGHT EYE: ICD-10-CM

## 2025-07-23 DIAGNOSIS — H25.812 COMBINED FORMS OF AGE-RELATED CATARACT OF LEFT EYE: ICD-10-CM

## 2025-07-23 DIAGNOSIS — E11.3299 BACKGROUND DIABETIC RETINOPATHY (H): ICD-10-CM

## 2025-07-23 DIAGNOSIS — H40.1132 PRIMARY OPEN ANGLE GLAUCOMA (POAG) OF BOTH EYES, MODERATE STAGE: Primary | ICD-10-CM

## 2025-07-24 ENCOUNTER — TELEPHONE (OUTPATIENT)
Dept: OPHTHALMOLOGY | Facility: CLINIC | Age: 55
End: 2025-07-24
Payer: COMMERCIAL

## 2025-07-24 NOTE — TELEPHONE ENCOUNTER
This encounter is being sent to inform the clinic that this patient has a referral from Dr. Clifton for the diagnoses of   Primary open angle glaucoma (POAG) of both eyes, moderate stage [H40.1132]   Combined forms of age-related cataract of right eye [H25.811]   Background diabetic retinopathy and has requested that this patient be seen within 1- 2 weels  Based on the availability of our provider(s), we are unable to accommodate this request.    Were all sites offered this patient?   Yes     Does scheduling algorithm request to schedule next available?   Patient appointment has not been scheduled.  Please review the referral request for accommodation and contact the patient.  If unable to accommodate, please resubmit a referral and indicate a preferred partner or affiliate location using Provider Finder or Scheduling Instructions field.     -------      Referral above received by triage    -- per referral information from [facility] yesterday:Will refer to [facility] for consideration of cataract surgery +/- MIGS left eye; possibly right eye thereafter (He is a contact lens wearer). Also a selective laser trabeculoplasty candidate. Mentioned availability of retina staff if concerns regarding retinopathy. Will continue drops as currently doing in interim.       Triage team to reach out to review scheduling with Dr. Ring, Dr. Gates or Dr. Perez.    Will Hay, RN 4:10 PM 07/24/25

## 2025-07-24 NOTE — TELEPHONE ENCOUNTER
Discussion with patient.  Will refer to [facility] for consideration of cataract surgery +/- MIGS left eye; possibly right eye thereafter (He is a contact lens wearer).  Also a selective laser trabeculoplasty candidate.  Mentioned availability of retina staff if concerns regarding retinopathy.  Will continue drops as currently doing in interim.    Ciaran Clifton M.D.

## 2025-07-24 NOTE — TELEPHONE ENCOUNTER
Patient Quality Outreach    Patient is due for the following:   Diabetes -  LDL (Fasting), Microalbumin, and Foot Exam      Topic Date Due    Hepatitis B Vaccine (3 of 3 - 19+ 3-dose series) 05/19/2025    Zoster (Shingles) Vaccine (2 of 2) 01/14/2025       Action(s) Taken:   Patient has upcoming appointment, these items will be addressed at that time.  Patient is scheduled with Dr. Choudhury on 10/2/2025.     Type of outreach:    Chart review performed, no outreach needed.    Questions for provider review:    None         Gisela De La Rosa MA  Chart routed to None.

## 2025-07-30 ENCOUNTER — MYC REFILL (OUTPATIENT)
Dept: EDUCATION SERVICES | Facility: CLINIC | Age: 55
End: 2025-07-30
Payer: COMMERCIAL

## 2025-07-30 ENCOUNTER — TELEPHONE (OUTPATIENT)
Dept: DERMATOLOGY | Facility: CLINIC | Age: 55
End: 2025-07-30
Payer: COMMERCIAL

## 2025-07-30 DIAGNOSIS — E10.311 TYPE 1 DIABETES MELLITUS WITH RETINOPATHY AND MACULAR EDEMA, UNSPECIFIED LATERALITY, UNSPECIFIED RETINOPATHY SEVERITY (H): ICD-10-CM

## 2025-07-30 RX ORDER — INSULIN ASPART 100 [IU]/ML
INJECTION, SOLUTION INTRAVENOUS; SUBCUTANEOUS
Qty: 20 ML | Refills: 5 | Status: SHIPPED | OUTPATIENT
Start: 2025-07-30

## 2025-07-30 RX ORDER — ACYCLOVIR 400 MG/1
TABLET ORAL
Qty: 9 EACH | Refills: 0 | Status: SHIPPED | OUTPATIENT
Start: 2025-07-30

## 2025-07-30 NOTE — TELEPHONE ENCOUNTER
[facility] Call Center    Phone Message    May a detailed message be left on voicemail: yes     Reason for Call: Pt calling in looking for a sooner appt. Since booking his appointment the mole on his chest has been growing and changing. It has hardened and grown in texture. Feels like a pencil eraser is under his skin. Kris is looking for some peace of mind about the mole. Pt is ok to going to  also. Please call back. Thank you.    Action Taken: Other: [facility]    Travel Screening: Not Applicable     Date of Service:

## 2025-07-31 RX ORDER — INSULIN ASPART 100 [IU]/ML
INJECTION, SOLUTION INTRAVENOUS; SUBCUTANEOUS
Qty: 20 ML | Refills: 0 | OUTPATIENT
Start: 2025-07-31

## 2025-07-31 NOTE — TELEPHONE ENCOUNTER
Spoke with patient and scheduled sooner appt at  derm 8/12    Yolanda BARKER RN BSN  [facility]  [phone]

## 2025-07-31 NOTE — TELEPHONE ENCOUNTER
Last Written Prescription:  insulin aspart (NOVOLOG VIAL) 100 UNITS/ML vial   20 mL 5 7/30/2025   Refill decision:   [] Medication refilled per  Medication Refill in Ambulatory Care  policy.  [x] Medication unable to be refilled by RN due to: Other:  duplicate  Addressed in a different encounter.      Request from pharmacy:  Requested Prescriptions   Pending Prescriptions Disp Refills    NOVOLOG RELION 100 UNIT/ML vial [Pharmacy Med Name: NovoLOG ReliOn 100 UNIT/ML Subcutaneous Solution] 20 mL 0     Sig: INJECT 50 UNITS SUBCUTANEOUSLY DAILY VIA  INSULIN  PUMP       Insulin Protocol Failed - 7/31/2025  2:55 PM        Failed - Medication is active on med list and the sig matches. RN to manually verify dose and sig if red X/fail.             Failed - Chart review required     Review Chart.    Do not approve if insulin is used in a pump.  Instead, direct refill request to the patient's endocrinologist.  If the patient doesn't have an endocrinologist, then send the refill to the patient's PCP for review            Passed - HgbA1C in past 3 or 6 months     If HgbA1C is 8 or greater, it needs to be on file within the past 3 months.  If less than 8, must be on file within the past 6 months.     Recent Labs   Lab Test 04/16/25  1348   A1C 6.4*             Passed - Recent (6 month) or future (90 days) visit with the authorizing provider's specialty (provided they have been seen in the past 9 months)     The patient must have completed an in-person or virtual visit within the past 6 months or has a future visit scheduled within the next 90 days with the authorizing provider s specialty.  Urgent care and e-visits do not quality as an office visit for this protocol.          Passed - Medication indicated for associated diagnosis             Passed - Patient is 18 years of age or older

## 2025-08-12 ENCOUNTER — MYC REFILL (OUTPATIENT)
Dept: FAMILY MEDICINE | Facility: CLINIC | Age: 55
End: 2025-08-12

## 2025-08-12 ENCOUNTER — OFFICE VISIT (OUTPATIENT)
Dept: DERMATOLOGY | Facility: CLINIC | Age: 55
End: 2025-08-12
Payer: COMMERCIAL

## 2025-08-12 DIAGNOSIS — L81.4 LENTIGINES: ICD-10-CM

## 2025-08-12 DIAGNOSIS — L21.9 DERMATITIS, SEBORRHEIC: Primary | ICD-10-CM

## 2025-08-12 DIAGNOSIS — G63 POLYNEUROPATHY ASSOCIATED WITH UNDERLYING DISEASE: ICD-10-CM

## 2025-08-12 DIAGNOSIS — D22.9 MULTIPLE BENIGN NEVI: ICD-10-CM

## 2025-08-12 DIAGNOSIS — L82.1 SEBORRHEIC KERATOSES: ICD-10-CM

## 2025-08-12 DIAGNOSIS — L57.0 AK (ACTINIC KERATOSIS): ICD-10-CM

## 2025-08-12 DIAGNOSIS — D18.01 CHERRY ANGIOMA: ICD-10-CM

## 2025-08-12 DIAGNOSIS — Z12.83 ENCOUNTER FOR SCREENING FOR MALIGNANT NEOPLASM OF SKIN: ICD-10-CM

## 2025-08-12 DIAGNOSIS — L82.0 INFLAMED SEBORRHEIC KERATOSIS: ICD-10-CM

## 2025-08-12 RX ORDER — KETOCONAZOLE 20 MG/ML
SHAMPOO, SUSPENSION TOPICAL
Qty: 120 ML | Refills: 11 | Status: SHIPPED | OUTPATIENT
Start: 2025-08-12

## 2025-08-12 RX ORDER — PREGABALIN 75 MG/1
75 CAPSULE ORAL 3 TIMES DAILY
Qty: 90 CAPSULE | Refills: 0 | Status: SHIPPED | OUTPATIENT
Start: 2025-08-12

## 2025-08-13 RX ORDER — PREGABALIN 75 MG/1
75 CAPSULE ORAL 3 TIMES DAILY
Qty: 90 CAPSULE | Refills: 0 | OUTPATIENT
Start: 2025-08-13

## 2025-08-21 ENCOUNTER — TELEPHONE (OUTPATIENT)
Dept: OPHTHALMOLOGY | Facility: CLINIC | Age: 55
End: 2025-08-21

## 2025-08-21 ENCOUNTER — VIRTUAL VISIT (OUTPATIENT)
Dept: EDUCATION SERVICES | Facility: CLINIC | Age: 55
End: 2025-08-21
Payer: COMMERCIAL

## 2025-08-21 ENCOUNTER — OFFICE VISIT (OUTPATIENT)
Dept: OPHTHALMOLOGY | Facility: CLINIC | Age: 55
End: 2025-08-21
Attending: OPHTHALMOLOGY
Payer: COMMERCIAL

## 2025-08-21 DIAGNOSIS — B88.0 INFESTATION BY DEMODEX: ICD-10-CM

## 2025-08-21 DIAGNOSIS — H25.813 MIXED TYPE AGE-RELATED CATARACT, BOTH EYES: ICD-10-CM

## 2025-08-21 DIAGNOSIS — H40.1123 PRIMARY OPEN ANGLE GLAUCOMA (POAG) OF LEFT EYE, SEVERE STAGE: ICD-10-CM

## 2025-08-21 DIAGNOSIS — H01.00B BLEPHARITIS OF UPPER AND LOWER EYELIDS OF BOTH EYES, UNSPECIFIED TYPE: Primary | ICD-10-CM

## 2025-08-21 DIAGNOSIS — E10.3413 SEVERE NONPROLIFERATIVE DIABETIC RETINOPATHY OF BOTH EYES WITH MACULAR EDEMA ASSOCIATED WITH TYPE 1 DIABETES MELLITUS (H): ICD-10-CM

## 2025-08-21 DIAGNOSIS — E10.311 TYPE 1 DIABETES MELLITUS WITH RETINOPATHY AND MACULAR EDEMA, UNSPECIFIED LATERALITY, UNSPECIFIED RETINOPATHY SEVERITY (H): Primary | ICD-10-CM

## 2025-08-21 DIAGNOSIS — H40.001 GLAUCOMA SUSPECT, RIGHT EYE: ICD-10-CM

## 2025-08-21 DIAGNOSIS — H01.00A BLEPHARITIS OF UPPER AND LOWER EYELIDS OF BOTH EYES, UNSPECIFIED TYPE: Primary | ICD-10-CM

## 2025-08-21 PROCEDURE — G0463 HOSPITAL OUTPT CLINIC VISIT: HCPCS | Performed by: OPHTHALMOLOGY

## 2025-08-21 PROCEDURE — 76519 ECHO EXAM OF EYE: CPT | Performed by: OPHTHALMOLOGY

## 2025-08-21 PROCEDURE — 92134 CPTRZ OPH DX IMG PST SGM RTA: CPT | Performed by: OPHTHALMOLOGY

## 2025-08-21 RX ORDER — LOTILANER OPHTHALMIC SOLUTION 2.5 MG/ML
1 SOLUTION/ DROPS OPHTHALMIC 2 TIMES DAILY
Qty: 10 ML | Refills: 0 | OUTPATIENT
Start: 2025-08-21

## 2025-08-21 ASSESSMENT — REFRACTION_MANIFEST
OD_ADD: +2.00
OS_SPHERE: -8.75
OS_AXIS: 064
OS_CYLINDER: +1.25
OD_CYLINDER: +1.50
OS_ADD: +2.00
OD_SPHERE: -8.00
OD_AXIS: 131

## 2025-08-21 ASSESSMENT — VISUAL ACUITY
METHOD: SNELLEN - LINEAR
OD_PH_CC: 20/40
OD_BAT_MED: 20/60
OS_CC: 20/40
OS_BAT_LOW: 20/50-1
OD_CC: 20/50
OD_BAT_LOW: 20/60
OS_BAT_MED: 20/60
OS_BAT_HIGH: 20/125
OS_CC+: -2
CORRECTION_TYPE: CONTACTS
OS_PH_CC: 20/40
OD_BAT_HIGH: 20/70

## 2025-08-21 ASSESSMENT — EXTERNAL EXAM - LEFT EYE: OS_EXAM: NORMAL

## 2025-08-21 ASSESSMENT — CUP TO DISC RATIO
OS_RATIO: 0.95
OD_RATIO: 0.3

## 2025-08-21 ASSESSMENT — PACHYMETRY
OD_CT(UM): .567
OS_CT(UM): .558

## 2025-08-21 ASSESSMENT — CONF VISUAL FIELD
OD_INFERIOR_NASAL_RESTRICTION: 0
METHOD: COUNTING FINGERS
OD_INFERIOR_TEMPORAL_RESTRICTION: 0
OD_SUPERIOR_NASAL_RESTRICTION: 0
OS_SUPERIOR_TEMPORAL_RESTRICTION: 3
OD_NORMAL: 1
OS_INFERIOR_TEMPORAL_RESTRICTION: 3
OD_SUPERIOR_TEMPORAL_RESTRICTION: 0

## 2025-08-21 ASSESSMENT — TONOMETRY
OS_IOP_MMHG: 09
OD_IOP_MMHG: 11
IOP_METHOD: ICARE

## 2025-08-21 ASSESSMENT — EXTERNAL EXAM - RIGHT EYE: OD_EXAM: NORMAL

## 2025-09-02 DIAGNOSIS — E10.311 TYPE 1 DIABETES MELLITUS WITH RETINOPATHY AND MACULAR EDEMA, UNSPECIFIED LATERALITY, UNSPECIFIED RETINOPATHY SEVERITY (H): Primary | ICD-10-CM

## 2025-09-03 ENCOUNTER — PATIENT OUTREACH (OUTPATIENT)
Dept: CARE COORDINATION | Facility: CLINIC | Age: 55
End: 2025-09-03
Payer: COMMERCIAL

## (undated) RX ORDER — ONDANSETRON 2 MG/ML
INJECTION INTRAMUSCULAR; INTRAVENOUS
Status: DISPENSED
Start: 2024-09-23

## (undated) RX ORDER — LIDOCAINE HYDROCHLORIDE 10 MG/ML
INJECTION, SOLUTION EPIDURAL; INFILTRATION; INTRACAUDAL; PERINEURAL
Status: DISPENSED
Start: 2024-09-24